# Patient Record
Sex: MALE | Race: AMERICAN INDIAN OR ALASKA NATIVE | NOT HISPANIC OR LATINO | Employment: OTHER | URBAN - METROPOLITAN AREA
[De-identification: names, ages, dates, MRNs, and addresses within clinical notes are randomized per-mention and may not be internally consistent; named-entity substitution may affect disease eponyms.]

---

## 2023-04-03 ENCOUNTER — TRANSFERRED RECORDS (OUTPATIENT)
Dept: HEALTH INFORMATION MANAGEMENT | Facility: CLINIC | Age: 68
End: 2023-04-03

## 2023-05-16 ENCOUNTER — TRANSFERRED RECORDS (OUTPATIENT)
Dept: HEALTH INFORMATION MANAGEMENT | Facility: CLINIC | Age: 68
End: 2023-05-16

## 2023-05-17 ENCOUNTER — TRANSFERRED RECORDS (OUTPATIENT)
Dept: HEALTH INFORMATION MANAGEMENT | Facility: CLINIC | Age: 68
End: 2023-05-17

## 2023-07-14 ENCOUNTER — TRANSFERRED RECORDS (OUTPATIENT)
Dept: HEALTH INFORMATION MANAGEMENT | Facility: CLINIC | Age: 68
End: 2023-07-14

## 2023-07-28 ENCOUNTER — HOSPITAL ENCOUNTER (OUTPATIENT)
Dept: CT IMAGING | Facility: HOSPITAL | Age: 68
Discharge: HOME | End: 2023-07-28
Attending: INTERNAL MEDICINE | Admitting: INTERNAL MEDICINE
Payer: COMMERCIAL

## 2023-07-28 DIAGNOSIS — R93.89 IMAGING ABNORMALITY: ICD-10-CM

## 2023-07-28 LAB
CREAT BLD-MCNC: 0.8 MG/DL (ref 0.7–1.3)
GFR SERPL CREATININE-BSD FRML MDRD: >60 ML/MIN/1.73M2

## 2023-07-28 PROCEDURE — 70491 CT SOFT TISSUE NECK W/DYE: CPT

## 2023-07-28 PROCEDURE — 82565 ASSAY OF CREATININE: CPT

## 2023-07-28 PROCEDURE — 250N000011 HC RX IP 250 OP 636: Mod: JZ | Performed by: INTERNAL MEDICINE

## 2023-07-28 RX ORDER — IOPAMIDOL 755 MG/ML
90 INJECTION, SOLUTION INTRAVASCULAR ONCE
Status: COMPLETED | OUTPATIENT
Start: 2023-07-28 | End: 2023-07-28

## 2023-07-28 RX ADMIN — IOPAMIDOL 90 ML: 755 INJECTION, SOLUTION INTRAVENOUS at 13:54

## 2023-08-01 ENCOUNTER — MEDICAL CORRESPONDENCE (OUTPATIENT)
Dept: HEALTH INFORMATION MANAGEMENT | Facility: CLINIC | Age: 68
End: 2023-08-01
Payer: COMMERCIAL

## 2023-08-02 ENCOUNTER — TRANSCRIBE ORDERS (OUTPATIENT)
Dept: VASCULAR SURGERY | Facility: CLINIC | Age: 68
End: 2023-08-02
Payer: COMMERCIAL

## 2023-08-02 ENCOUNTER — TRANSCRIBE ORDERS (OUTPATIENT)
Dept: OTHER | Age: 68
End: 2023-08-02

## 2023-08-02 DIAGNOSIS — I65.21 OCCLUSION OF RIGHT CAROTID ARTERY: Primary | ICD-10-CM

## 2023-08-02 DIAGNOSIS — I65.21 ARTERIOSCLEROSIS OF CAROTID ARTERY, RIGHT: ICD-10-CM

## 2023-08-02 DIAGNOSIS — J39.2 THROAT MASS: Primary | ICD-10-CM

## 2023-08-03 ENCOUNTER — TELEPHONE (OUTPATIENT)
Dept: OTOLARYNGOLOGY | Facility: CLINIC | Age: 68
End: 2023-08-03
Payer: COMMERCIAL

## 2023-08-07 DIAGNOSIS — I65.29 CAROTID STENOSIS: Primary | ICD-10-CM

## 2023-08-07 NOTE — TELEPHONE ENCOUNTER
FUTURE VISIT INFORMATION:      FUTURE VISIT INFORMATION:  Date: 8/9/23  Time: 9:20 AM  Location: [facility] [place]  REFERRAL INFORMATION:  Referring provider: Wojciech Vicente MD   Referring providers clinic: [facility]  Reason for visit/diagnosis:  Ref by Wojciech Vicente MD for Throat Mass, Arteriosclerosis of Carotid Artery     RECORDS REQUESTED FROM:       Clinic name Comments Records Status Imaging Status   Imaging CT neck 7/28/23 Epic Pacs   [facility] 7/11/2023 [place] Video Swallow Study   5/5/2023 [place] ESOPHAGRAM  CE pending  Req 8/7/23  Pacs   [facility] Speech Therapy   7/11/23 note- Rayna Aleman, SLP   CE    [facility] 4/3/23 note Don Kilpatrick DO Send to scan 9/8/23 August 7, 2023 at 3:02 PM - request for imaging pushed and [facility] for recs -[place]  August 8, 2023 at 9:06 AM - Images resolved in pacs from [facility] -[place]  August 8, 2023 at 10:22 AM - Received [facility] recs and send to scan -[facility]

## 2023-08-09 ENCOUNTER — THERAPY VISIT (OUTPATIENT)
Dept: SPEECH THERAPY | Facility: CLINIC | Age: 68
End: 2023-08-09
Payer: COMMERCIAL

## 2023-08-09 ENCOUNTER — PRE VISIT (OUTPATIENT)
Dept: OTOLARYNGOLOGY | Facility: CLINIC | Age: 68
End: 2023-08-09

## 2023-08-09 ENCOUNTER — OFFICE VISIT (OUTPATIENT)
Dept: OTOLARYNGOLOGY | Facility: CLINIC | Age: 68
End: 2023-08-09
Payer: COMMERCIAL

## 2023-08-09 DIAGNOSIS — R22.1 NECK MASS: ICD-10-CM

## 2023-08-09 DIAGNOSIS — R13.13 PHARYNGEAL DYSPHAGIA: Primary | ICD-10-CM

## 2023-08-09 DIAGNOSIS — R13.10 DYSPHAGIA, UNSPECIFIED TYPE: Primary | ICD-10-CM

## 2023-08-09 PROCEDURE — 31575 DIAGNOSTIC LARYNGOSCOPY: CPT | Performed by: OTOLARYNGOLOGY

## 2023-08-09 PROCEDURE — 99207 PR NO BILLABLE SERVICE THIS VISIT: CPT | Performed by: SPEECH-LANGUAGE PATHOLOGIST

## 2023-08-09 PROCEDURE — 99205 OFFICE O/P NEW HI 60 MIN: CPT | Mod: 25 | Performed by: OTOLARYNGOLOGY

## 2023-08-09 ASSESSMENT — PAIN SCALES - GENERAL: PAINLEVEL: MILD PAIN (2)

## 2023-08-09 NOTE — NURSING NOTE
Chief Complaint   Patient presents with    Consult     Throat mass        .  Rogelio Santiago LPN

## 2023-08-09 NOTE — LETTER
8/9/2023       RE: Ko Thakkar  [address]  [address]  [place] [address]     Dear Colleague,    Thank you for referring your patient, Ko Thakkar, to the [facility] [place] at [facility]. Please see a copy of my visit note below.    Dear Dr. Vicente:    I had the pleasure of meeting Ko Thakkar in consultation today at the [facility] at your request.     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    He was seen by [facility] on 4/3/2023 at the request of his PCP for dysphagia to solid foods. They discussed EGD vs swallow study.     He had an EGD on 5/16/2023 with GI and found to be H pylori positive, started on medication.     He saw SLP on 7/11/2023 for a video swallow study at [facility]. He was noted to have poor epiglottic inversion and was recommended for further evaluation. He did have penetration and aspiration on the swallow study.    He had a CT neck on 7/28/2023 which showed a supraglottic mass measuring 2.2 x 1.4 x 2.3 cm with concern for paraglottic involvement, no cartilage erosion, enlarged bilateral level IB, IIA, III, and right level IV nodes per radiology. He was also noted to have 70% ICA stenosis with concerns for high risk plaque. He has carotid duplex and vascular consult on 8/21/2023.    He says he tries to eat as much protein as possible, has to avoid things like steak. He makes shaw and softer things that involve less chewing. He does supplements in liquid form. He says sometimes soft things get stuck. He is able to clear things when they get stuck. He does not have pain with swallowing. He is able to feel residuals. He has coughing on liquids. He has not had an aspiration pneumonia. He has lost about  5-7 lbs in the last few months. He has no ear pain. He has tinnitus. He feels like it is not as easy to breathe as it used to be. He can walk up to about 3 flights of stairs. He has no hemoptysis.     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     He says his teeth need addressing. He last saw the dentist about a year ago.     His wife is dealing with some surgical needs as well.       Past medical history: reflux, removal of food impaction in 2008 and 2014 (potential web vs Schatzki ring in distal esophagus on EGD), hyperlipidemia, hypertension, diabetes, spinal meningitis as a child    Past surgical history: none    Social history: History of alcohol sober for 24 years. Former drug abuse. Previously smoked about 23 years ago, 4 ppd previously, 8 years old started. Intermittent chewing tobacco use in the past. Works as a . Lives with wife. Worked as .    Family history: Brother had scleroderma    MEDICATIONS:     Current Outpatient Medications   Medication Sig Dispense Refill    aspirin 325 MG tablet [ASPIRIN 325 MG TABLET] Take 325 mg by mouth daily.      glyBURIDE (DIABETA) 5 MG tablet [GLYBURIDE (DIABETA) 5 MG TABLET] Take 10 mg by mouth 2 (two) times a day with meals.      insulin glargine (LANTUS) 100 unit/mL injection [INSULIN GLARGINE (LANTUS) 100 UNIT/ML INJECTION] Inject 25 Units under the skin bedtime.      lisinopril (PRINIVIL,ZESTRIL) 5 MG tablet [LISINOPRIL (PRINIVIL,ZESTRIL) 5 MG TABLET] Take 5 mg by mouth daily.      metFORMIN (GLUCOPHAGE) 500 MG tablet [METFORMIN (GLUCOPHAGE) 500 MG TABLET] Take 500 mg by mouth 2 (two) times a day with meals.      pravastatin (PRAVACHOL) 20 MG tablet [PRAVASTATIN (PRAVACHOL) 20 MG TABLET] Take 20 mg by mouth bedtime.      therapeutic multivitamin (THERAGRAN) tablet [THERAPEUTIC MULTIVITAMIN (THERAGRAN) TABLET] Take 1 tablet by mouth daily.         ALLERGIES:  No Known Allergies    HABITS/SOCIAL HISTORY:   History of alcohol sober for 24 years. Former drug abuse.  Previously smoked about 23 years ago, 4 ppd previously, 8 years old started. Intermittent chewing tobacco use in the past.   Works as a .   Lives with wife.   Worked as .    Social History     Socioeconomic History    Marital status:      Spouse name: Not on file    Number of children: Not on file    Years of education: Not on file    Highest education level: Not on file   Occupational History    Not on file   Tobacco Use    Smoking status: Former     Types: Cigarettes     Quit date: 10/12/1999     Years since quittin.8    Smokeless tobacco: Not on file   Substance and Sexual Activity    Alcohol use: No    Drug use: No    Sexual activity: Not on file   Other Topics Concern    Not on file   Social History Narrative    Not on file     Social Determinants of Health     Financial Resource Strain: Not on file   Food Insecurity: Not on file   Transportation Needs: Not on file   Physical Activity: Not on file   Stress: Not on file   Social Connections: Not on file   Intimate Partner Violence: Not on file   Housing Stability: Not on file       PAST MEDICAL HISTORY: No past medical history on file.     PAST SURGICAL HISTORY:   Past Surgical History:   Procedure Laterality Date    ESOPHAGOSCOPY, GASTROSCOPY, DUODENOSCOPY (EGD), COMBINED N/A 10/12/2014    Procedure: ESOPHAGOGASTRODUODENOSCOPY;  Surgeon: Jh Ventura MD;  Location: [facility];  Service:     LAPAROTOMY EXPLORATORY Left     spleen       FAMILY HISTORY:  No family history on file.    REVIEW OF SYSTEMS:  12 point ROS was negative other than the symptoms noted above in the HPI.  Patient Supplied Answers to Review of Systems      2023     9:07 AM    ENT ROS   Constitutional Problems with sleep   Neurology Dizzy spells    Headache   Psychology Frequently feeling anxious   Ears, Nose, Throat Ringing/noise in ears    Sore throat    Trouble swallowing    Hoarseness   Cardiopulmonary Cough         PHYSICAL EXAMINATION:   There were  no vitals taken for this visit.   Appearance:   normal; NAD, age-appropriate appearance, well-developed, normal habitus   Communication:   normal; communicates verbally, normal voice quality   Head/Face:   inspection -  Normal; no scars or visible lesions   Salivary glands -  Normal size, no tenderness, swelling, or palpable masses   Facial strength -  Normal and symmetric    Skin:  normal, no rash   Ears:  auricle (AD) -  normal  EAC (AD) -  normal  TM (AD) -  Normal, no effusion  auricle (AS) -  normal  EAC (AS) -  normal  TM (AS) -  Normal, no effusion  Normal clinical speech reception   Nose:  Ext. inspection -  Normal  Internal Inspection -  Normal mucosa, septum, and turbinates   Nasopharynx:  normal mucosa, no masses   Oral Cavity:  lips -  Normal mucosa, oral competence, and stoma size   edentulous along maxilla, mandible with broken and missing teeth, healthy gingival mucosa   Hard palate, buccal, floor of mouth mucosa normal   Tongue - normal movement, no lesions, no palpable masses   Oropharynx:  mucosa -  Normal, no lesions  soft palate -  Normal, no lesions, no asymmetry, normal elevation  There is tumor of the right tonsil extending toward the base of tongue  There is some asymmetric fullness of the right lateral/posterior pharyngeal wall   Hypopharynx:  asymmetric fullness of the right lateral and pharyngeal wall concerning for submucosal tumor  No pooled secretions  Limited view of pyriform sinuses particularly on right   Larynx:  Epiglottis is thickened  Tumor of the right arytenoid  Right cord is paralyzed  Left arytenoid with some mild edema  Mobile left true cord   Neck: No visible mass or asymmetry   Palpable about 1.5-2 cm node in left level II  Normal range of motion   Lymphatic:  left level II node about 1.5-2 cm in size, firm   Cardiovascular:  warm, pink, well-perfused extremities without swelling, tenderness, or edema   Respiratory:  Normal respiratory effort, no stridor   Neuro/Psych.:   mood/affect -  normal  mental status -  normal          PROCEDURES:   Flexible fiberoptic laryngoscopy: Scope exam was indicated due to oropharyngeal vs supraglottic cancer. Verbal consent was obtained. The nasal cavity was prepped with an aerosolized solution of topical anesthetic and vasoconstrictive agent. The scope was passed through the anterior nasal cavity and advanced. Inspection of the nasopharynx revealed no gross abnormality. There is tumor of the right tonsil extending toward the glossotonsillar sulcus. There is asymetric fullness of the right lateral and posterior pharyngeal wall. There is edema of the epiglottis. There is tumor of the right arytenoid. The right cord is paralyzed. The left cord is mobile. The left arytenoid has some mild edema. There is a limited view of the pyriform sinuses. The airway is patent. Procedure tolerated well with no immediate complications noted.                    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 I am suspicious this is oropharyngeal with supraglottic extension.    We discussed that his clinical picture is certainly concerning for a laryngeal (vs oropharyngeal) cancer with sameer metastasis.    We will need to get a tissue diagnosis. We discussed that this could be done with a needle biopsy of the palpable neck node or an in clinic tonsil biopsy. He has considerable phobia of needles and would prefer anything be done under sedation. We will get him setup for a biopsy with IR of the left neck node.    We discussed that if his disease has no distant metastasis that he still has curable disease. If there is distant disease then this is treatable but not curable.    We broadly discussed that this cancer  can be treated with surgical and nonsurgical techniques. Nonsurgical treatment would be with chemoradiation with laryngeal preservation. Surgical treatment would be with a total laryngectomy/partial pharyngectomy with postoperative radiation (given the multiple bilateral nodes concerning for metastasis).    Certainly of concern is his baseline aspiration. We discussed that a laryngectomy would give him the opportunity to swallow again safely when he heals. We discussed that he would be NPO with a feeding tube for a period of time while healing but that ultimately he would be able to eat again safely without aspiration.     We discussed however that a laryngectomy would lose his ability to talk normally, that there are vocal rehab options. He states today very clearly that singing is very important to him, more so even than eating. Certainly with the partial pharyngectomy I would have some concerns about this but I encouraged him to talk more with the SLP team as well.     We discussed that if he has nonsurgical therapy with chemoradiation he would almost certainly need to have a PEG and this could be permanent vs temporary. We discussed that the radiation could also have impacts on his singing.    Complicating his situation, his wife also recently was diagnosed with some medical issues and will need to undergo surgery. We discussed that his is obviously a very challenging situation and will take balance to figure out timing for each of their treatments.    I had him see our SLP team today. He will need to have a full visit at his follow-up.     Once we have a tissue diagnosis, if malignancy is verified then we will need to proceed with a PET scan. I would like to plan on seeing him back about a week after the PET scan, giving us time to review it at tumor board.    Important in his treatment planning will also be his severe carotid stenosis. He is seeing vascular in 2 weeks with a carotid duplex. We discussed that  management of carotid stenosis varies and could impact his surgical clearance as well.     I discussed with him that he would not be able to be on anticoagulation for surgical resection of any malignancy in the form of a laryngectomy. With nonsurgical therapy he would almost certainly need a PEG tube and would also need to be done prior to any anticoagulation for treatment of his carotid stenosis.    We discussed that regardless of surgical or nonsurgical therapy he would need to have dental extractions given the need for radiation. If he has surgical treatment we can discuss the teeth at the time of laryngectomy. If he has nonsurgical therapy we would need to get him in with the dentist as soon as possible.    Thank you very much for the opportunity to participate in the care of your patient.      Denia Hardin MD  Otolaryngology- Head & Neck Surgery      This note was dictated with voice recognition software and then edited. Please excuse any unintentional errors.         CC:  Wojciech Vicente MD  [facility]   [address] [id number]  [facility] [address]      Alton Mota MD  [facility]   [address]  [facility] [address]

## 2023-08-09 NOTE — PROGRESS NOTES
Ko Thakkar was seen for allied health care provider visit for introduction of self and SLP services. Provided information on trajectory of care and benefits of SLP services after further work up is completed. Speaking is functional. Swallowing is impaired. Pt had VFSS on the outside which resulted in recommendation for further evaluation of pharyngeal anatomy. Currently here is a mass in his pharynx which may require total laryngectomy with partial pharyngectomy or chemoradiation. Offered formal pre-operative total laryngectomy education however pt and his spouse declined today feeling a bit overwhelmed with everything going on in their lives. Pt's wife has to have surgery for cervical cancer and the thought of cancer in the patient has the wife feeling overwhelmed. All questions answered within scope of practice for pt and his spouse. Encouraged pt to reach out with any questions or concerns. Time spent with patient 9 minutes. No billable services provided. Pt will benefit from formal evaluation upon return to ENT clinic.       Rola Marcano MS, CCC-SLP  Speech-Language Pathology  [facility]  Department of Otolaryngology/D&T - 4th floor  Phone: [phone]  Email: [email or web address]

## 2023-08-09 NOTE — CONSULTS
.    Outpatient Neuroradiology Biopsy Referral    Patient is a 68 y/o male with a PMH of HTN, HDL, DM, reflux, tobacco use, drug abuse, Dysphagia.  Neuroradiology has been asked to biopsy left neck mass noted on recent CT with concerns for malignancy.    CT 7/28/23 IMPRESSION:   1.  Right supraglottic visceral space mass causing partial effacement of the supraglottic airway medially and effacement of the adjacent sub-thyroid cartilage fat plane. No CT findings of thyroid arytenoid cartilage invasion or erosion. Pathologic bilateral lymphadenopathy involving levels I, II, III and IV. Primary diagnostic considerations include squamous cell carcinoma and lymphoma. Tissue sampling and direct visualization recommended.  2.  High-grade (approximately 70%) proximal right internal carotid artery stenosis secondary to high risk plaque. Recommend ultrasound and vascular surgery consultation.  3.  Multiple mandibular cavities.     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    Case and imaging CT 7/28/23 was reviewed with Dr. Daniel and Dr. Gibbs from Neuroradiology and FNA biopsy of the left neck mass is approved.     Procedure order, FNA entered order placed.    No hold ASA.    If requesting team would like samples sent for anything else please enter them or notify Neuroradiology prior to scheduled procedure.    Primary team Dr. Wilfred FREGOSO made aware of Neuroradiology recommendations via epic messaging.    PETE Cleaning CNP  Interventional Radiology   IR on-call pager: [phone]

## 2023-08-09 NOTE — PROGRESS NOTES
Dear Dr. Vicente:    I had the pleasure of meeting Ko Thakkar in consultation today at the [facility] at your request.     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    He was seen by [facility] on 4/3/2023 at the request of his PCP for dysphagia to solid foods. They discussed EGD vs swallow study.     He had an EGD on 5/16/2023 with GI and found to be H pylori positive, started on medication.     He saw SLP on 7/11/2023 for a video swallow study at [facility]. He was noted to have poor epiglottic inversion and was recommended for further evaluation. He did have penetration and aspiration on the swallow study.    He had a CT neck on 7/28/2023 which showed a supraglottic mass measuring 2.2 x 1.4 x 2.3 cm with concern for paraglottic involvement, no cartilage erosion, enlarged bilateral level IB, IIA, III, and right level IV nodes per radiology. He was also noted to have 70% ICA stenosis with concerns for high risk plaque. He has carotid duplex and vascular consult on 8/21/2023.    He says he tries to eat as much protein as possible, has to avoid things like steak. He makes shaw and softer things that involve less chewing. He does supplements in liquid form. He says sometimes soft things get stuck. He is able to clear things when they get stuck. He does not have pain with swallowing. He is able to feel residuals. He has coughing on liquids. He has not had an aspiration pneumonia. He has lost about 5-7 lbs in the last few months. He has no ear pain. He has tinnitus. He feels like it is not as easy to breathe as it used to be. He can walk up to about 3 flights of stairs. He has no hemoptysis.     He is a [place] in [place].     He says his teeth need addressing. He last saw the dentist about a year ago.     His wife is dealing with  some surgical needs as well.       Past medical history: reflux, removal of food impaction in 2008 and 2014 (potential web vs Schatzki ring in distal esophagus on EGD), hyperlipidemia, hypertension, diabetes, spinal meningitis as a child    Past surgical history: none    Social history: History of alcohol sober for 24 years. Former drug abuse. Previously smoked about 23 years ago, 4 ppd previously, 8 years old started. Intermittent chewing tobacco use in the past. Works as a . Lives with wife. Worked as .    Family history: Brother had scleroderma    MEDICATIONS:     Current Outpatient Medications   Medication Sig Dispense Refill     aspirin 325 MG tablet [ASPIRIN 325 MG TABLET] Take 325 mg by mouth daily.       glyBURIDE (DIABETA) 5 MG tablet [GLYBURIDE (DIABETA) 5 MG TABLET] Take 10 mg by mouth 2 (two) times a day with meals.       insulin glargine (LANTUS) 100 unit/mL injection [INSULIN GLARGINE (LANTUS) 100 UNIT/ML INJECTION] Inject 25 Units under the skin bedtime.       lisinopril (PRINIVIL,ZESTRIL) 5 MG tablet [LISINOPRIL (PRINIVIL,ZESTRIL) 5 MG TABLET] Take 5 mg by mouth daily.       metFORMIN (GLUCOPHAGE) 500 MG tablet [METFORMIN (GLUCOPHAGE) 500 MG TABLET] Take 500 mg by mouth 2 (two) times a day with meals.       pravastatin (PRAVACHOL) 20 MG tablet [PRAVASTATIN (PRAVACHOL) 20 MG TABLET] Take 20 mg by mouth bedtime.       therapeutic multivitamin (THERAGRAN) tablet [THERAPEUTIC MULTIVITAMIN (THERAGRAN) TABLET] Take 1 tablet by mouth daily.         ALLERGIES:  No Known Allergies    HABITS/SOCIAL HISTORY:   History of alcohol sober for 24 years. Former drug abuse. Previously smoked about 23 years ago, 4 ppd previously, 8 years old started. Intermittent chewing tobacco use in the past.   Works as a .   Lives with wife.   Worked as .    Social History     Socioeconomic History     Marital status:      Spouse name: Not on file     Number of children: Not on file     Years of  education: Not on file     Highest education level: Not on file   Occupational History     Not on file   Tobacco Use     Smoking status: Former     Types: Cigarettes     Quit date: 10/12/1999     Years since quittin.8     Smokeless tobacco: Not on file   Substance and Sexual Activity     Alcohol use: No     Drug use: No     Sexual activity: Not on file   Other Topics Concern     Not on file   Social History Narrative     Not on file     Social Determinants of Health     Financial Resource Strain: Not on file   Food Insecurity: Not on file   Transportation Needs: Not on file   Physical Activity: Not on file   Stress: Not on file   Social Connections: Not on file   Intimate Partner Violence: Not on file   Housing Stability: Not on file       PAST MEDICAL HISTORY: No past medical history on file.     PAST SURGICAL HISTORY:   Past Surgical History:   Procedure Laterality Date     ESOPHAGOSCOPY, GASTROSCOPY, DUODENOSCOPY (EGD), COMBINED N/A 10/12/2014    Procedure: ESOPHAGOGASTRODUODENOSCOPY;  Surgeon: Jh Ventura MD;  Location: [facility];  Service:      LAPAROTOMY EXPLORATORY Left     spleen       FAMILY HISTORY:  No family history on file.    REVIEW OF SYSTEMS:  12 point ROS was negative other than the symptoms noted above in the HPI.  Patient Supplied Answers to Review of Systems      2023     9:07 AM    ENT ROS   Constitutional Problems with sleep   Neurology Dizzy spells    Headache   Psychology Frequently feeling anxious   Ears, Nose, Throat Ringing/noise in ears    Sore throat    Trouble swallowing    Hoarseness   Cardiopulmonary Cough         PHYSICAL EXAMINATION:   There were no vitals taken for this visit.   Appearance:   normal; NAD, age-appropriate appearance, well-developed, normal habitus   Communication:   normal; communicates verbally, normal voice quality   Head/Face:   inspection -  Normal; no scars or visible lesions   Salivary glands -  Normal size, no tenderness, swelling, or  palpable masses   Facial strength -  Normal and symmetric    Skin:  normal, no rash   Ears:  auricle (AD) -  normal  EAC (AD) -  normal  TM (AD) -  Normal, no effusion  auricle (AS) -  normal  EAC (AS) -  normal  TM (AS) -  Normal, no effusion  Normal clinical speech reception   Nose:  Ext. inspection -  Normal  Internal Inspection -  Normal mucosa, septum, and turbinates   Nasopharynx:  normal mucosa, no masses   Oral Cavity:  lips -  Normal mucosa, oral competence, and stoma size   edentulous along maxilla, mandible with broken and missing teeth, healthy gingival mucosa   Hard palate, buccal, floor of mouth mucosa normal   Tongue - normal movement, no lesions, no palpable masses   Oropharynx:  mucosa -  Normal, no lesions  soft palate -  Normal, no lesions, no asymmetry, normal elevation  There is tumor of the right tonsil extending toward the base of tongue  There is some asymmetric fullness of the right lateral/posterior pharyngeal wall   Hypopharynx:  asymmetric fullness of the right lateral and pharyngeal wall concerning for submucosal tumor  No pooled secretions  Limited view of pyriform sinuses particularly on right   Larynx:  Epiglottis is thickened  Tumor of the right arytenoid  Right cord is paralyzed  Left arytenoid with some mild edema  Mobile left true cord   Neck: No visible mass or asymmetry   Palpable about 1.5-2 cm node in left level II  Normal range of motion   Lymphatic:  left level II node about 1.5-2 cm in size, firm   Cardiovascular:  warm, pink, well-perfused extremities without swelling, tenderness, or edema   Respiratory:  Normal respiratory effort, no stridor   Neuro/Psych.:  mood/affect -  normal  mental status -  normal          PROCEDURES:   Flexible fiberoptic laryngoscopy: Scope exam was indicated due to oropharyngeal vs supraglottic cancer. Verbal consent was obtained. The nasal cavity was prepped with an aerosolized solution of topical anesthetic and vasoconstrictive agent. The  scope was passed through the anterior nasal cavity and advanced. Inspection of the nasopharynx revealed no gross abnormality. There is tumor of the right tonsil extending toward the glossotonsillar sulcus. There is asymetric fullness of the right lateral and posterior pharyngeal wall. There is edema of the epiglottis. There is tumor of the right arytenoid. The right cord is paralyzed. The left cord is mobile. The left arytenoid has some mild edema. There is a limited view of the pyriform sinuses. The airway is patent. Procedure tolerated well with no immediate complications noted.                    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 I am suspicious this is oropharyngeal with supraglottic extension.    We discussed that his clinical picture is certainly concerning for a laryngeal (vs oropharyngeal) cancer with sameer metastasis.    We will need to get a tissue diagnosis. We discussed that this could be done with a needle biopsy of the palpable neck node or an in clinic tonsil biopsy. He has considerable phobia of needles and would prefer anything be done under sedation. We will get him setup for a biopsy with IR of the left neck node.    We discussed that if his disease has no distant metastasis that he still has curable disease. If there is distant disease then this is treatable but not curable.    We broadly discussed that this cancer can be treated with surgical and nonsurgical techniques. Nonsurgical treatment would be with chemoradiation with laryngeal preservation. Surgical treatment would be with a total laryngectomy/partial pharyngectomy with postoperative radiation (given the multiple bilateral nodes concerning for  metastasis).    Certainly of concern is his baseline aspiration. We discussed that a laryngectomy would give him the opportunity to swallow again safely when he heals. We discussed that he would be NPO with a feeding tube for a period of time while healing but that ultimately he would be able to eat again safely without aspiration.     We discussed however that a laryngectomy would lose his ability to talk normally, that there are vocal rehab options. He states today very clearly that singing is very important to him, more so even than eating. Certainly with the partial pharyngectomy I would have some concerns about this but I encouraged him to talk more with the SLP team as well.     We discussed that if he has nonsurgical therapy with chemoradiation he would almost certainly need to have a PEG and this could be permanent vs temporary. We discussed that the radiation could also have impacts on his singing.    Complicating his situation, his wife also recently was diagnosed with some medical issues and will need to undergo surgery. We discussed that his is obviously a very challenging situation and will take balance to figure out timing for each of their treatments.    I had him see our SLP team today. He will need to have a full visit at his follow-up.     Once we have a tissue diagnosis, if malignancy is verified then we will need to proceed with a PET scan. I would like to plan on seeing him back about a week after the PET scan, giving us time to review it at tumor board.    Important in his treatment planning will also be his severe carotid stenosis. He is seeing vascular in 2 weeks with a carotid duplex. We discussed that management of carotid stenosis varies and could impact his surgical clearance as well.     I discussed with him that he would not be able to be on anticoagulation for surgical resection of any malignancy in the form of a laryngectomy. With nonsurgical therapy he would almost certainly need a  PEG tube and would also need to be done prior to any anticoagulation for treatment of his carotid stenosis.    We discussed that regardless of surgical or nonsurgical therapy he would need to have dental extractions given the need for radiation. If he has surgical treatment we can discuss the teeth at the time of laryngectomy. If he has nonsurgical therapy we would need to get him in with the dentist as soon as possible.    Thank you very much for the opportunity to participate in the care of your patient.      Denia Hardin MD  Otolaryngology- Head & Neck Surgery      This note was dictated with voice recognition software and then edited. Please excuse any unintentional errors.         CC:  Wojciech Vicente MD  [facility]   [address] [address]  [facility] [id number]      Alton Mota MD  [facility]   [address] [facility] [address]

## 2023-08-21 ENCOUNTER — ANCILLARY PROCEDURE (OUTPATIENT)
Dept: VASCULAR ULTRASOUND | Facility: CLINIC | Age: 68
End: 2023-08-21
Attending: SURGERY
Payer: COMMERCIAL

## 2023-08-21 ENCOUNTER — OFFICE VISIT (OUTPATIENT)
Dept: VASCULAR SURGERY | Facility: CLINIC | Age: 68
End: 2023-08-21
Attending: INTERNAL MEDICINE
Payer: COMMERCIAL

## 2023-08-21 VITALS
SYSTOLIC BLOOD PRESSURE: 167 MMHG | TEMPERATURE: 98 F | DIASTOLIC BLOOD PRESSURE: 77 MMHG | HEART RATE: 60 BPM | RESPIRATION RATE: 16 BRPM

## 2023-08-21 DIAGNOSIS — I65.29 CAROTID STENOSIS: ICD-10-CM

## 2023-08-21 DIAGNOSIS — I65.21 OCCLUSION OF RIGHT CAROTID ARTERY: ICD-10-CM

## 2023-08-21 PROCEDURE — G0463 HOSPITAL OUTPT CLINIC VISIT: HCPCS | Mod: 25 | Performed by: SURGERY

## 2023-08-21 PROCEDURE — 93880 EXTRACRANIAL BILAT STUDY: CPT

## 2023-08-21 PROCEDURE — 99214 OFFICE O/P EST MOD 30 MIN: CPT | Mod: GC | Performed by: SURGERY

## 2023-08-21 PROCEDURE — 93880 EXTRACRANIAL BILAT STUDY: CPT | Mod: 26 | Performed by: SURGERY

## 2023-08-21 RX ORDER — LIRAGLUTIDE 6 MG/ML
INJECTION SUBCUTANEOUS DAILY
COMMUNITY

## 2023-08-21 RX ORDER — ATORVASTATIN CALCIUM 20 MG/1
20 TABLET, FILM COATED ORAL DAILY
COMMUNITY
End: 2023-09-14

## 2023-08-21 RX ORDER — ATORVASTATIN CALCIUM 80 MG/1
80 TABLET, FILM COATED ORAL DAILY
Qty: 90 TABLET | Refills: 3 | Status: SHIPPED | OUTPATIENT
Start: 2023-08-21

## 2023-08-21 RX ORDER — ASPIRIN 81 MG/1
81 TABLET, CHEWABLE ORAL DAILY
Qty: 90 TABLET | Refills: 3 | Status: SHIPPED | OUTPATIENT
Start: 2023-08-21

## 2023-08-21 ASSESSMENT — PAIN SCALES - GENERAL: PAINLEVEL: NO PAIN (0)

## 2023-08-21 NOTE — PATIENT INSTRUCTIONS
Linda Connell,    Thank you for entrusting your care with us today. After your visit today with MD Tj Leonard this is the plan that was discussed at your appointment.    Increase atorvastatin to 80mg daily and start aspirin 81mg daily    Return in 6 months to follow up with ultrasound. We will call you to schedule.         I am including additional information on these things and our contact information if you have any questions or concerns.   Please do not hesitate to reach out to us if you felt we did not answer your questions or you are unsure of the treatment plan after your visit today. Our number is [phone].Thank you for trusting us with your care.         Again thank you for your time.

## 2023-08-21 NOTE — PROGRESS NOTES
VASCULAR SURGERY CLINIC CONSULTATION   VASCULAR SURGEON: Dr. Leonard    LOCATION:  [facility]    Ko Tahkkar  Medical Record #:  [id number]  YOB: 1955  Age:  67 year old     Date of Service: 8/21/2023    PRIMARY CARE PROVIDER: Alton Mota      Reason for visit: Right carotid stenosis    IMPRESSION: 67-year-old male with right-sided carotid stenosis that is high-grade.  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  This has to be addressed first before we intervene on the carotid stenosis.  It appears to be about 80 to 85% stenosed on the CT scan.  The patient is asymptomatic from carotid standpoint.    RECOMMENDATION:    Agree with that the patient has to be treated for cancer first.  Resume aspirin and atorvastatin and high-dose (80 mg).  We will follow-up in 6 months with another ultrasound of the carotid.    Seen and discussed with staff, Dr. Aisha Tapia MD      HPI:  Ko Thakkar is a 67 year old male who was seen today in consultation for right-sided carotid stenosis.  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  He was to pursue nonoperative management of chemoradiation for his cancer.  Otherwise, he never had a stroke or strokelike symptoms in the past.    REVIEW OF SYSTEMS:    A 12 point ROS was reviewed and except for what is listed in the HPI above, all others are negative    PHH:  No past medical history on file.     Past Surgical History:   Procedure Laterality Date    ESOPHAGOSCOPY, GASTROSCOPY, DUODENOSCOPY (EGD), COMBINED N/A 10/12/2014    Procedure: ESOPHAGOGASTRODUODENOSCOPY;  Surgeon: Jh Ventura MD;  Location: [facility];  Service:     LAPAROTOMY EXPLORATORY Left     spleen       ALLERGIES:  No Known Allergies    MEDS:    Current Outpatient Medications:     lisinopril (PRINIVIL,ZESTRIL) 5  MG tablet, [LISINOPRIL (PRINIVIL,ZESTRIL) 5 MG TABLET] Take 5 mg by mouth daily., Disp: , Rfl:     metFORMIN (GLUCOPHAGE) 500 MG tablet, [METFORMIN (GLUCOPHAGE) 500 MG TABLET] Take 500 mg by mouth 2 (two) times a day with meals., Disp: , Rfl:     aspirin 325 MG tablet, [ASPIRIN 325 MG TABLET] Take 325 mg by mouth daily. (Patient not taking: Reported on 8/21/2023), Disp: , Rfl:     glyBURIDE (DIABETA) 5 MG tablet, [GLYBURIDE (DIABETA) 5 MG TABLET] Take 10 mg by mouth 2 (two) times a day with meals. (Patient not taking: Reported on 8/21/2023), Disp: , Rfl:     insulin glargine (LANTUS) 100 unit/mL injection, [INSULIN GLARGINE (LANTUS) 100 UNIT/ML INJECTION] Inject 25 Units under the skin bedtime., Disp: , Rfl:     pravastatin (PRAVACHOL) 20 MG tablet, [PRAVASTATIN (PRAVACHOL) 20 MG TABLET] Take 20 mg by mouth bedtime., Disp: , Rfl:     therapeutic multivitamin (THERAGRAN) tablet, [THERAPEUTIC MULTIVITAMIN (THERAGRAN) TABLET] Take 1 tablet by mouth daily., Disp: , Rfl:     SOCIAL HABITS:   Social History    Substance and Sexual Activity      Alcohol use: No      History   Drug Use No      History   Smoking Status    Former    Types: Cigarettes    Quit date: 10/12/1999   Smokeless Tobacco    Not on file        FAMILY HISTORY:  No family history on file.    PE:  There were no vitals taken for this visit.  Wt Readings from Last 1 Encounters:   10/12/14 81.6 kg (180 lb)     There is no height or weight on file to calculate BMI.    EXAM:  GENERAL: This is a well-developed 67 year old male who appears his stated age  EYES: Grossly normal.  CARDIAC:  Warm, well-perfused, RRR, palpable radial, palpable femoral, and palpable PT pulses bilaterally  ABDOMEN: Soft, non-tender, no pulsatile mass  MUSCULOSKELETAL: Grossly normal and both lower extremities are intact.  HEME/LYMPH: No lymphedema  NEUROLOGIC: Focally intact, Alert and oriented x 3.   strength 5 out of 5 bilaterally, moving lower extremities without issues  PSYCH:  appropriate affect  INTEGUMENT: No open lesions or ulcers        DIAGNOSTIC STUDIES:     Images:  CT Soft Tissue Neck w Contrast    Result Date: 8/1/2023  EXAM: CT SOFT TISSUE NECK W CONTRAST LOCATION: [facility] DATE: 7/28/2023 INDICATION: Imaging abnormality. COMPARISON: None. CONTRAST: IsoVue-370. 90 mL. TECHNIQUE: Routine CT Soft Tissue Neck with IV contrast. Multiplanar reformats. Dose reduction techniques were used. FINDINGS: MUCOSAL SPACES/SOFT TISSUES: Hyperenhancing right supraglottic polypoid-shaped mass measuring 22 x 14 x 23 mm. Effacement of the right fat plane between the pharyngeal mucosal space and deep margin of the right thyroid cartilage. Partial effacement of the supraglottic airway. No erosion of the arytenoid or cricoid cartilages. The inferior margin of the lesion appears to involve the superior aspect of the false vocal cords. Abnormally enlarged bilateral level IB, IIA, III and right level IV lymph nodes  measuring between 11 and 21 mm long axis. Normal infraglottic trachea. Normal oral cavity,  spaces. Mild right greater than left torus mandibularis. Otherwise normal floor of mouth structures. LYMPH NODES: As above. SALIVARY GLANDS: Normal parotid and submandibular glands. THYROID: Normal. VESSELS: Focal circumferential narrowing of the right proximal internal carotid artery by low-attenuation plaque measuring 1.2-1.5 mm. Normal right internal carotid artery measures 4-5 mm calculating to approximately 70% luminal stenosis. Mild atherosclerotic plaque at the carotid bifurcations. VISUALIZED INTRACRANIAL/ORBITS/SINUSES: No abnormality of the visualized intracranial compartment or orbits. Mild-to-moderate parapharyngeal mucosal thickening and debris. Clear mastoid air cells. Multiple cavities involving the remaining mandibular teeth. OTHER: No destructive osseous lesion. Centrilobular and paraseptal emphysema.     IMPRESSION: 1.  Right supraglottic visceral  space mass causing partial effacement of the supraglottic airway medially and effacement of the adjacent sub-thyroid cartilage fat plane. No CT findings of thyroid arytenoid cartilage invasion or erosion. Pathologic bilateral lymphadenopathy involving levels I, II, III and IV. Primary diagnostic considerations include squamous cell carcinoma and lymphoma. Tissue sampling and direct visualization recommended. 2.  High-grade (approximately 70%) proximal right internal carotid artery stenosis secondary to high risk plaque. Recommend ultrasound and vascular surgery consultation. 3.  Multiple mandibular cavities. 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 This report will be copied to the [facility] to ensure a provider acknowledges the finding. Access Center is available Monday through Friday 8am-3:30 pm.        LABS:      No results found for: NA, BUN, CREATININE, GLUCOSE, HGB, PLT, BNP, INR

## 2023-08-21 NOTE — PROGRESS NOTES
[facility]        Patient is here for a consult to discuss Carotid stenosis. Patient has no symptoms.  Diagnosed with throat cancer 2 weeks ago, needs throat surgery first.     Pt is currently taking Statin.      The provider has been notified that the patient has no concerns.     Questions patient would like addressed today are: N/A.    Refills are needed: No    Has homecare services and agency name:  No

## 2023-08-22 ENCOUNTER — TELEPHONE (OUTPATIENT)
Dept: INTERVENTIONAL RADIOLOGY/VASCULAR | Facility: CLINIC | Age: 68
End: 2023-08-22
Payer: COMMERCIAL

## 2023-08-24 ENCOUNTER — TELEPHONE (OUTPATIENT)
Dept: OTOLARYNGOLOGY | Facility: CLINIC | Age: 68
End: 2023-08-24
Payer: COMMERCIAL

## 2023-08-24 NOTE — TELEPHONE ENCOUNTER
" Health Call Center    Phone Message    May a detailed message be left on voicemail: yes     Reason for Call: Other: Pt called in this morning letting us know that he will be having another procedure for throat cancer and the way he understands it is that Dr. Hardin will not be able to do surgery on 8/29/23. When this writer went to clarify this information pt was very confused and we telling me that Dr. Hardin told him that he can not do this throat cancer \"thing\" at the same time as surgery. Please reach out to patient and help him understand what is going on and if his surgery with Dr. Hardin is still going to be scheduled for 8/29/23 or if it will have to be post poned. Thank you     Action Taken: Message routed to:  [facility] ([facility]): [facility]    Travel Screening: Not Applicable                                                                    "

## 2023-08-24 NOTE — TELEPHONE ENCOUNTER
This writer reached out to the patient to assure him that the appointment on August 29th is not a surgical appointment.  After getting clarification from the surgical team and from Dr. Wilfred Bueno's nurse, this writer explained to the patient that the August 29th appointment is going to be a biopsy, and the appointment on September 13th is going to be to follow up after a biopsy and PET scan have been completed.  This writer asked the patient if he had a PET scan scheduled yet, and the patient was not sure and mentioned that it was possible he was supposed to have that done through [facility].  This writer informed the patient that I would reach out to Dr. Hardin's team to clarify that plan so his imaging could be scheduled through the correct conduits.  The patient expressed understanding of the plan and had no further questions at the time of the call.  This writer will forward a message to Dr. Hardin's team to ensure the PET scan scheduling is in progress.    Jen Corey LPN

## 2023-08-25 DIAGNOSIS — C10.9 SQUAMOUS CELL CARCINOMA OF OROPHARYNX (H): Primary | ICD-10-CM

## 2023-08-25 DIAGNOSIS — C10.2 PRIMARY SQUAMOUS CELL CARCINOMA OF LATERAL WALL OF OROPHARYNX (H): ICD-10-CM

## 2023-08-29 ENCOUNTER — APPOINTMENT (OUTPATIENT)
Dept: MEDSURG UNIT | Facility: CLINIC | Age: 68
End: 2023-08-29
Attending: OTOLARYNGOLOGY
Payer: COMMERCIAL

## 2023-08-29 ENCOUNTER — HOSPITAL ENCOUNTER (OUTPATIENT)
Facility: CLINIC | Age: 68
Discharge: HOME | End: 2023-08-29
Attending: OTOLARYNGOLOGY | Admitting: RADIOLOGY
Payer: COMMERCIAL

## 2023-08-29 ENCOUNTER — APPOINTMENT (OUTPATIENT)
Dept: INTERVENTIONAL RADIOLOGY/VASCULAR | Facility: CLINIC | Age: 68
End: 2023-08-29
Attending: OTOLARYNGOLOGY
Payer: COMMERCIAL

## 2023-08-29 VITALS
OXYGEN SATURATION: 97 % | BODY MASS INDEX: 28.24 KG/M2 | SYSTOLIC BLOOD PRESSURE: 133 MMHG | RESPIRATION RATE: 18 BRPM | DIASTOLIC BLOOD PRESSURE: 84 MMHG | HEART RATE: 81 BPM | HEIGHT: 66 IN | TEMPERATURE: 97.4 F | WEIGHT: 175.71 LBS

## 2023-08-29 DIAGNOSIS — R22.1 NECK MASS: ICD-10-CM

## 2023-08-29 LAB
ERYTHROCYTE [DISTWIDTH] IN BLOOD BY AUTOMATED COUNT: 13.5 % (ref 10–15)
GLUCOSE BLDC GLUCOMTR-MCNC: 121 MG/DL (ref 70–99)
GLUCOSE BLDC GLUCOMTR-MCNC: 149 MG/DL (ref 70–99)
HCT VFR BLD AUTO: 42.7 % (ref 40–53)
HGB BLD-MCNC: 13.8 G/DL (ref 13.3–17.7)
INR PPP: 0.98 (ref 0.85–1.15)
MCH RBC QN AUTO: 27.5 PG (ref 26.5–33)
MCHC RBC AUTO-ENTMCNC: 32.3 G/DL (ref 31.5–36.5)
MCV RBC AUTO: 85 FL (ref 78–100)
PLATELET # BLD AUTO: 335 10E3/UL (ref 150–450)
RBC # BLD AUTO: 5.02 10E6/UL (ref 4.4–5.9)
WBC # BLD AUTO: 10.3 10E3/UL (ref 4–11)

## 2023-08-29 PROCEDURE — 10005 FNA BX W/US GDN 1ST LES: CPT | Mod: GC | Performed by: RADIOLOGY

## 2023-08-29 PROCEDURE — 88172 CYTP DX EVAL FNA 1ST EA SITE: CPT | Mod: 26 | Performed by: PATHOLOGY

## 2023-08-29 PROCEDURE — 36415 COLL VENOUS BLD VENIPUNCTURE: CPT | Performed by: NURSE PRACTITIONER

## 2023-08-29 PROCEDURE — 999N000142 HC STATISTIC PROCEDURE PREP ONLY

## 2023-08-29 PROCEDURE — 82962 GLUCOSE BLOOD TEST: CPT

## 2023-08-29 PROCEDURE — 99152 MOD SED SAME PHYS/QHP 5/>YRS: CPT | Mod: GC | Performed by: RADIOLOGY

## 2023-08-29 PROCEDURE — 250N000009 HC RX 250

## 2023-08-29 PROCEDURE — 258N000003 HC RX IP 258 OP 636: Performed by: NURSE PRACTITIONER

## 2023-08-29 PROCEDURE — 85027 COMPLETE CBC AUTOMATED: CPT | Performed by: NURSE PRACTITIONER

## 2023-08-29 PROCEDURE — 10005 FNA BX W/US GDN 1ST LES: CPT

## 2023-08-29 PROCEDURE — 999N000133 HC STATISTIC PP CARE STAGE 2

## 2023-08-29 PROCEDURE — 88305 TISSUE EXAM BY PATHOLOGIST: CPT | Mod: TC | Performed by: OTOLARYNGOLOGY

## 2023-08-29 PROCEDURE — 250N000011 HC RX IP 250 OP 636

## 2023-08-29 PROCEDURE — 99153 MOD SED SAME PHYS/QHP EA: CPT

## 2023-08-29 PROCEDURE — 85610 PROTHROMBIN TIME: CPT | Performed by: NURSE PRACTITIONER

## 2023-08-29 PROCEDURE — 99152 MOD SED SAME PHYS/QHP 5/>YRS: CPT

## 2023-08-29 PROCEDURE — 88173 CYTOPATH EVAL FNA REPORT: CPT | Mod: 26 | Performed by: PATHOLOGY

## 2023-08-29 PROCEDURE — 88342 IMHCHEM/IMCYTCHM 1ST ANTB: CPT | Mod: 26 | Performed by: PATHOLOGY

## 2023-08-29 PROCEDURE — 88305 TISSUE EXAM BY PATHOLOGIST: CPT | Mod: 26 | Performed by: PATHOLOGY

## 2023-08-29 RX ORDER — LIDOCAINE 40 MG/G
CREAM TOPICAL
Status: DISCONTINUED | OUTPATIENT
Start: 2023-08-29 | End: 2023-08-29 | Stop reason: HOSPADM

## 2023-08-29 RX ORDER — NICOTINE POLACRILEX 4 MG
15-30 LOZENGE BUCCAL
Status: DISCONTINUED | OUTPATIENT
Start: 2023-08-29 | End: 2023-08-29 | Stop reason: HOSPADM

## 2023-08-29 RX ORDER — NALOXONE HYDROCHLORIDE 0.4 MG/ML
0.2 INJECTION, SOLUTION INTRAMUSCULAR; INTRAVENOUS; SUBCUTANEOUS
Status: DISCONTINUED | OUTPATIENT
Start: 2023-08-29 | End: 2023-08-29 | Stop reason: HOSPADM

## 2023-08-29 RX ORDER — FENTANYL CITRATE 50 UG/ML
25-50 INJECTION, SOLUTION INTRAMUSCULAR; INTRAVENOUS EVERY 5 MIN PRN
Status: DISCONTINUED | OUTPATIENT
Start: 2023-08-29 | End: 2023-08-29 | Stop reason: HOSPADM

## 2023-08-29 RX ORDER — NALOXONE HYDROCHLORIDE 0.4 MG/ML
0.4 INJECTION, SOLUTION INTRAMUSCULAR; INTRAVENOUS; SUBCUTANEOUS
Status: DISCONTINUED | OUTPATIENT
Start: 2023-08-29 | End: 2023-08-29 | Stop reason: HOSPADM

## 2023-08-29 RX ORDER — SODIUM CHLORIDE 9 MG/ML
INJECTION, SOLUTION INTRAVENOUS CONTINUOUS
Status: DISCONTINUED | OUTPATIENT
Start: 2023-08-29 | End: 2023-08-29 | Stop reason: HOSPADM

## 2023-08-29 RX ORDER — DEXTROSE MONOHYDRATE 25 G/50ML
25-50 INJECTION, SOLUTION INTRAVENOUS
Status: DISCONTINUED | OUTPATIENT
Start: 2023-08-29 | End: 2023-08-29 | Stop reason: HOSPADM

## 2023-08-29 RX ORDER — AMLODIPINE BESYLATE 5 MG/1
5 TABLET ORAL DAILY
COMMUNITY

## 2023-08-29 RX ORDER — FLUMAZENIL 0.1 MG/ML
0.2 INJECTION, SOLUTION INTRAVENOUS
Status: DISCONTINUED | OUTPATIENT
Start: 2023-08-29 | End: 2023-08-29 | Stop reason: HOSPADM

## 2023-08-29 RX ORDER — HYDROCHLOROTHIAZIDE 25 MG/1
25 TABLET ORAL DAILY
COMMUNITY

## 2023-08-29 RX ADMIN — MIDAZOLAM 0.5 MG: 1 INJECTION INTRAMUSCULAR; INTRAVENOUS at 09:54

## 2023-08-29 RX ADMIN — LIDOCAINE HYDROCHLORIDE 4 ML: 10 INJECTION, SOLUTION EPIDURAL; INFILTRATION; INTRACAUDAL; PERINEURAL at 10:19

## 2023-08-29 RX ADMIN — FENTANYL CITRATE 25 MCG: 50 INJECTION, SOLUTION INTRAMUSCULAR; INTRAVENOUS at 09:55

## 2023-08-29 RX ADMIN — MIDAZOLAM 0.5 MG: 1 INJECTION INTRAMUSCULAR; INTRAVENOUS at 10:08

## 2023-08-29 RX ADMIN — FENTANYL CITRATE 25 MCG: 50 INJECTION, SOLUTION INTRAMUSCULAR; INTRAVENOUS at 10:18

## 2023-08-29 RX ADMIN — MIDAZOLAM 0.5 MG: 1 INJECTION INTRAMUSCULAR; INTRAVENOUS at 10:18

## 2023-08-29 RX ADMIN — FENTANYL CITRATE 25 MCG: 50 INJECTION, SOLUTION INTRAMUSCULAR; INTRAVENOUS at 10:08

## 2023-08-29 RX ADMIN — SODIUM CHLORIDE: 9 INJECTION, SOLUTION INTRAVENOUS at 08:30

## 2023-08-29 ASSESSMENT — ACTIVITIES OF DAILY LIVING (ADL)
ADLS_ACUITY_SCORE: 35
ADLS_ACUITY_SCORE: 35

## 2023-08-29 NOTE — PROVIDER NOTIFICATION
Time: 1105  Notified: Ashlyn Cade,   Reason: L neck bx site appeared to be swelling more. Pt denied discomfort.   Provider came and  assess pt and bx site. Per MD, swelling was there and it can be due to the Lidocaine injection. Ordered to apply ice pack

## 2023-08-29 NOTE — PROGRESS NOTES
Pt is tolerating liquids and foods, ambulating, and urinating. L neck site stable (no further bleeding or hematoma).  Pt has a  - wife Aretha.  A/O x4 and making needs known.  VSS.  IV access removed.  Discharge education completed and outlined in AVS or handout by previous RN.  All questions answered and addressed.  Belongings returned to patient at discharge.  Discharged to self care.  [facility].

## 2023-08-29 NOTE — IR NOTE
Patient Name: Ko Thakkar  Medical Record Number: [id number]  Today's Date: 8/29/2023    Procedure: Left level 2 lymph node fine needle aspiration  Proceduralist: Dr. White, Dr. Alyssia Goldberg  Pathology present: Yes    Procedure Start: 1015  Procedure end: 1032  Sedation medications administered: 75 mcg fentanyl and 1.5 mg versed (sedation time 37 mins)    Report given to: Maria Elena MAGALI 2A  : NA    Other Notes: Pt arrived to IR room 6 from . Consent reviewed. Pt denies any questions or concerns regarding procedure. Pt positioned supine and monitored per protocol. Pt tolerated procedure without any noted complications. Pt transferred back to .

## 2023-08-29 NOTE — PROGRESS NOTES
Interventional Radiology Pre-Procedure Sedation Assessment   Time of Assessment: 9:37 AM    Expected Level: Moderate Sedation    Indication: Sedation is required for the following type of Procedure: Biopsy    Sedation and procedural consent: Risks, benefits and alternatives were discussed with Patient    PO Intake: Appropriately NPO for procedure    ASA Class: Class 2 - MILD SYSTEMIC DISEASE, NO ACUTE PROBLEMS, NO FUNCTIONAL LIMITATIONS.    Mallampati: Grade 1:  Soft palate, uvula, tonsillar pillars, and posterior pharyngeal wall visible    Lungs: Lungs Clear with good breath sounds bilaterally    Heart: Normal heart sounds and rate    History and physical reviewed and no updates needed. I have reviewed the lab findings, diagnostic data, medications, and the plan for sedation. I have determined this patient to be an appropriate candidate for the planned sedation and procedure and have reassessed the patient IMMEDIATELY PRIOR to sedation and procedure.    Arthur Goldberg MD

## 2023-08-29 NOTE — PROGRESS NOTES
Pt arrived 2A with spouse. Pt is here for L neck needle aspiration.  Pt is stable, aVSS and pain free.  PIV in and IVF. INR=0.98, Hkjq=478 and hgb=13.8.and CBC pending.  Consent is singed. Spouse, Aretha is here and will drive pt to home. Aretha's cell # is  [phone]

## 2023-08-29 NOTE — DISCHARGE INSTRUCTIONS
[facility]    Interventional Radiology  Patient Instructions Following Left Neck Lymph node Biopsy    AFTER YOU GO HOME  Due to the sedation you were given, for the first 24 hours:   We recommend that an adult stay with you, DO NOT drive or operate machinery at home or at work, DO NOT smoke, DO NOT make any important or legal decisions.  DO NOT drink alcoholic beverages the day of your procedure  Drink plenty of fluids   Resume your regular diet, unless otherwise instructed by your Primary Physician  Relax and take it easy for 48 hours  DO NOT do any strenuous exercise or lifting (> 10 lbs) for at least 7 days following your procedure  Keep the dressing dry and in place for 24 hours. Replace with Band aid for 2 days.  Never leave a wet dressing in place.  Do not take a shower for at least 12 hours following your procedure. No tub bath, hot tub, or swimming for 5 days.  There should be minimum drainage from the biopsy site    CALL THE PHYSICIAN IF:  You start bleeding from the procedure site.  If you do start to bleed from that site, lie down flat and hold pressure on the site for a minimum of 10 minutes.  Your physician will tell you if you need to return to the hospital  You develop nausea or vomiting  You have excessive swelling, redness, or tenderness at the site  You have drainage that looks like it is infected.  You experience severe pain  You develop hives or a rash or unexplained itching  You develop shortness of breath  You develop a temperature of 101 degrees F or greater  You develop chest pain or cough up blood, lightheadedness or fainting    ADDITIONAL INSTRUCTIONS  Resume all your medications    [facility] INTERVENTIONAL RADIOLOGY DEPARTMENT  Procedure Physician: Arthur Goldberg MD                                      Date of procedure: August 29, 2023  Telephone Numbers: [phone]      Monday-Friday 7:30 am to 4:00 pm  [phone] After 4:00 pm Monday-Friday, Weekends & Holidays.    Ask for the Interventional Radiologist on call.  Someone is on call 24 hrs/day  [facility] toll free number: [phone] Monday-Friday 8:00 am to 4:30 pm  [facility] Emergency Dept: [phone]

## 2023-08-29 NOTE — PROGRESS NOTES
Pt returned from IR ~1040 s/p L neck lymph biopsy.  Up on arrival pt is ALox4 and drowsy, pain free and AVSS/BS stable. L neck site covered with drsg and it is cdi/no bleeding or hematoma.

## 2023-08-31 LAB
PATH REPORT.COMMENTS IMP SPEC: ABNORMAL
PATH REPORT.COMMENTS IMP SPEC: YES
PATH REPORT.FINAL DX SPEC: ABNORMAL
PATH REPORT.GROSS SPEC: ABNORMAL
PATH REPORT.MICROSCOPIC SPEC OTHER STN: ABNORMAL
PATH REPORT.RELEVANT HX SPEC: ABNORMAL

## 2023-09-01 ENCOUNTER — PATIENT OUTREACH (OUTPATIENT)
Dept: OTOLARYNGOLOGY | Facility: CLINIC | Age: 68
End: 2023-09-01
Payer: COMMERCIAL

## 2023-09-01 NOTE — TELEPHONE ENCOUNTER
Called patient to review results from biopsy.    Final Diagnosis   Specimen A                 Interpretation:                  Positive for malignancy  Squamous cell carcinoma (see comment)                 Other Findings:                  Acute inflammation present.                 Adequacy:                 Satisfactory for evaluation        Patient scheduled for PET scan and will be reviewed at tumor conference. Patient verbalized understanding of plan of care. Will follow up as scheduled.

## 2023-09-06 ENCOUNTER — HOSPITAL ENCOUNTER (OUTPATIENT)
Dept: PET IMAGING | Facility: CLINIC | Age: 68
Discharge: HOME | End: 2023-09-06
Attending: OTOLARYNGOLOGY | Admitting: OTOLARYNGOLOGY
Payer: COMMERCIAL

## 2023-09-06 PROCEDURE — A9552 F18 FDG: HCPCS | Performed by: OTOLARYNGOLOGY

## 2023-09-06 PROCEDURE — 78815 PET IMAGE W/CT SKULL-THIGH: CPT | Mod: 26 | Performed by: RADIOLOGY

## 2023-09-06 PROCEDURE — 78815 PET IMAGE W/CT SKULL-THIGH: CPT | Mod: PI

## 2023-09-06 PROCEDURE — 343N000001 HC RX 343: Performed by: OTOLARYNGOLOGY

## 2023-09-06 RX ORDER — IOPAMIDOL 755 MG/ML
45-150 INJECTION, SOLUTION INTRAVASCULAR ONCE
Status: DISCONTINUED | OUTPATIENT
Start: 2023-09-06 | End: 2023-09-06

## 2023-09-06 RX ADMIN — FLUDEOXYGLUCOSE F-18 10.46 MILLICURIE: 500 INJECTION, SOLUTION INTRAVENOUS at 14:29

## 2023-09-06 NOTE — TUMOR CONFERENCE
Head & Neck Tumor Conference Note   9/8/2023    Status: New  Staff: Dr. 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 He was complaining of dysphagia and had an EGD on 5/16/2023 with GI and found to be H pylori positive, started on medication.     He saw SLP on 7/11/2023 for a video swallow study at [facility]. He was noted to have poor epiglottic inversion and was recommended for further evaluation. He did have penetration and aspiration on the swallow study.     He had a CT neck on 7/28/2023 which showed a supraglottic mass measuring 2.2 x 1.4 x 2.3 cm with concern for paraglottic involvement, no cartilage erosion, enlarged bilateral level IB, IIA, III, and right level IV nodes per radiology. He was also noted to have 70% ICA stenosis with concerns for high risk plaque. He has carotid duplex and vascular consult on 8/21/2023.     He says he tries to eat as much protein as possible, has to avoid things like steak. He makes shaw and softer things that involve less chewing. He does supplements in liquid form. He says sometimes soft things get stuck. He is able to clear things when they get stuck. He does not have pain with swallowing. He is able to feel residuals. He has coughing on liquids. He has not had an aspiration pneumonia. He has lost about 5-7 lbs in the last few months. He has no ear pain. He has tinnitus. He feels like it is not as easy to breathe as it used to be. He can walk up to about 3 flights of stairs. He has no hemoptysis.     On clinical exam, he was noted to have tumor of the right tonsil extending toward the glossotonsillar sulcus, asymetric fullness of the right lateral and posterior pharyngeal wall, edema of the epiglottis. 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     Pertinent PMH: reflux, removal of food impaction in 2008 and 2014 (potential web vs Schatzki ring in distal esophagus on EGD), hyperlipidemia, hypertension, diabetes, spinal meningitis as a child. Patient highly values his singing voice.    Surgical Hx: No prior surgeries.     Social history: History of alcohol sober for 24 years. Former drug abuse. Previously smoked about 23 years ago, 4 ppd previously, 8 years old started. Intermittent chewing tobacco use in the past. Works as a . Lives with wife. Worked as .     Family history: Brother had scleroderma    Imaging:   PET  IMPRESSION:      F-18 FDG whole body PET/CT including the neck area demonstrates:     Hypermetabolic mass centered around the right aryepiglottic fold,  piriform sinus and anteriorly extending to involve the right side of  the suprahyoid epiglottis with slight extension to the infrahyoid  segment. Posteriorly there is extension to posterior pharyngeal wall.  A separate single focus of hypermetabolism in the left posterior  pharyngeal wall. These areas are suspicious for squamosal cell cancer.     Additional noncontinuous areas of suspicious hypermetabolic activity  and thickening involving the right palatine tonsil extending to  involve the glossotonsillar sulcus, right tongue base and right  lateral oropharyngeal wall. These are also suspicious for malignancy.  Less intense but still worrisome areas of hypermetabolism in the left  palatine tonsil and left glossotonsillar sulcus.  Multiple bilateral cervical metastatic FDG avid lymphadenopathy  including right lateral retropharyngeal node, right intraparotid node,  right level 2, 3, 4 and left level 2 and 3 stations.  No evidence of distant metastasis.      Other findings:   -Mild esophagitis.   -Left mandibular canine periapical abscess.   -Inflammatory sinus mucosal disease.   -Single 5 mm left lower lobe subpleural  nodule.  -Sequela of presumably infarction in the right temporal lobe.     Pathology:   Left neck FNA: Positive for malignancy, SCC    Comment: p16 is performed with appropriate controls and displays block positivity in the malignant cells.  p16 is a surrogate for HPV. There were only scant lymphoid cells in the background and therefore, lymph node as the site of sampling cannot be definitively mentioned    Tumor Board Recommendation:   Discussion: Imaging and path were discussed. On PET there is extensive tumor involving the A-E fold, right lateral pharyngeal wall, tongue base, tonsil, epiglottis. Additionally there is a focus of uptake on the left posterior wall. There are multiple areas of lymphadenopathy, including right lateral retropharyngeal node, right intraparotid node, right level 2, 3, 4 and left level 2 and 3 stations. Primary surgery vs induction therapy followed by surgery were discussed, however the patient is very interested in keeping his voice. If pursuing surgery, patient would likely need postop radiation anyway given the extent of the disease.    Regarding his carotid stenosis, he has been seen by vascular surgery who recommended first managing his cancer, as well as resumption of his aspirin and atorvastatin.    Plan:   - PEG with upfront chemorads recommended given patient feels his voice is very important  - could consider induction followed by surgery vs radiation however given the parotid and retropharyngeal involvement, this is unlikely to decrease the size of the resection or treatment field    Mayte Aggarwal MD   Otolaryngology-Head & Neck Surgery PGY3  Please contact ENT on call with questions    Documentation / Disclaimer Cancer Tumor Board Note  Cancer tumor board recommendations do not override what is determined to be reasonable care and treatment, which is dependent on the circumstances of a patient's case; the patient's medical, social, and personal concerns; and the  clinical judgment of the oncologist [physician].

## 2023-09-08 ENCOUNTER — TUMOR CONFERENCE (OUTPATIENT)
Dept: ONCOLOGY | Facility: CLINIC | Age: 68
End: 2023-09-08
Payer: COMMERCIAL

## 2023-09-08 NOTE — TUMOR CONFERENCE
PET scan results and tumor conference recommendations will be reviewed at appointment with Dr. Hardin on 8/13.

## 2023-09-11 NOTE — PROGRESS NOTES
Dear Dr. Vicente:    I had the pleasure of seeing Ko Thakkar in follow-up today at the [facility].     History of Present Illness:   Ko Thakkar is a 67 year old man with a T4N2c SCC of the oropharynx.    He was seen by [facility] on 4/3/2023 at the request of his PCP for dysphagia to solid foods. They discussed EGD vs swallow study.     He had an EGD on 5/16/2023 with GI and found to be H pylori positive, started on medication.     He saw SLP on 7/11/2023 for a video swallow study at [facility]. He was noted to have poor epiglottic inversion and was recommended for further evaluation. He did have penetration and aspiration on the swallow study.    He had a CT neck on 7/28/2023 which showed a supraglottic mass measuring 2.2 x 1.4 x 2.3 cm with concern for paraglottic involvement, no cartilage erosion, enlarged bilateral level IB, IIA, III, and right level IV nodes per radiology. He was also noted to have 70% ICA stenosis with concerns for high risk plaque.     Interval history:  He comes in today for follow-up. He was seen as a new patient in August 2023. On exam he was found to have extensive tumor involving the oropharynx, supraglottis, hypopharynx, with vocal cord paralysis. He elected for an FNA of the neck with IR which was performed on 8/29/2023. Final pathology was consistent with SCC (not enough cells for p16 status). He had a PET scan on 9/6/2023 which showed FDG avid mass of the right tonsil/lateral pharyngeal wall/glossotonsillar sulcus/tongue base, right AE fold, right epiglottis, right pyriform sinus, right posterolateral pharyngeal wall, posterior pharyngeal wall, no thyroid cartilage erosion, right retropharyngeal node, deep lobe parotid FDG avid mass, bilateral cervical nodes (right level II-IV, left level II-III), no distant disease. His case was reviewed at tumor board with recommendation for chemoradiation with PEG  placement.    He was found on CT neck to have right carotid stenosis. He did have visit with vascular on 8/21/2023 with recommendation for management after cancer treatment, resume aspirin.    He is tired. He is having a bit of phlegm that he has to spit up.     His wife has her hysterectomy next Wednesday.    He has no dentist.    He is accompanied by his wife who supplements history.      MEDICATIONS:     Current Outpatient Medications   Medication Sig Dispense Refill    amLODIPine (NORVASC) 5 MG tablet Take 5 mg by mouth daily      aspirin (ASA) 81 MG chewable tablet Take 1 tablet (81 mg) by mouth daily 90 tablet 3    atorvastatin (LIPITOR) 20 MG tablet Take 20 mg by mouth daily      atorvastatin (LIPITOR) 80 MG tablet Take 1 tablet (80 mg) by mouth daily 90 tablet 3    empagliflozin (JARDIANCE) 25 MG TABS tablet Take 25 mg by mouth daily      hydrochlorothiazide (HYDRODIURIL) 25 MG tablet Take 25 mg by mouth daily      insulin glargine (LANTUS) 100 unit/mL injection Inject 25 Units Subcutaneous At Bedtime      insulin NPH-Regular 70/30 (HUMULIN 70/30;NOVOLIN 70/30) (70-30) 100 UNIT/ML vial       liraglutide (VICTOZA) 18 MG/3ML solution Inject Subcutaneous daily      lisinopril (PRINIVIL,ZESTRIL) 5 MG tablet [LISINOPRIL (PRINIVIL,ZESTRIL) 5 MG TABLET] Take 5 mg by mouth daily.      metFORMIN (GLUCOPHAGE) 500 MG tablet [METFORMIN (GLUCOPHAGE) 500 MG TABLET] Take 500 mg by mouth 2 (two) times a day with meals.      therapeutic multivitamin (THERAGRAN) tablet [THERAPEUTIC MULTIVITAMIN (THERAGRAN) TABLET] Take 1 tablet by mouth daily.      glyBURIDE (DIABETA) 5 MG tablet [GLYBURIDE (DIABETA) 5 MG TABLET] Take 10 mg by mouth 2 (two) times a day with meals. (Patient not taking: Reported on 8/21/2023)         ALLERGIES:  No Known Allergies    HABITS/SOCIAL HISTORY:   History of alcohol sober for 24 years. Former drug abuse. Previously smoked about 23 years ago, 4 ppd previously, 8 years old started. Intermittent chewing  tobacco use in the past.   Works as a .   Lives with wife.   Worked as .    Social History     Socioeconomic History    Marital status:      Spouse name: Not on file    Number of children: Not on file    Years of education: Not on file    Highest education level: Not on file   Occupational History    Not on file   Tobacco Use    Smoking status: Former     Types: Cigarettes     Quit date: 10/12/1999     Years since quittin.9    Smokeless tobacco: Not on file   Substance and Sexual Activity    Alcohol use: No    Drug use: No    Sexual activity: Not on file   Other Topics Concern    Not on file   Social History Narrative    Not on file     Social Determinants of Health     Financial Resource Strain: Not on file   Food Insecurity: Not on file   Transportation Needs: Not on file   Physical Activity: Not on file   Stress: Not on file   Social Connections: Not on file   Intimate Partner Violence: Not on file   Housing Stability: Not on file       PAST MEDICAL HISTORY: No past medical history on file.     PAST SURGICAL HISTORY:   Past Surgical History:   Procedure Laterality Date    ESOPHAGOSCOPY, GASTROSCOPY, DUODENOSCOPY (EGD), COMBINED N/A 10/12/2014    Procedure: ESOPHAGOGASTRODUODENOSCOPY;  Surgeon: Jh Ventura MD;  [facility]: [facility];  Service:     IR FINE NEEDLE ASPIRATION W ULTRASOUND  2023    LAPAROTOMY EXPLORATORY Left     spleen       FAMILY HISTORY:  No family history on file.    REVIEW OF SYSTEMS:  12 point ROS was negative other than the symptoms noted above in the HPI.  Patient Supplied Answers to Review of Systems      2023     9:07 AM    ENT ROS   Constitutional Problems with sleep   Neurology Dizzy spells    Headache   Psychology Frequently feeling anxious   Ears, Nose, Throat Ringing/noise in ears    Sore throat    Trouble swallowing    Hoarseness   Cardiopulmonary Cough         PHYSICAL EXAMINATION:   /76 (BP Location: Right arm, Patient Position:  "Sitting, Cuff Size: Adult Regular)   Pulse 86   Ht 1.676 m (5' 6\")   Wt 80.3 kg (177 lb)   SpO2 97%   BMI 28.57 kg/m     Patient in NAD  Breathing comfortably on room air  No hoarseness    PROCEDURES:       RESULTS REVIEWED:   I reviewed path report, note from vascular    I independently reviewed PET imaging    Care discussed with SLP      IMPRESSION AND PLAN:   Ko Thakkar is a 67 year old man with a T4N2c SCC of the oropharynx with sameer metastasis.     I discussed with him that his tumor could certainly be treated with either surgical or nonsurgical techniques. Given the extent of his disease surgery would be a total laryngectomy with partial (vs total) pharyngectomy, bilateral neck dissections, free flap reconstruction. We may not reach that retropharyngeal node at the time of surgery. He would need postoperative radiation, and potentially chemotherapy.    He indicated at his last visit that singing is very important, more so than even eating. The laryngectomy would leave him without ability to talk normally. The pharyngectomy can have impacts on his voice rehab after as well as his swallowing.    Nonsurgical therapy would be with chemoradiation. It would allow him to keep his larynx but given his baseline swallow function likely leave him PEG dependent or require a salvage laryngectomy down the road for a nonfunctional larynx.      Certainly of concern is his baseline aspiration. He will need to have a PEG and this could be permanent vs temporary. We discussed that normally for someone who has baseline aspiration I would recommend consideration of upfront surgical therapy, but there are certainly other complicating considerations. He would need to understand that his swallow may be worse after radiation.     Complicating his situation, his wife also recently was diagnosed with some medical issues and will need to undergo surgery next week, and other treatment plans pending. We discussed that his is " obviously a very challenging situation and will take balance to figure out timing for each of their treatments.    I had him see our SLP team today. A new baseline video swallow study was ordered for prior to radiation.    We discussed that pending preference of oncology he may be recommended for a port. Will await their recommendations so that we can coordinate PEG and port placement if needed.    He does have severe carotid stenosis, certainly would place him at risk for a stroke in the perioperative period if he elected for surgery.    We discussed that regardless of surgical or nonsurgical therapy he would need to have dental extractions given the need for radiation. He would need to get him in with the dentist as soon as possible and will likely need extractions. He does not have a dentist, will reach out to the [place] dental team to try to expedite care.    He has a history of addiction. He has some concerns about the pain medications with treatment, but he will likely need narcotics given the extent of his treatment. A palliative care consult was placed to help with management.    He has a parotid node seen on PET. This could certainly be a warthin tumor. This would significantly change the radiation or surgical treatment plan. We will have IR do a biopsy of the parotid node prior to treatment.    A social work referral was placed to help with financial concerns related to treatment.    After our discussion he wishes to move forward with radiation and medical oncology consults for nonsurgical therapy.    I will see him back 6 weeks following completion of treatment.    Thank you very much for the opportunity to participate in the care of your patient.      Denia Hardin MD  Otolaryngology- Head & Neck Surgery      This note was dictated with voice recognition software and then edited. Please excuse any unintentional errors.       A total of 50 minutes was spent on patient visit (40 minutes) and charting  activities on date of service.         CC:  Wojciech Vicente MD  [facility]   [address] [address]  [facility] [address]      Alton Mota MD  [facility]   [address]  [facility] [address]      Xochilt Louise MD  Department of Medical Oncology  [facility]      Juwan Cordova MD  Department of Radiation Oncology  [facility]

## 2023-09-13 ENCOUNTER — THERAPY VISIT (OUTPATIENT)
Dept: SPEECH THERAPY | Facility: CLINIC | Age: 68
End: 2023-09-13
Payer: COMMERCIAL

## 2023-09-13 ENCOUNTER — OFFICE VISIT (OUTPATIENT)
Dept: OTOLARYNGOLOGY | Facility: CLINIC | Age: 68
End: 2023-09-13
Payer: COMMERCIAL

## 2023-09-13 ENCOUNTER — PATIENT OUTREACH (OUTPATIENT)
Dept: ONCOLOGY | Facility: CLINIC | Age: 68
End: 2023-09-13

## 2023-09-13 VITALS
WEIGHT: 177 LBS | OXYGEN SATURATION: 97 % | SYSTOLIC BLOOD PRESSURE: 125 MMHG | HEART RATE: 86 BPM | BODY MASS INDEX: 28.45 KG/M2 | HEIGHT: 66 IN | DIASTOLIC BLOOD PRESSURE: 76 MMHG

## 2023-09-13 DIAGNOSIS — R13.13 PHARYNGEAL DYSPHAGIA: Primary | ICD-10-CM

## 2023-09-13 DIAGNOSIS — C10.9 SQUAMOUS CELL CARCINOMA OF OROPHARYNX (H): Primary | ICD-10-CM

## 2023-09-13 DIAGNOSIS — C77.0 SECONDARY MALIGNANCY OF LYMPH NODES OF HEAD, FACE AND NECK (H): ICD-10-CM

## 2023-09-13 DIAGNOSIS — C10.9 SQUAMOUS CELL CARCINOMA OF OROPHARYNX (H): ICD-10-CM

## 2023-09-13 PROBLEM — C32.1: Status: ACTIVE | Noted: 2023-09-13

## 2023-09-13 PROCEDURE — 99215 OFFICE O/P EST HI 40 MIN: CPT | Performed by: OTOLARYNGOLOGY

## 2023-09-13 PROCEDURE — 99207 PR NO CHARGE LOS: CPT | Performed by: SPEECH-LANGUAGE PATHOLOGIST

## 2023-09-13 ASSESSMENT — PAIN SCALES - GENERAL: PAINLEVEL: NO PAIN (0)

## 2023-09-13 NOTE — PATIENT INSTRUCTIONS
"You were seen in the clinic today by Dr. Hardin. If you have any questions or concerns after your appointment, please call the clinic at [phone]. Press \"1\" for scheduling, press \"3\" for nurse advice.    2.   The following has been recommended for you based upon your appointment today:   -Schedule video swallow study with SLP within next 3 weeks   -Referrals to Medical and Radiation Oncology were placed    -IR referral for parotid biopsy    -Dentist: [phone]      Hallie HIDALGO, RN  [facility]  Department of Otolaryngology  [phone]     "

## 2023-09-13 NOTE — TELEPHONE ENCOUNTER
RECORDS STATUS - ALL OTHER DIAGNOSIS      Action September 19, 2023 9:11 AM    Action Taken Received IB from provider, PT was seen at [facility]. Records is requested.      RECORDS RECEIVED FROM: Epic, [facility]   DATE RECEIVED: 9/14   NOTES STATUS DETAILS   OFFICE NOTE from referring provider Lidia Hardin: 9/13/23   DISCHARGE SUMMARY from hospital [facility] 8/29/23   MEDICATION LIST [facility]    LABS     PATHOLOGY REPORTS Epic 9/13/23, 8/29/23: FNA   ANYTHING RELATED TO DIAGNOSIS Epic 8/29/23   IMAGING (NEED IMAGES & REPORT)     ULTRASOUND PACS 5/25/23: [facility]

## 2023-09-13 NOTE — PROGRESS NOTES
New Patient Oncology Nurse Navigator Note     Referring provider:   Denia Hardin MD [facility] [phone]     Referred to (specialty): Medical Oncology    Requested provider (if applicable): Aspen Nair or Pamela     Date Referral Received: 2023     Evaluation for : oropharyngeal cancer     Clinical History (per Nurse review of records provided):    **BOOK MARKED**   NOTES:  2023:  ENT Consult with Dr. Hardin and SLP evaluation    IMAGIN2023:  PET CT   2023:  CT Soft Tissue neck    PATHOLOGY:  2023:  Final Diagnosis  Specimen A                 Interpretation:                  Positive for malignancy  Squamous cell carcinoma (see comment)        Electronically signed by Xavier Castro III, MD on 2023 at 11:57 AM    Clinical Assessment / Barriers to Care (Per Nurse): none noted       Records Location (Care Everywhere, Media, etc.): EPIC     Records Needed: none     Additional testing needed prior to consult: none

## 2023-09-13 NOTE — PROGRESS NOTES
I called and spoke to both Ko and his wife, Aretha.  I explained my role and purpose of my call.  We went over the need for both medical and radiation oncology. I explained he will not start treatment on these first visits, but rather he will be having consultation/discussion.    Dental:  I asked if he has been seen or needs to see a dentist yet.  They said someone (Possibly Dr Hardin and/or her RNCC) is working to get him in with the dental clinic as he has a bone protruding in his lower jaw that needs to be shaved down as well as several remaining teeth need to be pulled. I will message them and ask.

## 2023-09-13 NOTE — PROGRESS NOTES
Ko Thakkar was seen for allied health care provider visit for introduction of self and SLP services. Pt with newly dx T3N2c SCC supraglottis. He has decided to proceed with definitive chemoXRT. Provided information on trajectory of care and benefits of SLP services before and during treatment.. Speaking is WFL. Swallowing is impaired. Formal baseline video swallow study evaluation indicated before patient starts treatment. He will get a prophylactic PEG. All questions answered within scope of practice for pt and his wife. Encouraged pt to reach out with any questions or concerns. Time spent with patient 5 minutes.       FARNAZ Farah MA (music), CCC-SLP   Speech Language Pathologist  [place] Trained Vocologist   [facility]  Dept. of Otolaryngology  Department of Rehabilitation Services  [address] [place] [id number]  Email: [email or web address]   Phone: [phone]  Pronouns: she/her/hers

## 2023-09-13 NOTE — LETTER
9/13/2023       RE: Ko Thakkar  [address]  [address]  [place] [address]     Dear Colleague,    Thank you for referring your patient, Ko Thakkar, to the [facility] [place] at [facility]. Please see a copy of my visit note below.    Dear Dr. Vicente:    I had the pleasure of seeing Ko Thakkar in follow-up today at the [facility].     History of Present Illness:   Ko Thakkar is a 67 year old man with a T4N2c SCC of the oropharynx.    He was seen by [facility] on 4/3/2023 at the request of his PCP for dysphagia to solid foods. They discussed EGD vs swallow study.     He had an EGD on 5/16/2023 with GI and found to be H pylori positive, started on medication.     He saw SLP on 7/11/2023 for a video swallow study at [facility]. He was noted to have poor epiglottic inversion and was recommended for further evaluation. He did have penetration and aspiration on the swallow study.    He had a CT neck on 7/28/2023 which showed a supraglottic mass measuring 2.2 x 1.4 x 2.3 cm with concern for paraglottic involvement, no cartilage erosion, enlarged bilateral level IB, IIA, III, and right level IV nodes per radiology. He was also noted to have 70% ICA stenosis with concerns for high risk plaque.     Interval history:  He comes in today for follow-up. He was seen as a new patient in August 2023. On exam he was found to have extensive tumor involving the oropharynx, supraglottis, hypopharynx, with vocal cord paralysis. He elected for an FNA of the neck with IR which was performed on 8/29/2023. Final pathology was consistent with SCC (not enough cells for p16 status). He had a PET scan on 9/6/2023 which showed FDG avid mass of the right tonsil/lateral pharyngeal wall/glossotonsillar sulcus/tongue base, right AE fold, right epiglottis, right pyriform sinus, right  posterolateral pharyngeal wall, posterior pharyngeal wall, no thyroid cartilage erosion, right retropharyngeal node, deep lobe parotid FDG avid mass, bilateral cervical nodes (right level II-IV, left level II-III), no distant disease. His case was reviewed at tumor board with recommendation for chemoradiation with PEG placement.    He was found on CT neck to have right carotid stenosis. He did have visit with vascular on 8/21/2023 with recommendation for management after cancer treatment, resume aspirin.    He is tired. He is having a bit of phlegm that he has to spit up.     His wife has her hysterectomy next Wednesday.    He has no dentist.    He is accompanied by his wife who supplements history.      MEDICATIONS:     Current Outpatient Medications   Medication Sig Dispense Refill    amLODIPine (NORVASC) 5 MG tablet Take 5 mg by mouth daily      aspirin (ASA) 81 MG chewable tablet Take 1 tablet (81 mg) by mouth daily 90 tablet 3    atorvastatin (LIPITOR) 20 MG tablet Take 20 mg by mouth daily      atorvastatin (LIPITOR) 80 MG tablet Take 1 tablet (80 mg) by mouth daily 90 tablet 3    empagliflozin (JARDIANCE) 25 MG TABS tablet Take 25 mg by mouth daily      hydrochlorothiazide (HYDRODIURIL) 25 MG tablet Take 25 mg by mouth daily      insulin glargine (LANTUS) 100 unit/mL injection Inject 25 Units Subcutaneous At Bedtime      insulin NPH-Regular 70/30 (HUMULIN 70/30;NOVOLIN 70/30) (70-30) 100 UNIT/ML vial       liraglutide (VICTOZA) 18 MG/3ML solution Inject Subcutaneous daily      lisinopril (PRINIVIL,ZESTRIL) 5 MG tablet [LISINOPRIL (PRINIVIL,ZESTRIL) 5 MG TABLET] Take 5 mg by mouth daily.      metFORMIN (GLUCOPHAGE) 500 MG tablet [METFORMIN (GLUCOPHAGE) 500 MG TABLET] Take 500 mg by mouth 2 (two) times a day with meals.      therapeutic multivitamin (THERAGRAN) tablet [THERAPEUTIC MULTIVITAMIN (THERAGRAN) TABLET] Take 1 tablet by mouth daily.      glyBURIDE (DIABETA) 5 MG tablet [GLYBURIDE (DIABETA) 5 MG  TABLET] Take 10 mg by mouth 2 (two) times a day with meals. (Patient not taking: Reported on 2023)         ALLERGIES:  No Known Allergies    HABITS/SOCIAL HISTORY:   History of alcohol sober for 24 years. Former drug abuse. Previously smoked about 23 years ago, 4 ppd previously, 8 years old started. Intermittent chewing tobacco use in the past.   Works as a .   Lives with wife.   Worked as .    Social History     Socioeconomic History    Marital status:      Spouse name: Not on file    Number of children: Not on file    Years of education: Not on file    Highest education level: Not on file   Occupational History    Not on file   Tobacco Use    Smoking status: Former     Types: Cigarettes     Quit date: 10/12/1999     Years since quittin.9    Smokeless tobacco: Not on file   Substance and Sexual Activity    Alcohol use: No    Drug use: No    Sexual activity: Not on file   Other Topics Concern    Not on file   Social History Narrative    Not on file     Social Determinants of Health     Financial Resource Strain: Not on file   Food Insecurity: Not on file   Transportation Needs: Not on file   Physical Activity: Not on file   Stress: Not on file   Social Connections: Not on file   Intimate Partner Violence: Not on file   Housing Stability: Not on file       PAST MEDICAL HISTORY: No past medical history on file.     PAST SURGICAL HISTORY:   Past Surgical History:   Procedure Laterality Date    ESOPHAGOSCOPY, GASTROSCOPY, DUODENOSCOPY (EGD), COMBINED N/A 10/12/2014    Procedure: ESOPHAGOGASTRODUODENOSCOPY;  Surgeon: Jh Ventura MD;  Location: [facility];  Service:     IR FINE NEEDLE ASPIRATION W ULTRASOUND  2023    LAPAROTOMY EXPLORATORY Left     spleen       FAMILY HISTORY:  No family history on file.    REVIEW OF SYSTEMS:  12 point ROS was negative other than the symptoms noted above in the HPI.  Patient Supplied Answers to Review of Systems      2023     9:07 AM   [facility]  "ENT ROS   Constitutional Problems with sleep   Neurology Dizzy spells    Headache   Psychology Frequently feeling anxious   Ears, Nose, Throat Ringing/noise in ears    Sore throat    Trouble swallowing    Hoarseness   Cardiopulmonary Cough         PHYSICAL EXAMINATION:   /76 (BP Location: Right arm, Patient Position: Sitting, Cuff Size: Adult Regular)   Pulse 86   Ht 1.676 m (5' 6\")   Wt 80.3 kg (177 lb)   SpO2 97%   BMI 28.57 kg/m     Patient in NAD  Breathing comfortably on room air  No hoarseness    PROCEDURES:       RESULTS REVIEWED:   I reviewed path report, note from vascular    I independently reviewed PET imaging    Care discussed with SLP      IMPRESSION AND PLAN:   Ko Thakkar is a 67 year old man with a T4N2c SCC of the oropharynx with sameer metastasis.     I discussed with him that his tumor could certainly be treated with either surgical or nonsurgical techniques. Given the extent of his disease surgery would be a total laryngectomy with partial (vs total) pharyngectomy, bilateral neck dissections, free flap reconstruction. We may not reach that retropharyngeal node at the time of surgery. He would need postoperative radiation, and potentially chemotherapy.    He indicated at his last visit that singing is very important, more so than even eating. The laryngectomy would leave him without ability to talk normally. The pharyngectomy can have impacts on his voice rehab after as well as his swallowing.    Nonsurgical therapy would be with chemoradiation. It would allow him to keep his larynx but given his baseline swallow function likely leave him PEG dependent or require a salvage laryngectomy down the road for a nonfunctional larynx.      Certainly of concern is his baseline aspiration. He will need to have a PEG and this could be permanent vs temporary. We discussed that normally for someone who has baseline aspiration I would recommend consideration of upfront surgical therapy, but " there are certainly other complicating considerations. He would need to understand that his swallow may be worse after radiation.     Complicating his situation, his wife also recently was diagnosed with some medical issues and will need to undergo surgery next week, and other treatment plans pending. We discussed that his is obviously a very challenging situation and will take balance to figure out timing for each of their treatments.    I had him see our SLP team today. A new baseline video swallow study was ordered for prior to radiation.    We discussed that pending preference of oncology he may be recommended for a port. Will await their recommendations so that we can coordinate PEG and port placement if needed.    He does have severe carotid stenosis, certainly would place him at risk for a stroke in the perioperative period if he elected for surgery.    We discussed that regardless of surgical or nonsurgical therapy he would need to have dental extractions given the need for radiation. He would need to get him in with the dentist as soon as possible and will likely need extractions. He does not have a dentist, will reach out to the [facility] dental team to try to expedite care.    He has a history of addiction. He has some concerns about the pain medications with treatment, but he will likely need narcotics given the extent of his treatment. A palliative care consult was placed to help with management.    He has a parotid node seen on PET. This could certainly be a warthin tumor. This would significantly change the radiation or surgical treatment plan. We will have IR do a biopsy of the parotid node prior to treatment.    A social work referral was placed to help with financial concerns related to treatment.    After our discussion he wishes to move forward with radiation and medical oncology consults for nonsurgical therapy.    I will see him back 6 weeks following completion of treatment.    Thank you very  much for the opportunity to participate in the care of your patient.      Denia Hardin MD  Otolaryngology- Head & Neck Surgery      This note was dictated with voice recognition software and then edited. Please excuse any unintentional errors.       A total of 50 minutes was spent on patient visit (40 minutes) and charting activities on date of service.       CC:  Wojciech Vicente MD  [facility]   [address] [id number]  [facility] [address]      Alton Mota MD  [facility]   [address] [facility] [address]      Xochilt Louise MD  Department of Medical Oncology  [facility]      Juwan Cordova MD  Department of Radiation Oncology  [facility]

## 2023-09-13 NOTE — CONSULTS
Outpatient Neuroradiology Biopsy Referral    Patient is a 68 y/o male with a PMH of dysphagia, SCC oropharynx. Neuroradiology has been asked to biopsy soft tissue parotid lesion for treatment planning. Discussed at Tumor Board 9/8/23.    PET CT 9/6/23 IMPRESSION:      F-18 FDG whole body PET/CT including the neck area demonstrates:     Hypermetabolic mass centered around the right aryepiglottic fold,  piriform sinus and anteriorly extending to involve the right side of  the suprahyoid epiglottis with slight extension to the infrahyoid  segment. Posteriorly there is extension to posterior pharyngeal wall.  A separate single focus of hypermetabolism in the left posterior  pharyngeal wall. These areas are suspicious for squamosal cell cancer.     Additional noncontinuous areas of suspicious hypermetabolic activity  and thickening involving the right palatine tonsil extending to  involve the glossotonsillar sulcus, right tongue base and right  lateral oropharyngeal wall. These are also suspicious for malignancy.  Less intense but still worrisome areas of hypermetabolism in the left  palatine tonsil and left glossotonsillar sulcus.  Multiple bilateral cervical metastatic FDG avid lymphadenopathy  including right lateral retropharyngeal node, right intraparotid node,  right level 2, 3, 4 and left level 2 and 3 stations.  No evidence of distant metastasis.      Other findings:   -Mild esophagitis.   -Left mandibular canine periapical abscess.   -Inflammatory sinus mucosal disease.   -Single 5 mm left lower lobe subpleural nodule.  -Sequela of presumably infarction in the right temporal lobe.     Case and imaging PET CT 9/6/23 was reviewed with Dr. Carmona and Dr. Gibbs from Neuroradiology and US guided FNA biopsy of the Right parotid  is approved.     Procedure order, FNA placed.    No hold ASA.     If requesting team would like samples sent for anything else please enter them or notify Neuroradiology prior to  scheduled procedure.    Primary team Dr.Khaja FREGOSO made aware of Neuroradiology recommendations via epic messaging.    PETE Cleaning CNP  Interventional Radiology   IR on-call pager: [phone]

## 2023-09-14 ENCOUNTER — PATIENT OUTREACH (OUTPATIENT)
Dept: ONCOLOGY | Facility: CLINIC | Age: 68
End: 2023-09-14

## 2023-09-14 ENCOUNTER — ONCOLOGY VISIT (OUTPATIENT)
Dept: ONCOLOGY | Facility: CLINIC | Age: 68
End: 2023-09-14
Attending: OTOLARYNGOLOGY
Payer: COMMERCIAL

## 2023-09-14 ENCOUNTER — PRE VISIT (OUTPATIENT)
Dept: ONCOLOGY | Facility: CLINIC | Age: 68
End: 2023-09-14
Payer: COMMERCIAL

## 2023-09-14 ENCOUNTER — LAB (OUTPATIENT)
Dept: LAB | Facility: CLINIC | Age: 68
End: 2023-09-14
Attending: INTERNAL MEDICINE
Payer: COMMERCIAL

## 2023-09-14 VITALS
WEIGHT: 180 LBS | HEART RATE: 86 BPM | OXYGEN SATURATION: 97 % | BODY MASS INDEX: 29.05 KG/M2 | TEMPERATURE: 98.2 F | SYSTOLIC BLOOD PRESSURE: 144 MMHG | DIASTOLIC BLOOD PRESSURE: 80 MMHG

## 2023-09-14 DIAGNOSIS — H93.13 TINNITUS, BILATERAL: ICD-10-CM

## 2023-09-14 DIAGNOSIS — C32.1 SCC (SQUAMOUS CELL CARCINOMA) OF SUPRAGLOTTIS (H): ICD-10-CM

## 2023-09-14 DIAGNOSIS — E83.42 HYPOMAGNESEMIA: ICD-10-CM

## 2023-09-14 DIAGNOSIS — C32.1 SCC (SQUAMOUS CELL CARCINOMA) OF SUPRAGLOTTIS (H): Primary | ICD-10-CM

## 2023-09-14 DIAGNOSIS — C10.9 SQUAMOUS CELL CARCINOMA OF OROPHARYNX (H): ICD-10-CM

## 2023-09-14 LAB
ALBUMIN SERPL BCG-MCNC: 4.5 G/DL (ref 3.5–5.2)
ALP SERPL-CCNC: 107 U/L (ref 40–129)
ALT SERPL W P-5'-P-CCNC: 28 U/L (ref 0–70)
ANION GAP SERPL CALCULATED.3IONS-SCNC: 10 MMOL/L (ref 7–15)
AST SERPL W P-5'-P-CCNC: 22 U/L (ref 0–45)
BASOPHILS # BLD AUTO: 0.1 10E3/UL (ref 0–0.2)
BASOPHILS NFR BLD AUTO: 1 %
BILIRUB SERPL-MCNC: 0.4 MG/DL
BUN SERPL-MCNC: 23.1 MG/DL (ref 8–23)
CALCIUM SERPL-MCNC: 9.2 MG/DL (ref 8.8–10.2)
CHLORIDE SERPL-SCNC: 102 MMOL/L (ref 98–107)
CREAT SERPL-MCNC: 0.86 MG/DL (ref 0.67–1.17)
DEPRECATED HCO3 PLAS-SCNC: 26 MMOL/L (ref 22–29)
EGFRCR SERPLBLD CKD-EPI 2021: >90 ML/MIN/1.73M2
EOSINOPHIL # BLD AUTO: 0.6 10E3/UL (ref 0–0.7)
EOSINOPHIL NFR BLD AUTO: 5 %
ERYTHROCYTE [DISTWIDTH] IN BLOOD BY AUTOMATED COUNT: 13.6 % (ref 10–15)
GLUCOSE SERPL-MCNC: 167 MG/DL (ref 70–99)
HCT VFR BLD AUTO: 39.5 % (ref 40–53)
HGB BLD-MCNC: 13.2 G/DL (ref 13.3–17.7)
IMM GRANULOCYTES # BLD: 0 10E3/UL
IMM GRANULOCYTES NFR BLD: 0 %
LYMPHOCYTES # BLD AUTO: 2.1 10E3/UL (ref 0.8–5.3)
LYMPHOCYTES NFR BLD AUTO: 20 %
MAGNESIUM SERPL-MCNC: 2.3 MG/DL (ref 1.7–2.3)
MCH RBC QN AUTO: 28.2 PG (ref 26.5–33)
MCHC RBC AUTO-ENTMCNC: 33.4 G/DL (ref 31.5–36.5)
MCV RBC AUTO: 84 FL (ref 78–100)
MONOCYTES # BLD AUTO: 0.9 10E3/UL (ref 0–1.3)
MONOCYTES NFR BLD AUTO: 9 %
NEUTROPHILS # BLD AUTO: 6.9 10E3/UL (ref 1.6–8.3)
NEUTROPHILS NFR BLD AUTO: 65 %
NRBC # BLD AUTO: 0 10E3/UL
NRBC BLD AUTO-RTO: 0 /100
PLATELET # BLD AUTO: 351 10E3/UL (ref 150–450)
POTASSIUM SERPL-SCNC: 4 MMOL/L (ref 3.4–5.3)
PROT SERPL-MCNC: 7.3 G/DL (ref 6.4–8.3)
RBC # BLD AUTO: 4.68 10E6/UL (ref 4.4–5.9)
SODIUM SERPL-SCNC: 138 MMOL/L (ref 136–145)
WBC # BLD AUTO: 10.6 10E3/UL (ref 4–11)

## 2023-09-14 PROCEDURE — 83735 ASSAY OF MAGNESIUM: CPT

## 2023-09-14 PROCEDURE — 85025 COMPLETE CBC W/AUTO DIFF WBC: CPT

## 2023-09-14 PROCEDURE — 99205 OFFICE O/P NEW HI 60 MIN: CPT | Performed by: INTERNAL MEDICINE

## 2023-09-14 PROCEDURE — 36415 COLL VENOUS BLD VENIPUNCTURE: CPT

## 2023-09-14 PROCEDURE — 80053 COMPREHEN METABOLIC PANEL: CPT

## 2023-09-14 PROCEDURE — 99417 PROLNG OP E/M EACH 15 MIN: CPT | Performed by: INTERNAL MEDICINE

## 2023-09-14 PROCEDURE — G0463 HOSPITAL OUTPT CLINIC VISIT: HCPCS | Performed by: INTERNAL MEDICINE

## 2023-09-14 ASSESSMENT — PAIN SCALES - GENERAL: PAINLEVEL: NO PAIN (0)

## 2023-09-14 NOTE — NURSING NOTE
Chief Complaint   Patient presents with    Blood Draw     Labs drawn via  by RN.    Labs Only     Labs collected from venipuncture by RN.    Destiny Trevino RN

## 2023-09-14 NOTE — PROGRESS NOTES
[facility]    PATIENT NAME: Ko Thakkar  MRN # [id number]   DATE OF VISIT: September 14, 2023  YOB: 1955     Otolaryngology: Dr. Denia Hardin  Radiation Oncology: Dr. Juwan Cordova   PCP: Dr. Alton Mota; [facility] in [place]     CANCER TYPE: SCC supraglottis  STAGE: [id number] (IRON)  ECOG PS:     PD-L1:  NGS:     SUMMARY  5/16/23 EGD for dysphagia. Gastritis and gastric diverticulum. Bx showed H. Pylori, treated. No atrophic gastritis or dysplasia  7/11/23 Swallow study at [facility]. Poor epiglottic inversion, penetration, aspiration  7/28/23 CT neck. 2.2 x 1.4 x 2.3 cm supraglottic mass, concern for paraglottic involvement, bilateral IB, IIA, III, IV adenopathy, 70% ICA stenosis   8/21/23 US carotid. 70-99% stenosis R ICA. <50% stenosis L ICA  8/29/23 FNA L level 2 node (IR). Path: SCC  9/6/23 PET/CT. FDG avid mass (SUV 13.5) right tonsil/lateral pharyngeal wall/glossotonsillar sulcus with slight extension to the R tongue base, right AE fold, right epiglottis, right piriform sinus, right posterolateral pharyngeal wall, posterior pharyngeal wall, no thyroid cartilage erosion, right retropharyngeal node, deep lobe parotid FDG avid mass, bilateral cervical nodes (right level II-IV, left level II-III), no distant disease, L mandibular canine with periapical abscess. R temporal lobe change suggestive of prior infarct. 5 mm LLL nodule.    ASSESSMENT AND PLAN  SCC supraglottis, [id number] (IRON): Wants to preserve his voice if possible, so has declined surgery and is here to discuss chemoradiation. Not likely to be a candidate for cisplatin due to tinnitus. Will get audiogram. Assuming hearing loss is prohibitive, discussed carboplatin paclitaxel weekly as a radiosensitizer for 6-7 doses during radiation. Discussed logistics, potential side effects and additional benefit over radiation alone. Will see Dr. Cordova next week. We're worried about laryngeal incompetence despite cancer  treatment so will have to monitor for pneumonia closely. Plan to FNA R parotid node 10/13    Definitive vs adjuvant: definitive   Chemo: carboplatin paclitaxel weekly pending audiogram  Goal: curative  Dental: waiting to hear back from dental school  Speech Path: Jessica Ramos visit 9/13/23, video 10/10, known aspiration at baseline  Nutrition: Will get Meka involved after Gtube placed  Audiology: Scheduled 9/25  Labs: CBC ok, need CMP  Gtube: Prophylactic - Dr. Hardin's team working on this  Port: If pt prefers  Start date: TBD    R KAILASH: Met with Dr. Tapia, vascular surgery fellow 8/21. Recommended treating the cancer first, asa 81 and high dose atorva, follow up 6 months with repeat US. PET/CT showed possible old R temporal infarct.    H/o food impaction: lower esophagus monitor    Liver issue: Says he was to have some sort of imaging at [facility] to look at his liver that had to be canceled due to the appts he needed for the cancer. Will try to figure out what that was about    120 minutes spent by me on the date of the encounter doing chart review, history and exam, documentation and further activities per the note     Xochilt Louise MD  Associate Professor of Medicine  Hematology, Oncology and Transplantation      SUBJECTIVE  Mr Thakkar is a 66 yo male who presents today to establish care in medical oncology for newly diagnosed supraglottic cancer. TL was discussed, declined. 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 Gtube dependence and inability to swallow or necessitating laryngectomy down the road if going down the chemoradiation route was also discussed.   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   At tumor board, we discussed the option of induction. Wouldn't change surgery, might make radiation field a little smaller  Has parotid bx planned.  Needs dentist  Has baseline tinnitus   Coughing up phlegm. Just started sitting up to sleep last night  His wife Aretha  "is scheduled for a hysterectomy next wed 12-13# since spring   Friends know him by \"Ace\"     Aretha can't drive for 2 weeks after surgery     PAST MEDICAL HISTORY  SCC as above  GERD  H/o food impaction in 2008 and 2014 (steak) related to Schatzki ring on EGD 10/13/14  DM2. Last A1c about 7.6 sometime in summer 2023  Dyslipidemia  Possible prior L temporal CVA  HTN  H.o spinal meningitis as a baby -   R arm surgery  Bone graft to forehead  Ex lap   Tinnitus secondary to treatment for meningitis or from menigitis - bilateral   Hearing loss   Numbness in the toes - mostly big toes   Crushing pills   No muscle aches or pains     CURRENT OUTPATIENT MEDICATIONS  Reviewed    Omega 3 fatty acid in addition    ALLERGIES  No Known Allergies     SOCIAL HISTORY: . H/o ETOH, but sober for many years - 20 +. H/o substance dependence. Former smoker, about 4 PPD started age 8, quit in 1999. H/o intermittent chew tobacco. Working as a , has done so since age 19. 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 Three children - 45-47. Two sons and a daughter. Three step-sons. 19 grandchildren.     FAMILY HISTORY:   Father passed away in his 40s of a heart attack  Mom had cirrhosis, did not consume ETOH  Children are generally healthy - his son has back problems - has DJD  Sister had an aneurysm and passed away from that     REVIEW OF SYSTEMS  As above in the HPI, o/w complete 12-point ROS was negative.    PHYSICAL EXAM  BP (!) 144/80   Pulse 86   Temp 98.2  F (36.8  C)   Wt 81.6 kg (180 lb)   SpO2 97%   BMI 29.05 kg/m    GEN: NAD  HEENT: EOMI, no icterus, injection or pallor  EXT: no edema  NEURO: alert  SKIN: no rashes    LABORATORY AND IMAGING STUDIES    Labs 8/29/23 were independently reviewed and interpreted by me  CBC ok. MCV 85, hgb 13.8  No chemistries or creatinine, LFTs    PET Oncology (Eyes to Thighs)  Narrative: Combined Report of: PET and CT on " 9/6/2023 4:18 PM:    1. PET of the neck, chest, abdomen, and pelvis.  2. PET CT Fusion for Attenuation Correction and Anatomical  Localization.  3. 3D MIP and PET-CT fused images were processed on an independent  workstation and archived to PACS and reviewed by a radiologist.    Technique:    1. PET: The patient received 10.46 mCi of F-18-FDG. The serum glucose  was 144 mg/dL prior to administration. Body weight was 79.7 kg. Images  were evaluated in the axial, sagittal, and coronal planes as well as  the rotational whole body MIP. Images were acquired from the cranial  vertex to the feet.    UPTAKE WAS MEASURED AT 83 MINUTES.     BACKGROUND: Liver SUV max = 3.3, Aorta Blood SUV Max = 2.5.     2. CT: For attenuation correction only.    Contrast and Medications: None    3. 3D MIP and PET-CT fused images were processed on an independent  workstation and archived to PACS and reviewed by a radiologist.    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    ADDITIONAL INFORMATION OBTAINED FROM EMR: none.    COMPARISON: 7/28/23 CT neck    FINDINGS:     HEAD/NECK:    Pharyngeal mucosal spaces:     There is abnormal increased FDG uptake (max SUV 13.5, contralateral  6.3) by the right lateral oropharyngeal wall extending to involve the  right tonsil and glossotonsillar sulcus with slight extension into  right tongue base. There is asymmetric fullness of the right lateral  oropharyngeal wall. There is less intense increased metabolism by the  left tongue base and left glossotonsillar sulcus (max SUV 7.1).    There is abnormal FDG avid masslike thickening of the right AE fold  extending to involve the suprahyoid epiglottis on the right. There is  slight involvement and thickening of the infrahyoid segment of the  epiglottis on the right.   There is asymmetric complete effacement with FDG avid masslike  appearance of the right piriform sinus suggestive of  tumor extension.  FDG avid disease extends also to the right posterolateral pharyngeal  wall. The right piriform sinus tumor component abuts the inner margin  of the right thyroid cartilage with no evidence of erosion. Posterior  to the left piriform sinus there is focal FDG uptake along the  posterior pharyngeal wall or post cricoid mucosa which might suggest  extension of tumor.    True and false cords appear uninvolved. Hyoid bone is intact.  Nasopharyngeal mucosal space is normal.    Cervical lymph nodes: There is an FDG avid right lateral  retropharyngeal node suspicious for involvement. There is a punctate  FDG avid node in the deep lobe of the right parotid gland suspicious  for an intraparotid node. There are multiple FDG avid and enlarged  right level 2, right level 3 and right L4 lymphadenopathy. On the left  there are enlargement and FDG avid left level 2 and 3 adenopathy.  Examples for lymph nodes in the right level2,  there is a 1.5 cm  lymphadenopathy with maximum SUV of 11.7. On the left an example lymph  node which is largest one measures up to 2.0 cm with maximum SUV of  13.1.     FDG avid periapical lucency of the left mandibular canine which is  suggestive of periapical abscess.    Within the brain parenchyma there is photopenic hypodensity in the  lateral aspect of the right temporal lobe suggestive of sequela of  prior infarction.     Scattered mildly FDG avid mucosal thickening of bilateral ethmoid air  cells, right side of the frontal sinus, left maxillary sinus and right  sphenoid locule are suggestive of sinus inflammatory mucosal disease.     CHEST:    Mild diffuse FDG uptake right esophagus without masslike thickening  suggestive of mild esophagitis.  There is no suspicious FDG uptake in the chest.  Scarring in the left apex and smaller scar within the right apex.  Centrilobular emphysematous changes in the lung apices. A calcified  granuloma in the right middle lobe adjacent to the  fissure. There is a  non-FDG avid 5 mm left lower lobe subpleural nodule.    ABDOMEN AND PELVIS:  There is no suspicious FDG uptake in the abdomen or pelvis.    LOWER EXTREMITIES:   There is no suspicious FDG uptake in the visualized lower extremities.    BONES:   There is no suspicious FDG uptake in the skeleton.   Impression: IMPRESSION:     F-18 FDG whole body PET/CT including the neck area demonstrates:    Hypermetabolic mass centered around the right aryepiglottic fold,  piriform sinus and anteriorly extending to involve the right side of  the suprahyoid epiglottis with slight extension to the infrahyoid  segment. Posteriorly there is extension to posterior pharyngeal wall.  A separate single focus of hypermetabolism in the left posterior  pharyngeal wall. These areas are suspicious for squamosal cell cancer.    Additional noncontinuous areas of suspicious hypermetabolic activity  and thickening involving the right palatine tonsil extending to  involve the glossotonsillar sulcus, right tongue base and right  lateral oropharyngeal wall. These are also suspicious for malignancy.  Less intense but still worrisome areas of hypermetabolism in the left  palatine tonsil and left glossotonsillar sulcus.  Multiple bilateral cervical metastatic FDG avid lymphadenopathy  including right lateral retropharyngeal node, right intraparotid node,  right level 2, 3, 4 and left level 2 and 3 stations.  No evidence of distant metastasis.     Other findings:   -Mild esophagitis.   -Left mandibular canine periapical abscess.   -Inflammatory sinus mucosal disease.   -Single 5 mm left lower lobe subpleural nodule.  -Sequela of presumably infarction in the right temporal lobe.     MARI WARREN MD         SYSTEM ID:  [id number]     Imaging was personally reviewed and interpreted by me     Path report reviewed    Notes from [facility], Dr. Hardin reviewed

## 2023-09-14 NOTE — LETTER
9/14/2023         RE: Ko Thakkar  [address]  [address]  [place] [address]        Dear Colleague,    Thank you for referring your patient, Ko Thakkar, to the [facility]. Please see a copy of my visit note below.       [facility]    PATIENT NAME: Ko Thakkar  MRN # [id number]   DATE OF VISIT: September 14, 2023  YOB: 1955     Otolaryngology: Dr. Denia Hardin  Radiation Oncology: Dr. Juwan Cordova   PCP: Dr. Alton Mota; [facility] in [place]     CANCER TYPE: SCC supraglottis  STAGE: [id number] (RION)  ECOG PS:     PD-L1:  NGS:     SUMMARY  5/16/23 EGD for dysphagia. Gastritis and gastric diverticulum. Bx showed H. Pylori, treated. No atrophic gastritis or dysplasia  7/11/23 Swallow study at [facility]. Poor epiglottic inversion, penetration, aspiration  7/28/23 CT neck. 2.2 x 1.4 x 2.3 cm supraglottic mass, concern for paraglottic involvement, bilateral IB, IIA, III, IV adenopathy, 70% ICA stenosis   8/21/23 US carotid. 70-99% stenosis R ICA. <50% stenosis L ICA  8/29/23 FNA L level 2 node (IR). Path: SCC  9/6/23 PET/CT. FDG avid mass (SUV 13.5) right tonsil/lateral pharyngeal wall/glossotonsillar sulcus with slight extension to the R tongue base, right AE fold, right epiglottis, right piriform sinus, right posterolateral pharyngeal wall, posterior pharyngeal wall, no thyroid cartilage erosion, right retropharyngeal node, deep lobe parotid FDG avid mass, bilateral cervical nodes (right level II-IV, left level II-III), no distant disease, L mandibular canine with periapical abscess. R temporal lobe change suggestive of prior infarct. 5 mm LLL nodule.    ASSESSMENT AND PLAN  SCC supraglottis, [id number] (IRON): Wants to preserve his voice if possible, so has declined surgery and is here to discuss chemoradiation. Not likely to be a candidate for cisplatin due to tinnitus. Will get audiogram. Assuming hearing loss is prohibitive,  discussed carboplatin paclitaxel weekly as a radiosensitizer for 6-7 doses during radiation. Discussed logistics, potential side effects and additional benefit over radiation alone. Will see Dr. Cordova next week. We're worried about laryngeal incompetence despite cancer treatment so will have to monitor for pneumonia closely. Plan to FNA R parotid node 10/13    Definitive vs adjuvant: definitive   Chemo: carboplatin paclitaxel weekly pending audiogram  Goal: curative  Dental: waiting to hear back from dental school  Speech Path: Jessica Cuellargueroadenike visit 9/13/23, video 10/10, known aspiration at baseline  Nutrition: Will get Meka involved after Gtube placed  Audiology: Scheduled 9/25  Labs: CBC ok, need CMP  Gtube: Prophylactic - Dr. Hardin's team working on this  Port: If pt prefers  Start date: TBD    R KAILASH: Met with Dr. Tapia, vascular surgery fellow 8/21. Recommended treating the cancer first, asa 81 and high dose atorva, follow up 6 months with repeat US. PET/CT showed possible old R temporal infarct.    H/o food impaction: lower esophagus monitor    Liver issue: Says he was to have some sort of imaging at [facility] to look at his liver that had to be canceled due to the appts he needed for the cancer. Will try to figure out what that was about    120 minutes spent by me on the date of the encounter doing chart review, history and exam, documentation and further activities per the note     Xochilt Louise MD  Associate Professor of Medicine  Hematology, Oncology and Transplantation      SUBJECTIVE  Mr Thakkar is a 66 yo male who presents today to establish care in medical oncology for newly diagnosed supraglottic cancer. TL was discussed, declined. 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 Gtube dependence and inability to swallow or necessitating laryngectomy down the road if going down the chemoradiation route was also discussed.   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  "  At tumor board, we discussed the option of induction. Wouldn't change surgery, might make radiation field a little smaller  Has parotid bx planned.  Needs dentist  Has baseline tinnitus   Coughing up phlegm. Just started sitting up to sleep last night  His wife Aretha is scheduled for a hysterectomy next wed 12-13# since spring   Friends know him by \"Ace\"     Aretha can't drive for 2 weeks after surgery     PAST MEDICAL HISTORY  SCC as above  GERD  H/o food impaction in 2008 and 2014 (steak) related to Schatzki ring on EGD 10/13/14  DM2. Last A1c about 7.6 sometime in summer 2023  Dyslipidemia  Possible prior L temporal CVA  HTN  H.o spinal meningitis as a baby -   R arm surgery  Bone graft to forehead  Ex lap   Tinnitus secondary to treatment for meningitis or from menigitis - bilateral   Hearing loss   Numbness in the toes - mostly big toes   Crushing pills   No muscle aches or pains     CURRENT OUTPATIENT MEDICATIONS  Reviewed    Omega 3 fatty acid in addition    ALLERGIES  No Known Allergies     SOCIAL HISTORY: . H/o ETOH, but sober for many years - 20 +. H/o substance dependence. Former smoker, about 4 PPD started age 8, quit in 1999. H/o intermittent chew tobacco. Working as a , has done so since age 19. 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 Three children - 45-47. Two sons and a daughter. Three step-sons. 19 grandchildren.     FAMILY HISTORY:   Father passed away in his 40s of a heart attack  Mom had cirrhosis, did not consume ETOH  Children are generally healthy - his son has back problems - has DJD  Sister had an aneurysm and passed away from that     REVIEW OF SYSTEMS  As above in the HPI, o/w complete 12-point ROS was negative.    PHYSICAL EXAM  BP (!) 144/80   Pulse 86   Temp 98.2  F (36.8  C)   Wt 81.6 kg (180 lb)   SpO2 97%   BMI 29.05 kg/m    GEN: NAD  HEENT: EOMI, no icterus, injection or pallor  EXT: no " edema  NEURO: alert  SKIN: no rashes    LABORATORY AND IMAGING STUDIES    Labs 8/29/23 were independently reviewed and interpreted by me  CBC ok. MCV 85, hgb 13.8  No chemistries or creatinine, LFTs    PET Oncology (Eyes to Thighs)  Narrative: Combined Report of: PET and CT on 9/6/2023 4:18 PM:    1. PET of the neck, chest, abdomen, and pelvis.  2. PET CT Fusion for Attenuation Correction and Anatomical  Localization.  3. 3D MIP and PET-CT fused images were processed on an independent  workstation and archived to PACS and reviewed by a radiologist.    Technique:    1. PET: The patient received 10.46 mCi of F-18-FDG. The serum glucose  was 144 mg/dL prior to administration. Body weight was 79.7 kg. Images  were evaluated in the axial, sagittal, and coronal planes as well as  the rotational whole body MIP. Images were acquired from the cranial  vertex to the feet.    UPTAKE WAS MEASURED AT 83 MINUTES.     BACKGROUND: Liver SUV max = 3.3, Aorta Blood SUV Max = 2.5.     2. CT: For attenuation correction only.    Contrast and Medications: None    3. 3D MIP and PET-CT fused images were processed on an independent  workstation and archived to PACS and reviewed by a radiologist.    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    ADDITIONAL INFORMATION OBTAINED FROM EMR: none.    COMPARISON: 7/28/23 CT neck    FINDINGS:     HEAD/NECK:    Pharyngeal mucosal spaces:     There is abnormal increased FDG uptake (max SUV 13.5, contralateral  6.3) by the right lateral oropharyngeal wall extending to involve the  right tonsil and glossotonsillar sulcus with slight extension into  right tongue base. There is asymmetric fullness of the right lateral  oropharyngeal wall. There is less intense increased metabolism by the  left tongue base and left glossotonsillar sulcus (max SUV 7.1).    There is abnormal FDG avid masslike thickening of the right AE  fold  extending to involve the suprahyoid epiglottis on the right. There is  slight involvement and thickening of the infrahyoid segment of the  epiglottis on the right.   There is asymmetric complete effacement with FDG avid masslike  appearance of the right piriform sinus suggestive of tumor extension.  FDG avid disease extends also to the right posterolateral pharyngeal  wall. The right piriform sinus tumor component abuts the inner margin  of the right thyroid cartilage with no evidence of erosion. Posterior  to the left piriform sinus there is focal FDG uptake along the  posterior pharyngeal wall or post cricoid mucosa which might suggest  extension of tumor.    True and false cords appear uninvolved. Hyoid bone is intact.  Nasopharyngeal mucosal space is normal.    Cervical lymph nodes: There is an FDG avid right lateral  retropharyngeal node suspicious for involvement. There is a punctate  FDG avid node in the deep lobe of the right parotid gland suspicious  for an intraparotid node. There are multiple FDG avid and enlarged  right level 2, right level 3 and right L4 lymphadenopathy. On the left  there are enlargement and FDG avid left level 2 and 3 adenopathy.  Examples for lymph nodes in the right level2,  there is a 1.5 cm  lymphadenopathy with maximum SUV of 11.7. On the left an example lymph  node which is largest one measures up to 2.0 cm with maximum SUV of  13.1.     FDG avid periapical lucency of the left mandibular canine which is  suggestive of periapical abscess.    Within the brain parenchyma there is photopenic hypodensity in the  lateral aspect of the right temporal lobe suggestive of sequela of  prior infarction.     Scattered mildly FDG avid mucosal thickening of bilateral ethmoid air  cells, right side of the frontal sinus, left maxillary sinus and right  sphenoid locule are suggestive of sinus inflammatory mucosal disease.     CHEST:    Mild diffuse FDG uptake right esophagus without  masslike thickening  suggestive of mild esophagitis.  There is no suspicious FDG uptake in the chest.  Scarring in the left apex and smaller scar within the right apex.  Centrilobular emphysematous changes in the lung apices. A calcified  granuloma in the right middle lobe adjacent to the fissure. There is a  non-FDG avid 5 mm left lower lobe subpleural nodule.    ABDOMEN AND PELVIS:  There is no suspicious FDG uptake in the abdomen or pelvis.    LOWER EXTREMITIES:   There is no suspicious FDG uptake in the visualized lower extremities.    BONES:   There is no suspicious FDG uptake in the skeleton.   Impression: IMPRESSION:     F-18 FDG whole body PET/CT including the neck area demonstrates:    Hypermetabolic mass centered around the right aryepiglottic fold,  piriform sinus and anteriorly extending to involve the right side of  the suprahyoid epiglottis with slight extension to the infrahyoid  segment. Posteriorly there is extension to posterior pharyngeal wall.  A separate single focus of hypermetabolism in the left posterior  pharyngeal wall. These areas are suspicious for squamosal cell cancer.    Additional noncontinuous areas of suspicious hypermetabolic activity  and thickening involving the right palatine tonsil extending to  involve the glossotonsillar sulcus, right tongue base and right  lateral oropharyngeal wall. These are also suspicious for malignancy.  Less intense but still worrisome areas of hypermetabolism in the left  palatine tonsil and left glossotonsillar sulcus.  Multiple bilateral cervical metastatic FDG avid lymphadenopathy  including right lateral retropharyngeal node, right intraparotid node,  right level 2, 3, 4 and left level 2 and 3 stations.  No evidence of distant metastasis.     Other findings:   -Mild esophagitis.   -Left mandibular canine periapical abscess.   -Inflammatory sinus mucosal disease.   -Single 5 mm left lower lobe subpleural nodule.  -Sequela of presumably infarction  in the right temporal lobe.     MARI WARREN MD         SYSTEM ID:  [id number]     Imaging was personally reviewed and interpreted by me     Path report reviewed    Notes from Dr. Wilfred CARUSO reviewed        Xochilt Louise MD

## 2023-09-15 ENCOUNTER — PATIENT OUTREACH (OUTPATIENT)
Dept: ONCOLOGY | Facility: CLINIC | Age: 68
End: 2023-09-15
Payer: COMMERCIAL

## 2023-09-15 NOTE — PROGRESS NOTES
"[facility]: Cancer Care Initial Note                                    Discussion with Patient:                                                      Met with Ko and Ondina (spouse) to discuss chemotherapy and resources available at the [facility]. Provided patient with \"My Cancer Guidebook\", and [facility] printouts on carbo/taxol. Reviewed administration, side effects and ongoing symptom management by APPs in clinic. Provided phone numbers for triage and after hours care. Highlighted steps to expect when getting chemotherapy (check in, labs, pre- meds, infusion), Discussed that chemo treatment may be delayed a day or two due to blood counts, infusion schedule or patient's need to modify. Included a one page resource of symptoms and when to contact the Cancer Clinic with questions or concerns.  Reviewed chemotherapy safety guidelines.    Ko signed Authorization to Discuss paperwork. Discussed utilizing [employer] to assist in managing appointments and asking future questions of health care team. Requested Ko contact our Nurse Triage team for symptom and side effect management.        Social work referral placed for financial concerns per patient request. Provided my contact info if they have any questions or concerns.        Goals          General     Medical (pt-stated)      Notes - Note created  9/15/2023  2:01 PM by Olga Miranda, RN     Goal Statement: I will use my clinic and care team resources as directed.  Date Goal set: 9/15/2023  Barriers: disease burden  Strengths: support  Date to Achieve By: ongoing  Patient expressed understanding of goal: Yes  Action steps to achieve this goal:  I will contact triage with new, worsening or uncontrolled symptoms.   I will contact triage with temperature over 100.4  I will call with difficulties of scheduling and/or transportation.   I will not send urgent or symptomatic messages through [employer].   I will contact scheduling to " Was the patient seen in the last year in this department? Yes    Does patient have an active prescription for medications requested? No     Received Request Via: Pharmacy      Pt met protocol?: Yes    LAST OV 11/26/2019      arrange or make changes in my appointments.                Assessment:                                                      Initial  Current living arrangement:: I live in a private home with family  Informal Support system:: Family;Sherry based  Bed or wheelchair confined:: No  Mobility Status: Independent  Transportation means:: Regular car  Referrals Placed: social work  Advanced Care Plans/Directives on file:: No    Plan of Care Education Review:   Assessment completed with:: Patient    Current living arrangement  I live in a private home with family    Plan of Care Education   Yearly learning assessment completed?: Yes (see Education tab)  Diagnosis:: squamous cell carcinoma of supraglottis  Does patient understand diagnosis?: Yes  Tx plan/regimen:: carbo/taxol + RT, needs peg placed  Does patient understand treatment plan/regimen?: Yes  Preparing for treatment:: Reviewed treatment preparation information with patient (vascular access, day of chemo, visitor policy, what to bring, etc.)  Vascular access:: Peripheral IV  Side effect education:: Diarrhea/Constipation;Fatigue;Nausea/Vomiting;Infection;Neuropathy;Lab value monitoring (anemia, neutropenia, thrombocytopenia);Mouth sores;Sexual health  Supportive services:: Nutrition;Social work  Transportation means:: Regular car  Safety/self care at home reviewed with patient:: Yes  Informal Support system:: Family;Sherry based  Coping - concerns/fears reviewed with patient:: Yes  Plan of Care:: PALOMO follow-up appointment;Lab appointment;Imaging;MD follow-up appointment;Treatment schedule  When to call provider:: Bleeding;Uncontrolled diarrhea/constipation;Increased shortness of breath;New/worsening pain;Uncontrolled nausea/vomiting;Shaking chills;Temperature >100.4F  Reasons for deferring treatment reviewed with patient:: Yes    Evaluation of Learning  Patient Education Provided: Yes  Readiness:: Acceptance  Method:: Booklet/Handout;Literature;Explanation  Response::  Verbalizes understanding           Intervention/Education provided during outreach:                                                       Plan:  Start carbo/taxol pending RT start (consult date 9/19)  PEG placement  RNCC will call patient prior to chemo start for check in   referral    Olga Miranda, RN, BSN  RN Care Coordinator  [facility]

## 2023-09-15 NOTE — PROGRESS NOTES
[facility]: Cancer Care                                                                                          Received VM from Ko's wife, Ondina, requesting a letter for Ko's employer.    Returned their call. Discussed that we will work on this letter and mail it to their house once it is completed.    Additionally discussed with Ko if he would prefer getting a port placed vs peripheral lab draws & infusions. Discussed how both ports & PIVs work, risks and benefits, long term maintenance, etc. Ko states that he would prefer to not get a port placed, and is okay with getting chemotherapy and labs peripherally. MD made aware.    Encouraged them to call back with any further questions or concerns. RNCC will work on obtaining letter for Ko's employer and will mail it to them once it is completed.    Olga Miranda, RN, BSN  RN Care Coordinator  [facility]

## 2023-09-18 ENCOUNTER — PATIENT OUTREACH (OUTPATIENT)
Dept: ONCOLOGY | Facility: CLINIC | Age: 68
End: 2023-09-18
Payer: COMMERCIAL

## 2023-09-18 DIAGNOSIS — C10.9 SQUAMOUS CELL CARCINOMA OF OROPHARYNX (H): Primary | ICD-10-CM

## 2023-09-18 DIAGNOSIS — C10.9 SQUAMOUS CELL CARCINOMA OF OROPHARYNX (H): ICD-10-CM

## 2023-09-18 DIAGNOSIS — E11.9 TYPE 2 DIABETES MELLITUS WITHOUT COMPLICATIONS (H): ICD-10-CM

## 2023-09-18 NOTE — CONSULTS
Outpatient Percutaneous Gastrotomy/Gastrojejunostomy Tube IR Referral  09/18/23    Referring Provider: Dr. Wilfred FREGOSO  IR Referral Request: To place a g tube for nutrition, SCC oral pharynx for chemo radiation therapy.   Procedure Approved for: G tube placement    Brief History:    Ko Thakkar is a 67 year old male with history of SCC oral, dysphagia.  Patient no known abdominal surgeries.     Pertinent Imaging Reviewed:  CT 9/6/23         Case and imaging was reviewed with Dr. Taylor from IR and G tube placement is recommended. Series 4 Image 172    IR recommends patient have a dietary consultation, learning center referral, and home care consult prior to G tube placement.     Med holds ASA for 5 days prior to scheduled procedure.    Procedure order placed.    Requesting team, DR. Wilfred FREGOSO, made aware of IR recommendations via Epic messaging.      PETE Cleaning CNP  Interventional Radiology   IR on-call pager: [phone]

## 2023-09-19 ENCOUNTER — OFFICE VISIT (OUTPATIENT)
Dept: RADIATION ONCOLOGY | Facility: CLINIC | Age: 68
End: 2023-09-19
Attending: OTOLARYNGOLOGY
Payer: COMMERCIAL

## 2023-09-19 VITALS
WEIGHT: 182 LBS | SYSTOLIC BLOOD PRESSURE: 162 MMHG | DIASTOLIC BLOOD PRESSURE: 77 MMHG | BODY MASS INDEX: 29.38 KG/M2 | HEART RATE: 78 BPM

## 2023-09-19 DIAGNOSIS — C10.9 SQUAMOUS CELL CARCINOMA OF OROPHARYNX (H): Primary | ICD-10-CM

## 2023-09-19 DIAGNOSIS — C10.9 SQUAMOUS CELL CARCINOMA OF OROPHARYNX (H): ICD-10-CM

## 2023-09-19 PROCEDURE — G0463 HOSPITAL OUTPT CLINIC VISIT: HCPCS | Performed by: SURGERY

## 2023-09-19 PROCEDURE — 99205 OFFICE O/P NEW HI 60 MIN: CPT | Performed by: SURGERY

## 2023-09-19 ASSESSMENT — ENCOUNTER SYMPTOMS
EYES NEGATIVE: 1
PSYCHIATRIC NEGATIVE: 1
HEADACHES: 1
CARDIOVASCULAR NEGATIVE: 1
SPUTUM PRODUCTION: 1
GASTROINTESTINAL NEGATIVE: 1
DIZZINESS: 1
COUGH: 1
BACK PAIN: 1
TINGLING: 1
NECK PAIN: 1

## 2023-09-19 NOTE — PROGRESS NOTES
Department of Radiation Oncology  [facility]: Cancer Center  [facility]  [address] [place] [id number]  [phone]       Consultation Note    Name: Ko Thakkar MRN: [id number]   : 1955   Date of Service: Sep 19, 2023 Referring: Dr. Hardin/ Dr. Louise     Reason for consultation: [id number] oropharyngeal SCC, p16 status unknown     History of Present Illness     Mr. Thakkar is a 67 year old male [id number] oropharyngeal SCC, p16 status unknown. Tumor involves the right tonsil, posterior pharyngeal wall, and right AE fold, hypopharynx, with known right cord paralysis, with bilateral adenopathy including right RP node, right level IV, and questionable right parotid node.    Briefly, his oncologic history is as follows:    Patient initially presented in 2023 with worsening dysphagia particularly to solid foods.  This prompted further evaluation and he met with a gastroenterologist and eventually had a EGD performed on 2023.    He eventually saw a speech pathologist on 2023, and was noted to have poor epiglottic inversion.  He also was noted to have aspiration on the swallow study.    A CT scan was obtained on 2023, which demonstrated a 2.3 cm enhancing mass in the supraglottis, with no erosion of the arytenoid or cricoid cartilage, along with bilateral adenopathy.  Of note there was also right carotid stenosis of 70%.  Follow-up ultrasound was performed on 2023, with 70 to 99% stenosis of the right ICA, and less than 50% stenosis of the left ICA.    He saw Dr. Hardin in 2023, with scope exam demonstrating a tumor in the right tonsil extending towards the glossotonsillar sulcus, with fullness of the posterior pharyngeal wall on the right, with edema of the epiglottis primarily on the right-hand side, with tumor involving the right arytenoid, with vocal cord paralysis on the right, along with obstruction of the  right piriform sinus.    He eventually went FNA of left neck lymph node which returned positive for squamous cell carcinoma,with p16 undetermined due to scant cells.    PET scan was obtained on 9/6/2023 which demonstrated increased FDG uptake in the right tonsil and glossotonsillar sulcus with slight extension into the right tongue base and to the lateral pharyngeal wall.  There is also evidence and uptake in the right AE fold and epiglottis with a effacement of the right piriform sinus and possible extension into the postcricoid mucosa.There is no evidence of any cartilage invasion.  There is also evidence of bilateral adenopathy, including a right lateral retropharyngeal lymph node, a questionable right deep lobe of the parotid, and right level 2, right level 3, right level 4, left level 2, 3.  There is no evidence of any distant metastatic disease.    He discussed options of surgery with , but this would involved a total laryngectomy, partial versus total pharyngectomy and bilateral neck dissections and reconstruction.  At this point, patient does not wish to pursue any surgical options particularly as he enjoys singing at the [employer] and does not want to lose his voice, despite already having evidence of swallow dysfunction and voice dysfunction, evidence by aspiration as well as a right cord paralysis and dysphonia.    His case was discussed at head neck tumor board, with recommendation for proceeding with definitive chemoradiation versus induction chemotherapy.     He has met with Dr. Louise in medical oncology on 9/14/23, who discussed both options of concurrent chemoradiation versus induction chemotherapy.  He is deemed likely to be a candidate of cisplatin and possibly would be a candidate for carbo/Taxol.    Today, he is accompanied by his wife.  He states that he has had significant dysphagia to solid food since April 2023 with weight loss.  He does endorse aspirations with thin liquids.  He  endorses dysphonia, but still is able to use his voice and saying.  He denies any odynophagia, otalgia, stridor, focal neurologic changes.     He does have evidence of poor dentition, and meeting with dental team at [facility] has not beed scheduled yet.    He also states that currently works as a .  He does have a former 4 pack a day smoking history since the age of 9, quit over 20 years ago, prior alcohol and drug abuser, quit over 20 years ago.  He currently lives with his wife, who is also undergoing a hysterectomy tomorrow.    Past Medical History:   No past medical history on file.    Past Surgical History:   Past Surgical History:   Procedure Laterality Date    ESOPHAGOSCOPY, GASTROSCOPY, DUODENOSCOPY (EGD), COMBINED N/A 10/12/2014    Procedure: ESOPHAGOGASTRODUODENOSCOPY;  Surgeon: Jh Ventura MD;  Location: [facility];  Service:     IR FINE NEEDLE ASPIRATION W ULTRASOUND  8/29/2023    LAPAROTOMY EXPLORATORY Left     spleen       Chemotherapy History:  No prior chemotherapy    Radiation History:  No prior radiation    Pregnant: No  Implanted Cardiac Devices: No    Medications:  Current Outpatient Medications   Medication    amLODIPine (NORVASC) 5 MG tablet    aspirin (ASA) 81 MG chewable tablet    atorvastatin (LIPITOR) 80 MG tablet    empagliflozin (JARDIANCE) 25 MG TABS tablet    glyBURIDE (DIABETA) 5 MG tablet    hydrochlorothiazide (HYDRODIURIL) 25 MG tablet    insulin glargine (LANTUS) 100 unit/mL injection    insulin NPH-Regular 70/30 (HUMULIN 70/30;NOVOLIN 70/30) (70-30) 100 UNIT/ML vial    liraglutide (VICTOZA) 18 MG/3ML solution    lisinopril (PRINIVIL,ZESTRIL) 5 MG tablet    metFORMIN (GLUCOPHAGE) 500 MG tablet    therapeutic multivitamin (THERAGRAN) tablet     No current facility-administered medications for this visit.         Allergies:   No Known Allergies    Social History:  Tobacco: Former smoker, 4 pack a day for 30 years quit over 20 years ago  Alcohol: Former alcohol abuser, quit  over 20 years ago, former drug user quit over 20 years ago  Employment: Works as a     Family History:  No family history on file.    Review of Systems   A 10-point review of systems was performed. Pertinent findings are noted in the HPI.    Physical Exam   ECOG Status: 2    Vitals:  BP (!) 162/77   Pulse 78   Wt 82.6 kg (182 lb)   BMI 29.38 kg/m      Gen: Alert, in NAD  Head: NC/AT  Eyes: PERRL, EOMI, sclera anicteric  Ears: No external auricular lesions  Nose/sinus: No rhinorrhea or epistaxis  Oral cavity/oropharynx: MMM, no visible oral cavity lesions, right tonsil mass with possible extension to posterior pharyngeal wall, poor dentition no teeth in the uppers, poor dentition no lower   Neck: Full ROM, supple, small lymphadenopathy bilaterally,  Pulm: No wheezing, stridor or respiratory distress  CV: Extremities are warm and well-perfused, no cyanosis, no pedal edema  Abdominal: Normal bowel sounds, soft, nontender, no masses  Musculoskeletal: Normal bulk and tone  Skin: Normal color and turgor  Neuro: A/Ox3, CN II-XII intact, normal gait         Imaging/Path/Labs   Imaging: per HPI, personally reviewed and in agreement    Path: per HPI, personally reviewed and in agreement    Labs: per HPI, personally reviewed and in agreement    Assessment      Mr. Thakkar is a 67 year old male [id number] oropharyngeal SCC, p16 status unknown. Tumor involves the right tonsil, posterior pharyngeal wall, and right AE fold, hypopharynx, with known right cord paralysis, with bilateral adenopathy including right RP node, right level IV, and questionable right parotid node. There is no evidence of any distant disease. He has evidence of swallow dysfunction with dysphagia and aspiration, and voice dysfunction with dysphonia and right cord paralysis.     In general, we discussed NCCN guidelines for locally advanced oropharyngeal cancer.  Options generally include either resection, with adjuvant treatment depending on final  pathology, concurrent chemoradiation, or induction chemotherapy followed by chemoradiation.    He is already discussed options of surgery with , which would likely include a pharyngectomy, total laryngectomy, bilateral neck dissections.  Although he already has evidence of aspiration and voice dysfunction with a right cord paralysis, he greatly wishes to per serve his voice as he is king in [employer] and thus does not want to pursue surgical options.    His case was discussed at head neck tumor board with recommendation for either concurrent chemoradiation versus initiating with induction chemotherapy.  He is already met with Dr. Louise who has discussed both options.  After evaluating this case, I feel that induction chemotherapy is preferred in this setting, given that he has fairly extensive disease in the oropharynx into the supraglottis and into the hypopharynx with significant bilateral neck disease including a right retropharyngeal lymph node, and questionable right parotid lymph node.      Further, he is still pending radiation clearance and will likely require significant dental work.  In addition he is also undergoing a biopsy of the right parotid lymph node on 10/13/2023.  All of these will potentially delay his care, which is another reason I favor induction chemotherapy in this situation.    I have directly discussed this case with both Jarrett and Wilfred, who are also in agreement with this plan of care for this patient.    Plan     After extensive discussion, patient wishes to induction therapy, followed by assessment for chemoRT.  Medical Oncology- He has been evaluated by Dr. Louise, with plan for induction chemotherapy, followed by re-imaging with PET/CT with CT neck with contrast, and plan chemoRT to follow. Given baseline tinnitus, anticipate carbo/taxol per Dr. Louise.  Dental- Pending Radiation Clearance, he has poor dentition and will likely require dental work  Feeding tube- He  already has evidence of dysphagia and aspiration, and is pending treatment for this  He is pending right parotid biopsy on 10/13/23  With regard to right carotid artery stenosis, he has met with David/Willie, vascular surgery, with recommendation to treat cancer first, started on ASA and statin, will have follow up in 6 months.     All benefits and risks discussed, and patient is in agreement with the oncologic plan discussed above.     I have directly discussed this case with both Jarrett and Wilfred, who are also in agreement with this plan of care for this patient.    Thank you for allowing me to participate in your patient's care.  If you should require any additional information, please do not hesitate in contacting me.     Juwan Cordova MD  Department of Radiation Oncology  [facility]

## 2023-09-19 NOTE — PROGRESS NOTES
High priority dental referral placed for pre-radiation clearance. Message sent to dental team for scheduling.    Denia Hardin MD    Department of Otolaryngology

## 2023-09-19 NOTE — LETTER
2023         RE: Ko Thakkar  [address]  [address]  [place] [address]        Dear Colleague,    Thank you for referring your patient, Ko Thakkar, to the [facility] RADIATION ONCOLOGY. Please see a copy of my visit note below.      HPI    INITIAL PATIENT ASSESSMENT    Diagnosis: Cancer    Prior radiation therapy: None    Prior chemotherapy: None    Prior hormonal therapy:No    Pain Eval:  Denies    Psychosocial  Living arrangements: Lives with wife  Fall Risk: independent   referral needs: Yes: Will contact     Advanced Directive: No  Implantable Cardiac Device? No    OReview of Systems   Constitutional:  Positive for malaise/fatigue.   HENT:  Positive for tinnitus.    Eyes: Negative.    Respiratory:  Positive for cough and sputum production.    Cardiovascular: Negative.    Gastrointestinal: Negative.    Genitourinary: Negative.    Musculoskeletal:  Positive for back pain and neck pain.   Skin: Negative.    Neurological:  Positive for dizziness, tingling and headaches.        Neuropathy in feet   Endo/Heme/Allergies: Negative.    Psychiatric/Behavioral: Negative.                      Department of Radiation Oncology  [facility]    Health: Cancer Center  [facility]  [address] [place] [address]  [phone]       Consultation Note    Name: Ko Thakkar MRN: [id number]   : 1955   Date of Service: Sep 19, 2023 Referring: Dr. Hardin/ Dr. Louise     Reason for consultation: [id number] oropharyngeal SCC, p16 status unknown     History of Present Illness     Mr. Thakkar is a 67 year old male [id number] oropharyngeal SCC, p16 status unknown. Tumor involves the right tonsil, posterior pharyngeal wall, and right AE fold, hypopharynx, with known right cord paralysis, with bilateral adenopathy including right RP node, right level IV, and questionable right parotid node.    Briefly, his oncologic history is  as follows:    Patient initially presented in April 2023 with worsening dysphagia particularly to solid foods.  This prompted further evaluation and he met with a gastroenterologist and eventually had a EGD performed on 5/16/2023.    He eventually saw a speech pathologist on 7/11/2023, and was noted to have poor epiglottic inversion.  He also was noted to have aspiration on the swallow study.    A CT scan was obtained on 7/28/2023, which demonstrated a 2.3 cm enhancing mass in the supraglottis, with no erosion of the arytenoid or cricoid cartilage, along with bilateral adenopathy.  Of note there was also right carotid stenosis of 70%.  Follow-up ultrasound was performed on 8/21/2023, with 70 to 99% stenosis of the right ICA, and less than 50% stenosis of the left ICA.    He saw Dr. Hardin in August 2023, with scope exam demonstrating a tumor in the right tonsil extending towards the glossotonsillar sulcus, with fullness of the posterior pharyngeal wall on the right, with edema of the epiglottis primarily on the right-hand side, with tumor involving the right arytenoid, with vocal cord paralysis on the right, along with obstruction of the right piriform sinus.    He eventually went FNA of left neck lymph node which returned positive for squamous cell carcinoma,with p16 undetermined due to scant cells.    PET scan was obtained on 9/6/2023 which demonstrated increased FDG uptake in the right tonsil and glossotonsillar sulcus with slight extension into the right tongue base and to the lateral pharyngeal wall.  There is also evidence and uptake in the right AE fold and epiglottis with a effacement of the right piriform sinus and possible extension into the postcricoid mucosa.There is no evidence of any cartilage invasion.  There is also evidence of bilateral adenopathy, including a right lateral retropharyngeal lymph node, a questionable right deep lobe of the parotid, and right level 2, right level 3, right level 4, left  level 2, 3.  There is no evidence of any distant metastatic disease.    He discussed options of surgery with , but this would involved a total laryngectomy, partial versus total pharyngectomy and bilateral neck dissections and reconstruction.  At this point, patient does not wish to pursue any surgical options particularly as he enjoys singing at the [employer] and does not want to lose his voice, despite already having evidence of swallow dysfunction and voice dysfunction, evidence by aspiration as well as a right cord paralysis and dysphonia.    His case was discussed at head neck tumor board, with recommendation for proceeding with definitive chemoradiation versus induction chemotherapy.     He has met with Dr. Louise in medical oncology on 9/14/23, who discussed both options of concurrent chemoradiation versus induction chemotherapy.  He is deemed likely to be a candidate of cisplatin and possibly would be a candidate for carbo/Taxol.    Today, he is accompanied by his wife.  He states that he has had significant dysphagia to solid food since April 2023 with weight loss.  He does endorse aspirations with thin liquids.  He endorses dysphonia, but still is able to use his voice and saying.  He denies any odynophagia, otalgia, stridor, focal neurologic changes.     He does have evidence of poor dentition, and meeting with dental team at [facility] has not beed scheduled yet.    He also states that currently works as a .  He does have a former 4 pack a day smoking history since the age of 9, quit over 20 years ago, prior alcohol and drug abuser, quit over 20 years ago.  He currently lives with his wife, who is also undergoing a hysterectomy tomorrow.    Past Medical History:   No past medical history on file.    Past Surgical History:   Past Surgical History:   Procedure Laterality Date    ESOPHAGOSCOPY, GASTROSCOPY, DUODENOSCOPY (EGD), COMBINED N/A 10/12/2014    Procedure: ESOPHAGOGASTRODUODENOSCOPY;  Surgeon:  Jh Ventura MD;  Location: [facility];  Service:     IR FINE NEEDLE ASPIRATION W ULTRASOUND  8/29/2023    LAPAROTOMY EXPLORATORY Left     spleen       Chemotherapy History:  No prior chemotherapy    Radiation History:  No prior radiation    Pregnant: No  Implanted Cardiac Devices: No    Medications:  Current Outpatient Medications   Medication    amLODIPine (NORVASC) 5 MG tablet    aspirin (ASA) 81 MG chewable tablet    atorvastatin (LIPITOR) 80 MG tablet    empagliflozin (JARDIANCE) 25 MG TABS tablet    glyBURIDE (DIABETA) 5 MG tablet    hydrochlorothiazide (HYDRODIURIL) 25 MG tablet    insulin glargine (LANTUS) 100 unit/mL injection    insulin NPH-Regular 70/30 (HUMULIN 70/30;NOVOLIN 70/30) (70-30) 100 UNIT/ML vial    liraglutide (VICTOZA) 18 MG/3ML solution    lisinopril (PRINIVIL,ZESTRIL) 5 MG tablet    metFORMIN (GLUCOPHAGE) 500 MG tablet    therapeutic multivitamin (THERAGRAN) tablet     No current facility-administered medications for this visit.         Allergies:   No Known Allergies    Social History:  Tobacco: Former smoker, 4 pack a day for 30 years quit over 20 years ago  Alcohol: Former alcohol abuser, quit over 20 years ago, former drug user quit over 20 years ago  Employment: Works as a     Family History:  No family history on file.    Review of Systems   A 10-point review of systems was performed. Pertinent findings are noted in the HPI.    Physical Exam   ECOG Status: 2    Vitals:  BP (!) 162/77   Pulse 78   Wt 82.6 kg (182 lb)   BMI 29.38 kg/m      Gen: Alert, in NAD  Head: NC/AT  Eyes: PERRL, EOMI, sclera anicteric  Ears: No external auricular lesions  Nose/sinus: No rhinorrhea or epistaxis  Oral cavity/oropharynx: MMM, no visible oral cavity lesions, right tonsil mass with possible extension to posterior pharyngeal wall, poor dentition no teeth in the uppers, poor dentition no lower   Neck: Full ROM, supple, small lymphadenopathy bilaterally,  Pulm: No wheezing, stridor or  respiratory distress  CV: Extremities are warm and well-perfused, no cyanosis, no pedal edema  Abdominal: Normal bowel sounds, soft, nontender, no masses  Musculoskeletal: Normal bulk and tone  Skin: Normal color and turgor  Neuro: A/Ox3, CN II-XII intact, normal gait         Imaging/Path/Labs   Imaging: per HPI, personally reviewed and in agreement    Path: per HPI, personally reviewed and in agreement    Labs: per HPI, personally reviewed and in agreement    Assessment      Mr. Thakkar is a 67 year old male [id number] oropharyngeal SCC, p16 status unknown. Tumor involves the right tonsil, posterior pharyngeal wall, and right AE fold, hypopharynx, with known right cord paralysis, with bilateral adenopathy including right RP node, right level IV, and questionable right parotid node. There is no evidence of any distant disease. He has evidence of swallow dysfunction with dysphagia and aspiration, and voice dysfunction with dysphonia and right cord paralysis.     In general, we discussed NCCN guidelines for locally advanced oropharyngeal cancer.  Options generally include either resection, with adjuvant treatment depending on final pathology, concurrent chemoradiation, or induction chemotherapy followed by chemoradiation.    He is already discussed options of surgery with , which would likely include a pharyngectomy, total laryngectomy, bilateral neck dissections.  Although he already has evidence of aspiration and voice dysfunction with a right cord paralysis, he greatly wishes to per serve his voice as he is king in [employer] and thus does not want to pursue surgical options.    His case was discussed at head neck tumor board with recommendation for either concurrent chemoradiation versus initiating with induction chemotherapy.  He is already met with Dr. Louise who has discussed both options.  After evaluating this case, I feel that induction chemotherapy is preferred in this setting, given that he has fairly  extensive disease in the oropharynx into the supraglottis and into the hypopharynx with significant bilateral neck disease including a right retropharyngeal lymph node, and questionable right parotid lymph node.      Further, he is still pending radiation clearance and will likely require significant dental work.  In addition he is also undergoing a biopsy of the right parotid lymph node on 10/13/2023.  All of these will potentially delay his care, which is another reason I favor induction chemotherapy in this situation.    I have directly discussed this case with both Jarrett and Wilfred, who are also in agreement with this plan of care for this patient.    Plan     After extensive discussion, patient wishes to induction therapy, followed by assessment for chemoRT.  Medical Oncology- He has been evaluated by Dr. Louise, with plan for induction chemotherapy, followed by re-imaging with PET/CT with CT neck with contrast, and plan chemoRT to follow. Given baseline tinnitus, anticipate carbo/taxol per Dr. Louise.  Dental- Pending Radiation Clearance, he has poor dentition and will likely require dental work  Feeding tube- He already has evidence of dysphagia and aspiration, and is pending treatment for this  He is pending right parotid biopsy on 10/13/23  With regard to right carotid artery stenosis, he has met with David/Willie, vascular surgery, with recommendation to treat cancer first, started on ASA and statin, will have follow up in 6 months.     All benefits and risks discussed, and patient is in agreement with the oncologic plan discussed above.     I have directly discussed this case with both Jarrett and Wilfred, who are also in agreement with this plan of care for this patient.    Thank you for allowing me to participate in your patient's care.  If you should require any additional information, please do not hesitate in contacting me.     Juwan Cordova MD  Department of Radiation  Oncology  [facility]

## 2023-09-19 NOTE — PROGRESS NOTES
HPI    INITIAL PATIENT ASSESSMENT    Diagnosis: Cancer    Prior radiation therapy: None    Prior chemotherapy: None    Prior hormonal therapy:No    Pain Eval:  Denies    Psychosocial  Living arrangements: Lives with wife  Fall Risk: independent   referral needs: Yes: Will contact     Advanced Directive: No  Implantable Cardiac Device? No    OReview of Systems   Constitutional:  Positive for malaise/fatigue.   HENT:  Positive for tinnitus.    Eyes: Negative.    Respiratory:  Positive for cough and sputum production.    Cardiovascular: Negative.    Gastrointestinal: Negative.    Genitourinary: Negative.    Musculoskeletal:  Positive for back pain and neck pain.   Skin: Negative.    Neurological:  Positive for dizziness, tingling and headaches.        Neuropathy in feet   Endo/Heme/Allergies: Negative.    Psychiatric/Behavioral: Negative.                  005

## 2023-09-21 ENCOUNTER — TELEPHONE (OUTPATIENT)
Dept: INTERVENTIONAL RADIOLOGY/VASCULAR | Facility: CLINIC | Age: 68
End: 2023-09-21
Payer: COMMERCIAL

## 2023-09-21 NOTE — TELEPHONE ENCOUNTER
Patient scheduled for biopsy and g tube placement on 10/13. Warm transferred from scheduling team to review meds, however due to several med holds, patient's wife (Ondina) requested instructions to be emailed and to have nurses follow-up closer to appointment date to review instructions.     Instructions emailed to [email or web address] per request as detailed below.        INTERVENTIONAL RADIOLOGY INSTRUCTIONS    You are scheduled for an upcoming procedure in the Interventional Radiology Department at [facility].    Date: 10/13/23    Procedure: FNA biopsy of parotid and G tube placement    Address: [facility]    [address] [place] [address]    Parking: Patient and Visitor Ramp    [address] [place] [address]   parking available by the main entrance of hospital    Check in at the [facility] Waiting Room at 8:00am.    Do not eat anything after midnight.    You may have sips of clear liquids until 6:00am. Examples of clear liquids: water, apple juice, gatorade, jello, black coffee or tea (no creamer or milk).    Arrange for a responsible adult to drive you home. Use of public transportation (cabs, buses, etc) home alone after your procedure is not allowed.     We recommend you have a responsible adult stay with you 1-2 hours after you get home.    Two visitors may accompany you the day of your procedure.    Please make the following medication adjustments:  Aspirin: hold 5 days before procedure.  Metformin: hold the morning of procedure.  Glyburide: hold the morning of procedure.  Victoza: hold the day before and day of procedure.  Jardiance: hold for 3 days before procedure.  Insulin NPH-Regular 70/30: hold the morning of procedure.  Lantus: give 80% of your usual evening dose the night before your procedure or on the morning dose the day of your procedure.    You may take all your other medications as prescribed with  sips of clear liquids the morning of your procedure. Bring a list of your current medications.      [facility] ([facility])    Who:  PLC offers classes for patients and family to learn self-care skills for going home. Classes are available for inpatients and outpatients.    Where:  [facility] has offices at [facility], [facility], [facility], and [facility] [place].    How to set up a class:  Call [phone] to set up an appointment. You can schedule a same-day appointment after your g tube placement. It is recommended to have a family member or caregiver available to participate in learning how to care for your new g tube at home.    If you have any questions, please call the IR nurses at [phone], Monday through Friday 7:30am - 4:00pm.  If you need to cancel or reschedule, please call the IR schedulers at [phone].    Thank you!

## 2023-09-25 ENCOUNTER — TELEPHONE (OUTPATIENT)
Dept: AUDIOLOGY | Facility: CLINIC | Age: 68
End: 2023-09-25

## 2023-09-25 ENCOUNTER — PATIENT OUTREACH (OUTPATIENT)
Dept: CARE COORDINATION | Facility: CLINIC | Age: 68
End: 2023-09-25

## 2023-09-25 NOTE — PROGRESS NOTES
Social Work - Intervention  [facility]  Data/Intervention:    Patient Name: Ko Thakkar Goes By: Ko    /Age: 1955 (67 year old)     Visit Type: telephone  Referral Source: [facility]  Reason for Referral: Financial Assistance    Collaborated With:    -Ondina, Wife (consent on file)     Psychosocial Information/Concerns:  SW received referral to connect with patient and wife regarding financial resources.     Intervention/Education/Resources Provided:  SW called Ondina (wife), introduced self and explained the reason for calling. Per Ondina, since patients diagnosis they have lost time from work and has been behind on rent and utilities. When patient is able to work they make anywhere from $400-$600 per week. Ondina was diagnosed with Cerebral Palsy during childhood and receives SSDI. Ondina was also diagnosed with Uterine Cancer and had a Hysterectomy last month.     SW spoke with Ondina about financial grants available to Oncology patients and expenses they can assist with. SW provided information on [employer] and the Cancer Care Financial Assistance Program. Per Ondina, they already completed an application for [employer] for both patient and self. They are waiting to hear back from the program. SW also provided information on [place] [employer] and explained that it is not Oncology specific but for those who reside in [place] and need financial assistance.     Assessment/Plan:  SW will follow up with Ondina via e-mail with resources that were discussed today. Ondina was encouraged patient to reach back out as needed. SW will continue to remain available.  Provided patient/family with contact information and availability.    CORDELL Kim,[facility]  Hematology/Oncology Social Worker  Phone:[phone] Pager: [phone]

## 2023-09-25 NOTE — TELEPHONE ENCOUNTER
LVM with rescheduled time for hearing eval       If they show up for their original time at 10, please ask Neli or Rhonda if they would be willing to see in admin time. If not schedule with me 9/26 at 12:00.

## 2023-10-02 ENCOUNTER — OFFICE VISIT (OUTPATIENT)
Dept: AUDIOLOGY | Facility: CLINIC | Age: 68
End: 2023-10-02
Attending: INTERNAL MEDICINE
Payer: COMMERCIAL

## 2023-10-02 DIAGNOSIS — C32.1 SCC (SQUAMOUS CELL CARCINOMA) OF SUPRAGLOTTIS (H): ICD-10-CM

## 2023-10-02 DIAGNOSIS — H90.3 SENSORY HEARING LOSS, BILATERAL: Primary | ICD-10-CM

## 2023-10-02 DIAGNOSIS — H93.13 TINNITUS, BILATERAL: ICD-10-CM

## 2023-10-02 PROCEDURE — 92588 EVOKED AUDITORY TST COMPLETE: CPT | Performed by: AUDIOLOGIST

## 2023-10-02 PROCEDURE — 92550 TYMPANOMETRY & REFLEX THRESH: CPT | Performed by: AUDIOLOGIST

## 2023-10-02 PROCEDURE — 92557 COMPREHENSIVE HEARING TEST: CPT | Performed by: AUDIOLOGIST

## 2023-10-02 NOTE — Clinical Note
Baseline audiogram completed today. He has bilateral sensorineural hearing loss- he reports this is longstanding since he was a baby, but likely worsened over time. Please send him back for testing as needed throughout/after treatment. See audiogram for details.   Thanks, Neli

## 2023-10-02 NOTE — PROGRESS NOTES
AUDIOLOGY REPORT    SUBJECTIVE:  Ko Thakkar is a 67 year old male who was seen in the Audiology Clinic at the [facility] for audiologic evaluation, referred by Xochilt Louise M.D. The patient is here for a baseline hearing evaluation prior to undergoing ototoxic chemotherapy treatment. They have a diagnosis of squamous cell carcinoma of the supraglottis. The patient reports a longstanding hearing loss and tinnitus bilaterally. 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 He also reports extensive history of noise exposure throughout his life without consistent use of hearing protection. The patient denies aural pain, pressure, drainage, and history of ear surgeries. The patient notes difficulty with communication in a variety of listening situations.      OBJECTIVE:  Abuse Screening:  Do you feel unsafe at home or work/school? No  Do you feel threatened by someone? No  Does anyone try to keep you from having contact with others, or doing things outside of your home? No  Physical signs of abuse present? No    Otoscopic exam indicated ears are clear of cerumen bilaterally     Pure Tone Thresholds assessed using conventional audiometry with good, reliability from 250-8000 Hz bilaterally using insert earphones and circumaural headphones     RIGHT:  mild sloping to profound sensorineural hearing loss    LEFT:   normal sloping to profound, rising to severe sensorineural hearing loss    High frequency audiometry from 9,000-16,000 Hz was performed and fell outside aged norms at majority of frequencies bilaterally.    Distortion product otoacoustic emissions were performed from 1.5-10 kHz and were absent bilaterally with the exception of 1.5 kHz in the right ear.      Tympanogram:    RIGHT: normal eardrum mobility    LEFT:  normal eardrum mobility    Reflexes (reported by stimulus ear):  RIGHT: Ipsilateral: absent at frequencies  tested  RIGHT: Contralateral: absent at frequencies tested  LEFT:   Ipsilateral: present at normal levels  LEFT:   Contralateral: present at elevated levels      Speech Reception Threshold:    RIGHT: 40 dB HL    LEFT:   35 dB HL  Word Recognition Score:     RIGHT: 92% at 85 dB HL using NU-6 recorded word list.    LEFT:   96% at 85 dB HL using NU-6 recorded word list.      ASSESSMENT:   Mild sloping to profound sensorineural hearing loss in the right ear, and normal sloping to profound rising to severe sensorineural hearing loss in the left ear was found today. No grade assigned today as this is a baseline evaluation. Today s results were discussed with the patient in detail.     PLAN:  Patient was counseled regarding hearing loss and impact on communication.  Patient is a good candidate for amplification- hearing aid consultation recommended once chemotherapy treatment is complete. Handout on good communication strategies was given to patient. It is recommended that the patient return for testing based on physician protocol and recommendation.  Please call this clinic with questions regarding these results or recommendations.        Jefferson Felipe.  Licensed Audiologist  [place] # [id number]

## 2023-10-03 ENCOUNTER — TELEPHONE (OUTPATIENT)
Dept: INTERVENTIONAL RADIOLOGY/VASCULAR | Facility: CLINIC | Age: 68
End: 2023-10-03
Payer: COMMERCIAL

## 2023-10-03 ENCOUNTER — PATIENT OUTREACH (OUTPATIENT)
Dept: ONCOLOGY | Facility: CLINIC | Age: 68
End: 2023-10-03
Payer: COMMERCIAL

## 2023-10-03 DIAGNOSIS — C32.1 SCC (SQUAMOUS CELL CARCINOMA) OF SUPRAGLOTTIS (H): Primary | ICD-10-CM

## 2023-10-03 DIAGNOSIS — Z13.29 SCREENING FOR HYPOTHYROIDISM: ICD-10-CM

## 2023-10-03 NOTE — TELEPHONE ENCOUNTER
Contacted patient and left VMM informing him that he does not need an additional appointment for a pre-operative physical prior to his procedure with IR at [facility] on 10/13/2023.    Mariah SANTILLAN  Interventional Radiology RN   [phone]

## 2023-10-04 ENCOUNTER — VIRTUAL VISIT (OUTPATIENT)
Dept: ONCOLOGY | Facility: CLINIC | Age: 68
End: 2023-10-04
Attending: INTERNAL MEDICINE
Payer: COMMERCIAL

## 2023-10-04 VITALS — BODY MASS INDEX: 29.38 KG/M2 | HEIGHT: 66 IN

## 2023-10-04 DIAGNOSIS — Z91.89 AT RISK FOR PROLONGED QT INTERVAL SYNDROME: ICD-10-CM

## 2023-10-04 DIAGNOSIS — C32.1 SCC (SQUAMOUS CELL CARCINOMA) OF SUPRAGLOTTIS (H): Primary | ICD-10-CM

## 2023-10-04 PROCEDURE — 99214 OFFICE O/P EST MOD 30 MIN: CPT | Mod: VID | Performed by: INTERNAL MEDICINE

## 2023-10-04 RX ORDER — DIPHENHYDRAMINE HCL 25 MG
50 CAPSULE ORAL ONCE
Status: CANCELLED
Start: 2023-10-16

## 2023-10-04 RX ORDER — EPINEPHRINE 1 MG/ML
0.3 INJECTION, SOLUTION INTRAMUSCULAR; SUBCUTANEOUS EVERY 5 MIN PRN
Status: CANCELLED | OUTPATIENT
Start: 2023-10-16

## 2023-10-04 RX ORDER — METHYLPREDNISOLONE SODIUM SUCCINATE 125 MG/2ML
125 INJECTION, POWDER, LYOPHILIZED, FOR SOLUTION INTRAMUSCULAR; INTRAVENOUS
Status: CANCELLED
Start: 2023-10-16

## 2023-10-04 RX ORDER — HEPARIN SODIUM,PORCINE 10 UNIT/ML
5-20 VIAL (ML) INTRAVENOUS DAILY PRN
Status: CANCELLED | OUTPATIENT
Start: 2023-10-16

## 2023-10-04 RX ORDER — LORAZEPAM 2 MG/ML
0.5 INJECTION INTRAMUSCULAR EVERY 4 HOURS PRN
Status: CANCELLED | OUTPATIENT
Start: 2023-10-16

## 2023-10-04 RX ORDER — PALONOSETRON 0.05 MG/ML
0.25 INJECTION, SOLUTION INTRAVENOUS ONCE
Status: CANCELLED | OUTPATIENT
Start: 2023-10-16

## 2023-10-04 RX ORDER — ALBUTEROL SULFATE 90 UG/1
1-2 AEROSOL, METERED RESPIRATORY (INHALATION)
Status: CANCELLED
Start: 2023-10-16

## 2023-10-04 RX ORDER — MEPERIDINE HYDROCHLORIDE 25 MG/ML
25 INJECTION INTRAMUSCULAR; INTRAVENOUS; SUBCUTANEOUS EVERY 30 MIN PRN
Status: CANCELLED | OUTPATIENT
Start: 2023-10-16

## 2023-10-04 RX ORDER — DIPHENHYDRAMINE HYDROCHLORIDE 50 MG/ML
50 INJECTION INTRAMUSCULAR; INTRAVENOUS
Status: CANCELLED
Start: 2023-10-16

## 2023-10-04 RX ORDER — HEPARIN SODIUM (PORCINE) LOCK FLUSH IV SOLN 100 UNIT/ML 100 UNIT/ML
5 SOLUTION INTRAVENOUS
Status: CANCELLED | OUTPATIENT
Start: 2023-10-16

## 2023-10-04 RX ORDER — ALBUTEROL SULFATE 0.83 MG/ML
2.5 SOLUTION RESPIRATORY (INHALATION)
Status: CANCELLED | OUTPATIENT
Start: 2023-10-16

## 2023-10-04 ASSESSMENT — PAIN SCALES - GENERAL: PAINLEVEL: MODERATE PAIN (4)

## 2023-10-04 NOTE — NURSING NOTE
Is the patient currently in the state of [place]? YES    Visit mode:VIDEO    If the visit is dropped, the patient can be reconnected by: VIDEO VISIT: Text to cell phone:   Telephone Information:   Mobile [phone]       Will anyone else be joining the visit? NO  (If patient encounters technical issues they should call [phone] :[phone])    How would you like to obtain your AVS? MyChart    Are changes needed to the allergy or medication list? No  Please review distress screening answers. Patient would like to connect Oncology Social Worker.     Pt states he has been very tired and has no energy.   Pt states pain level currently is a 0. This morning pain level was a 4.     Reason for visit: ADELE LUOF

## 2023-10-04 NOTE — PROGRESS NOTES
"     [facility] CANCER [facility]    PATIENT NAME: Ko Thakkar  MRN # [id number]   DATE OF VISIT: October 4, 2023  YOB: 1955     Otolaryngology: Dr. Denia Hardin  Radiation Oncology: Dr. Juwan Cordova   PCP: Dr. Alton Mota; [facility] in [place]     CANCER TYPE: SCC supraglottis  STAGE: [id number] (IRON)  ECOG PS: 1    PD-L1:  NGS:     SUMMARY  5/16/23 EGD for dysphagia. Gastritis and gastric diverticulum. Bx showed H. Pylori, treated. No atrophic gastritis or dysplasia  7/11/23 Swallow study at [facility]. Poor epiglottic inversion, penetration, aspiration  7/28/23 CT neck. 2.2 x 1.4 x 2.3 cm supraglottic mass, concern for paraglottic involvement, bilateral IB, IIA, III, IV adenopathy, 70% ICA stenosis   8/21/23 US carotid. 70-99% stenosis R ICA. <50% stenosis L ICA  8/29/23 FNA L level 2 node (IR). Path: SCC  9/6/23 PET/CT. FDG avid mass (SUV 13.5) right tonsil/lateral pharyngeal wall/glossotonsillar sulcus with slight extension to the R tongue base, right AE fold, right epiglottis, right piriform sinus, right posterolateral pharyngeal wall, posterior pharyngeal wall, no thyroid cartilage erosion, right retropharyngeal node, deep lobe parotid FDG avid mass, bilateral cervical nodes (right level II-IV, left level II-III), no distant disease, L mandibular canine with periapical abscess. R temporal lobe change suggestive of prior infarct. 5 mm LLL nodule.    ASSESSMENT AND PLAN  SCC supraglottis, [id number] (IRON): Declined surgery. Planning chemoradiation but with all of the logistical issues and work needed prior to starting, also due to the large size of the primary tumor, will start chemo first. Discussed carboplatin paclitaxel. Not a candidate for cisplatin-based therapy due to hearing loss, comorbidities, etc., and not a candidate for TPF for the same. Discussed not a \"standard\" approach but have to take into consideration details in his specific case. Plan 2 cycles and CT neck + CAP, appts " with me an Dr. Cordova. Would move to chemoradiation afterward, with plan for C3 of carboplatin paclitaxel if needed.    Dental: Met dentist yesterday, planning extractions of remaining 8 teeth starting this Fri. Ko didn't know if it would be a one-time deal of would require multiple sessions    Dysphagia, aspiration: Jessica Ramos visit 9/13/23, video 10/10, known aspiration at baseline. Gtube 10/13    Secretions: Discussed it's from the cancer.     R KAILASH: Met with Dr. Tapia, vascular surgery fellow 8/21. Recommended treating the cancer first, asa 81 and high dose atorva, follow up 6 months with repeat US. PET/CT showed possible old R temporal infarct. Not discussed today.     H/o food impaction: lower esophagus monitor    Liver issue: Says he was to have some sort of imaging at [facility] to look at his liver that had to be canceled due to the appts he needed for the cancer. Will try to figure out what that was about - not actively discussed again today    30 minutes spent by me on the date of the encounter doing chart review, history and exam, documentation and further activities per the note     Xochilt Louise MD  Associate Professor of Medicine  Hematology, Oncology and Transplantation      SUBJECTIVE  Mr. Thakkar (Ace) returns for follow up to discuss chemo.   Tired - lots of interruptions in sleep due to secretions and having to clear them. O/w no change from last visit   Aretha is doing ok, recovering well.     PAST MEDICAL HISTORY  SCC as above  GERD  H/o food impaction in 2008 and 2014 (steak) related to Schatzki ring on EGD 10/13/14  DM2. Last A1c about 7.6 sometime in summer 2023  Dyslipidemia  Possible prior L temporal CVA  HTN  H.o spinal meningitis as a baby -   R arm surgery  Bone graft to forehead  Ex lap   Tinnitus secondary to treatment for meningitis or from menigitis - bilateral   Hearing loss -- audiogram 10/2/2023  Numbness in the toes - mostly big toes   Crushing pills   No muscle aches or  pains     CURRENT OUTPATIENT MEDICATIONS  Reviewed    ALLERGIES  No Known Allergies     PHYSICAL EXAM  There were no vitals taken for this visit.  GEN: NAD  HEENT: EOMI, no icterus, injection or pallor  NEURO: alert    Remainder of physical exam deferred due to public health emergency and limitations of video visit.    LABORATORY AND IMAGING STUDIES    Labs 9/14/23 were independently reviewed and interpreted by me    Audiogram report reviewed     Virtual Visit Details  Type of service:  Video Visit   Video Start Time: 11:10 AM  Video End Time:11:25 AM  Originating Location (pt. Location): Home  Distant Location (provider location):  On-site  Platform used for Video Visit: Schedulicity

## 2023-10-04 NOTE — LETTER
10/4/2023         RE: Ko Thakkar  [address]  [address]  [place] [address]        Dear Colleague,    Thank you for referring your patient, Ko Thakkar, to the [facility] CANCER CLINIC. Please see a copy of my visit note below.         [facility] CANCER [facility]    PATIENT NAME: Ko Thakkar  MRN # [id number]   DATE OF VISIT: October 4, 2023  YOB: 1955     Otolaryngology: Dr. Denia Hardin  Radiation Oncology: Dr. Juwan Cordova   PCP: Dr. Alton Mota; [facility] in [place]     CANCER TYPE: SCC supraglottis  STAGE: [id number] (IRON)  ECOG PS: 1    PD-L1:  NGS:     SUMMARY  5/16/23 EGD for dysphagia. Gastritis and gastric diverticulum. Bx showed H. Pylori, treated. No atrophic gastritis or dysplasia  7/11/23 Swallow study at . Poor epiglottic inversion, penetration, aspiration  7/28/23 CT neck. 2.2 x 1.4 x 2.3 cm supraglottic mass, concern for paraglottic involvement, bilateral IB, IIA, III, IV adenopathy, 70% ICA stenosis   8/21/23 US carotid. 70-99% stenosis R ICA. <50% stenosis L ICA  8/29/23 FNA L level 2 node (IR). Path: SCC  9/6/23 PET/CT. FDG avid mass (SUV 13.5) right tonsil/lateral pharyngeal wall/glossotonsillar sulcus with slight extension to the R tongue base, right AE fold, right epiglottis, right piriform sinus, right posterolateral pharyngeal wall, posterior pharyngeal wall, no thyroid cartilage erosion, right retropharyngeal node, deep lobe parotid FDG avid mass, bilateral cervical nodes (right level II-IV, left level II-III), no distant disease, L mandibular canine with periapical abscess. R temporal lobe change suggestive of prior infarct. 5 mm LLL nodule.    ASSESSMENT AND PLAN  SCC supraglottis, [id number] (IRON): Declined surgery. Planning chemoradiation but with all of the logistical issues and work needed prior to starting, also due to the large size of the primary tumor, will start chemo first. Discussed carboplatin paclitaxel. Not  "a candidate for cisplatin-based therapy due to hearing loss, comorbidities, etc., and not a candidate for TPF for the same. Discussed not a \"standard\" approach but have to take into consideration details in his specific case. Plan 2 cycles and CT neck + CAP, appts with me an Dr. Cordova. Would move to chemoradiation afterward, with plan for C3 of carboplatin paclitaxel if needed.    Dental: Met dentist yesterday, planning extractions of remaining 8 teeth starting this Fri. Ko didn't know if it would be a one-time deal of would require multiple sessions    Dysphagia, aspiration: Jessica Gregorioadenike visit 9/13/23, video 10/10, known aspiration at baseline. Gtube 10/13    Secretions: Discussed it's from the cancer.     R KAILASH: Met with Dr. Tapia, vascular surgery fellow 8/21. Recommended treating the cancer first, asa 81 and high dose atorva, follow up 6 months with repeat US. PET/CT showed possible old R temporal infarct. Not discussed today.     H/o food impaction: lower esophagus monitor    Liver issue: Says he was to have some sort of imaging at [facility] to look at his liver that had to be canceled due to the appts he needed for the cancer. Will try to figure out what that was about - not actively discussed again today    30 minutes spent by me on the date of the encounter doing chart review, history and exam, documentation and further activities per the note     Xochilt Louise MD  Associate Professor of Medicine  Hematology, Oncology and Transplantation      SUBJECTIVE  Mr. Thakkar (Ace) returns for follow up to discuss chemo.   Tired - lots of interruptions in sleep due to secretions and having to clear them. O/w no change from last visit   Aretha is doing ok, recovering well.     PAST MEDICAL HISTORY  SCC as above  GERD  H/o food impaction in 2008 and 2014 (steak) related to Schatzki ring on EGD 10/13/14  DM2. Last A1c about 7.6 sometime in summer 2023  Dyslipidemia  Possible prior L temporal CVA  HTN  H.o spinal " meningitis as a baby -   R arm surgery  Bone graft to forehead  Ex lap   Tinnitus secondary to treatment for meningitis or from menigitis - bilateral   Hearing loss -- audiogram 10/2/2023  Numbness in the toes - mostly big toes   Crushing pills   No muscle aches or pains     CURRENT OUTPATIENT MEDICATIONS  Reviewed    ALLERGIES  No Known Allergies     PHYSICAL EXAM  There were no vitals taken for this visit.  GEN: NAD  HEENT: EOMI, no icterus, injection or pallor  NEURO: alert    Remainder of physical exam deferred due to public health emergency and limitations of video visit.    LABORATORY AND IMAGING STUDIES    Labs 9/14/23 were independently reviewed and interpreted by me    Audiogram report reviewed     Virtual Visit Details  Type of service:  Video Visit   Video Start Time: 11:10 AM  Video End Time:11:25 AM  Originating Location (pt. Location): Home  Distant Location (provider location):  On-site  Platform used for Video Visit: Merritt Louise MD

## 2023-10-05 ENCOUNTER — PATIENT OUTREACH (OUTPATIENT)
Dept: ONCOLOGY | Facility: CLINIC | Age: 68
End: 2023-10-05
Payer: COMMERCIAL

## 2023-10-05 DIAGNOSIS — C10.9 SQUAMOUS CELL CARCINOMA OF OROPHARYNX (H): Primary | ICD-10-CM

## 2023-10-05 NOTE — PROGRESS NOTES
[facility] CANCER CLINIC    PATIENT NAME: Ko Thakkar  MRN # [id number]   DATE OF VISIT: October 6, 2023  YOB: 1955     Otolaryngology: Dr. Denia Hardin  Radiation Oncology: Dr. Juwan Cordova   PCP: Dr. Alton Mota; [facility] in [place]     CANCER TYPE: SCC supraglottis  STAGE: [id number] (IRON)  ECOG PS: 1    PD-L1:  NGS:     SUMMARY  5/16/23 EGD for dysphagia. Gastritis and gastric diverticulum. Bx showed H. Pylori, treated. No atrophic gastritis or dysplasia  7/11/23 Swallow study at [facility]. Poor epiglottic inversion, penetration, aspiration  7/28/23 CT neck. 2.2 x 1.4 x 2.3 cm supraglottic mass, concern for paraglottic involvement, bilateral IB, IIA, III, IV adenopathy, 70% ICA stenosis   8/21/23 US carotid. 70-99% stenosis R ICA. <50% stenosis L ICA  8/29/23 FNA L level 2 node (IR). Path: SCC  9/6/23 PET/CT. FDG avid mass (SUV 13.5) right tonsil/lateral pharyngeal wall/glossotonsillar sulcus with slight extension to the R tongue base, right AE fold, right epiglottis, right piriform sinus, right posterolateral pharyngeal wall, posterior pharyngeal wall, no thyroid cartilage erosion, right retropharyngeal node, deep lobe parotid FDG avid mass, bilateral cervical nodes (right level II-IV, left level II-III), no distant disease, L mandibular canine with periapical abscess. R temporal lobe change suggestive of prior infarct. 5 mm LLL nodule.    ASSESSMENT AND PLAN  R KAILASH: Met with Dr. Tapia, vascular surgery fellow 8/21. Recommended treating the cancer first, asa 81 and high dose atorva. He confirms he has been compliant with these meds. Follow up 6 months with repeat US. PET/CT showed possible old R temporal infarct which we reviewed today. His recent confusion is concerning for TIA symptoms. Reviewed his multiple risk factors for stroke including the KAILASH. We had a detailed discussion regarding situations where he would need to present emergently to ED including recurrent confusion,  vision or speech changes, ataxia, paresthesias, etc. He and Miya verbalize understanding of plan. I suggested we obtain a brain MRI to essentially serve as a baseline scan and further evaluate infarct history which he is agreeable to. Will try to obtain this prior to starting chemo.  -Brain MRI w and w/o contrast within 1 week    SCC supraglottis, [id number] (IRON): Declined surgery. Planning chemoradiation but with all of the logistical issues and work needed prior to starting, also due to the large size of the primary tumor, will start chemo first. Tentatively planned 10/16.  Plan is for carboplatin/paclitaxel. Although not standard approach, he is not a candidate for cisplatin-based therapy due to hearing loss, comorbidities, etc., and not a candidate for TPF for the same. Plan 2 cycles and CT neck + CAP, appts with Dr. Louise and Dr. Cordova. Would move to chemoradiation afterward, with plan for C3 of carboplatin paclitaxel if needed.    Dental: Dental extractions of remaining 8 teeth today. Isn't sure if this will be done in one visit. Asked that they update us after appointment.     Dysphagia, aspiration: Jessica Ramos visit 9/13/23, next video 10/10, known aspiration at baseline. Gtube scheduled 10/13    Not discussed today:  Secretions: Discussed it's from the cancer.     H/o food impaction: lower esophagus monitor    Liver issue: Says he was to have some sort of imaging at [facility] to look at his liver that had to be canceled due to the appts he needed for the cancer. Will try to figure out what that was about - not actively discussed again today    60 minutes spent on the date of the encounter doing chart review, review of test results, interpretation of tests, patient visit, documentation, and discussion with family     Juany Gallagher CNP    SUBJECTIVE  Ace is seen today for an interim visit due to a recent episode of confusion  -Beginning Wednesday night his wife noticed that he became acutely confused. Preceding  the event, they had been at the grocery store and were having issues figuring out a balance on a gift card. They had argued a bit about this so Ondina went to her room briefly. When she came out Ace could not recall the issue with the gift card or the fact they were just at the store. He also couldn't recall that he had cancer and seemed shocked by this. He wasn't aware of any of this upcoming procedures. He had also become confused about whether he had parked the car in the garage. Went to the garage to check on this and fumbled with his keys for quite some time.  -Episode started around 9:00 pm and lasted for about 4 hours   -During this time Ondina denies that Ace was acting unusual in any regard. He was steady on his feet, no slurred speech  -Ace briefly recalls the event now but doesn't not recall details at the time  -Woke up the following morning and all symptoms/confusion have resolved  -He notes he did have symptoms similar to this when he was 18 years old after falling out of a 3rd story window and lying in subzero temperature for hours before being taken to the hospital    PAST MEDICAL HISTORY  SCC as above  GERD  H/o food impaction in 2008 and 2014 (steak) related to Schatzki ring on EGD 10/13/14  DM2. Last A1c about 7.6 sometime in summer 2023  Dyslipidemia  Possible prior L temporal CVA  HTN  H.o spinal meningitis as a baby -   R arm surgery  Bone graft to forehead  Ex lap   Tinnitus secondary to treatment for meningitis or from menigitis - bilateral   Hearing loss -- audiogram 10/2/2023  Numbness in the toes - mostly big toes   Crushing pills   No muscle aches or pains     CURRENT OUTPATIENT MEDICATIONS  Reviewed    ALLERGIES  No Known Allergies     PHYSICAL EXAM  BP (!) 147/68   Pulse 81   Temp 98.3  F (36.8  C)   Resp 18   Wt 82.8 kg (182 lb 8 oz)   SpO2 98%   BMI 29.46 kg/m      General: Well-appearing male, NAD  Eyes: EOMI, PERRL. No scleral icterus.  Cardiovascular: RRR, no m/g/r. No  peripheral edema.  Respiratory: CTA bilaterally. No wheezes or crackles.  Neurologic: A/O x 4. Normal speech. Strength 5/5 bilaterally to upper and lower extremities. Negative Romberg. Normal gait.   Skin: No rashes, petechiae, or bruising noted on exposed skin.    LABORATORY AND IMAGING STUDIES  Most Recent 3 CBC's:  Recent Labs   Lab Test 10/06/23  0714 09/14/23  1515 08/29/23  0828   WBC 8.7 10.6 10.3   HGB 12.6* 13.2* 13.8   MCV 84 84 85    351 335   ANEUTAUTO 4.4 6.9  --     Most Recent 3 BMP's:  Recent Labs   Lab Test 10/06/23  0714 09/14/23  1515 08/29/23  1043 08/29/23  0836 07/28/23  1345    138  --   --   --    POTASSIUM 3.6 4.0  --   --   --    CHLORIDE 105 102  --   --   --    CO2 23 26  --   --   --    BUN 22.0 23.1*  --   --   --    CR 0.85 0.86  --   --  0.8   ANIONGAP 11 10  --   --   --    OLE 9.2 9.2  --   --   --    * 167* 121*   < >  --    PROTTOTAL 6.9 7.3  --   --   --    ALBUMIN 4.2 4.5  --   --   --     < > = values in this interval not displayed.    Most Recent 2 LFT's:  Recent Labs   Lab Test 10/06/23  0714 09/14/23  1515   AST 20 22   ALT 21 28   ALKPHOS 106 107   BILITOTAL 0.3 0.4    Most Recent TSH and T4:  Recent Labs   Lab Test 10/06/23  0714   TSH 3.07     Phos/Mag:  Lab Results   Component Value Date    MAG 2.3 09/14/2023    No results found for: B12      I reviewed the above labs today.

## 2023-10-05 NOTE — TELEPHONE ENCOUNTER
[facility]: Cancer Care                                                                                          Received voice mail from Ko's wife, Ondina, requesting I call her back.    Attempted to call back but they did not answer. Left VM requesting a call back if they would still like to talk.    Addendum 10/05/23 3:16 PM  Received call from Ondina that Ko developed 3 hour long episode of confusion yesterday evening. Per Ondina, he couldn't remember that he had cancer, where they parked the car, etc. Nothing seemed to precede this episode - denies trauma to head, changes to medications, alcohol consumption. No weakness, slurred speech, changes in vision, balance changes.    Per Ondina, they woke up in the morning and Ko was completely lucid. Ko has no residual symptoms today. He somewhat remembers the episode yesterday.     I strongly encouraged them to contact our triage team if this happens again. Messaged Dr. Louise to see if further workup is needed.     Addendum 10/05/23 4:13 PM  Per Dr. Louise, patient needs neuro exam by PALOMO + labs today or tomorrow. Attempted to contact Ondina + Ko x2 but they did not answer. Left detailed message requesting a call back. Notified [facility] know that we would like to get him in tomorrow. [facility] will follow up tomorrow.     Addendum 10/05/23 4:30 PM  Ondina returned my call. They are agreeable to coming in tomorrow for labs and a visit with [facility]. Connected the call with [facility] to get him scheduled.    Olga Miranda, RN, BSN  RN Care Coordinator  [facility]

## 2023-10-06 ENCOUNTER — APPOINTMENT (OUTPATIENT)
Dept: LAB | Facility: CLINIC | Age: 68
End: 2023-10-06
Attending: REGISTERED NURSE
Payer: COMMERCIAL

## 2023-10-06 ENCOUNTER — PATIENT OUTREACH (OUTPATIENT)
Dept: ONCOLOGY | Facility: CLINIC | Age: 68
End: 2023-10-06

## 2023-10-06 ENCOUNTER — ONCOLOGY VISIT (OUTPATIENT)
Dept: ONCOLOGY | Facility: CLINIC | Age: 68
End: 2023-10-06
Attending: REGISTERED NURSE
Payer: COMMERCIAL

## 2023-10-06 VITALS
WEIGHT: 182.5 LBS | DIASTOLIC BLOOD PRESSURE: 68 MMHG | RESPIRATION RATE: 18 BRPM | SYSTOLIC BLOOD PRESSURE: 147 MMHG | TEMPERATURE: 98.3 F | HEART RATE: 81 BPM | OXYGEN SATURATION: 98 % | BODY MASS INDEX: 29.46 KG/M2

## 2023-10-06 DIAGNOSIS — R41.0 ACUTE CONFUSION: ICD-10-CM

## 2023-10-06 DIAGNOSIS — C10.9 SQUAMOUS CELL CARCINOMA OF OROPHARYNX (H): Primary | ICD-10-CM

## 2023-10-06 DIAGNOSIS — C32.1 SCC (SQUAMOUS CELL CARCINOMA) OF SUPRAGLOTTIS (H): ICD-10-CM

## 2023-10-06 DIAGNOSIS — I65.21 CAROTID ARTERY STENOSIS, ASYMPTOMATIC, RIGHT: ICD-10-CM

## 2023-10-06 DIAGNOSIS — Z13.29 SCREENING FOR HYPOTHYROIDISM: ICD-10-CM

## 2023-10-06 LAB
ALBUMIN SERPL BCG-MCNC: 4.2 G/DL (ref 3.5–5.2)
ALP SERPL-CCNC: 106 U/L (ref 40–129)
ALT SERPL W P-5'-P-CCNC: 21 U/L (ref 0–70)
ANION GAP SERPL CALCULATED.3IONS-SCNC: 11 MMOL/L (ref 7–15)
AST SERPL W P-5'-P-CCNC: 20 U/L (ref 0–45)
BASO+EOS+MONOS # BLD AUTO: ABNORMAL 10*3/UL
BASO+EOS+MONOS NFR BLD AUTO: ABNORMAL %
BASOPHILS # BLD AUTO: 0.1 10E3/UL (ref 0–0.2)
BASOPHILS NFR BLD AUTO: 1 %
BILIRUB SERPL-MCNC: 0.3 MG/DL
BUN SERPL-MCNC: 22 MG/DL (ref 8–23)
CALCIUM SERPL-MCNC: 9.2 MG/DL (ref 8.8–10.2)
CHLORIDE SERPL-SCNC: 105 MMOL/L (ref 98–107)
CREAT SERPL-MCNC: 0.85 MG/DL (ref 0.67–1.17)
DEPRECATED HCO3 PLAS-SCNC: 23 MMOL/L (ref 22–29)
EGFRCR SERPLBLD CKD-EPI 2021: >90 ML/MIN/1.73M2
EOSINOPHIL # BLD AUTO: 0.6 10E3/UL (ref 0–0.7)
EOSINOPHIL NFR BLD AUTO: 7 %
ERYTHROCYTE [DISTWIDTH] IN BLOOD BY AUTOMATED COUNT: 13.5 % (ref 10–15)
GLUCOSE SERPL-MCNC: 141 MG/DL (ref 70–99)
HCT VFR BLD AUTO: 38.3 % (ref 40–53)
HGB BLD-MCNC: 12.6 G/DL (ref 13.3–17.7)
IMM GRANULOCYTES # BLD: 0 10E3/UL
IMM GRANULOCYTES NFR BLD: 0 %
LYMPHOCYTES # BLD AUTO: 2.5 10E3/UL (ref 0.8–5.3)
LYMPHOCYTES NFR BLD AUTO: 29 %
MCH RBC QN AUTO: 27.8 PG (ref 26.5–33)
MCHC RBC AUTO-ENTMCNC: 32.9 G/DL (ref 31.5–36.5)
MCV RBC AUTO: 84 FL (ref 78–100)
MONOCYTES # BLD AUTO: 1 10E3/UL (ref 0–1.3)
MONOCYTES NFR BLD AUTO: 12 %
NEUTROPHILS # BLD AUTO: 4.4 10E3/UL (ref 1.6–8.3)
NEUTROPHILS NFR BLD AUTO: 51 %
NRBC # BLD AUTO: 0 10E3/UL
NRBC BLD AUTO-RTO: 0 /100
PLATELET # BLD AUTO: 318 10E3/UL (ref 150–450)
POTASSIUM SERPL-SCNC: 3.6 MMOL/L (ref 3.4–5.3)
PROT SERPL-MCNC: 6.9 G/DL (ref 6.4–8.3)
RBC # BLD AUTO: 4.54 10E6/UL (ref 4.4–5.9)
SODIUM SERPL-SCNC: 139 MMOL/L (ref 135–145)
TSH SERPL DL<=0.005 MIU/L-ACNC: 3.07 UIU/ML (ref 0.3–4.2)
VIT B12 SERPL-MCNC: 488 PG/ML (ref 232–1245)
WBC # BLD AUTO: 8.7 10E3/UL (ref 4–11)

## 2023-10-06 PROCEDURE — 82607 VITAMIN B-12: CPT | Performed by: REGISTERED NURSE

## 2023-10-06 PROCEDURE — 85025 COMPLETE CBC W/AUTO DIFF WBC: CPT | Performed by: REGISTERED NURSE

## 2023-10-06 PROCEDURE — 84443 ASSAY THYROID STIM HORMONE: CPT | Performed by: REGISTERED NURSE

## 2023-10-06 PROCEDURE — 99215 OFFICE O/P EST HI 40 MIN: CPT | Performed by: REGISTERED NURSE

## 2023-10-06 PROCEDURE — G0463 HOSPITAL OUTPT CLINIC VISIT: HCPCS | Performed by: REGISTERED NURSE

## 2023-10-06 PROCEDURE — 80053 COMPREHEN METABOLIC PANEL: CPT | Performed by: REGISTERED NURSE

## 2023-10-06 PROCEDURE — 36415 COLL VENOUS BLD VENIPUNCTURE: CPT | Performed by: REGISTERED NURSE

## 2023-10-06 RX ORDER — LISINOPRIL 40 MG/1
40 TABLET ORAL DAILY
COMMUNITY
Start: 2023-09-22

## 2023-10-06 ASSESSMENT — PAIN SCALES - GENERAL: PAINLEVEL: NO PAIN (1)

## 2023-10-06 NOTE — Clinical Note
10/6/2023         RE: Ko Thakkar  [address]  [address]  [place] [address]        Dear Colleague,    Thank you for referring your patient, Ko Thakkar, to the [facility]. Please see a copy of my visit note below.         [facility] CANCER [facility]    PATIENT NAME: Ko Thakkar  MRN # [id number]   DATE OF VISIT: October 6, 2023  YOB: 1955     Otolaryngology: Dr. Denia Hardin  Radiation Oncology: Dr. Juwan Cordova   PCP: Dr. Alton Mota; [facility] in [place]     CANCER TYPE: SCC supraglottis  STAGE: [id number] (IRON)  ECOG PS: 1    PD-L1:  NGS:     SUMMARY  5/16/23 EGD for dysphagia. Gastritis and gastric diverticulum. Bx showed H. Pylori, treated. No atrophic gastritis or dysplasia  7/11/23 Swallow study at . Poor epiglottic inversion, penetration, aspiration  7/28/23 CT neck. 2.2 x 1.4 x 2.3 cm supraglottic mass, concern for paraglottic involvement, bilateral IB, IIA, III, IV adenopathy, 70% ICA stenosis   8/21/23 US carotid. 70-99% stenosis R ICA. <50% stenosis L ICA  8/29/23 FNA L level 2 node (IR). Path: SCC  9/6/23 PET/CT. FDG avid mass (SUV 13.5) right tonsil/lateral pharyngeal wall/glossotonsillar sulcus with slight extension to the R tongue base, right AE fold, right epiglottis, right piriform sinus, right posterolateral pharyngeal wall, posterior pharyngeal wall, no thyroid cartilage erosion, right retropharyngeal node, deep lobe parotid FDG avid mass, bilateral cervical nodes (right level II-IV, left level II-III), no distant disease, L mandibular canine with periapical abscess. R temporal lobe change suggestive of prior infarct. 5 mm LLL nodule.    ASSESSMENT AND PLAN  SCC supraglottis, [id number] (IRON): Declined surgery. Planning chemoradiation but with all of the logistical issues and work needed prior to starting, also due to the large size of the primary tumor, will start chemo first. Discussed carboplatin paclitaxel. Not  "a candidate for cisplatin-based therapy due to hearing loss, comorbidities, etc., and not a candidate for TPF for the same. Discussed not a \"standard\" approach but have to take into consideration details in his specific case. Plan 2 cycles and CT neck + CAP, appts with me an Dr. Cordova. Would move to chemoradiation afterward, with plan for C3 of carboplatin paclitaxel if needed.    R KAILASH: Met with Dr. Tapia, vascular surgery fellow 8/21. Recommended treating the cancer first, asa 81 and high dose atorva, follow up 6 months with repeat US. PET/CT showed possible old R temporal infarct. Not discussed today.     Dental: Met dentist yesterday, planning extractions of remaining 8 teeth starting this Fri. Ko didn't know if it would be a one-time deal of would require multiple sessions    Dysphagia, aspiration: Jessica Ramos visit 9/13/23, video 10/10, known aspiration at baseline. Gtube 10/13    Secretions: Discussed it's from the cancer.     H/o food impaction: lower esophagus monitor    Liver issue: Says he was to have some sort of imaging at [facility] to look at his liver that had to be canceled due to the appts he needed for the cancer. Will try to figure out what that was about - not actively discussed again today    *** minutes spent on the date of the encounter doing {2021 E&M time in:695479}     Juany Gallagher, CNP    SUBJECTIVE  Mr. Thakkar (Ace) ***     Wed evening  Drove to store  Arguing  Short term memory deficit, couldn't recall gift card issue  8:00 pm  Couldn't recall upcoming procedures or cancer  Couldn't recall parking car garage. Went to garage to check.  Ace recalls being in the garage. Went to look in the truck.   No focal deficits  Up until 2:00 am  Slept, symptoms resolved when woke up  Injury at 18 year old      PAST MEDICAL HISTORY  SCC as above  GERD  H/o food impaction in 2008 and 2014 (steak) related to Schatzki ring on EGD 10/13/14  DM2. Last A1c about 7.6 sometime in summer " 2023  Dyslipidemia  Possible prior L temporal CVA  HTN  H.o spinal meningitis as a baby -   R arm surgery  Bone graft to forehead  Ex lap   Tinnitus secondary to treatment for meningitis or from menigitis - bilateral   Hearing loss -- audiogram 10/2/2023  Numbness in the toes - mostly big toes   Crushing pills   No muscle aches or pains     CURRENT OUTPATIENT MEDICATIONS  Reviewed    ALLERGIES  No Known Allergies     PHYSICAL EXAM  There were no vitals taken for this visit.    General: Well-appearing male in no acute distress.  Eyes: EOMI, PERRL. No scleral icterus.  ENT: Oral mucosa is moist without lesions or thrush.   Lymphatic: Neck is supple without cervical or supraclavicular lymphadenopathy.   Cardiovascular: RRR No murmurs, gallops, or rubs. No peripheral edema.  Respiratory: CTA bilaterally. No wheezes or crackles.  Gastrointestinal: BS +. Abdomen soft, non-tender. No palpable hepatosplenomegaly or masses.   Neurologic: A/O x 4. Normal speech. Strength 5/5 bilaterally to upper and lower extremities. Reflexes 2+ bilaterally. Negative Romberg. Normal gait.   Skin: No rashes, petechiae, or bruising noted on exposed skin.      LABORATORY AND IMAGING STUDIES  Most Recent 3 CBC's:  Recent Labs   Lab Test 09/14/23  1515 08/29/23  0828   WBC 10.6 10.3   HGB 13.2* 13.8   MCV 84 85    335   ANEUTAUTO 6.9  --     Most Recent 3 BMP's:  Recent Labs   Lab Test 09/14/23  1515 08/29/23  1043 08/29/23  0836 07/28/23  1345     --   --   --    POTASSIUM 4.0  --   --   --    CHLORIDE 102  --   --   --    CO2 26  --   --   --    BUN 23.1*  --   --   --    CR 0.86  --   --  0.8   ANIONGAP 10  --   --   --    OLE 9.2  --   --   --    * 121* 149*  --    PROTTOTAL 7.3  --   --   --    ALBUMIN 4.5  --   --   --     Most Recent 2 LFT's:  Recent Labs   Lab Test 09/14/23  1515   AST 22   ALT 28   ALKPHOS 107   BILITOTAL 0.4    Most Recent TSH and T4:No lab results found.  Phos/Mag:  Lab Results   Component Value  Date    MAG 2.3 09/14/2023    No results found for: B12      I reviewed the above labs today.               [facility] CANCER [facility]    PATIENT NAME: Ko Thakkar  MRN # [id number]   DATE OF VISIT: October 6, 2023  YOB: 1955     Otolaryngology: Dr. Denia Hardin  Radiation Oncology: Dr. Juwan Cordova   PCP: Dr. Alton Mota; [facility] [place]     CANCER TYPE: SCC supraglottis  STAGE: [id number] (IRON)  ECOG PS: 1    PD-L1:  NGS:     SUMMARY  5/16/23 EGD for dysphagia. Gastritis and gastric diverticulum. Bx showed H. Pylori, treated. No atrophic gastritis or dysplasia  7/11/23 Swallow study at [facility]. Poor epiglottic inversion, penetration, aspiration  7/28/23 CT neck. 2.2 x 1.4 x 2.3 cm supraglottic mass, concern for paraglottic involvement, bilateral IB, IIA, III, IV adenopathy, 70% ICA stenosis   8/21/23 US carotid. 70-99% stenosis R ICA. <50% stenosis L ICA  8/29/23 FNA L level 2 node (IR). Path: SCC  9/6/23 PET/CT. FDG avid mass (SUV 13.5) right tonsil/lateral pharyngeal wall/glossotonsillar sulcus with slight extension to the R tongue base, right AE fold, right epiglottis, right piriform sinus, right posterolateral pharyngeal wall, posterior pharyngeal wall, no thyroid cartilage erosion, right retropharyngeal node, deep lobe parotid FDG avid mass, bilateral cervical nodes (right level II-IV, left level II-III), no distant disease, L mandibular canine with periapical abscess. R temporal lobe change suggestive of prior infarct. 5 mm LLL nodule.    ASSESSMENT AND PLAN  R KAILASH: Met with Dr. Tapia, vascular surgery fellow 8/21. Recommended treating the cancer first, asa 81 and high dose atorva. He confirms he has been compliant with these meds. Follow up 6 months with repeat US. PET/CT showed possible old R temporal infarct which we reviewed today. His recent confusion is concerning for TIA symptoms. Reviewed his multiple risk factors for stroke including the KAILASH. We had a detailed  discussion regarding situations where he would need to present emergently to ED including recurrent confusion, vision or speech changes, ataxia, paresthesias, etc. He and Miya verbalize understanding of plan. I suggested we obtain a brain MRI to essentially serve as a baseline scan and further evaluate infarct history which he is agreeable to. Will try to obtain this prior to starting chemo.  -Brain MRI w and w/o contrast within 1 week    SCC supraglottis, [id number] (IRON): Declined surgery. Planning chemoradiation but with all of the logistical issues and work needed prior to starting, also due to the large size of the primary tumor, will start chemo first. Tentatively planned 10/16.  Plan is for carboplatin/paclitaxel. Although not standard approach, he is not a candidate for cisplatin-based therapy due to hearing loss, comorbidities, etc., and not a candidate for TPF for the same. Plan 2 cycles and CT neck + CAP, appts with Dr. Louise and Dr. Cordova. Would move to chemoradiation afterward, with plan for C3 of carboplatin paclitaxel if needed.    Dental: Dental extractions of remaining 8 teeth today. Isn't sure if this will be done in one visit. Asked that they update us after appointment.     Dysphagia, aspiration: Jessica Ramos visit 9/13/23, next video 10/10, known aspiration at baseline. Gtube scheduled 10/13    Not discussed today:  Secretions: Discussed it's from the cancer.     H/o food impaction: lower esophagus monitor    Liver issue: Says he was to have some sort of imaging at [facility] to look at his liver that had to be canceled due to the appts he needed for the cancer. Will try to figure out what that was about - not actively discussed again today    60 minutes spent on the date of the encounter doing chart review, review of test results, interpretation of tests, patient visit, documentation, and discussion with family     Juany Gallagher CNP    SUBJECTIVE  Ace is seen today for an interim visit due to a  recent episode of confusion  -Beginning Wednesday night his wife noticed that he became acutely confused. Preceding the event, they had been at the grocery store and were having issues figuring out a balance on a gift card. They had argued a bit about this so Ondina went to her room briefly. When she came out Ace could not recall the issue with the gift card or the fact they were just at the store. He also couldn't recall that he had cancer and seemed shocked by this. He wasn't aware of any of this upcoming procedures. He had also become confused about whether he had parked the car in the garage. Went to the garage to check on this and fumbled with his keys for quite some time.  -Episode started around 9:00 pm and lasted for about 4 hours   -During this time Ondina denies that Ace was acting unusual in any regard. He was steady on his feet, no slurred speech  -Ace briefly recalls the event now but doesn't not recall details at the time  -Woke up the following morning and all symptoms/confusion have resolved  -He notes he did have symptoms similar to this when he was 18 years old after falling out of a 3rd story window and lying in subzero temperature for hours before being taken to the hospital    PAST MEDICAL HISTORY  SCC as above  GERD  H/o food impaction in 2008 and 2014 (steak) related to Schatzki ring on EGD 10/13/14  DM2. Last A1c about 7.6 sometime in summer 2023  Dyslipidemia  Possible prior L temporal CVA  HTN  H.o spinal meningitis as a baby -   R arm surgery  Bone graft to forehead  Ex lap   Tinnitus secondary to treatment for meningitis or from menigitis - bilateral   Hearing loss -- audiogram 10/2/2023  Numbness in the toes - mostly big toes   Crushing pills   No muscle aches or pains     CURRENT OUTPATIENT MEDICATIONS  Reviewed    ALLERGIES  No Known Allergies     PHYSICAL EXAM  BP (!) 147/68   Pulse 81   Temp 98.3  F (36.8  C)   Resp 18   Wt 82.8 kg (182 lb 8 oz)   SpO2 98%   BMI 29.46 kg/m       General: Well-appearing male, NAD  Eyes: EOMI, PERRL. No scleral icterus.  Cardiovascular: RRR, no m/g/r. No peripheral edema.  Respiratory: CTA bilaterally. No wheezes or crackles.  Neurologic: A/O x 4. Normal speech. Strength 5/5 bilaterally to upper and lower extremities. Negative Romberg. Normal gait.   Skin: No rashes, petechiae, or bruising noted on exposed skin.    LABORATORY AND IMAGING STUDIES  Most Recent 3 CBC's:  Recent Labs   Lab Test 10/06/23  0714 09/14/23  1515 08/29/23  0828   WBC 8.7 10.6 10.3   HGB 12.6* 13.2* 13.8   MCV 84 84 85    351 335   ANEUTAUTO 4.4 6.9  --     Most Recent 3 BMP's:  Recent Labs   Lab Test 10/06/23  0714 09/14/23  1515 08/29/23  1043 08/29/23  0836 07/28/23  1345    138  --   --   --    POTASSIUM 3.6 4.0  --   --   --    CHLORIDE 105 102  --   --   --    CO2 23 26  --   --   --    BUN 22.0 23.1*  --   --   --    CR 0.85 0.86  --   --  0.8   ANIONGAP 11 10  --   --   --    OLE 9.2 9.2  --   --   --    * 167* 121*   < >  --    PROTTOTAL 6.9 7.3  --   --   --    ALBUMIN 4.2 4.5  --   --   --     < > = values in this interval not displayed.    Most Recent 2 LFT's:  Recent Labs   Lab Test 10/06/23  0714 09/14/23  1515   AST 20 22   ALT 21 28   ALKPHOS 106 107   BILITOTAL 0.3 0.4    Most Recent TSH and T4:  Recent Labs   Lab Test 10/06/23  0714   TSH 3.07     Phos/Mag:  Lab Results   Component Value Date    MAG 2.3 09/14/2023    No results found for: B12      I reviewed the above labs today.            Again, thank you for allowing me to participate in the care of your patient.        Sincerely,        Juany Gallagher, CNP

## 2023-10-06 NOTE — NURSING NOTE
Chief Complaint   Patient presents with    Labs Only     Venipuncture, vitals checked       Chanel Navarrete RN on 10/6/2023 at 7:16 AM

## 2023-10-06 NOTE — PROGRESS NOTES
"[facility]: Cancer Care                                                                                          Received VM from Ace's wife, Ondina, requesting a callback.    Spoke with Ondina, who was wondering if it is okay for Ace to get his covid vaccine. I relayed that it is okay, and encouraged him to get it prior to starting treatment. Ace starts chemotherapy 10/16.     Ondina reports that Ace had all of his teeth removed on Friday. They are planning to get his jaw bone \"shaved down\" on 10/20, and are wondering if the timing of that is okay given that he starts chemotherapy 4 days prior.     Message sent to provider inquiring about this.     Ace's dentist: Dr. Bird [phone]      Olga Miranda, RN, BSN  RN Care Coordinator  [facility]    "

## 2023-10-06 NOTE — NURSING NOTE
"Oncology Rooming Note    October 6, 2023 7:21 AM   Ko Thakkar is a 67 year old male who presents for:    Chief Complaint   Patient presents with    Labs Only     Venipuncture, vitals checked    Oncology Clinic Visit     SCC, Confusion spell 3 hours     Initial Vitals: BP (!) 147/68   Pulse 81   Temp 98.3  F (36.8  C)   Resp 18   Wt 82.8 kg (182 lb 8 oz)   SpO2 98%   BMI 29.46 kg/m   Estimated body mass index is 29.46 kg/m  as calculated from the following:    Height as of 10/4/23: 1.676 m (5' 6\").    Weight as of this encounter: 82.8 kg (182 lb 8 oz). Body surface area is 1.96 meters squared.  No Pain (1) Comment: Data Unavailable   No LMP for male patient.  Allergies reviewed: Yes  Medications reviewed: Yes    Medications: Medication refills not needed today.  Pharmacy name entered into EPIC: [facility] - [place], [place] - [address]    Clinical concerns: Pt had confusion spell of 3 hours on 10/4/23  Juany Gallagher was notified.      Rayna Sandhu              "

## 2023-10-10 ENCOUNTER — ANCILLARY PROCEDURE (OUTPATIENT)
Dept: GENERAL RADIOLOGY | Facility: CLINIC | Age: 68
End: 2023-10-10
Attending: OTOLARYNGOLOGY
Payer: COMMERCIAL

## 2023-10-10 ENCOUNTER — THERAPY VISIT (OUTPATIENT)
Dept: SPEECH THERAPY | Facility: CLINIC | Age: 68
End: 2023-10-10
Payer: COMMERCIAL

## 2023-10-10 ENCOUNTER — PATIENT OUTREACH (OUTPATIENT)
Dept: ONCOLOGY | Facility: CLINIC | Age: 68
End: 2023-10-10

## 2023-10-10 DIAGNOSIS — R13.13 PHARYNGEAL DYSPHAGIA: Primary | ICD-10-CM

## 2023-10-10 DIAGNOSIS — C10.9 SQUAMOUS CELL CARCINOMA OF OROPHARYNX (H): ICD-10-CM

## 2023-10-10 PROCEDURE — 92611 MOTION FLUOROSCOPY/SWALLOW: CPT | Mod: GN | Performed by: SPEECH-LANGUAGE PATHOLOGIST

## 2023-10-10 PROCEDURE — 74230 X-RAY XM SWLNG FUNCJ C+: CPT | Mod: GC | Performed by: RADIOLOGY

## 2023-10-10 PROCEDURE — 92610 EVALUATE SWALLOWING FUNCTION: CPT | Mod: GN | Performed by: SPEECH-LANGUAGE PATHOLOGIST

## 2023-10-10 RX ORDER — BARIUM SULFATE 400 MG/ML
SUSPENSION ORAL ONCE
Status: COMPLETED | OUTPATIENT
Start: 2023-10-10 | End: 2023-10-10

## 2023-10-10 RX ADMIN — BARIUM SULFATE 50 ML: 400 SUSPENSION ORAL at 13:29

## 2023-10-10 NOTE — PROGRESS NOTES
SPEECH LANGUAGE PATHOLOGY EVALUATION    See electronic medical record for Abuse and Falls Screening details.    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    Date of onset:  10/10/23    Relevant medical history: Arthritis; Bladder or bowel problems; Cancer; Diabetes; Dizziness; High blood pressure; Incontinence; Migraines or headaches; Numbness or tingling in perianal area; Pain at night or rest   Dates & types of surgery:       Prior diagnostic imaging/testing results: CT scan; X-ray; Video fluoroscopic swallow study       VFSS 7/11/23: noted aspiration on thin liquid trial during chin tuck maneuver. Decreased pharyngeal constriction, vallecular residue noted, decreased epiglottic inversion noted.   Recommended IDDSI level 6/0 recommended  Prior therapy history for the same diagnosis, illness or injury: No       Living Environment  Social support: With a significant other or spouse   Help at home: None  Equipment owned:        Employment: Yes   Hobbies/Interests: Caodaism     Patient goals for therapy: Eat sleep breath easy    Pain assessment: Pain denied     Objective     SWALLOW EVALUTION  Dysphagia history: Pt reports having trouble chewing food for some time since he had minimal dentition. He eats slowly   Current Diet/Method of Nutritional Intake: thin liquids (level 0), minced & moist (level 5)        CLINICAL SWALLOW EVALUATION  Oral Motor Function: Dentition: edentulous, does not have dentures, remaining teeth were extracted yesterday.  Secretion management: WFL  Mucosal quality: adequate  Mandibular function: intact  Oral labial function: WFL  Lingual function: WFL  Velar function: elevation decreased on right   Buccal function: intact  Laryngeal function: cough, throat clear, volitional swallow     Level of assist required for feeding: no assistance needed    Textures Trialed:   Clinical Swallow Eval: Thin Liquids  Mode of presentation: cup, self-fed   Volume presented: 4 oz   Preparatory Phase: WFL  Oral Phase: WFL  Pharyngeal phase of swallow: intact   Strategies trialed during procedure: none   Diagnostic statement: No overt clinical s/sx of aspiration/penetration noted on thin liquid trials.      ADDITIONAL EVAL COMPLETED TODAY : yes; rationale: objective assessment of pharyngeal phase since pt has known tumor    VIDEOFLUOROSCOPIC SWALLOW STUDY  Radiologist: resident  Views Taken: left lateral, A/P   Physical location of procedure: [facility]  Patient sitting upright on chair/stool for lateral views and standing for A/P views    VFSS textures trialed:   VFSS Eval: Thin Liquids  Mode of Presentation: cup, self-fed   Order of Presentation: Series 1, 2, 3, 11, 15 (A/P), 16 (A/P)  Preparatory Phase: WFL  Oral Phase: WFL  Bolus Location When Swallow Initiated: posterior angle of ramus  Pharyngeal Phase: pharyngeal wall coating, residue in valleculae, residue in pyriform sinus, decreased pharyngeal constriction  Rosenbeck's Penetration Aspiration Scale: 5 - contrast contacts vocal cords, visible residue remains (penetration)  Response to Aspiration: n/a  Strategies and Compensations: repeated swallows  Diagnostic Statement: Pt demonstrates residue in the valleculae and pyriform sinus after the completed swallow which is penetrated over time into the laryngeal vestibule. Pt is aware of residue and eventually clears.     VFSS Eval: Mildly Thick Liquids  Mode of Presentation: cup, self-fed   Order of Presentation: Series 4, 5  Preparatory Phase: WFL  Oral Phase: premature pharyngeal entry  Bolus Location When Swallow Initiated: pyriforms  Pharyngeal Phase: impaired epiglottic movement, impaired pharyngoesophageal segment opening, impaired tongue base retraction, residue in valleculae, residue in pyriform sinus  Rosenbeck's Penetration Aspiration Scale: 3 -  contrast remains above the vocal cords, visible residue remains (penetration)  Response to Aspiration: n/a  Strategies and Compensations: hard swallow, multiple swallows  Diagnostic Statement: Pt demonstrates decreased pharyngeal constriction, penetration into the laryngeal vestibule and pyriform sinus after the completed swallow. The penetration remains in laryngeal vestibule after the swallow. No aspiration noted. Pt cleared laryngeal residue with cued cough. Difficult to determine if residue increases with sips of mildly thick liquid since there was residue prior to the swallow.    VFSS Eval: Purees  Mode of Presentation: spoon, self-fed   Order of Presentation: Series 6, 7, 14 (A/P)  Preparatory Phase: WFL  Oral Phase: WFL, pt demonstrates bolus fractionation on 2/3 trials  Bolus Location When Swallow Initiated: valleculae  Pharyngeal Phase: impaired epiglottic movement, impaired tongue base retraction, residue in valleculae  Rosenbeck s Penetration Aspiration Scale: 1 - no aspiration, contrast does not enter airway  Response to Aspiration: none  Strategies and Compensations: double swallow  Diagnostic Statement: No aspiration/penetration noted on puree consistency trials. Residue noted in the valleculae however, cleared with a second swallow.     VFSS Eval: Minced & Moist  Mode of Presentation: spoon, self-fed   Order of Presentation: Series 8, 9, 10 (after cough), 12  Preparatory Phase: WFL  Oral Phase: WFL  Bolus Location When Swallow Initiated: valleculae  Pharyngeal Phase: impaired hyolaryngel excursion, impaired epiglottic movement, impaired pharyngoesophageal segment opening, residue in valleculae, residue in pyriform sinus  Rosenbeck s Penetration Aspiration Scale: 3 - contrast remains above the vocal cords, visible residue remains (penetration)  Response to Aspiration:  no aspiration however pt did demonstrate productive cough in response to penetrated material  Strategies and Compensations: liquid  wash  Diagnostic Statement: Penetration noted on material which remained in the pharynx after the swallow. He was able to clear with spontaneous cough. Decreased UES opening noted. Impaired pharyngeal constriction demonstrated.      VFSS Eval: Barium Tablet  Mode of presentation: Self fed with water  Order or Presentation Series 13   Diagnostic statement: Barium tablet moved through oral phase easily. It stuck in the valleculae for a short time but was cleared with several large sips of liquid. It passed into the esophagus without noted area of narrowing at the UES.     ESOPHAGEAL PHASE OF SWALLOW  no observed or reported concerns related to esophageal function     SWALLOW ASSESSMENT CLINICAL IMPRESSIONS AND RATIONALE  Diet Consistency Recommendations: thin liquids (level 0), minced & moist (level 5)    Recommended Feeding/Eating Techniques: alternate food and liquid intake, effortful (hard) swallow, maintain upright sitting position for eating   Medication Administration Recommendations: Whole with liquid  Instrumental Assessment Recommendations: repeat imaging if there are changes in swallowing function following chemoradiation.     Assessment & Plan   CLINICAL IMPRESSIONS   Medical Diagnosis: Squamous cell carcinoma of the supraglottis    Treatment Diagnosis: moderate oropharyngeal dysphagia   Impression/Assessment: Pt is a 67 year old male with dysphagia complaints. The following significant findings have been identified: impaired swallowing, characterized by penetration on thin liquid and minced and moist solid consistencies. Pt sensitive to the penetrated material and is able to clear with a cough. He demonstrates decreased hyolaryngeal excursion and epiglottic inversion. Decreased oral manipulation of minced/moist solid due to lack of dentition. He demonstrates decreased base of tongue retraction. His swallow function is more impaired when compared to his swallow study in July 2023. Identified deficits  interfere with their ability to consume an oral diet and maintain nutrition as compared to previous level of function.    PLAN OF CARE  Treatment Interventions: Swallowing dysfunction and/or oral function for feeding    Prognosis to achieve stated therapy goals is good   Rehab potential is impacted by: comorbidities    Long Term Goals   SLP Goal 1  Goal Identifier: PO  Goal Description: Pt will tolerate least restricitive diet while adhering to safe swallow precautions and without pulmonary compromise across 6 months time.  Rationale: To maximize safety, ease and/or independence of oral intake  Target Date: 01/07/24  SLP Goal 2  Goal Identifier: Exercise  Goal Description: Pt will improve and/or maintain ROM and strength of BOT, jaw and pharynx by completing 10 repetitions of 5/5 exercises 3 times daily with minimal written or verbal cues.  Rationale: To maximize safety, ease and/or independence of oral intake  Target Date: 01/07/24      Frequency of Treatment: 2-4x/month  Duration of Treatment: 3 months     Recommended Referrals to Other Professionals: none  Education Assessment:   Learner/Method: Patient;Significant Other;No Barriers to Learning    Risks and benefits of evaluation/treatment have been explained.   Patient/Family/caregiver agrees with Plan of Care.     Evaluation Time:    SLP Eval: oral/pharyngeal swallow function, clinical minutes (82507): 10  SLP Eval: VideoFluoroscopic Swallow function Minutes (20797): 35      Signing Clinician: LLOYD Cruz    [facility]                                                                                   OUTPATIENT SPEECH LANGUAGE PATHOLOGY      PLAN OF TREATMENT FOR OUTPATIENT REHABILITATION   Patient's Last Name, First Name, Ko Almeida YOB: 1955   Provider's Name   [facility]   Medical Record No.  [id number]     Onset Date: 10/10/23 Start of Care Date:  10/10/23     Medical Diagnosis:  Squamous cell carcinoma of the supraglottis      SLP Treatment Diagnosis: moderate oropharyngeal dysphagia  Plan of Treatment  Frequency/Duration: 2-4x/month  / 3 months     Certification date from 10/10/23   To 01/07/24          See note for plan of treatment details and functional goals     Rola Marcano, SLP                         I CERTIFY THE NEED FOR THESE SERVICES FURNISHED UNDER        THIS PLAN OF TREATMENT AND WHILE UNDER MY CARE     (Physician attestation of this document indicates review and certification of the therapy plan).                Referring Provider:  Dr. Denia Hardin      Initial Assessment  See Epic Evaluation- 10/10/23

## 2023-10-11 NOTE — PROGRESS NOTES
"[facility]: Cancer Care Follow-Up Note                                    Discussion with Patient:                                                      Reached out to Ace & Ondina to relay chemotherapy timing surrounding dental procedures.    We will have Ace start chemo as planned on 10/16. He is supposed to have dental procedure (\"removal of right lower torus mandibularis\" to prep for dentures eventually) on 10/20, which we will delay to be shortly before cycle 2 is due (11/2 or 11/3), then push out cycle 2 one week from dental procedure. Ace and Ondina are agreeable to this plan, and will contact the dental clinic to reschedule the appointment and let me know when this is done.    Ace did mention they found an abscess when he got his teeth removed last week. I did confirm with the dentist that they were able to clean this out thoroughly and they had no further concerned for infection. Relayed to Dr. Louise, who is okay with starting treatment on Monday still.     Goals          General     Medical (pt-stated)      Notes - Note created  9/15/2023  2:01 PM by Olga Miranda, RN     Goal Statement: I will use my clinic and care team resources as directed.  Date Goal set: 9/15/2023  Barriers: disease burden  Strengths: support  Date to Achieve By: ongoing  Patient expressed understanding of goal: Yes  Action steps to achieve this goal:  I will contact triage with new, worsening or uncontrolled symptoms.   I will contact triage with temperature over 100.4  I will call with difficulties of scheduling and/or transportation.   I will not send urgent or symptomatic messages through [employer].   I will contact scheduling to arrange or make changes in my appointments.                Dates of Treatment:                                                      Infusion given in last 28 days       None          Treatment Plan Medications       OP ONC Head Neck Cancer - PACLitaxel / CARBOplatin       Take Home Medications       " Medication Sig Start/End Day/Cycle Status    prochlorperazine (COMPAZINE) 10 MG tablet Take 0.5 tablets (5 mg) by mouth every 6 hours as needed for nausea or vomiting S to -- Take-home, Prescription medications - 9/26/2023 Signed    ondansetron (ZOFRAN) 8 MG tablet Take 1 tablet (8 mg) by mouth every 8 hours as needed for nausea (vomiting) S to -- Take-home, Prescription medications - 9/26/2023 Signed                            Assessment:                                                      Plan of Care Education Review:        Plan of Care Education   Yearly learning assessment completed?: Yes (see Education tab)  Diagnosis:: squamous cell carcinoma of supraglottis  Does patient understand diagnosis?: Yes  Tx plan/regimen:: carboplatin + paclitaxol  Does patient understand treatment plan/regimen?: Yes  Additional education provided for: : Dental clearance/precautions;Other (comment) (retime chemotherapy and dental procedure)    Evaluation of Learning  Patient Education Provided: Yes  Readiness:: Acceptance  Method:: Explanation  Response:: Verbalizes understanding           Intervention/Education provided during outreach:                                                       Follow up call in 1-2 weeks  Patient to follow up as scheduled at next appt  Patient to call/Sentrihart message with updates  Confirmed patient has clinic and triage numbers    Olga Miranda, RN, BSN  RN Care Coordinator  [facility]

## 2023-10-12 DIAGNOSIS — C32.1 SCC (SQUAMOUS CELL CARCINOMA) OF SUPRAGLOTTIS (H): Primary | ICD-10-CM

## 2023-10-13 ENCOUNTER — APPOINTMENT (OUTPATIENT)
Dept: INTERVENTIONAL RADIOLOGY/VASCULAR | Facility: CLINIC | Age: 68
End: 2023-10-13
Attending: OTOLARYNGOLOGY
Payer: COMMERCIAL

## 2023-10-13 ENCOUNTER — APPOINTMENT (OUTPATIENT)
Dept: MEDSURG UNIT | Facility: CLINIC | Age: 68
End: 2023-10-13
Attending: OTOLARYNGOLOGY
Payer: COMMERCIAL

## 2023-10-13 ENCOUNTER — HOSPITAL ENCOUNTER (OUTPATIENT)
Dept: EDUCATION SERVICES | Facility: CLINIC | Age: 68
Discharge: HOME | End: 2023-10-13
Attending: OTOLARYNGOLOGY | Admitting: OTOLARYNGOLOGY
Payer: COMMERCIAL

## 2023-10-13 ENCOUNTER — PATIENT OUTREACH (OUTPATIENT)
Dept: ONCOLOGY | Facility: CLINIC | Age: 68
End: 2023-10-13

## 2023-10-13 ENCOUNTER — HOSPITAL ENCOUNTER (OUTPATIENT)
Facility: CLINIC | Age: 68
Discharge: HOME | End: 2023-10-13
Attending: OTOLARYNGOLOGY | Admitting: RADIOLOGY
Payer: COMMERCIAL

## 2023-10-13 VITALS
TEMPERATURE: 98 F | HEART RATE: 86 BPM | BODY MASS INDEX: 29.15 KG/M2 | SYSTOLIC BLOOD PRESSURE: 139 MMHG | OXYGEN SATURATION: 96 % | HEIGHT: 66 IN | RESPIRATION RATE: 18 BRPM | WEIGHT: 181.4 LBS | DIASTOLIC BLOOD PRESSURE: 71 MMHG

## 2023-10-13 DIAGNOSIS — C77.0 SECONDARY MALIGNANCY OF LYMPH NODES OF HEAD, FACE AND NECK (H): ICD-10-CM

## 2023-10-13 DIAGNOSIS — C10.9 SQUAMOUS CELL CARCINOMA OF OROPHARYNX (H): ICD-10-CM

## 2023-10-13 LAB — INR PPP: 1.03 (ref 0.85–1.15)

## 2023-10-13 PROCEDURE — 250N000011 HC RX IP 250 OP 636: Performed by: RADIOLOGY

## 2023-10-13 PROCEDURE — 10005 FNA BX W/US GDN 1ST LES: CPT

## 2023-10-13 PROCEDURE — 99152 MOD SED SAME PHYS/QHP 5/>YRS: CPT | Mod: GC | Performed by: RADIOLOGY

## 2023-10-13 PROCEDURE — 85610 PROTHROMBIN TIME: CPT | Performed by: NURSE PRACTITIONER

## 2023-10-13 PROCEDURE — 250N000011 HC RX IP 250 OP 636

## 2023-10-13 PROCEDURE — 250N000009 HC RX 250

## 2023-10-13 PROCEDURE — 999N000134 HC STATISTIC PP CARE STAGE 3

## 2023-10-13 PROCEDURE — 250N000009 HC RX 250: Performed by: RADIOLOGY

## 2023-10-13 PROCEDURE — 10005 FNA BX W/US GDN 1ST LES: CPT | Mod: GC | Performed by: RADIOLOGY

## 2023-10-13 PROCEDURE — 88173 CYTOPATH EVAL FNA REPORT: CPT | Mod: 26 | Performed by: PATHOLOGY

## 2023-10-13 PROCEDURE — 272N000151 HC KIT CR11

## 2023-10-13 PROCEDURE — 49440 PLACE GASTROSTOMY TUBE PERC: CPT

## 2023-10-13 PROCEDURE — 999N000142 HC STATISTIC PROCEDURE PREP ONLY

## 2023-10-13 PROCEDURE — 49440 PLACE GASTROSTOMY TUBE PERC: CPT | Mod: GC | Performed by: RADIOLOGY

## 2023-10-13 PROCEDURE — 258N000003 HC RX IP 258 OP 636: Performed by: NURSE PRACTITIONER

## 2023-10-13 PROCEDURE — 250N000011 HC RX IP 250 OP 636: Mod: JZ | Performed by: NURSE PRACTITIONER

## 2023-10-13 PROCEDURE — 255N000002 HC RX 255 OP 636: Mod: JZ | Performed by: OTOLARYNGOLOGY

## 2023-10-13 PROCEDURE — 36415 COLL VENOUS BLD VENIPUNCTURE: CPT | Performed by: NURSE PRACTITIONER

## 2023-10-13 PROCEDURE — 88305 TISSUE EXAM BY PATHOLOGIST: CPT | Mod: 26 | Performed by: PATHOLOGY

## 2023-10-13 PROCEDURE — 250N000009 HC RX 250: Performed by: STUDENT IN AN ORGANIZED HEALTH CARE EDUCATION/TRAINING PROGRAM

## 2023-10-13 PROCEDURE — 88305 TISSUE EXAM BY PATHOLOGIST: CPT | Mod: TC | Performed by: OTOLARYNGOLOGY

## 2023-10-13 RX ORDER — DEXTROSE MONOHYDRATE 25 G/50ML
25-50 INJECTION, SOLUTION INTRAVENOUS
Status: DISCONTINUED | OUTPATIENT
Start: 2023-10-13 | End: 2023-10-13 | Stop reason: HOSPADM

## 2023-10-13 RX ORDER — SODIUM CHLORIDE 9 MG/ML
INJECTION, SOLUTION INTRAVENOUS CONTINUOUS
Status: DISCONTINUED | OUTPATIENT
Start: 2023-10-13 | End: 2023-10-13 | Stop reason: HOSPADM

## 2023-10-13 RX ORDER — LIDOCAINE HYDROCHLORIDE 10 MG/ML
1-30 INJECTION, SOLUTION EPIDURAL; INFILTRATION; INTRACAUDAL; PERINEURAL
Status: COMPLETED | OUTPATIENT
Start: 2023-10-13 | End: 2023-10-13

## 2023-10-13 RX ORDER — NALOXONE HYDROCHLORIDE 0.4 MG/ML
0.4 INJECTION, SOLUTION INTRAMUSCULAR; INTRAVENOUS; SUBCUTANEOUS
Status: DISCONTINUED | OUTPATIENT
Start: 2023-10-13 | End: 2023-10-13 | Stop reason: HOSPADM

## 2023-10-13 RX ORDER — LIDOCAINE HYDROCHLORIDE 20 MG/ML
JELLY TOPICAL 2 TIMES DAILY PRN
Status: DISCONTINUED | OUTPATIENT
Start: 2023-10-13 | End: 2023-10-13 | Stop reason: HOSPADM

## 2023-10-13 RX ORDER — ONDANSETRON 8 MG/1
8 TABLET, FILM COATED ORAL EVERY 8 HOURS PRN
Qty: 30 TABLET | Refills: 2 | Status: SHIPPED | OUTPATIENT
Start: 2023-10-13 | End: 2023-12-19

## 2023-10-13 RX ORDER — PROCHLORPERAZINE MALEATE 10 MG
5 TABLET ORAL EVERY 6 HOURS PRN
Qty: 30 TABLET | Refills: 2 | Status: SHIPPED | OUTPATIENT
Start: 2023-10-13 | End: 2023-12-19

## 2023-10-13 RX ORDER — NICOTINE POLACRILEX 4 MG
15-30 LOZENGE BUCCAL
Status: DISCONTINUED | OUTPATIENT
Start: 2023-10-13 | End: 2023-10-13 | Stop reason: HOSPADM

## 2023-10-13 RX ORDER — FLUMAZENIL 0.1 MG/ML
0.2 INJECTION, SOLUTION INTRAVENOUS
Status: DISCONTINUED | OUTPATIENT
Start: 2023-10-13 | End: 2023-10-13 | Stop reason: HOSPADM

## 2023-10-13 RX ORDER — LIDOCAINE 40 MG/G
CREAM TOPICAL
Status: DISCONTINUED | OUTPATIENT
Start: 2023-10-13 | End: 2023-10-13 | Stop reason: HOSPADM

## 2023-10-13 RX ORDER — IOPAMIDOL 510 MG/ML
100 INJECTION, SOLUTION INTRAVASCULAR ONCE
Status: COMPLETED | OUTPATIENT
Start: 2023-10-13 | End: 2023-10-13

## 2023-10-13 RX ORDER — NALOXONE HYDROCHLORIDE 0.4 MG/ML
0.2 INJECTION, SOLUTION INTRAMUSCULAR; INTRAVENOUS; SUBCUTANEOUS
Status: DISCONTINUED | OUTPATIENT
Start: 2023-10-13 | End: 2023-10-13 | Stop reason: HOSPADM

## 2023-10-13 RX ORDER — CEFAZOLIN SODIUM 2 G/100ML
2 INJECTION, SOLUTION INTRAVENOUS
Status: COMPLETED | OUTPATIENT
Start: 2023-10-13 | End: 2023-10-13

## 2023-10-13 RX ORDER — FENTANYL CITRATE 50 UG/ML
25-50 INJECTION, SOLUTION INTRAMUSCULAR; INTRAVENOUS EVERY 5 MIN PRN
Status: DISCONTINUED | OUTPATIENT
Start: 2023-10-13 | End: 2023-10-13 | Stop reason: HOSPADM

## 2023-10-13 RX ADMIN — CEFAZOLIN SODIUM 2 G: 2 INJECTION, SOLUTION INTRAVENOUS at 08:39

## 2023-10-13 RX ADMIN — FENTANYL CITRATE 50 MCG: 50 INJECTION, SOLUTION INTRAMUSCULAR; INTRAVENOUS at 10:09

## 2023-10-13 RX ADMIN — GLUCAGON 1 MG: 1 INJECTION, POWDER, LYOPHILIZED, FOR SOLUTION INTRAMUSCULAR; INTRAVENOUS at 11:09

## 2023-10-13 RX ADMIN — MIDAZOLAM 1 MG: 1 INJECTION INTRAMUSCULAR; INTRAVENOUS at 10:09

## 2023-10-13 RX ADMIN — FENTANYL CITRATE 50 MCG: 50 INJECTION, SOLUTION INTRAMUSCULAR; INTRAVENOUS at 10:58

## 2023-10-13 RX ADMIN — FENTANYL CITRATE 50 MCG: 50 INJECTION, SOLUTION INTRAMUSCULAR; INTRAVENOUS at 11:12

## 2023-10-13 RX ADMIN — MIDAZOLAM 1 MG: 1 INJECTION INTRAMUSCULAR; INTRAVENOUS at 11:13

## 2023-10-13 RX ADMIN — LIDOCAINE HYDROCHLORIDE: 20 JELLY TOPICAL at 11:04

## 2023-10-13 RX ADMIN — LIDOCAINE HYDROCHLORIDE: 20 JELLY TOPICAL at 11:00

## 2023-10-13 RX ADMIN — LIDOCAINE HYDROCHLORIDE 3 ML: 10 INJECTION, SOLUTION EPIDURAL; INFILTRATION; INTRACAUDAL; PERINEURAL at 10:27

## 2023-10-13 RX ADMIN — SODIUM CHLORIDE: 9 INJECTION, SOLUTION INTRAVENOUS at 08:38

## 2023-10-13 RX ADMIN — IOPAMIDOL 15 ML: 510 INJECTION, SOLUTION INTRAVASCULAR at 11:30

## 2023-10-13 RX ADMIN — LIDOCAINE HYDROCHLORIDE 10 ML: 10 INJECTION, SOLUTION EPIDURAL; INFILTRATION; INTRACAUDAL; PERINEURAL at 11:11

## 2023-10-13 RX ADMIN — MIDAZOLAM 1 MG: 1 INJECTION INTRAMUSCULAR; INTRAVENOUS at 10:58

## 2023-10-13 ASSESSMENT — ACTIVITIES OF DAILY LIVING (ADL)
ADLS_ACUITY_SCORE: 35

## 2023-10-13 NOTE — DISCHARGE INSTRUCTIONS
[facility]    Interventional Radiology  Patient Instructions Following Biopsy    AFTER YOU GO HOME  If you were given sedation, for the first 24 hours: we recommend that an adult stay with you, DO NOT drive or operate machinery at home or at work, DO NOT smoke, DO NOT make any important or legal decisions.  DO NOT drink alcoholic beverages the day of your procedure  Drink plenty of fluids   Resume your regular diet, unless otherwise instructed by your Primary Physician  Relax and take it easy for 48 hours  DO NOT do any strenuous exercise or lifting (> 10 lbs) for at least 7 days following your procedure  Keep the dressing dry and in place for 24 hours. Replace with Band aid for 2 days.  Never leave a wet dressing in place.  Do not take a shower for at least 12 hours following your procedure. No tub bath, hot tub, or swimming for 5 days.  There should be minimum drainage from the biopsy site    CALL THE PHYSICIAN IF:  You start bleeding from the procedure site.  If you do start to bleed from that site, lie down flat and hold pressure on the site for a minimum of 10 minutes.  Your physician will tell you if you need to return to the hospital  You develop nausea or vomiting  You have excessive swelling, redness, or tenderness at the site  You have drainage that looks like it is infected.  You experience severe pain  You develop hives or a rash or unexplained itching  You develop shortness of breath  You develop a temperature of 101 degrees F or greater    [facility] INTERVENTIONAL RADIOLOGY DEPARTMENT  Procedure Physician: Dr. Dr. Wilkinson, Dr. Daniel                                     Date of procedure: October 13, 2023  Telephone Numbers: [phone]      Monday-Friday 7:30 am to 4:00 pm  [phone] After 4:00 pm Monday-Friday, Weekends & Holidays.   Ask for the Interventional Radiologist on call.  Someone is on call 24 hrs/day  [facility] toll free number: [phone] Monday-Friday 8:00 am to 4:30  pm  [facility] Emergency Dept: [phone]    Interventional Radiology  Patient Discharge Instructions  Post Tube Placement:  Gastrostomy/Gastro-Jejunostomy    For some patients, the tube puts food and medicine into the body. For others, it drains fluid from the stomach. Your doctor will decide how long you will have the tube.    A disc or balloon inside the body will hold the tube in place. The balloon is filled with water.  You may notice one or two  anchors  (buttons, stitches, T-tacks, or T-fasteners) around your tube/site. These hold the stomach to the inside wall of the belly.    Self-care the first few days  Do not eat or drink for the first four hours. (You may take medicine with small sips of water.)  Stick to liquids for the first day and then advance your diet as tolerated, if you are able to take in oral foods.  If you are starting tube feedings, this should have been prearranged with your clinic.    Check your tube site often. Call your doctor if your side effects of pain, redness or bleeding get worse. It is normal to have some drainage (fluid leaking) around the tube.    Limit heavy activity (lifting, straining or exercise).     If you received IV medicine to make you sleepy: You may feel drowsy, forgetful and unsteady.     No driving until the day after surgery. No alcohol for 24 hours.    Activity   You may resume your normal activities as tolerated, however, most patients have site pain for about a week following placement.  Minimal use of your abdominal muscles will decrease the pain.  Keep the tube secure at all times (you may tape it to your skin or use the Flexi-Trak device) and avoid tugging on it.  Bathing  You may shower 24 hours after the tube is placed.  Remove the dressing before your shower, reapply afterwards.  DO NOT submerge in water of any kind for 3 weeks after placement.  Once the site is healed, which is around 3 weeks, for most patients, you may go in bathtubs, spas and swimming pools  where the water is clean or chlorinated.  Be sure the caps are tight.  Your doctor may ask you to cover the tube site with a plastic bandage when swimming.    Do not swim in lakes, oceans or non-chlorinated public schmitt for the duration of your tube being in place.  When swimming or any activity where the tube could be inadvertently pulled, wearing a t-shirt or one piece bathing suit (for women) decreases the risk.  Basic tube care  Always wash your hands before touching the tube.    Do not use ointment or hydrogen peroxide near the tube. You may use a thin layer of skin balm, like Desitin around the site after it is clean and dry.     After 3 weeks:  If you have a G-tube:  - Once each day, pull gently on the tube.  It should move in and out slightly (about   to    inch). You may see a slight rounding of the skin as you pull up. If the tube is too tight to move in and out, call your nurse or doctor.  - We may ask you to gently rotate the tube. If so, roll it between your thumb and forefinger.  This breaks up any flaps of tissue that have formed around the tube inside the stomach.  Never rotate a J-tube or a GJ-tube.    Changing the bandage  Wear a bandage until the site has healed and there is no leaking fluid. Change the bandage at least once a day and each time it gets wet or soiled.  1. Gather these supplies:  - Plastic bag  - Gauze or split gauze (4×4 or 2×2)  - Paper tape (1 or 2 inches wide)  2. Clean your work area with household  and water (or rubbing alcohol). Wash your hands.  3. Remove the old bandage. Place it in the bag.  4. Wash your hands again. Clean the tube site.  5. Replace the bandage.   If you have a disk: Slide split gauze under and around the tube (or use two pieces of gauze).  Tape the gauze in place.   If you don t have a disk: Fold one piece of gauze in half, placing it below the tube site.  Fold another piece of gauze in half, placing it over the tube site. Tape the gauze in  place.  6. Secure the tube to the skin. If you don t have a disk, be sure the tube is at a 90-degree angle.   First, place a piece of a tape across the skin. Then, use a second piece of tape to secure the tube over the first piece.  7 Do not attach the tube to clothing, except during feedings.    Flushing Your Tube  Flush the tube with water (clean, drinkable tap water is fine) before and after each feeding, as well as between medications if you choose to use your tube to instill medications.    If you are not using your tube, it needs to be flushed with 20-30 cc of clean tap water twice daily.    Follow-up  Contact the Interventional Radiology Clinic to schedule your suture removal two weeks after tube placement at [phone]  Routine tube changes are recommended every 6-9 months.  After 6 weeks most tubes can be changed to a skin level,  button  type device.  Tube removals are performed in our clinic    The following questions should be first directed to the doctor that referred you for the feeding tube  Which feeding formula to use?  How long will the tube be needed?  Calorie requirement for your specific problem?  How much water you need to give through your tube daily?  Nausea, vomiting, diarrhea, or weight loss problems.    When to call for help  Call your doctor if you have   A fever that is:  - over 101 F (38.3 C) if taken under the tongue  - over 100 F (37.8 C) if taken under your arm.   Vomiting (throwing up)   Diarrhea (loose, watery stools)   Constipation (hard, dry stools) for more than 24 hours  Call your care coordinator or Interventional Radiology if you have:   Bleeding, drainage or swelling at the tube site.   A painful, bright red rash   Severe pain or pain that does not improve with medicine.   A plugged tube and you cannot flush it.   A tube that has come out.   Fluid leaking around the tube.   Any questions or problems with your tube.  If you need help after business hours or on a holiday, go  to Emergency unless you can reach home care or your care coordinator.  Phone numbers  [] [facility]:   [place]: [phone]      Monday-Friday 7:30 am to 4:00 pm  * After 4pm and on weekends, call the hospital  at [phone] and ask to have the on call Interventional Radiologist paged.

## 2023-10-13 NOTE — PROGRESS NOTES
Pt arrived to  for g tube placement and parotid gland biopsy in [facility]. VSS. 3/10 pain in mouth and tension headache.  PIV infusing. Lab resulted. One consent signed, awaiting Neuro-radiology consent.   Aretha, wife, at bedside.

## 2023-10-13 NOTE — PROGRESS NOTES
Pt s/p parotid biopsy and G tube placement.  VSS.  Denies pain.  R upper neck area stable, no bleeding or hematoma.  G tube site also stable; bag open to gravity for 4 hrs per provider orders until 1430.

## 2023-10-13 NOTE — PROCEDURES
[facility]    Procedure: Image guided gastrostomy tube placement    Date/Time: 10/13/2023 11:35 AM    Performed by: Behzadi, Heshmatzadeh, MD  Authorized by: Jonathon Cruz MD  IR Fellow Physician: Ashkan Behzadi      UNIVERSAL PROTOCOL   Site Marked: NA  Prior Images Obtained and Reviewed:  Yes  Required items: Required blood products, implants, devices and special equipment available    Patient identity confirmed:  Verbally with patient, arm band, provided demographic data and hospital-assigned identification number  Patient was reevaluated immediately before administering moderate or deep sedation or anesthesia  Confirmation Checklist:  Patient's identity using two indicators, relevant allergies, procedure was appropriate and matched the consent or emergent situation and correct equipment/implants were available  Time out: Immediately prior to the procedure a time out was called    Universal Protocol: the Joint Commission Universal Protocol was followed    Preparation: Patient was prepped and draped in usual sterile fashion       ANESTHESIA    Anesthesia:  Local infiltration  Local Anesthetic:  Lidocaine 1% without epinephrine      SEDATION  Patient Sedated: Yes    Sedation:  Fentanyl and midazolam  Vital signs: Vital signs monitored during sedation    See dictated procedure note for full details.  Findings: -    Specimens: none    Complications: None    Condition: Stable    Plan: Gastrostomy tube should be attached to gravity drainage for 4 hours, then can be used.       PROCEDURE  Describe Procedure: 18 [place] citizen [place] TEO gastrostomy tube placed under fluoroscopic guidance. Catheter to gravity drainage.    Patient Tolerance:  Patient tolerated the procedure well with no immediate complications  Length of time physician/provider present for 1:1 monitoring during sedation: 20

## 2023-10-13 NOTE — CONSULTS
Met with patient and his wife for enteral tube feeding education. Patient was still sleepy from his procedure and was mostly engaged in class, but did start to fall asleep toward the end. His wife was engaged in learning.  Demonstrated on a model site cares, anchoring the tube, flushing, medication administration, and administration of bolus feeding using either the syringe and plunger or gravity bag. We also discussed tube safety and when to call with concerns. Patient's wife plans to assist with tube cares at home. She was able to return demonstrate skills on the model without difficulty and verbalized understanding of the information given.     Literature given: Learning about Handwashing, Caring for Your Tube at Home, Tube Feeding at Home-[place] Method Using Syringe and Plunger, Tube Feedings at Home-[place] Method.

## 2023-10-13 NOTE — PROGRESS NOTES
Pt's g tube was flushed and clamped. Pt's PIV removed. Pt has met criteria to discharge. Pt brought by wheelchair to  with wife.

## 2023-10-13 NOTE — PROGRESS NOTES
Discharge instructions given to pt and pt's wife; patient learning nurse at bedside completed all g tube education. Pt and pt's wife verbalize understanding of all.  Pt has ambulated to bathroom and voided. Continue to be NPO until 15:30, g tube to gravity bag. R parotid gland biopsy site with primapore CDI, no hematoma, soft.

## 2023-10-13 NOTE — PRE-PROCEDURE
GENERAL PRE-PROCEDURE:   Procedure:  G tube placement  Date/Time:  10/13/2023 8:53 AM    Verbal consent obtained?: Yes    Written consent obtained?: Yes    Risks and benefits: Risks, benefits and alternatives were discussed    DC Plan: Appropriate discharge home plan in place for patients who are going home after procedure   Consent given by:  Patient  Patient states understanding of procedure being performed: Yes    Patient's understanding of procedure matches consent: Yes    Procedure consent matches procedure scheduled: Yes    Expected level of sedation:  Moderate  Appropriately NPO:  Yes  ASA Class:  2  Mallampati  :  Grade 1- soft palate, uvula, tonsillar pillars, and posterior pharyngeal wall visible  Lungs:  Lungs clear with good breath sounds bilaterally  Heart:  Normal heart sounds and rate  History & Physical reviewed:  History and physical reviewed and no updates needed  Statement of review:  I have reviewed the lab findings, diagnostic data, medications, and the plan for sedation

## 2023-10-13 NOTE — IR NOTE
Patient Name: Ko Thakkar  Medical Record Number: [id number]  Today's Date: 10/13/2023    Procedure: Right parotid fine needle aspiration  Proceduralist: Dr. Fredy García  Pathology present: Yes  Procedure Start: 1019  Procedure end: 1034  Sedation medications administered: 50 mcg fentanyl and 1 mg versed (sedation time 30 minutes)        Procedure: Gastrostomy tube placement  Proceduralist: Dr. Shrestha, Dr. Heshmatzaday Behzadi   Pathology present: NA  Procedure Start: 1107  Procedure end: 1125  Sedation medications administered: 100 mcg fentanyl and 2 mg versed (sedation time 25 mins)        Report given to: Yasmin DE LOS SANTOS 2A  : POPEYE    Other Notes: Pt arrived to IR room 4 from . Consent reviewed. Pt denies any questions or concerns regarding procedure. Pt positioned supine and monitored per protocol. Pt tolerated procedure without any noted complications. Pt transferred back to .

## 2023-10-13 NOTE — PROGRESS NOTES
[facility]: Cancer Care                                                                                          Received VM from Ace's wife, Ondina, requesting a callback.    Returned call but they did not answer. Left VM requesting a callback.    Olga Miranda, RN, BSN  RN Care Coordinator  [facility]

## 2023-10-14 ENCOUNTER — HEALTH MAINTENANCE LETTER (OUTPATIENT)
Age: 68
End: 2023-10-14

## 2023-10-14 ENCOUNTER — NURSE TRIAGE (OUTPATIENT)
Dept: NURSING | Facility: CLINIC | Age: 68
End: 2023-10-14
Payer: COMMERCIAL

## 2023-10-14 NOTE — TELEPHONE ENCOUNTER
"Wife calling. Is in pain where GT was placed, Pain level is \"8\" . Tube was placed yesterday. Patient did sleep through the night. Is due to have dressing change. This writer asked wife to do the dressing change first to assess for any reddness, signs of infection and call back for triage.     BENNY KRAUSE RN  [facility] nurse advisors  10/14/2023  12:41 PM  Reason for Disposition   Health Information question, no triage required and triager able to answer question    Additional Information   Negative: [1] Caller is not with the adult (patient) AND [2] reporting urgent symptoms   Negative: Lab result questions   Negative: Medication questions   Negative: Caller can't be reached by phone   Negative: Caller has already spoken to PCP or another triager   Negative: RN needs further essential information from caller in order to complete triage   Negative: Requesting regular office appointment   Negative: [1] Caller requesting NON-URGENT health information AND [2] PCP's office is the best resource    Protocols used: Information Only Call - No Triage-A-    "

## 2023-10-14 NOTE — TELEPHONE ENCOUNTER
"Wife is the caller.  Pt called earlier as pt was having pain issues where g-tube was placed 10/13/23.  Pt pain was an \"8\" on 0/10 pain scale and continues to be \"4\".  Wife did dressing change, no drainage, no sign of infection.  Pt is taking Ibuprofen and Tylenol.    Mom will follow Care Advice and follow up with clinic if anything needed further.  Maria De Jesus Lee RN  [facility] Nurse Advisor    Additional Information   Negative: [1] Major abdominal surgical incision AND [2] wound gaping open AND [3] visible internal organs   Negative: Sounds like a life-threatening emergency to the triager   Negative: Patient has a Negative Pressure Wound Therapy device   Negative: Patient is followed by a wound clinic or wound specialist for this wound   Negative: [1] Bleeding from incision AND [2] won't stop after 10 minutes of direct pressure   Negative: [1] Bleeding (more than a few drops) from incision AND [2] tracheostomy or blood vessel surgery (e.g., carotid endarterectomy, femoral bypass graft, kidney dialysis fistula)   Negative: [1] Widespread rash AND [2] bright red, sunburn-like   Negative: Severe pain in the incision   Negative: [1] Incision gaping open AND [2] < 48 hours since wound re-opened   Negative: [1] Incision gaping open AND [2] length of opening > 2 inches (5 cm)   Negative: Patient sounds very sick or weak to the triager   Negative: Sounds like a serious complication to the triager   Negative: Fever > 100.4 F (38.0 C)   Negative: [1] Incision looks infected (spreading redness, pain) AND [2] fever > 99.5 F (37.5 C)   Negative: [1] Incision looks infected (spreading redness, pain) AND [2] large red area (> 2 in. or 5 cm)   Negative: [1] Incision looks infected (spreading redness, pain) AND [2] face wound   Negative: [1] Red streak runs from the incision AND [2] longer than 1 inch (2.5 cm)   Negative: [1] Pus or bad-smelling fluid draining from incision AND [2] no fever   Negative: [1] Post-op pain AND [2] not " controlled with pain medications   Negative: Dressing soaked with blood or body fluid (e.g., drainage)   Negative: [1] Scant bleeding (e.g., few drops) from incision AND AND [2] tracheostomy or blood vessel surgery (e.g., carotid endarterectomy, femoral bypass graft, kidney dialysis fistula)   Negative: [1] Raised bruise and [2] size > 2 inches (5 cm) and expanding   Negative: [1] Caller has URGENT question AND [2] triager unable to answer question   Negative: [1] INCREASING pain in incision AND [2] > 2 days (48 hours) since surgery   Negative: [1] Small red area or streak AND [2] no fever   Negative: [1] Clear or blood-tinged fluid draining from wound AND [2] no fever   Negative: [1] Incision gaping open AND [2] > 48 hours since wound re-opened AND [3] length of opening > 1/2 inch (12 mm)   Negative: [1] Incision on face gaping open AND [2] > 48 hours since wound re-opened AND [3] length of opening > 1/4 inch (6 mm)   Negative: Suture or staple removal is overdue   Negative: [1] Suture or staple came out early AND [2] caller wants wound checked   Negative: [1] Caller has NON-URGENT question AND [2] triager unable to answer question   Negative: Pimple where a stitch comes through the skin   Negative: Suture (or staple) came out early   Negative: Mild bruising near incision site   Negative: Suture removal date, questions about   Negative: Skin tape (e.g., Steri-Strips) removal, questions about   Negative: Sutured or stapled surgical incision, questions about   Negative: Surgical incision after sutures or staples removed, questions about   Negative: Numbness at incision site, questions about   Negative: Saline dressing changes, questions about    Protocols used: Post-Op Incision Symptoms and [id number]

## 2023-10-16 ENCOUNTER — THERAPY VISIT (OUTPATIENT)
Dept: SPEECH THERAPY | Facility: CLINIC | Age: 68
End: 2023-10-16
Payer: COMMERCIAL

## 2023-10-16 ENCOUNTER — APPOINTMENT (OUTPATIENT)
Dept: LAB | Facility: CLINIC | Age: 68
End: 2023-10-16
Attending: INTERNAL MEDICINE
Payer: COMMERCIAL

## 2023-10-16 ENCOUNTER — TELEPHONE (OUTPATIENT)
Dept: INTERVENTIONAL RADIOLOGY/VASCULAR | Facility: CLINIC | Age: 68
End: 2023-10-16

## 2023-10-16 ENCOUNTER — PATIENT OUTREACH (OUTPATIENT)
Dept: CARE COORDINATION | Facility: CLINIC | Age: 68
End: 2023-10-16

## 2023-10-16 ENCOUNTER — INFUSION THERAPY VISIT (OUTPATIENT)
Dept: ONCOLOGY | Facility: CLINIC | Age: 68
End: 2023-10-16
Attending: INTERNAL MEDICINE
Payer: COMMERCIAL

## 2023-10-16 VITALS
OXYGEN SATURATION: 98 % | TEMPERATURE: 98.9 F | HEART RATE: 100 BPM | RESPIRATION RATE: 16 BRPM | WEIGHT: 178.3 LBS | HEIGHT: 66 IN | SYSTOLIC BLOOD PRESSURE: 110 MMHG | DIASTOLIC BLOOD PRESSURE: 71 MMHG | BODY MASS INDEX: 28.66 KG/M2

## 2023-10-16 DIAGNOSIS — C10.9 SQUAMOUS CELL CARCINOMA OF OROPHARYNX (H): Primary | ICD-10-CM

## 2023-10-16 DIAGNOSIS — C32.1 SCC (SQUAMOUS CELL CARCINOMA) OF SUPRAGLOTTIS (H): Primary | ICD-10-CM

## 2023-10-16 DIAGNOSIS — R13.13 PHARYNGEAL DYSPHAGIA: ICD-10-CM

## 2023-10-16 LAB
ALBUMIN SERPL BCG-MCNC: 4.3 G/DL (ref 3.5–5.2)
ALP SERPL-CCNC: 140 U/L (ref 40–129)
ALT SERPL W P-5'-P-CCNC: 23 U/L (ref 0–70)
ANION GAP SERPL CALCULATED.3IONS-SCNC: 13 MMOL/L (ref 7–15)
AST SERPL W P-5'-P-CCNC: 17 U/L (ref 0–45)
BASO+EOS+MONOS # BLD AUTO: ABNORMAL 10*3/UL
BASO+EOS+MONOS NFR BLD AUTO: ABNORMAL %
BASOPHILS # BLD AUTO: 0.1 10E3/UL (ref 0–0.2)
BASOPHILS NFR BLD AUTO: 1 %
BILIRUB SERPL-MCNC: 0.4 MG/DL
BUN SERPL-MCNC: 14.8 MG/DL (ref 8–23)
CALCIUM SERPL-MCNC: 9.7 MG/DL (ref 8.8–10.2)
CHLORIDE SERPL-SCNC: 99 MMOL/L (ref 98–107)
CREAT SERPL-MCNC: 0.79 MG/DL (ref 0.67–1.17)
DEPRECATED HCO3 PLAS-SCNC: 25 MMOL/L (ref 22–29)
EGFRCR SERPLBLD CKD-EPI 2021: >90 ML/MIN/1.73M2
EOSINOPHIL # BLD AUTO: 0.7 10E3/UL (ref 0–0.7)
EOSINOPHIL NFR BLD AUTO: 6 %
ERYTHROCYTE [DISTWIDTH] IN BLOOD BY AUTOMATED COUNT: 13.2 % (ref 10–15)
GLUCOSE SERPL-MCNC: 258 MG/DL (ref 70–99)
HCT VFR BLD AUTO: 40.8 % (ref 40–53)
HGB BLD-MCNC: 13.4 G/DL (ref 13.3–17.7)
IMM GRANULOCYTES # BLD: 0 10E3/UL
IMM GRANULOCYTES NFR BLD: 0 %
LYMPHOCYTES # BLD AUTO: 1.9 10E3/UL (ref 0.8–5.3)
LYMPHOCYTES NFR BLD AUTO: 17 %
MCH RBC QN AUTO: 27.9 PG (ref 26.5–33)
MCHC RBC AUTO-ENTMCNC: 32.8 G/DL (ref 31.5–36.5)
MCV RBC AUTO: 85 FL (ref 78–100)
MONOCYTES # BLD AUTO: 1.1 10E3/UL (ref 0–1.3)
MONOCYTES NFR BLD AUTO: 10 %
NEUTROPHILS # BLD AUTO: 7.5 10E3/UL (ref 1.6–8.3)
NEUTROPHILS NFR BLD AUTO: 66 %
NRBC # BLD AUTO: 0 10E3/UL
NRBC BLD AUTO-RTO: 0 /100
PATH REPORT.COMMENTS IMP SPEC: ABNORMAL
PATH REPORT.COMMENTS IMP SPEC: ABNORMAL
PATH REPORT.COMMENTS IMP SPEC: YES
PATH REPORT.FINAL DX SPEC: ABNORMAL
PATH REPORT.GROSS SPEC: ABNORMAL
PATH REPORT.MICROSCOPIC SPEC OTHER STN: ABNORMAL
PATH REPORT.RELEVANT HX SPEC: ABNORMAL
PLATELET # BLD AUTO: 392 10E3/UL (ref 150–450)
POTASSIUM SERPL-SCNC: 3.5 MMOL/L (ref 3.4–5.3)
PROT SERPL-MCNC: 7.4 G/DL (ref 6.4–8.3)
RBC # BLD AUTO: 4.81 10E6/UL (ref 4.4–5.9)
SODIUM SERPL-SCNC: 137 MMOL/L (ref 135–145)
WBC # BLD AUTO: 11.2 10E3/UL (ref 4–11)

## 2023-10-16 PROCEDURE — 258N000003 HC RX IP 258 OP 636: Performed by: INTERNAL MEDICINE

## 2023-10-16 PROCEDURE — 36415 COLL VENOUS BLD VENIPUNCTURE: CPT | Performed by: INTERNAL MEDICINE

## 2023-10-16 PROCEDURE — 250N000013 HC RX MED GY IP 250 OP 250 PS 637: Performed by: INTERNAL MEDICINE

## 2023-10-16 PROCEDURE — 250N000011 HC RX IP 250 OP 636: Mod: JZ | Performed by: INTERNAL MEDICINE

## 2023-10-16 PROCEDURE — 96367 TX/PROPH/DG ADDL SEQ IV INF: CPT

## 2023-10-16 PROCEDURE — 96417 CHEMO IV INFUS EACH ADDL SEQ: CPT

## 2023-10-16 PROCEDURE — 96375 TX/PRO/DX INJ NEW DRUG ADDON: CPT

## 2023-10-16 PROCEDURE — 85025 COMPLETE CBC W/AUTO DIFF WBC: CPT | Performed by: INTERNAL MEDICINE

## 2023-10-16 PROCEDURE — 92526 ORAL FUNCTION THERAPY: CPT | Mod: GN | Performed by: SPEECH-LANGUAGE PATHOLOGIST

## 2023-10-16 PROCEDURE — 84155 ASSAY OF PROTEIN SERUM: CPT | Performed by: INTERNAL MEDICINE

## 2023-10-16 PROCEDURE — 96415 CHEMO IV INFUSION ADDL HR: CPT

## 2023-10-16 PROCEDURE — 96413 CHEMO IV INFUSION 1 HR: CPT

## 2023-10-16 RX ORDER — DIPHENHYDRAMINE HCL 25 MG
50 CAPSULE ORAL ONCE
Status: COMPLETED | OUTPATIENT
Start: 2023-10-16 | End: 2023-10-16

## 2023-10-16 RX ORDER — PALONOSETRON 0.05 MG/ML
0.25 INJECTION, SOLUTION INTRAVENOUS ONCE
Status: COMPLETED | OUTPATIENT
Start: 2023-10-16 | End: 2023-10-16

## 2023-10-16 RX ADMIN — PACLITAXEL 343 MG: 6 INJECTION, SOLUTION INTRAVENOUS at 09:38

## 2023-10-16 RX ADMIN — FAMOTIDINE 20 MG: 10 INJECTION, SOLUTION INTRAVENOUS at 08:57

## 2023-10-16 RX ADMIN — DIPHENHYDRAMINE HYDROCHLORIDE 50 MG: 25 CAPSULE ORAL at 08:55

## 2023-10-16 RX ADMIN — SODIUM CHLORIDE 250 ML: 9 INJECTION, SOLUTION INTRAVENOUS at 08:55

## 2023-10-16 RX ADMIN — CARBOPLATIN 520 MG: 10 INJECTION, SOLUTION INTRAVENOUS at 12:46

## 2023-10-16 RX ADMIN — DEXAMETHASONE SODIUM PHOSPHATE: 10 INJECTION, SOLUTION INTRAMUSCULAR; INTRAVENOUS at 09:08

## 2023-10-16 RX ADMIN — PALONOSETRON HYDROCHLORIDE 0.25 MG: 0.25 INJECTION INTRAVENOUS at 08:57

## 2023-10-16 ASSESSMENT — PAIN SCALES - GENERAL: PAINLEVEL: MODERATE PAIN (4)

## 2023-10-16 NOTE — TELEPHONE ENCOUNTER
Met Ace & Ondina in the infusion center. They report that the pain is improving but still present. Ace mostly notices it with movement, and has to brace the tube when he bends over. They deny swelling, redness, drainage. Provided the IR nurse line phone number and instructed them to reach out to the IR team, as they placed the tube on Friday.

## 2023-10-16 NOTE — PATIENT INSTRUCTIONS
[facility] Triage and after hours / weekends / holidays:  [phone]    Please call the triage or after hours line if you experience a temperature greater than or equal to 100.4, shaking chills, have uncontrolled nausea, vomiting and/or diarrhea, dizziness, shortness of breath, chest pain, bleeding, unexplained bruising, or if you have any other new/concerning symptoms, questions or concerns.      If you are having any concerning symptoms or wish to speak to a provider before your next infusion visit, please call triage to notify them so we can adequately serve you.     If you need a refill on a narcotic prescription or other medication, please call before your infusion appointment.                October 2023 Sunday Monday Tuesday Wednesday Thursday Friday Saturday   1     2    OTOTOXIC AUDIOGRAM  12:00 PM   (60 min.)   Neli Farrar AuD   [facility] Audiology [facility] 3     4    RETURN CCSL  10:45 AM   (30 min.)   Xochilt Louise MD   [facility] 5     6    LAB PERIPHERAL   7:00 AM   (15 min.)    [facility] DRAW   [facility]    RETURN CCSL   7:15 AM   (45 min.)   Juany Gallagher CNP   [facility] 7       8     9     10    XR VIDEO SWALLOW W SLP OR OT  12:45 PM   (60 min.)   [email or web address]   [facility] Xray [place]    VIDEO SWALLOW STUDY  12:45 PM   (60 min.)   Rola Marcano SLP   [facility] Rehabilitation Services [facility] 11     12     13    PROCEDURE - 7 HR   8:00 AM   (480 min.)   U2A ROOM 11   [facility] [address] [place]    IR FNA W ULTRASOUND   8:00 AM   (90 min.)   UUIR4   [facility] Interventional Radiology    Admission   8:00 AM   Denia Hardin MD   [facility] [address] [place] [address]   (Discharge: 10/13/2023)    IR G TUBE PERCUTANEOUS PLCMNT   8:30 AM   (120 min.)   [address]  Interventional Radiology    UU ENTERAL TUBE FEEDING  12:00 PM   (90 min.)   Tigist Hopkins, MAGALI   [facility]    PROCEDURE - 4 HR  12:30 PM   (240 min.)   U2A ROOM 10   [facility] [address] [place] 14       15     16    LAB PERIPHERAL   7:45 AM   (15 min.)   [facility]   [facility]    ONC INFUSION 5 HR (300 MIN)   8:30 AM   (300 min.)    ONC INFUSION NURSE   [facility]    SWALLOW TREATMENT   9:30 AM   (45 min.)   Rola Marcano, SLP   [facility] Rehabilitation Services [facility] 17     18    NEW NUTRITION   7:45 AM   (60 min.)   Meka Lowery RD   [facility] 19     20     21       22     23    LAB PERIPHERAL  12:15 PM   (15 min.)   [facility]   [facility]    RETURN CCSL  12:30 PM   (45 min.)   Juany Gallagher CNP   [facility]    MR BRAIN WWO   6:30 PM   (45 min.)   [email or web address]   [facility] 24     25     26     27     28       29     30     31                                     November 2023 Sunday Monday Tuesday Wednesday Thursday Friday Saturday                  1     2     3     4       5     6    LAB PERIPHERAL   9:45 AM   (15 min.)   [facility]   [facility]    RETURN CCSL  10:00 AM   (45 min.)   Juany Gallagher CNP   [facility]    ONC INFUSION 4.5 HR (270 MIN)  11:00 AM   (270 min.)    ONC INFUSION NURSE   [facility] 7     8     9     10     11       12     13     14    CT SOFT TISSUE NECK W  11:45 AM   (20 min.)   [email or web address]   [facility]    CT CHEST/ABDOMEN/PELVIS W  11:50 AM   (20 min.)   [facility]   [facility] 15     16    LAB PERIPHERAL  10:45 AM    (15 min.)   [facility] LAB DRAW   [facility]    RETURN CCSL  11:00 AM   (30 min.)   Xochilt Louise MD   [facility] 17     18       19     20     21     22     23     24     25       26     27    LAB PERIPHERAL  10:00 AM   (15 min.)   [facility] LAB DRAW   [facility]    ONC INFUSION 4.5 HR (270 MIN)  10:30 AM   (270 min.)    ONC INFUSION NURSE   [facility] 28     29     30                               Lab Results:  Recent Results (from the past 12 hour(s))   Comprehensive metabolic panel    Collection Time: 10/16/23  8:03 AM   Result Value Ref Range    Sodium 137 135 - 145 mmol/L    Potassium 3.5 3.4 - 5.3 mmol/L    Carbon Dioxide (CO2) 25 22 - 29 mmol/L    Anion Gap 13 7 - 15 mmol/L    Urea Nitrogen 14.8 8.0 - 23.0 mg/dL    Creatinine 0.79 0.67 - 1.17 mg/dL    GFR Estimate >90 >60 mL/min/1.73m2    Calcium 9.7 8.8 - 10.2 mg/dL    Chloride 99 98 - 107 mmol/L    Glucose 258 (H) 70 - 99 mg/dL    Alkaline Phosphatase 140 (H) 40 - 129 U/L    AST 17 0 - 45 U/L    ALT 23 0 - 70 U/L    Protein Total 7.4 6.4 - 8.3 g/dL    Albumin 4.3 3.5 - 5.2 g/dL    Bilirubin Total 0.4 <=1.2 mg/dL   CBC with platelets and differential    Collection Time: 10/16/23  8:03 AM   Result Value Ref Range    WBC Count 11.2 (H) 4.0 - 11.0 10e3/uL    RBC Count 4.81 4.40 - 5.90 10e6/uL    Hemoglobin 13.4 13.3 - 17.7 g/dL    Hematocrit 40.8 40.0 - 53.0 %    MCV 85 78 - 100 fL    MCH 27.9 26.5 - 33.0 pg    MCHC 32.8 31.5 - 36.5 g/dL    RDW 13.2 10.0 - 15.0 %    Platelet Count 392 150 - 450 10e3/uL    % Neutrophils 66 %    % Lymphocytes 17 %    % Monocytes 10 %    Mids % (Monos, Eos, Basos)      % Eosinophils 6 %    % Basophils 1 %    % Immature Granulocytes 0 %    NRBCs per 100 WBC 0 <1 /100    Absolute Neutrophils 7.5 1.6 - 8.3 10e3/uL    Absolute Lymphocytes 1.9 0.8 - 5.3 10e3/uL    Absolute Monocytes 1.1 0.0 - 1.3 10e3/uL    Mids Abs  (Monos, Eos, Basos)      Absolute Eosinophils 0.7 0.0 - 0.7 10e3/uL    Absolute Basophils 0.1 0.0 - 0.2 10e3/uL    Absolute Immature Granulocytes 0.0 <=0.4 10e3/uL    Absolute NRBCs 0.0 10e3/uL

## 2023-10-16 NOTE — NURSING NOTE
Chief Complaint   Patient presents with    Labs Only     Venipuncture, vitals checked     PIV placed  Chanel Navarrete RN on 10/16/2023 at 8:06 AM

## 2023-10-16 NOTE — TELEPHONE ENCOUNTER
Call attempt: 1  Message left: Yes  Left message to see how Ace's pain is after his G-tube placement.    Post call completed.   October 16, 2023 3:17 PM  Swapnil Saunders RN

## 2023-10-16 NOTE — PROGRESS NOTES
Infusion Nursing Note:  Ko Thakkar presents today for cycle 1 day 1 paclitaxel and carboplatin.    Patient seen by provider today: No   present during visit today: Not Applicable.    Note: Pt comes to infusion today with no questions or concerns.  Pt has 2/10 abdominal pain today which he describes as internal, not at the g tube site. Pt states the pain is improving each day and subsides when he takes ibuprofen. There is no redness, drainage, swelling at the G tube site. Pt has flushed G tube without difficulty. Olga Miranda, RNCC saw pt at bedside and provided IR nurse triage number if symptoms worsen.    Pt has been afebrile and denies signs and symptoms of infection including: cough, SOB, sore throat, diarrhea, vomiting, rash, or pain with urination. Pt wishes to proceed with today's planned treatment.    Pt slept most of the appointment after receiving Benadryl. He was fairly drowsy at the completion of the appointment but denied any other s/s other than feeling tired. Pt was oriented x4 and vitals remained stable. Discussed if drowsiness doesn't today with rest pt should call triage.     Intravenous Access:  Peripheral IV placed.    Treatment Conditions:  Lab Results   Component Value Date    HGB 13.4 10/16/2023    WBC 11.2 (H) 10/16/2023    ANEUTAUTO 7.5 10/16/2023     10/16/2023     Lab Results   Component Value Date     10/16/2023    POTASSIUM 3.5 10/16/2023    MAG 2.3 09/14/2023    CR 0.79 10/16/2023    OLE 9.7 10/16/2023    BILITOTAL 0.4 10/16/2023    ALBUMIN 4.3 10/16/2023    ALT 23 10/16/2023    AST 17 10/16/2023   Results reviewed, labs MET treatment parameters, ok to proceed with treatment.    Post Infusion Assessment:  Patient tolerated infusion without incident.  Blood return noted pre and post infusion.  Site patent and intact, free from redness, edema or discomfort.  No evidence of extravasations.  Access discontinued per protocol.     Discharge Plan:   Prescription  refills given for compazine and zofran.  Discharge instructions reviewed with: Family.  Patient and/or family verbalized understanding of discharge instructions and all questions answered.  Copy of AVS reviewed with patient and/or family.  Patient will return 11/06/23 for next appointment.  Patient discharged in stable condition accompanied by: wife.  Departure Mode: Ambulatory.      Emily Meese, RN

## 2023-10-17 ENCOUNTER — PATIENT OUTREACH (OUTPATIENT)
Dept: OTOLARYNGOLOGY | Facility: CLINIC | Age: 68
End: 2023-10-17
Payer: COMMERCIAL

## 2023-10-17 DIAGNOSIS — I65.21 CAROTID ARTERY STENOSIS, ASYMPTOMATIC, RIGHT: Primary | ICD-10-CM

## 2023-10-17 NOTE — TELEPHONE ENCOUNTER
Called patient's wife to review results from biopsy.    Final Diagnosis   Specimen A                 Interpretation:                  Positive for malignancy  Metastatic squamous cell carcinoma (see comment)                 Adequacy:                 Satisfactory for evaluation     Plan for patient to continue with chemo/radiation therapy as scheduled. Wife verbalized understanding of plan of care. Will call clinic with further questions or concerns.     Hallie CORONAN, RN

## 2023-10-18 ENCOUNTER — VIRTUAL VISIT (OUTPATIENT)
Dept: ONCOLOGY | Facility: CLINIC | Age: 68
End: 2023-10-18
Attending: DIETITIAN, REGISTERED
Payer: COMMERCIAL

## 2023-10-18 DIAGNOSIS — E11.9 TYPE 2 DIABETES MELLITUS WITHOUT COMPLICATIONS (H): ICD-10-CM

## 2023-10-18 DIAGNOSIS — C10.9 SQUAMOUS CELL CARCINOMA OF OROPHARYNX (H): ICD-10-CM

## 2023-10-18 PROCEDURE — 97802 MEDICAL NUTRITION INDIV IN: CPT | Mod: GT,95 | Performed by: DIETITIAN, REGISTERED

## 2023-10-18 NOTE — PROGRESS NOTES
Video-Visit Details     Type of service:  Video Visit     Video Start Time (time video started): 7:56am     Video End Time (time video stopped): 8:36am    Originating Location (pt. 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   Ace has been focused on eating mostly soft, pureed foods as he recently had his teeth removed and he has been having some difficulty with swallowing.   He has also noticed decreased taste with foods since he started chemo this week.   He has been eating 2 meals/day, often skips lunch as he's traveling, on the road.   He has been drinking 2-3 Muscle Milk shakes per day.    He is drinking a smoothie made by his wife every morning for breakfast.    He has been drinking >80 oz water between vitamin water and regular water.  He also drinks Corky D and diet coke.  He tries to limit sugary beverages due to his diabetes.     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    ANTHROPOMETRICS  Height: 66\"  Weight: 178 lbs/80kg  BMI: 28  Weight Status:  Overweight BMI 25-29.9  IBW: 142 lbs (125%)  Weight History: stable   Wt Readings from Last 10 Encounters:   10/16/23 80.9 kg (178 lb 4.8 oz)   10/13/23 82.3 kg (181 lb 6.4 oz)   10/06/23 82.8 kg (182 lb 8 oz)   09/19/23 82.6 kg (182 lb)   09/14/23 81.6 kg (180 lb)   09/13/23 80.3 kg (177 lb)   08/29/23 79.7 kg (175 lb 11.3 oz)   10/12/14 81.6 kg (180 lb)   10/12/14 81.6 kg (180 lb)   10/12/14 81.6 kg (180 lb)     Dosing Weight: 68kg    Medications/vitamins/minerals/herbals:   Reviewed    Labs:   Labs reviewed    NUTRITION FOCUSED PHYSICAL ASSESSMENT FOR DIAGNOSING MALNUTRITION:  Consult for education only      ASSESSED NUTRITION NEEDS:  Estimated Energy Needs: 9380-9611 kcals (30-35 Kcal/Kg)  Justification: increased needs with chemo/radiation   Estimated Protein Needs:  grams protein (1.2-1.5 g pro/Kg)  Justification: increased needs with chemo/radiation   Estimated Fluid Needs: 2400  mL   Justification: increase needs with chemo    NUTRITION DIAGNOSIS:  Predicted suboptimal nutrient intake " related cancer treatment to head/neck region, difficulty swallowing, PEG tube in place    INTERVENTIONS  Provided written & verbal education:     - Reviewed nutrition and hydration needs.   Advised pt to aim for at least 2000-2400kcal and 80-100g protein daily.    Advised pt to aim for 10 cups non-caffeine containing beverages (water/electrolytes) daily.    - Discussed strategies to help fortify meals and snacks via pureed/moist foods. Encouraged to focus on small, frequent meals and bringing foods/snacks on the road if working.   - Reviewed sources of protein. Encouraged to have a protein source with each meal and snack.    - Reviewed common barriers to eating with cancer treatment.  Discussed ways to cope with taste aversions.   - Reviewed high calorie/high protein oral nutrition supplement options (Ensure Complete, Ensure Plus/Boost Plus, Weight tamela powders etc).   - Encouraged utilizing these ONS in home made shakes/smoothies to prevent flavor fatigue.   - Reviewed PO to EN transition if PO intake declines.  Reviewed formula types, method of administration, role of home infusion company and RD.      Provided pt with corresponding education materials/handouts on:  --High calorie, high protein recipes  --Coping with taste aversions  --High calorie, high protein beverages  --Additional High calorie, high protein recipes  --Soft Moist high protein foods  --Nutrition considerations for head/neck cancer  --Electrolyte hydration options   --Tips to increase calories in your diet    Pt verbalize understanding of materials provided during consult.   Patient Understanding: good  Expected patient engagement: good     Goals  1.  Aim for 5-6 small frequent meals  2.  Aim for 2000-2400kcal and 80-100g protein, >8 cups non-caffeine fluids per day  3. Weight maintenance/no more than 1-2 lb wt loss each week during treatment    Follow-Up Plans: Pt has RD contact information for questions.      MONITORING AND  EVALUATION:  -Food/beverage intake  -Weight trends    Meka Lee RD, , LD

## 2023-10-18 NOTE — PATIENT INSTRUCTIONS
Linda Santamaria,     I have attached the resources that I referred to during our conversation (see your email):  --High calorie, high protein recipes  --Coping with taste aversions  --High calorie, high protein beverages  --Additional High calorie, high protein recipes  --Soft Moist high protein foods  --Nutrition considerations for head/neck cancer  --Electrolyte hydration options   --Tips to increase calories in your diet    Here are your nutrition goals:  --Calories: 3395-0089/day  --Protein:  grams/day  --Fluids: 8-10 cups (64-80oz)/day    I will follow-up with you in a few weeks once you start radiation.  Please reach out with further questions.     Be well,     Salvador Lee RD, , LD  Board Certified Specialist in Oncology Nutrition  [facility]  Oncology Services  [email or web address]   Office: [phone]  Fax: [phone]

## 2023-10-18 NOTE — PROGRESS NOTES
Social Work - Follow-Up  [employer]    Data/Intervention:    Patient Name: Ko Thakkar Goes By: Ace    /Age: 1955 (67 year old)    Reason for Follow-Up: Financial Assistance, Resources    Collaborated With:    -Patient's wife, Ondina  -[facility] Assessment    Intervention/Education/Resources Provided:  SW called Ondina to follow up on additional resources. Ondina reported that they are wondering if patient would qualify for PCA services. SW explained that PCA is a covered service through Medical Assistance, which patient is not on. SW assessed monthly income between patient and spouse and they appear to be over income for MA. SW did talk to spouse about the Alternative Care Waiver which is for individuals 65 years and older who are over income for MA and need additional services in the home. SW explained that patient would need to have an assessment completed first to determine if they are eligible for the waiver service. SW can call the [facility] to request an assessment. Once an  has been assigned they will reach out to patient and spouse. SW did explain that this process is not something that can happen within the next few days and that it can take several weeks due to staffing within the [facility]. Spouse verbalized understanding and knows to wait for a call from the [facility].     Assessment/Plan:  SW will call [facility] and request a [facility] Assessment for the Alternative Care Waiver. SW will continue to remain available as needed. Previously provided patient/family with writer's contact information and availability.      CORDELL Kim,[facility]  Hematology/Oncology Social Worker  Phone:[phone] Pager: [phone]

## 2023-10-19 ENCOUNTER — PATIENT OUTREACH (OUTPATIENT)
Dept: ONCOLOGY | Facility: CLINIC | Age: 68
End: 2023-10-19
Payer: COMMERCIAL

## 2023-10-19 NOTE — PROGRESS NOTES
[facility]: Cancer Care Follow-Up Note                                    Discussion with Patient:                                                      Spoke with Ko Thakkar following his first carboplatin/paclitaxel infusion on 10/16.     See below for assessment.    Ace is feeling well and is going to work today. He is now denying any discomfort at G-tube site (previous complaint from kiara this week). Denies nausea, vomiting, any other symptoms. Reports LBM 3 days ago, so I encouraged he start taking miralax daily with an increase in water intake, and feedback to us if this does not resolve constipation.     Reminded them to contact triage team for any developing symptoms/side effects.     Goals          General     Medical (pt-stated)      Notes - Note created  9/15/2023  2:01 PM by Olga Miranda, RN     Goal Statement: I will use my clinic and care team resources as directed.  Date Goal set: 9/15/2023  Barriers: disease burden  Strengths: support  Date to Achieve By: ongoing  Patient expressed understanding of goal: Yes  Action steps to achieve this goal:  I will contact triage with new, worsening or uncontrolled symptoms.   I will contact triage with temperature over 100.4  I will call with difficulties of scheduling and/or transportation.   I will not send urgent or symptomatic messages through [employer].   I will contact scheduling to arrange or make changes in my appointments.                Dates of Treatment:                                                      Infusion given in last 28 days       Administered MAR Action Medication Dose Rate Visit    10/16/2023 09:38 New Bag PACLitaxel (TAXOL) 343 mg in sodium chloride 0.9% in non-PVC container 607.17 mL infusion 343 mg 202.4 mL/hr Infusion Therapy Visit on 10/16/2023 in [facility]    10/16/2023 12:46 New Bag CARBOplatin 520 mg in sodium chloride 0.9 % 327 mL infusion 520 mg 654 mL/hr Infusion Therapy Visit on  10/16/2023 in [facility]            Assessment:                                                      The following possible symptoms were reviewed with Ko Thakkar:    Pain: PEG tube pain has resolved    Fever: denies    Chills: denies    Constipation: mild, last BM 3 days ago. Encouraged use of stool softeners and laxatives and increasing water intake    Diarrhea: denies    Nausea: denies    Vomiting: denies    Fatigue: denies    How are you coping: well    Plan of Care Education Review:   Assessment completed with:: Patient    Plan of Care Education   Yearly learning assessment completed?: Yes (see Education tab)  Diagnosis:: squamous cell carcinoma of supraglottis  Does patient understand diagnosis?: Yes  Tx plan/regimen:: carboplatin + paclitaxol  Additional education provided for: : Other (comment) (S&S to contact triage, how to manage constipation, reminder of antiemetic instructions)    Evaluation of Learning  Patient Education Provided: Yes  Readiness:: Acceptance  Method:: Explanation  Response:: Verbalizes understanding           Intervention/Education provided during outreach:                                                       Patient to follow up as scheduled at next appt  Patient to call/[employer] message with updates  Confirmed patient has clinic and triage numbers    Olga Miranda, RN, BSN  RN Care Coordinator  [facility]

## 2023-10-20 ENCOUNTER — NURSE TRIAGE (OUTPATIENT)
Dept: ONCOLOGY | Facility: CLINIC | Age: 68
End: 2023-10-20
Payer: COMMERCIAL

## 2023-10-20 DIAGNOSIS — G62.9 NEUROPATHY: Primary | ICD-10-CM

## 2023-10-20 RX ORDER — GABAPENTIN 250 MG/5ML
100 SOLUTION ORAL 2 TIMES DAILY
Qty: 120 ML | Refills: 0 | Status: SHIPPED | OUTPATIENT
Start: 2023-10-20 | End: 2023-10-23

## 2023-10-20 RX ORDER — GABAPENTIN 250 MG/5ML
100 SOLUTION ORAL 2 TIMES DAILY
Qty: 120 ML | Refills: 0 | Status: SHIPPED | OUTPATIENT
Start: 2023-10-20 | End: 2023-10-20

## 2023-10-23 ENCOUNTER — HOSPITAL ENCOUNTER (OUTPATIENT)
Dept: CT IMAGING | Facility: HOSPITAL | Age: 68
Discharge: HOME | End: 2023-10-23
Attending: REGISTERED NURSE
Payer: COMMERCIAL

## 2023-10-23 ENCOUNTER — HOSPITAL ENCOUNTER (OUTPATIENT)
Dept: MRI IMAGING | Facility: HOSPITAL | Age: 68
Discharge: HOME | End: 2023-10-23
Attending: REGISTERED NURSE
Payer: COMMERCIAL

## 2023-10-23 ENCOUNTER — ONCOLOGY VISIT (OUTPATIENT)
Dept: ONCOLOGY | Facility: CLINIC | Age: 68
End: 2023-10-23
Attending: REGISTERED NURSE
Payer: COMMERCIAL

## 2023-10-23 ENCOUNTER — LAB (OUTPATIENT)
Dept: LAB | Facility: CLINIC | Age: 68
End: 2023-10-23
Attending: REGISTERED NURSE
Payer: COMMERCIAL

## 2023-10-23 VITALS
RESPIRATION RATE: 18 BRPM | TEMPERATURE: 97.8 F | SYSTOLIC BLOOD PRESSURE: 168 MMHG | HEART RATE: 92 BPM | DIASTOLIC BLOOD PRESSURE: 68 MMHG | OXYGEN SATURATION: 97 %

## 2023-10-23 VITALS
SYSTOLIC BLOOD PRESSURE: 151 MMHG | OXYGEN SATURATION: 98 % | RESPIRATION RATE: 18 BRPM | DIASTOLIC BLOOD PRESSURE: 64 MMHG | WEIGHT: 176 LBS | HEART RATE: 93 BPM | BODY MASS INDEX: 28.41 KG/M2 | TEMPERATURE: 98 F

## 2023-10-23 DIAGNOSIS — T45.1X5A CHEMOTHERAPY-INDUCED NAUSEA: ICD-10-CM

## 2023-10-23 DIAGNOSIS — I65.21 CAROTID ARTERY STENOSIS, ASYMPTOMATIC, RIGHT: ICD-10-CM

## 2023-10-23 DIAGNOSIS — C32.1 SCC (SQUAMOUS CELL CARCINOMA) OF SUPRAGLOTTIS (H): ICD-10-CM

## 2023-10-23 DIAGNOSIS — R41.0 ACUTE CONFUSION: ICD-10-CM

## 2023-10-23 DIAGNOSIS — R11.0 CHEMOTHERAPY-INDUCED NAUSEA: ICD-10-CM

## 2023-10-23 DIAGNOSIS — C32.1 SCC (SQUAMOUS CELL CARCINOMA) OF SUPRAGLOTTIS (H): Primary | ICD-10-CM

## 2023-10-23 DIAGNOSIS — G62.9 NEUROPATHY: ICD-10-CM

## 2023-10-23 DIAGNOSIS — R13.10 DYSPHAGIA, UNSPECIFIED TYPE: ICD-10-CM

## 2023-10-23 DIAGNOSIS — E87.6 HYPOKALEMIA: ICD-10-CM

## 2023-10-23 LAB
ALBUMIN SERPL BCG-MCNC: 3.8 G/DL (ref 3.5–5.2)
ALP SERPL-CCNC: 110 U/L (ref 40–129)
ALT SERPL W P-5'-P-CCNC: 16 U/L (ref 0–70)
ANION GAP SERPL CALCULATED.3IONS-SCNC: 12 MMOL/L (ref 7–15)
AST SERPL W P-5'-P-CCNC: 18 U/L (ref 0–45)
BASOPHILS # BLD AUTO: 0 10E3/UL (ref 0–0.2)
BASOPHILS NFR BLD AUTO: 1 %
BILIRUB SERPL-MCNC: 0.2 MG/DL
BUN SERPL-MCNC: 24.1 MG/DL (ref 8–23)
CALCIUM SERPL-MCNC: 8.6 MG/DL (ref 8.8–10.2)
CHLORIDE SERPL-SCNC: 99 MMOL/L (ref 98–107)
CREAT SERPL-MCNC: 0.84 MG/DL (ref 0.67–1.17)
DEPRECATED HCO3 PLAS-SCNC: 24 MMOL/L (ref 22–29)
EGFRCR SERPLBLD CKD-EPI 2021: >90 ML/MIN/1.73M2
EOSINOPHIL # BLD AUTO: 0.2 10E3/UL (ref 0–0.7)
EOSINOPHIL NFR BLD AUTO: 4 %
ERYTHROCYTE [DISTWIDTH] IN BLOOD BY AUTOMATED COUNT: 12.7 % (ref 10–15)
GLUCOSE SERPL-MCNC: 267 MG/DL (ref 70–99)
HCT VFR BLD AUTO: 35.5 % (ref 40–53)
HGB BLD-MCNC: 12.1 G/DL (ref 13.3–17.7)
IMM GRANULOCYTES # BLD: 0 10E3/UL
IMM GRANULOCYTES NFR BLD: 0 %
LYMPHOCYTES # BLD AUTO: 1.4 10E3/UL (ref 0.8–5.3)
LYMPHOCYTES NFR BLD AUTO: 28 %
MCH RBC QN AUTO: 28.4 PG (ref 26.5–33)
MCHC RBC AUTO-ENTMCNC: 34.1 G/DL (ref 31.5–36.5)
MCV RBC AUTO: 83 FL (ref 78–100)
MONOCYTES # BLD AUTO: 0.3 10E3/UL (ref 0–1.3)
MONOCYTES NFR BLD AUTO: 5 %
NEUTROPHILS # BLD AUTO: 3.1 10E3/UL (ref 1.6–8.3)
NEUTROPHILS NFR BLD AUTO: 62 %
NRBC # BLD AUTO: 0 10E3/UL
NRBC BLD AUTO-RTO: 0 /100
PLATELET # BLD AUTO: 270 10E3/UL (ref 150–450)
POTASSIUM SERPL-SCNC: 3.3 MMOL/L (ref 3.4–5.3)
PROT SERPL-MCNC: 6.4 G/DL (ref 6.4–8.3)
RBC # BLD AUTO: 4.26 10E6/UL (ref 4.4–5.9)
SODIUM SERPL-SCNC: 135 MMOL/L (ref 135–145)
WBC # BLD AUTO: 5 10E3/UL (ref 4–11)

## 2023-10-23 PROCEDURE — 250N000011 HC RX IP 250 OP 636: Mod: JZ | Performed by: REGISTERED NURSE

## 2023-10-23 PROCEDURE — 70498 CT ANGIOGRAPHY NECK: CPT

## 2023-10-23 PROCEDURE — 80053 COMPREHEN METABOLIC PANEL: CPT

## 2023-10-23 PROCEDURE — G0463 HOSPITAL OUTPT CLINIC VISIT: HCPCS | Performed by: REGISTERED NURSE

## 2023-10-23 PROCEDURE — A9585 GADOBUTROL INJECTION: HCPCS | Mod: JZ | Performed by: REGISTERED NURSE

## 2023-10-23 PROCEDURE — 255N000002 HC RX 255 OP 636: Mod: JZ | Performed by: REGISTERED NURSE

## 2023-10-23 PROCEDURE — 36415 COLL VENOUS BLD VENIPUNCTURE: CPT

## 2023-10-23 PROCEDURE — 85004 AUTOMATED DIFF WBC COUNT: CPT

## 2023-10-23 PROCEDURE — 99215 OFFICE O/P EST HI 40 MIN: CPT | Performed by: REGISTERED NURSE

## 2023-10-23 PROCEDURE — 70553 MRI BRAIN STEM W/O & W/DYE: CPT

## 2023-10-23 RX ORDER — GADOBUTROL 604.72 MG/ML
0.1 INJECTION INTRAVENOUS ONCE
Status: COMPLETED | OUTPATIENT
Start: 2023-10-23 | End: 2023-10-23

## 2023-10-23 RX ORDER — IOPAMIDOL 755 MG/ML
67 INJECTION, SOLUTION INTRAVASCULAR ONCE
Status: COMPLETED | OUTPATIENT
Start: 2023-10-23 | End: 2023-10-23

## 2023-10-23 RX ADMIN — IOPAMIDOL 67 ML: 755 INJECTION, SOLUTION INTRAVENOUS at 18:59

## 2023-10-23 RX ADMIN — GADOBUTROL 8 ML: 604.72 INJECTION INTRAVENOUS at 19:36

## 2023-10-23 ASSESSMENT — PAIN SCALES - GENERAL: PAINLEVEL: MILD PAIN (2)

## 2023-10-23 NOTE — NURSING NOTE
Chief Complaint   Patient presents with    Blood Draw     Labs drawn via  by RN. VS taken.     Labs collected from venipuncture by RN. Vitals taken. Checked in for appointment(s).     Helder Adams RN

## 2023-10-23 NOTE — Clinical Note
10/23/2023         RE: Ko Thakkar  [address]  [address]  [place] [address]        Dear Colleague,    Thank you for referring your patient, Ko Thakkar, to the [facility] CANCER CLINIC. Please see a copy of my visit note below.         [facility] CANCER [facility]    PATIENT NAME: Ko Thakkar  MRN # [id number]   DATE OF VISIT: October 23, 2023  YOB: 1955     Otolaryngology: Dr. Denia Hardin  Radiation Oncology: Dr. Juwan Cordova   PCP: Dr. Alton Mota; [facility] in [place]     CANCER TYPE: SCC supraglottis  STAGE: [id number] (IRON)  ECOG PS: 1    PD-L1:  NGS:     SUMMARY  5/16/23 EGD for dysphagia. Gastritis and gastric diverticulum. Bx showed H. Pylori, treated. No atrophic gastritis or dysplasia  7/11/23 Swallow study at . Poor epiglottic inversion, penetration, aspiration  7/28/23 CT neck. 2.2 x 1.4 x 2.3 cm supraglottic mass, concern for paraglottic involvement, bilateral IB, IIA, III, IV adenopathy, 70% ICA stenosis   8/21/23 US carotid. 70-99% stenosis R ICA. <50% stenosis L ICA  8/29/23 FNA L level 2 node (IR). Path: SCC  9/6/23 PET/CT. FDG avid mass (SUV 13.5) right tonsil/lateral pharyngeal wall/glossotonsillar sulcus with slight extension to the R tongue base, right AE fold, right epiglottis, right piriform sinus, right posterolateral pharyngeal wall, posterior pharyngeal wall, no thyroid cartilage erosion, right retropharyngeal node, deep lobe parotid FDG avid mass, bilateral cervical nodes (right level II-IV, left level II-III), no distant disease, L mandibular canine with periapical abscess. R temporal lobe change suggestive of prior infarct. 5 mm LLL nodule.    ASSESSMENT AND PLAN  SCC supraglottis, [id number] (IRON): Declined surgery. Planning chemoradiation but with all of the logistical issues and work needed prior to starting, also due to the large size of the primary tumor, will start chemo first. Tentatively planned 10/16.  Plan is  for carboplatin/paclitaxel. Although not standard approach, he is not a candidate for cisplatin-based therapy due to hearing loss, comorbidities, etc., and not a candidate for TPF for the same. Plan 2 cycles and CT neck + CAP, appts with Dr. Louise and Dr. Cordova. Would move to chemoradiation afterward, with plan for C3 of carboplatin paclitaxel if needed.    R KAILASH: Met with Dr. Tapia, vascular surgery fellow 8/21. Recommended treating the cancer first, asa 81 and high dose atorva. He confirms he has been compliant with these meds. Follow up 6 months with repeat US. PET/CT showed possible old R temporal infarct which we reviewed today. His recent confusion is concerning for TIA symptoms. Reviewed his multiple risk factors for stroke including the KAILASH. We had a detailed discussion regarding situations where he would need to present emergently to ED including recurrent confusion, vision or speech changes, ataxia, paresthesias, etc. He and Miya verbalize understanding of plan. I suggested we obtain a brain MRI to essentially serve as a baseline scan and further evaluate infarct history which he is agreeable to. Will try to obtain this prior to starting chemo.  -Brain MRI w and w/o contrast within 1 week    Dental: Dental extractions of remaining 8 teeth today. Isn't sure if this will be done in one visit. Asked that they update us after appointment.     Dysphagia, aspiration: Jessica Ramos visit 9/13/23, next video 10/10, known aspiration at baseline. Gtube scheduled 10/13    Not discussed today:  Secretions: Discussed it's from the cancer.     H/o food impaction: lower esophagus monitor    Liver issue: Says he was to have some sort of imaging at [facility] to look at his liver that had to be canceled due to the appts he needed for the cancer. Will try to figure out what that was about - not actively discussed again today    *** minutes spent on the date of the encounter doing {2021 E&M time in:354509}     Juany Gallagher  CNP    SUBJECTIVE  ***    PAST MEDICAL HISTORY  SCC as above  GERD  H/o food impaction in 2008 and 2014 (steak) related to Schatzki ring on EGD 10/13/14  DM2. 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 No scleral icterus.  Cardiovascular: RRR, no m/g/r. No peripheral edema.  Respiratory: CTA bilaterally. No wheezes or crackles.  Neurologic: A/O x 4. Normal speech. Strength 5/5 bilaterally to upper and lower extremities. Negative Romberg. Normal gait.   Skin: No rashes, petechiae, or bruising noted on exposed skin.    LABORATORY AND IMAGING STUDIES  Most Recent 3 CBC's:  Recent Labs   Lab Test 10/23/23  1302 10/16/23  0803 10/06/23  0714   WBC 5.0 11.2* 8.7   HGB 12.1* 13.4 12.6*   MCV 83 85 84    392 318   ANEUTAUTO 3.1 7.5 4.4    Most Recent 3 BMP's:  Recent Labs   Lab Test 10/16/23  0803 10/06/23  0714 09/14/23  1515    139 138   POTASSIUM 3.5 3.6 4.0   CHLORIDE 99 105 102   CO2 25 23 26   BUN 14.8 22.0 23.1*   CR 0.79 0.85 0.86   ANIONGAP 13 11 10   OLE 9.7 9.2 9.2   * 141* 167*   PROTTOTAL 7.4 6.9 7.3   ALBUMIN 4.3 4.2 4.5    Most Recent 2 LFT's:  Recent Labs   Lab Test 10/16/23  0803 10/06/23  0714   AST 17 20   ALT 23 21   ALKPHOS 140* 106   BILITOTAL 0.4 0.3    Most Recent TSH and T4:  Recent Labs   Lab Test 10/06/23  0714   TSH 3.07     Phos/Mag:  Lab Results   Component Value Date    MAG 2.3 09/14/2023      Lab Results   Component Value Date    B12 488 10/06/2023          I reviewed the above labs today.               [facility] CANCER [facility]    PATIENT NAME: Ko Thakkar  MRN # [id number]   DATE OF VISIT: October 23, 2023  YOB: 1955     Otolaryngology: Dr. Denia Hardin  Radiation Oncology: Dr. Juwan Cordova   PCP: Dr. Alton Mota; [facility] in [place]     CANCER TYPE: SCC supraglottis  STAGE: [id number] (IRON)  ECOG PS: 1    PD-L1:  NGS:     SUMMARY  5/16/23 EGD for dysphagia. Gastritis and gastric diverticulum. Bx showed H. Pylori, treated. No atrophic gastritis or dysplasia  7/11/23 Swallow study at [facility]. Poor epiglottic inversion, penetration, aspiration  7/28/23 CT neck. 2.2 x 1.4 x 2.3 cm supraglottic mass, concern for paraglottic involvement, bilateral IB, IIA, III, IV adenopathy, 70% ICA stenosis   8/21/23 US carotid. 70-99% stenosis R ICA. <50% stenosis L ICA  8/29/23 FNA L level 2 node (IR). Path: SCC  9/6/23 PET/CT. FDG avid mass (SUV 13.5) right tonsil/lateral pharyngeal wall/glossotonsillar sulcus with slight extension to the R tongue base, right AE fold, right epiglottis, right piriform sinus, right posterolateral pharyngeal wall, posterior pharyngeal wall, no thyroid cartilage erosion, right retropharyngeal node, deep lobe parotid FDG avid mass, bilateral cervical nodes (right level II-IV, left level II-III), no distant disease, L mandibular canine with periapical abscess. R temporal lobe change suggestive of prior infarct. 5 mm LLL nodule.    ASSESSMENT AND PLAN  SCC supraglottis, [id number] (IRON): Declined surgery. Planning chemoradiation but with all of the logistical issues and work needed prior to starting, also due to the large size of the primary tumor, will start chemo first. Tentatively planned 10/16.  Plan is for carboplatin/paclitaxel. Although not standard approach, he is not a candidate for cisplatin-based therapy due to hearing loss, comorbidities, etc., and not a candidate for TPF for the same. Plan 2 cycles and CT  neck + CAP, appts with Dr. Louise and Dr. Cordova. Would move to chemoradiation afterward, with plan for C3 of carboplatin paclitaxel if needed.    R KAILASH: Met with Dr. Tapia, vascular surgery fellow 8/21. Recommended treating the cancer first, asa 81 and high dose atorva. He confirms he has been compliant with these meds. Follow up 6 months with repeat US. PET/CT showed possible old R temporal infarct which we reviewed today. His recent confusion is concerning for TIA symptoms. Reviewed his multiple risk factors for stroke including the KAILASH. We had a detailed discussion regarding situations where he would need to present emergently to ED including recurrent confusion, vision or speech changes, ataxia, paresthesias, etc. He and Miya verbalize understanding of plan. I suggested we obtain a brain MRI to essentially serve as a baseline scan and further evaluate infarct history which he is agreeable to. Will try to obtain this prior to starting chemo.  -Brain MRI w and w/o contrast within 1 week    Dental: Dental extractions of remaining 8 teeth today. Isn't sure if this will be done in one visit. Asked that they update us after appointment.     Dysphagia, aspiration: Jessica Ramos visit 9/13/23, next video 10/10, known aspiration at baseline. Gtube scheduled 10/13    Not discussed today:  Secretions: Discussed it's from the cancer.     H/o food impaction: lower esophagus monitor    Liver issue: Says he was to have some sort of imaging at [facility] to look at his liver that had to be canceled due to the appts he needed for the cancer. Will try to figure out what that was about - not actively discussed again today    *** minutes spent on the date of the encounter doing {2021 E&M time in:775108}     Juany Gallagher, CNP    SUBJECTIVE  Neuropathy  Arthritis  Gabapentin-fatigue  Nausea yesterday  Chili  Beef stew   Endocrinology 10/30, [facility] [place]    PAST MEDICAL HISTORY  SCC as above  GERD  H/o food impaction in 2008 and  2014 (steak) related to Schatzki ring on EGD 10/13/14  DM2. 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 No scleral icterus.  Cardiovascular: RRR, no m/g/r. No peripheral edema.  Respiratory: CTA bilaterally. No wheezes or crackles.  Neurologic: A/O x 4. Normal speech. Strength 5/5 bilaterally to upper and lower extremities. Negative Romberg. Normal gait.   Skin: No rashes, petechiae, or bruising noted on exposed skin.    LABORATORY AND IMAGING STUDIES  Most Recent 3 CBC's:  Recent Labs   Lab Test 10/23/23  1302 10/16/23  0803 10/06/23  0714   WBC 5.0 11.2* 8.7   HGB 12.1* 13.4 12.6*   MCV 83 85 84    392 318   ANEUTAUTO 3.1 7.5 4.4    Most Recent 3 BMP's:  Recent Labs   Lab Test 10/16/23  0803 10/06/23  0714 09/14/23  1515    139 138   POTASSIUM 3.5 3.6 4.0   CHLORIDE 99 105 102   CO2 25 23 26   BUN 14.8 22.0 23.1*   CR 0.79 0.85 0.86   ANIONGAP 13 11 10   OLE 9.7 9.2 9.2   * 141* 167*   PROTTOTAL 7.4 6.9 7.3   ALBUMIN 4.3 4.2 4.5    Most Recent 2 LFT's:  Recent Labs   Lab Test 10/16/23  0803 10/06/23  0714   AST 17 20   ALT 23 21   ALKPHOS 140* 106   BILITOTAL 0.4 0.3    Most Recent TSH and T4:  Recent Labs   Lab Test 10/06/23  0714   TSH 3.07     Phos/Mag:  Lab Results   Component Value Date    MAG 2.3 09/14/2023      Lab Results   Component Value Date    B12 488 10/06/2023         I reviewed the above labs today.            Again, thank you for allowing me to participate in  the care of your patient.        Sincerely,        Juany Gallagher CNP

## 2023-10-23 NOTE — NURSING NOTE
"Oncology Rooming Note    October 23, 2023 1:32 PM   Ko Thakkar is a 67 year old male who presents for:    Chief Complaint   Patient presents with    Oncology Clinic Visit     SCC (squamous cell carcinoma) of supraglottis     Initial Vitals: BP (!) 151/64   Pulse 93   Temp 98  F (36.7  C) (Oral)   Resp 18   Wt 79.8 kg (176 lb)   SpO2 98%   BMI 28.41 kg/m   Estimated body mass index is 28.41 kg/m  as calculated from the following:    Height as of 10/16/23: 1.676 m (5' 6\").    Weight as of this encounter: 79.8 kg (176 lb). Body surface area is 1.93 meters squared.  Mild Pain (2) Comment: Data Unavailable   No LMP for male patient.  Allergies reviewed: Yes  Medications reviewed: Yes    Medications: Medication refills not needed today.  Pharmacy name entered into EPIC: [facility] - [place], [place] - [address]    Clinical concerns: Pt wife concerned that new medication (gabapentin) are too strong.        Mandie Chamorro CMA              "

## 2023-10-23 NOTE — PROGRESS NOTES
[facility] CANCER CLINIC    PATIENT NAME: Ko Thakkar  MRN # [id number]   DATE OF VISIT: October 23, 2023  YOB: 1955     Otolaryngology: Dr. Denia Hardin  Radiation Oncology: Dr. Juwan Cordova   PCP: Dr. Alton Mota; [facility] in [place]     CANCER TYPE: SCC supraglottis  STAGE: cN4cT4wH1 (IRON)  ECOG PS: 1    PD-L1:  NGS:     SUMMARY  5/16/23 EGD for dysphagia. Gastritis and gastric diverticulum. Bx showed H. Pylori, treated. No atrophic gastritis or dysplasia  7/11/23 Swallow study at . Poor epiglottic inversion, penetration, aspiration  7/28/23 CT neck. 2.2 x 1.4 x 2.3 cm supraglottic mass, concern for paraglottic involvement, bilateral IB, IIA, III, IV adenopathy, 70% ICA stenosis   8/21/23 US carotid. 70-99% stenosis R ICA. <50% stenosis L ICA  8/29/23 FNA L level 2 node (IR). Path: SCC  9/6/23 PET/CT. FDG avid mass (SUV 13.5) right tonsil/lateral pharyngeal wall/glossotonsillar sulcus with slight extension to the R tongue base, right AE fold, right epiglottis, right piriform sinus, right posterolateral pharyngeal wall, posterior pharyngeal wall, no thyroid cartilage erosion, right retropharyngeal node, deep lobe parotid FDG avid mass, bilateral cervical nodes (right level II-IV, left level II-III), no distant disease, L mandibular canine with periapical abscess. R temporal lobe change suggestive of prior infarct. 5 mm LLL nodule.    ASSESSMENT AND PLAN  SCC supraglottis, [id number] (IRON): Declined surgery. Planning chemoradiation but d/t logistics and large size of the primary tumor, started chemo first with carbo/paclitaxel on 10/16/23. Although not standard approach, he is not a candidate for cisplatin-based therapy due to hearing loss, comorbidities, etc., and not a candidate for TPF for the same. Plan 2 cycles and CT neck + CAP, appts with Dr. Louise and Dr. Cordova.  Would move to chemoradiation afterward, with plan for C3 of carboplatin paclitaxel if needed. Now with  G2 peripheral neuropathy (improving) and mild nausea following C1. Will likely need to decrease paclitaxel dose with C2 although will continue to monitor recovery of paresthesias over the next few weeks before finalizing that decision.    R KAILASH: Met with Dr. Tapia, vascular surgery fellow 8/21/23. Recommended treating the cancer first, asa 81 and high dose atorva. Follow up 6 months with repeat US recommended . PET/CT 9/6/23 showed possible old R temporal infarct. Recent episode of confusion in early October (see 10/6 note for further detail) concerning for TIA. Brain MRI and CTA scheduled this evening. I've been in contact with Dr. Leonard with vascular and will update as needed based on imaging results or concerning recurrent symptoms.     Peripheral neuropathy: At least G2 but already improving. Okay to stop gabapentin. As above, may need to adjust Taxol with C2. Likely worse due to underlying diabetes. Cautioned against using high doses of ibuprofen. Specifically reviewed risk for renal toxicity and gastric ulcers.    Hypokalemia: Potassium 3.3 today. Recommended short-term supplement but declined. Prefers to manage with high-potassium foods such as bananas and spinach added to smoothies daily.    JULIANA: Mild, possibly more dyspepsia than true nausea. Continue Zofran and/or Compazine if needed.    Dental: s/p dental extractions (8 teeth) on 10/6. Has upcoming procedure to shave down jaw for dentures scheduled 11/6.  Intentional, one week chemo delay planned to allow for recovery.    Dysphagia, aspiration: Jessica Ramos visit 9/13/23, next video 10/10, known aspiration at baseline. Gtube scheduled 10/13    Secretions: Improving    H/o food impaction: lower esophagus, monitor. Globus sensation has improved    Liver issue: Says he was to have some sort of imaging at [facility] to look at his liver that had to be canceled due to the appts he needed for the cancer. Will try to figure out what that was about - not actively  discussed again today    50 minutes spent on the date of the encounter doing chart review, review of test results, interpretation of tests, patient visit, documentation, and discussion with family     Juany Gallagher CNP    SUBJECTIVE  Ace is seen today for a toxicity check following cycle 1 paclitaxel/carboplatin given 10/16/23.  -About 3 days after his infusion he developed acute numbness and tingling in his feet and hands. In his lower extremities, this pain radiated proximally through his calves and his upper thighs. In the upper extremities, the pain radiated up both arms and across his neck.   -He was prescribed gabapentin 100 mg BID for the neuropathic pain which made him extremely tired. He drives for a living and didn't feel he could function like this and has stopped the medication. He was also under the impression this included codeine and is very mindful to avoid addicting medications as he is a recovering alcoholic and chemical dependent.  -Neuropathic pain has started to improved. He admits that he had symptoms preceding chemo but not nearly as severe. He had difficulty walking when the pain was at it's worst. Typically for general pain control he takes 10 tablets of ibuprofen (2000mg) at one time which he has been doing for years.  -Took Compazine and Zofran once yesterday for mild nausea, otherwise denies any symptoms  -G-tube was placed 10/13. This was sore for the first few days but has now improved. Miya flushes this daily. Denies any resistance  -Appetite and taste are improving. He is also coughing and choking far less with eating over the past week. Yesterday he pureed beef stew and was able to eat watered-down chili. Also eats smoothies daily.  -Denies any recurrent episodes of confusion or short-term memory changes   -Scheduled to see his PCP, Alton Mane MD at [facility] on 10/30 for diabetes follow-up. He and Miya plan to get their flu and COVID vaccines at that  time.    PAST MEDICAL HISTORY  SCC as above  GERD  H/o food impaction in 2008 and 2014 (steak) related to Schatzki ring on EGD 10/13/14  DM2. Last A1c about 7.6 sometime in summer 2023  Dyslipidemia  Possible prior L temporal CVA  HTN  H.o spinal meningitis as a baby -   R arm surgery  Bone graft to forehead  Ex lap   Tinnitus secondary to treatment for meningitis or from menigitis - bilateral   Hearing loss -- audiogram 10/2/2023  Numbness in the toes - mostly big toes   Crushing pills   No muscle aches or pains     CURRENT OUTPATIENT MEDICATIONS  Current Outpatient Medications   Medication    amLODIPine (NORVASC) 5 MG tablet    aspirin (ASA) 81 MG chewable tablet    atorvastatin (LIPITOR) 80 MG tablet    empagliflozin (JARDIANCE) 25 MG TABS tablet    hydrochlorothiazide (HYDRODIURIL) 25 MG tablet    insulin NPH-Regular 70/30 (HUMULIN 70/30;NOVOLIN 70/30) (70-30) 100 UNIT/ML vial    liraglutide (VICTOZA) 18 MG/3ML solution    lisinopril (ZESTRIL) 40 MG tablet    metFORMIN (GLUCOPHAGE) 500 MG tablet    ondansetron (ZOFRAN) 8 MG tablet    prochlorperazine (COMPAZINE) 10 MG tablet    therapeutic multivitamin (THERAGRAN) tablet    glyBURIDE (DIABETA) 5 MG tablet    insulin glargine (LANTUS) 100 unit/mL injection    lisinopril (PRINIVIL,ZESTRIL) 5 MG tablet     No current facility-administered medications for this visit.       ALLERGIES  No Known Allergies     PHYSICAL EXAM  BP (!) 151/64   Pulse 93   Temp 98  F (36.7  C) (Oral)   Resp 18   Wt 79.8 kg (176 lb)   SpO2 98%   BMI 28.41 kg/m      General: Well-appearing male, NAD  Eyes: EOMI, PERRL. No scleral icterus.  Cardiovascular: RRR, no m/g/r. No peripheral edema.  Respiratory: CTA bilaterally. No wheezes or crackles.  Neurologic: A/O x 4. Normal speech. Strength 5/5 bilaterally to upper and lower extremities. Negative Romberg. Normal gait.   Skin: No rashes, petechiae, or bruising noted on exposed skin.    LABORATORY AND IMAGING STUDIES  Most Recent 3  CBC's:  Recent Labs   Lab Test 10/23/23  1302 10/16/23  0803 10/06/23  0714   WBC 5.0 11.2* 8.7   HGB 12.1* 13.4 12.6*   MCV 83 85 84    392 318   ANEUTAUTO 3.1 7.5 4.4    Most Recent 3 BMP's:  Recent Labs   Lab Test 10/23/23  1302 10/16/23  0803 10/06/23  0714    137 139   POTASSIUM 3.3* 3.5 3.6   CHLORIDE 99 99 105   CO2 24 25 23   BUN 24.1* 14.8 22.0   CR 0.84 0.79 0.85   ANIONGAP 12 13 11   OLE 8.6* 9.7 9.2   * 258* 141*   PROTTOTAL 6.4 7.4 6.9   ALBUMIN 3.8 4.3 4.2    Most Recent 2 LFT's:  Recent Labs   Lab Test 10/23/23  1302 10/16/23  0803   AST 18 17   ALT 16 23   ALKPHOS 110 140*   BILITOTAL 0.2 0.4    Most Recent TSH and T4:  Recent Labs   Lab Test 10/06/23  0714   TSH 3.07     Phos/Mag:  Lab Results   Component Value Date    MAG 2.3 09/14/2023      Lab Results   Component Value Date    B12 488 10/06/2023     Final Diagnosis   Specimen A                 Interpretation:                  Positive for malignancy  Metastatic squamous cell carcinoma (see comment)                 Adequacy:                 Satisfactory for evaluation         Electronically signed by Xavier Castro III, MD on 10/16/2023 at 12:43 PM   Comment    Cytological preparations and cell block show 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    Clinical Information    H/o SCC supraglottis with enlarged intraparotid lymph node   Gross Description    A(A). Parotid Gland, Right, :A. Parotid Gland, Right, , Fine Needle Aspirate:  Received are 2 fixed slides, processed for Pap stain, 2 air dried slides, processed for Diff Quik stain, and material in formalin, processed for one hematoxylin stained cell block.       I reviewed the above labs and parotid biopsy path results today.

## 2023-10-24 ENCOUNTER — PATIENT OUTREACH (OUTPATIENT)
Dept: ONCOLOGY | Facility: CLINIC | Age: 68
End: 2023-10-24
Payer: COMMERCIAL

## 2023-10-24 NOTE — PROGRESS NOTES
[facility]: Cancer Care                                                                                          Received phone call from [employer] and Ace requesting a work note for Ace's employer.   Note written and emailed to Ace's employer. Instructed Ace to reach out with any further needs.     Additionally relayed that per Dr. Louise, his brain MRI looks okay.    Olga Miranda, RN, BSN  RN Care Coordinator  [facility]

## 2023-10-24 NOTE — LETTER
10/24/2023         RE: Ko Thakkar  [address]  [address]  [place] [address]        To Whom It May Concern,    Ko is currently receiving treatment with us for a medical condition.    Please provide him frequent breaks at work to rest with reasonable working hours. We request he have a lifting limit of 20 lb.     Please reach out to us if you have any questions.     Thank you,  Olga Miranda RN BSN  Dr. Xochilt Louise's team

## 2023-11-07 ENCOUNTER — NURSE TRIAGE (OUTPATIENT)
Dept: ONCOLOGY | Facility: CLINIC | Age: 68
End: 2023-11-07
Payer: COMMERCIAL

## 2023-11-07 ENCOUNTER — TELEPHONE (OUTPATIENT)
Dept: INTERVENTIONAL RADIOLOGY/VASCULAR | Facility: CLINIC | Age: 68
End: 2023-11-07
Payer: COMMERCIAL

## 2023-11-07 DIAGNOSIS — R13.10 DYSPHAGIA, UNSPECIFIED TYPE: Primary | ICD-10-CM

## 2023-11-07 NOTE — TELEPHONE ENCOUNTER
"Oncology Nurse Triage - Reporting Symptoms  Situation:   Ko reporting the following symptoms: GT pain    Background:   Treating Provider: Dr. Louise     Date of last office visit: 10/23/23 with Juany Gallagher CNP      Recent treatments: Yes:   GT placement by IR in [facility] on 10/13/23  Per Juany's note: SCC supraglottis, [id number] (IRON): Declined surgery. Planning chemoradiation but d/t logistics and large size of the primary tumor, started chemo first with carbo/paclitaxel on 10/16/23.     Assessment  Onset of symptoms: hurting off and on for quite some time. (Pt could not identify a specific timeframe. States that it hurt after being placed and then was fine for a while; now having more pain.)  Pain level: makes him cringe at times. At it's worst: 7/10, overall 3-5/10.  Feels like it hurts \"inside.\"   Sharp pain starts where button that holds the tube in is, and shoots to left side of body about 4 inches from button.  Pain comes and goes; Takes 6-7 ibp tablets that he crunches up in OJ. Tries not to take this amount more than 2x/24 hours but may take it more frequently than Q 12H. Backs it up with tylenol which he says doesn't really help but makes it manageable. This writer explained that this was too much ibuprofen to take. Pt states that PCP told him as long has he has no bleeding, he can take it like that.  GT is flushed every day. Does not hurt to flush. Flushes easily without resistance.  There was some redness around the tube, but wife got some Desitin and applied as instructions said. Next day redness was gone. No warmth, redness, swelling. Some drainage on gauze, jennifer yellow brown drain.  No fever or chills.  Hurts when moves around, and hurts when sleeping. Sleeps on back. Moves a lot in his sleep.    No problem with oral intake.  No problem with urniation , no dysuria.  Bowel movements every day.     Recommendations:   Reached out to Juany and [facility] who advised pt call IR nurse line " [phone].  Pt called and provided with this telephone number.  Ondina, pt's wife voiced understanding.   Ondina transferred to IR nurse line.

## 2023-11-07 NOTE — TELEPHONE ENCOUNTER
I received a call from Ace and Ondina regarding his gastrostomy tube that was inserted on 10/13/2023.    Ace states that he has persistent, sharp pain in abdomen, that intensifies with strong cough, certain positioning.  He states that if he puts pressure on his abdomen with palm of hand he can illicit this sharp pain that radiates into left side of abdomen.    Ace has not started using this tube for feedings or  medication administration but he does flush this tube daily.  He reports no issue with flushing.      Ace does state that 2 T-tacks are still visible since initial placement.    I have notified IR team of patient status.  Plan for patient to be seen in IR for T-tack removal and evaluation of tube.    1020-  I have spoken with patient and his wife and they acknowledge understanding of recommendations from IR team.  I have provided them with IR scheduling contact number and also transferred them to contact for scheduling.  All questions answered.    Mariah SANTILLAN  Interventional Radiology RN   [phone]

## 2023-11-08 ENCOUNTER — ANCILLARY PROCEDURE (OUTPATIENT)
Dept: INTERVENTIONAL RADIOLOGY/VASCULAR | Facility: CLINIC | Age: 68
End: 2023-11-08
Attending: OTOLARYNGOLOGY
Payer: COMMERCIAL

## 2023-11-08 DIAGNOSIS — R13.10 DYSPHAGIA, UNSPECIFIED TYPE: ICD-10-CM

## 2023-11-08 PROCEDURE — 99212 OFFICE O/P EST SF 10 MIN: CPT

## 2023-11-08 NOTE — PROGRESS NOTES
Interventional Radiology Brief Post Procedure Note    Procedure: T-tac removal    Proceduralist: Alba Kamara PA-C    Time Out: Prior to the start of the procedure and with procedural staff participation, I verbally confirmed the patient s identity using two indicators, relevant allergies, that the procedure was appropriate and matched the consent or emergent situation, and that the correct equipment/implants were available. Immediately prior to starting the procedure I conducted the Time Out with the procedural staff and re-confirmed the patient s name, procedure, and site/side. (The Joint Commission universal protocol was followed.)  Yes    Medications   Medication Event Details Admin User Admin Time       Sedation: None    Findings: Successful removal of two t-tacs.    Estimated Blood Loss: None    Fluoroscopy Time:  minute(s)    SPECIMENS: None    Complications: 1. None     Condition: Stable    Plan: Follow up per primary team.    Comments: See dictated procedure note for full details.    Rhea Kamara PA-C

## 2023-11-13 ENCOUNTER — NURSE TRIAGE (OUTPATIENT)
Dept: ONCOLOGY | Facility: CLINIC | Age: 68
End: 2023-11-13

## 2023-11-13 ENCOUNTER — THERAPY VISIT (OUTPATIENT)
Dept: SPEECH THERAPY | Facility: CLINIC | Age: 68
End: 2023-11-13
Payer: COMMERCIAL

## 2023-11-13 ENCOUNTER — ONCOLOGY VISIT (OUTPATIENT)
Dept: ONCOLOGY | Facility: CLINIC | Age: 68
End: 2023-11-13
Attending: NURSE PRACTITIONER
Payer: COMMERCIAL

## 2023-11-13 ENCOUNTER — APPOINTMENT (OUTPATIENT)
Dept: LAB | Facility: CLINIC | Age: 68
End: 2023-11-13
Attending: NURSE PRACTITIONER
Payer: COMMERCIAL

## 2023-11-13 VITALS
RESPIRATION RATE: 16 BRPM | SYSTOLIC BLOOD PRESSURE: 143 MMHG | HEIGHT: 66 IN | WEIGHT: 172.5 LBS | HEART RATE: 94 BPM | OXYGEN SATURATION: 99 % | DIASTOLIC BLOOD PRESSURE: 77 MMHG | BODY MASS INDEX: 27.72 KG/M2 | TEMPERATURE: 98.9 F

## 2023-11-13 DIAGNOSIS — C10.9 SQUAMOUS CELL CARCINOMA OF OROPHARYNX (H): Primary | ICD-10-CM

## 2023-11-13 DIAGNOSIS — E87.6 HYPOKALEMIA: ICD-10-CM

## 2023-11-13 DIAGNOSIS — R13.13 PHARYNGEAL DYSPHAGIA: ICD-10-CM

## 2023-11-13 DIAGNOSIS — G62.9 PERIPHERAL POLYNEUROPATHY: ICD-10-CM

## 2023-11-13 DIAGNOSIS — C32.1 SCC (SQUAMOUS CELL CARCINOMA) OF SUPRAGLOTTIS (H): Primary | ICD-10-CM

## 2023-11-13 DIAGNOSIS — R13.10 DYSPHAGIA, UNSPECIFIED TYPE: ICD-10-CM

## 2023-11-13 LAB
ALBUMIN SERPL BCG-MCNC: 3.3 G/DL (ref 3.5–5.2)
ALP SERPL-CCNC: 109 U/L (ref 40–129)
ALT SERPL W P-5'-P-CCNC: 13 U/L (ref 0–70)
ANION GAP SERPL CALCULATED.3IONS-SCNC: 10 MMOL/L (ref 7–15)
AST SERPL W P-5'-P-CCNC: 16 U/L (ref 0–45)
BASOPHILS # BLD AUTO: 0.1 10E3/UL (ref 0–0.2)
BASOPHILS NFR BLD AUTO: 1 %
BILIRUB SERPL-MCNC: 0.2 MG/DL
BUN SERPL-MCNC: 12.6 MG/DL (ref 8–23)
CALCIUM SERPL-MCNC: 7.2 MG/DL (ref 8.8–10.2)
CHLORIDE SERPL-SCNC: 112 MMOL/L (ref 98–107)
CREAT SERPL-MCNC: 0.56 MG/DL (ref 0.67–1.17)
DEPRECATED HCO3 PLAS-SCNC: 19 MMOL/L (ref 22–29)
EGFRCR SERPLBLD CKD-EPI 2021: >90 ML/MIN/1.73M2
EOSINOPHIL # BLD AUTO: 0.6 10E3/UL (ref 0–0.7)
EOSINOPHIL NFR BLD AUTO: 7 %
ERYTHROCYTE [DISTWIDTH] IN BLOOD BY AUTOMATED COUNT: 13.5 % (ref 10–15)
GLUCOSE SERPL-MCNC: 148 MG/DL (ref 70–99)
HCT VFR BLD AUTO: 39.4 % (ref 40–53)
HGB BLD-MCNC: 13.1 G/DL (ref 13.3–17.7)
IMM GRANULOCYTES # BLD: 0 10E3/UL
IMM GRANULOCYTES NFR BLD: 0 %
LYMPHOCYTES # BLD AUTO: 1.8 10E3/UL (ref 0.8–5.3)
LYMPHOCYTES NFR BLD AUTO: 22 %
MCH RBC QN AUTO: 28.4 PG (ref 26.5–33)
MCHC RBC AUTO-ENTMCNC: 33.2 G/DL (ref 31.5–36.5)
MCV RBC AUTO: 86 FL (ref 78–100)
MONOCYTES # BLD AUTO: 0.7 10E3/UL (ref 0–1.3)
MONOCYTES NFR BLD AUTO: 9 %
NEUTROPHILS # BLD AUTO: 5 10E3/UL (ref 1.6–8.3)
NEUTROPHILS NFR BLD AUTO: 61 %
NRBC # BLD AUTO: 0 10E3/UL
NRBC BLD AUTO-RTO: 0 /100
PLATELET # BLD AUTO: 251 10E3/UL (ref 150–450)
POTASSIUM SERPL-SCNC: 2.9 MMOL/L (ref 3.4–5.3)
PROT SERPL-MCNC: 5.8 G/DL (ref 6.4–8.3)
RBC # BLD AUTO: 4.61 10E6/UL (ref 4.4–5.9)
SODIUM SERPL-SCNC: 141 MMOL/L (ref 135–145)
WBC # BLD AUTO: 8.2 10E3/UL (ref 4–11)

## 2023-11-13 PROCEDURE — 250N000013 HC RX MED GY IP 250 OP 250 PS 637: Performed by: NURSE PRACTITIONER

## 2023-11-13 PROCEDURE — 250N000011 HC RX IP 250 OP 636: Mod: JZ | Performed by: NURSE PRACTITIONER

## 2023-11-13 PROCEDURE — 96413 CHEMO IV INFUSION 1 HR: CPT

## 2023-11-13 PROCEDURE — 258N000003 HC RX IP 258 OP 636: Performed by: NURSE PRACTITIONER

## 2023-11-13 PROCEDURE — 36415 COLL VENOUS BLD VENIPUNCTURE: CPT | Performed by: NURSE PRACTITIONER

## 2023-11-13 PROCEDURE — 85004 AUTOMATED DIFF WBC COUNT: CPT | Performed by: NURSE PRACTITIONER

## 2023-11-13 PROCEDURE — 96368 THER/DIAG CONCURRENT INF: CPT

## 2023-11-13 PROCEDURE — 96415 CHEMO IV INFUSION ADDL HR: CPT

## 2023-11-13 PROCEDURE — G0463 HOSPITAL OUTPT CLINIC VISIT: HCPCS | Performed by: NURSE PRACTITIONER

## 2023-11-13 PROCEDURE — 96375 TX/PRO/DX INJ NEW DRUG ADDON: CPT

## 2023-11-13 PROCEDURE — 92526 ORAL FUNCTION THERAPY: CPT | Mod: GN | Performed by: SPEECH-LANGUAGE PATHOLOGIST

## 2023-11-13 PROCEDURE — 96367 TX/PROPH/DG ADDL SEQ IV INF: CPT

## 2023-11-13 PROCEDURE — 80053 COMPREHEN METABOLIC PANEL: CPT | Performed by: NURSE PRACTITIONER

## 2023-11-13 PROCEDURE — 96417 CHEMO IV INFUS EACH ADDL SEQ: CPT

## 2023-11-13 PROCEDURE — 99215 OFFICE O/P EST HI 40 MIN: CPT | Performed by: NURSE PRACTITIONER

## 2023-11-13 RX ORDER — HEPARIN SODIUM,PORCINE 10 UNIT/ML
5-20 VIAL (ML) INTRAVENOUS DAILY PRN
Status: CANCELLED | OUTPATIENT
Start: 2023-11-13

## 2023-11-13 RX ORDER — ALBUTEROL SULFATE 0.83 MG/ML
2.5 SOLUTION RESPIRATORY (INHALATION)
Status: CANCELLED | OUTPATIENT
Start: 2023-11-13

## 2023-11-13 RX ORDER — PALONOSETRON 0.05 MG/ML
0.25 INJECTION, SOLUTION INTRAVENOUS ONCE
Status: COMPLETED | OUTPATIENT
Start: 2023-11-13 | End: 2023-11-13

## 2023-11-13 RX ORDER — PALONOSETRON 0.05 MG/ML
0.25 INJECTION, SOLUTION INTRAVENOUS ONCE
Status: CANCELLED | OUTPATIENT
Start: 2023-11-13

## 2023-11-13 RX ORDER — ALBUTEROL SULFATE 90 UG/1
1-2 AEROSOL, METERED RESPIRATORY (INHALATION)
Status: CANCELLED
Start: 2023-11-13

## 2023-11-13 RX ORDER — MEPERIDINE HYDROCHLORIDE 25 MG/ML
25 INJECTION INTRAMUSCULAR; INTRAVENOUS; SUBCUTANEOUS EVERY 30 MIN PRN
Status: CANCELLED | OUTPATIENT
Start: 2023-11-13

## 2023-11-13 RX ORDER — DIPHENHYDRAMINE HCL 25 MG
50 CAPSULE ORAL ONCE
Status: CANCELLED
Start: 2023-11-13

## 2023-11-13 RX ORDER — LORAZEPAM 2 MG/ML
0.5 INJECTION INTRAMUSCULAR EVERY 4 HOURS PRN
Status: CANCELLED | OUTPATIENT
Start: 2023-11-13

## 2023-11-13 RX ORDER — DIPHENHYDRAMINE HCL 12.5 MG/5ML
50 SOLUTION ORAL ONCE
Qty: 20 ML | Refills: 0 | Status: COMPLETED | OUTPATIENT
Start: 2023-11-13 | End: 2023-11-13

## 2023-11-13 RX ORDER — HEPARIN SODIUM (PORCINE) LOCK FLUSH IV SOLN 100 UNIT/ML 100 UNIT/ML
5 SOLUTION INTRAVENOUS
Status: CANCELLED | OUTPATIENT
Start: 2023-11-13

## 2023-11-13 RX ORDER — METHYLPREDNISOLONE SODIUM SUCCINATE 125 MG/2ML
125 INJECTION, POWDER, LYOPHILIZED, FOR SOLUTION INTRAMUSCULAR; INTRAVENOUS
Status: CANCELLED
Start: 2023-11-13

## 2023-11-13 RX ORDER — DIPHENHYDRAMINE HYDROCHLORIDE 50 MG/ML
50 INJECTION INTRAMUSCULAR; INTRAVENOUS
Status: CANCELLED
Start: 2023-11-13

## 2023-11-13 RX ORDER — POTASSIUM CHLORIDE 1.5 G/1.58G
20 POWDER, FOR SOLUTION ORAL 2 TIMES DAILY
Qty: 60 EACH | Refills: 3 | Status: SHIPPED | OUTPATIENT
Start: 2023-11-13

## 2023-11-13 RX ORDER — EPINEPHRINE 1 MG/ML
0.3 INJECTION, SOLUTION INTRAMUSCULAR; SUBCUTANEOUS EVERY 5 MIN PRN
Status: CANCELLED | OUTPATIENT
Start: 2023-11-13

## 2023-11-13 RX ORDER — POTASSIUM CHLORIDE 1.5 G/1.58G
40 POWDER, FOR SOLUTION ORAL ONCE
Status: COMPLETED | OUTPATIENT
Start: 2023-11-13 | End: 2023-11-13

## 2023-11-13 RX ADMIN — POTASSIUM CHLORIDE 40 MEQ: 1.5 POWDER, FOR SOLUTION ORAL at 11:14

## 2023-11-13 RX ADMIN — PALONOSETRON HYDROCHLORIDE 0.25 MG: 0.25 INJECTION INTRAVENOUS at 10:25

## 2023-11-13 RX ADMIN — CARBOPLATIN 675 MG: 10 INJECTION, SOLUTION INTRAVENOUS at 14:03

## 2023-11-13 RX ADMIN — DEXAMETHASONE SODIUM PHOSPHATE: 10 INJECTION, SOLUTION INTRAMUSCULAR; INTRAVENOUS at 10:00

## 2023-11-13 RX ADMIN — DIPHENHYDRAMINE HYDROCHLORIDE 50 MG: 12.5 SOLUTION ORAL at 10:07

## 2023-11-13 RX ADMIN — FAMOTIDINE 20 MG: 10 INJECTION, SOLUTION INTRAVENOUS at 10:27

## 2023-11-13 RX ADMIN — PACLITAXEL 274 MG: 6 INJECTION, SOLUTION INTRAVENOUS at 10:56

## 2023-11-13 RX ADMIN — SODIUM CHLORIDE 250 ML: 9 INJECTION, SOLUTION INTRAVENOUS at 09:57

## 2023-11-13 RX ADMIN — POTASSIUM CHLORIDE 30 MEQ: 149 INJECTION, SOLUTION, CONCENTRATE INTRAVENOUS at 11:17

## 2023-11-13 ASSESSMENT — PAIN SCALES - GENERAL: PAINLEVEL: MILD PAIN (3)

## 2023-11-13 NOTE — PROGRESS NOTES
Infusion Nursing Note:  Ko Thakkar presents today for C2D1: Taxol/Carboplatin, IV Potassium replacement.    Patient seen by provider today: Yes: Olga Cole NP   present during visit today: Not Applicable.    Note: Per patient and his wife, patient tolerated his first infusion well.  Had marked neuropathy in bilateral finger after treatmetn.  Says the neuropahty has improved.  Also has had improvement with eating and swallowing.  Reports to experiencing some taste changes but has improved this past week. Patient didn't like how tired he got from the IV Benadryl with his first infusion.  Liquid p.o. Benadryl given to patient today and therapy plan updated to reflect liquid option with subsequent cycles.     Intravenous Access:  Peripheral IV placed.    Treatment Conditions:  Lab Results   Component Value Date    HGB 13.1 (L) 11/13/2023    WBC 8.2 11/13/2023    ANEUTAUTO 5.0 11/13/2023     11/13/2023        Lab Results   Component Value Date     11/13/2023    POTASSIUM 2.9 (L) 11/13/2023    MAG 2.3 09/14/2023    CR 0.56 (L) 11/13/2023    OLE 7.2 (L) 11/13/2023    BILITOTAL 0.2 11/13/2023    ALBUMIN 3.3 (L) 11/13/2023    ALT 13 11/13/2023    AST 16 11/13/2023       Results reviewed, labs MET treatment parameters, ok to proceed with treatment.      Post Infusion Assessment:  Patient tolerated infusion without incident.  Blood return noted pre and post infusion.  Site patent and intact, free from redness, edema or discomfort.  No evidence of extravasations.  Access discontinued per protocol.       Discharge Plan:   Prescription refills for potassium packets sent to his preferred home pharmacy.  Patient and/or family verbalized understanding of discharge instructions and all questions answered.  Copy of AVS reviewed with patient and/or family.  Patient will return 11/28/23 for a PET scan and 11/29/23 for labs, and Dr. Louise for next appointment.  Patient discharged in stable condition  accompanied by: wife.  Departure Mode: Ambulatory.      Rina Restrepo RN

## 2023-11-13 NOTE — PROGRESS NOTES
[facility]    PATIENT NAME: Ko Thakkar  MRN # [id number]   DATE OF VISIT: November 13, 2023  YOB: 1955     Otolaryngology: Dr. Denia Hardin  Radiation Oncology: Dr. Juwan Cordova   PCP: Dr. Alton Mota; [facility] in [place]     CANCER TYPE: SCC supraglottis  STAGE: [id number] (IRON)  ECOG PS: 1    SUMMARY  5/16/23 EGD for dysphagia. Gastritis and gastric diverticulum. Bx showed H. Pylori, treated. No atrophic gastritis or dysplasia  7/11/23 Swallow study at . Poor epiglottic inversion, penetration, aspiration  7/28/23 CT neck. 2.2 x 1.4 x 2.3 cm supraglottic mass, concern for paraglottic involvement, bilateral IB, IIA, III, IV adenopathy, 70% ICA stenosis   8/21/23 US carotid. 70-99% stenosis R ICA. <50% stenosis L ICA  8/29/23 FNA L level 2 node (IR). Path: SCC  9/6/23 PET/CT. FDG avid mass (SUV 13.5) right tonsil/lateral pharyngeal wall/glossotonsillar sulcus with slight extension to the R tongue base, right AE fold, right epiglottis, right piriform sinus, right posterolateral pharyngeal wall, posterior pharyngeal wall, no thyroid cartilage erosion, right retropharyngeal node, deep lobe parotid FDG avid mass, bilateral cervical nodes (right level II-IV, left level II-III), no distant disease, L mandibular canine with periapical abscess. R temporal lobe change suggestive of prior infarct. 5 mm LLL nodule.      SUBJECTIVE  Ace is seen today for cycle 2 paclitaxel/carboplatin, first cycle given 10/16/23.  -the acute numbness/tingling of his hands and feet have improved. Almost to baseline. He reports chronic pain up both his calves and arms that pre-dates chemo. This is unchanged  -has noticed ease of swallowing and oral intake. Not using feeding tube for nutrition. Appetite and taste improved  -denies recurrent episodes of confusion or short term memory   -one episode of nausea after last chemo, but overall mild   -7.7 A1C, recently saw Alton Mota at [place]  [facility]  -g tube sore, recently had T tacs removed    PAST MEDICAL HISTORY  SCC as above  GERD  H/o food impaction in 2008 and 2014 (steak) related to Schatzki ring on EGD 10/13/14  DM2. Last A1c about 7.6 sometime in summer 2023  Dyslipidemia  Possible prior L temporal CVA  HTN  H.o spinal meningitis as a baby -   R arm surgery  Bone graft to forehead  Ex lap   Tinnitus secondary to treatment for meningitis or from menigitis - bilateral   Hearing loss -- audiogram 10/2/2023  Numbness in the toes - mostly big toes   Crushing pills   No muscle aches or pains     CURRENT OUTPATIENT MEDICATIONS  Current Outpatient Medications   Medication    amLODIPine (NORVASC) 5 MG tablet    aspirin (ASA) 81 MG chewable tablet    atorvastatin (LIPITOR) 80 MG tablet    empagliflozin (JARDIANCE) 25 MG TABS tablet    glyBURIDE (DIABETA) 5 MG tablet    hydrochlorothiazide (HYDRODIURIL) 25 MG tablet    insulin glargine (LANTUS) 100 unit/mL injection    insulin NPH-Regular 70/30 (HUMULIN 70/30;NOVOLIN 70/30) (70-30) 100 UNIT/ML vial    liraglutide (VICTOZA) 18 MG/3ML solution    lisinopril (PRINIVIL,ZESTRIL) 5 MG tablet    lisinopril (ZESTRIL) 40 MG tablet    metFORMIN (GLUCOPHAGE) 500 MG tablet    ondansetron (ZOFRAN) 8 MG tablet    prochlorperazine (COMPAZINE) 10 MG tablet    therapeutic multivitamin (THERAGRAN) tablet     No current facility-administered medications for this visit.       ALLERGIES  No Known Allergies     PHYSICAL EXAM  BP (!) 143/77 (BP Location: Right arm, Patient Position: Sitting, Cuff Size: Adult Regular)   Pulse 94   Temp 98.9  F (37.2  C) (Oral)   Resp 16   Ht 1.676 m (5' 5.98\")   Wt 78.2 kg (172 lb 8 oz)   SpO2 99%   BMI 27.86 kg/m      General: Well-appearing male, NAD  Eyes: EOMI, PERRL. No scleral icterus.  Cardiovascular: RRR, no m/g/r. No peripheral edema.  Respiratory: CTA bilaterally. No wheezes or crackles.  Neurologic: A/O x 4. Normal speech. Strength 5/5 bilaterally to upper and lower " extremities. Negative Romberg. Normal gait.   Skin: No rashes, petechiae, or bruising noted on exposed skin.    LABORATORY AND IMAGING STUDIES  Most Recent 3 CBC's:  Recent Labs   Lab Test 10/23/23  1302 10/16/23  0803 10/06/23  0714   WBC 5.0 11.2* 8.7   HGB 12.1* 13.4 12.6*   MCV 83 85 84    392 318   ANEUTAUTO 3.1 7.5 4.4    Most Recent 3 BMP's:  Recent Labs   Lab Test 10/23/23  1302 10/16/23  0803 10/06/23  0714    137 139   POTASSIUM 3.3* 3.5 3.6   CHLORIDE 99 99 105   CO2 24 25 23   BUN 24.1* 14.8 22.0   CR 0.84 0.79 0.85   ANIONGAP 12 13 11   OLE 8.6* 9.7 9.2   * 258* 141*   PROTTOTAL 6.4 7.4 6.9   ALBUMIN 3.8 4.3 4.2    Most Recent 2 LFT's:  Recent Labs   Lab Test 10/23/23  1302 10/16/23  0803   AST 18 17   ALT 16 23   ALKPHOS 110 140*   BILITOTAL 0.2 0.4    Most Recent TSH and T4:  Recent Labs   Lab Test 10/06/23  0714   TSH 3.07     Phos/Mag:  Lab Results   Component Value Date    MAG 2.3 09/14/2023      Lab Results   Component Value Date    B12 488 10/06/2023     Final Diagnosis   Specimen A                 Interpretation:                  Positive for malignancy  Metastatic squamous cell carcinoma (see comment)                 Adequacy:                 Satisfactory for evaluation         Electronically signed by Xavier Castro III, MD on 10/16/2023 at 12:43 PM   Comment    Cytological preparations and cell block show 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    Clinical Information    H/o SCC supraglottis with enlarged intraparotid lymph node   Gross Description    A(A). Parotid Gland, Right, :A. Parotid Gland, Right, , Fine Needle Aspirate:  Received are 2 fixed slides, processed for Pap stain, 2 air dried slides, processed for Diff Quik stain, and material in formalin, processed for one hematoxylin stained cell block.       I reviewed the above labs and parotid biopsy path results today.      ASSESSMENT AND PLAN  SCC supraglottis, [id number] (IRON):  Declined surgery. Planning chemoradiation but d/t logistics and large size of the primary tumor, started chemo first with carbo/paclitaxel on 10/16/23. Although not standard approach, he is not a candidate for cisplatin-based therapy due to hearing loss, comorbidities, etc., and not a candidate for TPF for the same.   -Proceed with C2 today, decrease paclitaxel to 140 mg/m2 due to severity of neuropathy with cycle 1  -Plan 2 cycles and CT neck + CAP, appts with Dr. Louise and Dr. Cordova.  Would move to chemoradiation afterward, with plan for C3 of carboplatin paclitaxel if needed.     R KAILASH: Met with Dr. Tapia, vascular surgery fellow 8/21/23. Recommended treating the cancer first, asa 81 and high dose atorva. Follow up 6 months with repeat US recommended . PET/CT 9/6/23 showed possible old R temporal infarct. Recent episode of confusion in early October (see 10/6 note for further detail) concerning for TIA. Has had stability since. Brain MRI and CTA without new findings, per my review    Peripheral neuropathy: improvement but we dicussed the risk/benefit of giving full dose again; specifically the uncertainty of improvement with continued dosing.     Hypokalemia: Potassium 2.9. replace PP today and start 20 mEq packet BID. Recheck in one week at [facility].     JULIANA: Mild, possibly more dyspepsia than true nausea. Continue Zofran and/or Compazine if needed.    Dental: s/p dental extractions (8 teeth) on 10/6. Has upcoming procedure to shave down jaw for dentures scheduled 11/6.  Intentional, one week chemo delay (today) planned to allow for recovery.    Dysphagia, aspiration: established with SLP, will see Jessica today. Has PEG but not using. Reports improvement of swallowing with one cycle of chemo    Liver issue: Says he was to have some sort of imaging at [facility] to look at his liver that had to be canceled due to the appts he needed for the cancer. Will try to figure out what that was about - not actively discussed again  today    50 minutes spent on the date of the encounter doing chart review, review of test results, interpretation of tests, patient visit, documentation, and discussion with other provider(s)    Olga Baker CNP on 11/13/2023 at 9:21 AM

## 2023-11-13 NOTE — NURSING NOTE
Chief Complaint   Patient presents with    Blood Draw     Labs drawn with piv start by rn.  VS taken.     Labs drawn with PIV start by rn.  Pt tolerated well.  VS taken and pt checked in for next appt.    Renu Navarro RN

## 2023-11-13 NOTE — Clinical Note
11/13/2023         RE: Ko Thakkar  [address]  [address]  [place] [address]        Dear Colleague,    Thank you for referring your patient, Ko Thakkar, to the [facility]. Please see a copy of my visit note below.         [facility] CANCER [facility]    PATIENT NAME: Ko Thakkar  MRN # [id number]   DATE OF VISIT: November 13, 2023  YOB: 1955     Otolaryngology: Dr. Denia Hardin  Radiation Oncology: Dr. Juwan Cordova   PCP: Dr. Alton Mota; [facility][note 3] in [place]     CANCER TYPE: SCC supraglottis  STAGE: [id number] (IRON)  ECOG PS: 1    SUMMARY  5/16/23 EGD for dysphagia. Gastritis and gastric diverticulum. Bx showed H. Pylori, treated. No atrophic gastritis or dysplasia  7/11/23 Swallow study at . Poor epiglottic inversion, penetration, aspiration  7/28/23 CT neck. 2.2 x 1.4 x 2.3 cm supraglottic mass, concern for paraglottic involvement, bilateral IB, IIA, III, IV adenopathy, 70% ICA stenosis   8/21/23 US carotid. 70-99% stenosis R ICA. <50% stenosis L ICA  8/29/23 FNA L level 2 node (IR). Path: SCC  9/6/23 PET/CT. FDG avid mass (SUV 13.5) right tonsil/lateral pharyngeal wall/glossotonsillar sulcus with slight extension to the R tongue base, right AE fold, right epiglottis, right piriform sinus, right posterolateral pharyngeal wall, posterior pharyngeal wall, no thyroid cartilage erosion, right retropharyngeal node, deep lobe parotid FDG avid mass, bilateral cervical nodes (right level II-IV, left level II-III), no distant disease, L mandibular canine with periapical abscess. R temporal lobe change suggestive of prior infarct. 5 mm LLL nodule.      SUBJECTIVE  Ace is seen today for a toxicity check following cycle 1 paclitaxel/carboplatin given 10/16/23.  -has pain in his extremities  -one episode of nausea, but overall mild   -not taking gabapentin   -eating better! Swallowing is easier  -7.7 A1C          -About 3 days after his  infusion he developed acute numbness and tingling in his feet and hands. In his lower extremities, this pain radiated proximally through his calves and his upper thighs. In the upper extremities, the pain radiated up both arms and across his neck.   -He was prescribed gabapentin 100 mg BID for the neuropathic pain which made him extremely tired. He drives for a living and didn't feel he could function like this and has stopped the medication. He was also under the impression this included codeine and is very mindful to avoid addicting medications as he is a recovering alcoholic and chemical dependent.  -Neuropathic pain has started to improved. He admits that he had symptoms preceding chemo but not nearly as severe. He had difficulty walking when the pain was at it's worst. Typically for general pain control he takes 10 tablets of ibuprofen (2000mg) at one time which he has been doing for years.  -Took Compazine and Zofran once yesterday for mild nausea, otherwise denies any symptoms  -G-tube was placed 10/13. This was sore for the first few days but has now improved. Miya flushes this daily. Denies any resistance  -Appetite and taste are improving. He is also coughing and choking far less with eating over the past week. Yesterday he pureed beef stew and was able to eat watered-down chili. Also eats smoothies daily.  -Denies any recurrent episodes of confusion or short-term memory changes   -Scheduled to see his PCP, Alton Mane MD at [facility] on 10/30 for diabetes follow-up. He and Miya plan to get their flu and COVID vaccines at that time.    PAST MEDICAL HISTORY  SCC as above  GERD  H/o food impaction in 2008 and 2014 (steak) related to Schatzki ring on EGD 10/13/14  DM2. Last A1c about 7.6 sometime in summer 2023  Dyslipidemia  Possible prior L temporal CVA  HTN  H.o spinal meningitis as a baby -   R arm surgery  Bone graft to forehead  Ex lap   Tinnitus secondary to treatment for meningitis  or from menigitis - bilateral   Hearing loss -- audiogram 10/2/2023  Numbness in the toes - mostly big toes   Crushing pills   No muscle aches or pains     CURRENT OUTPATIENT MEDICATIONS  Current Outpatient Medications   Medication    amLODIPine (NORVASC) 5 MG tablet    aspirin (ASA) 81 MG chewable tablet    atorvastatin (LIPITOR) 80 MG tablet    empagliflozin (JARDIANCE) 25 MG TABS tablet    glyBURIDE (DIABETA) 5 MG tablet    hydrochlorothiazide (HYDRODIURIL) 25 MG tablet    insulin glargine (LANTUS) 100 unit/mL injection    insulin NPH-Regular 70/30 (HUMULIN 70/30;NOVOLIN 70/30) (70-30) 100 UNIT/ML vial    liraglutide (VICTOZA) 18 MG/3ML solution    lisinopril (PRINIVIL,ZESTRIL) 5 MG tablet    lisinopril (ZESTRIL) 40 MG tablet    metFORMIN (GLUCOPHAGE) 500 MG tablet    ondansetron (ZOFRAN) 8 MG tablet    prochlorperazine (COMPAZINE) 10 MG tablet    therapeutic multivitamin (THERAGRAN) tablet     No current facility-administered medications for this visit.       ALLERGIES  No Known Allergies     PHYSICAL EXAM  There were no vitals taken for this visit.    General: Well-appearing male, NAD  Eyes: EOMI, PERRL. No scleral icterus.  Cardiovascular: RRR, no m/g/r. No peripheral edema.  Respiratory: CTA bilaterally. No wheezes or crackles.  Neurologic: A/O x 4. Normal speech. Strength 5/5 bilaterally to upper and lower extremities. Negative Romberg. Normal gait.   Skin: No rashes, petechiae, or bruising noted on exposed skin.    LABORATORY AND IMAGING STUDIES  Most Recent 3 CBC's:  Recent Labs   Lab Test 10/23/23  1302 10/16/23  0803 10/06/23  0714   WBC 5.0 11.2* 8.7   HGB 12.1* 13.4 12.6*   MCV 83 85 84    392 318   ANEUTAUTO 3.1 7.5 4.4    Most Recent 3 BMP's:  Recent Labs   Lab Test 10/23/23  1302 10/16/23  0803 10/06/23  0714    137 139   POTASSIUM 3.3* 3.5 3.6   CHLORIDE 99 99 105   CO2 24 25 23   BUN 24.1* 14.8 22.0   CR 0.84 0.79 0.85   ANIONGAP 12 13 11   OLE 8.6* 9.7 9.2   * 258* 141*    PROTTOTAL 6.4 7.4 6.9   ALBUMIN 3.8 4.3 4.2    Most Recent 2 LFT's:  Recent Labs   Lab Test 10/23/23  1302 10/16/23  0803   AST 18 17   ALT 16 23   ALKPHOS 110 140*   BILITOTAL 0.2 0.4    Most Recent TSH and T4:  Recent Labs   Lab Test 10/06/23  0714   TSH 3.07     Phos/Mag:  Lab Results   Component Value Date    MAG 2.3 09/14/2023      Lab Results   Component Value Date    B12 488 10/06/2023     Final Diagnosis   Specimen A                 Interpretation:                  Positive for malignancy  Metastatic squamous cell carcinoma (see comment)                 Adequacy:                 Satisfactory for evaluation         Electronically signed by Xavier Castro III, MD on 10/16/2023 at 12:43 PM   Comment    Cytological preparations and cell block show 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    Clinical Information    H/o SCC supraglottis with enlarged intraparotid lymph node   Gross Description    A(A). Parotid Gland, Right, :A. Parotid Gland, Right, , Fine Needle Aspirate:  Received are 2 fixed slides, processed for Pap stain, 2 air dried slides, processed for Diff Quik stain, and material in formalin, processed for one hematoxylin stained cell block.       I reviewed the above labs and parotid biopsy path results today.      ASSESSMENT AND PLAN  SCC supraglottis, [id number] (IRON): Declined surgery. Planning chemoradiation but d/t logistics and large size of the primary tumor, started chemo first with carbo/paclitaxel on 10/16/23. Although not standard approach, he is not a candidate for cisplatin-based therapy due to hearing loss, comorbidities, etc., and not a candidate for TPF for the same. Plan 2 cycles and CT neck + CAP, appts with Dr. Louise and Dr. Cordova.  Would move to chemoradiation afterward, with plan for C3 of carboplatin paclitaxel if needed. Now with G2 peripheral neuropathy (improving) and mild nausea following C1. Will likely need to decrease paclitaxel dose with  C2 although will continue to monitor recovery of paresthesias over the next few weeks before finalizing that decision.    R KAILASH: Met with Dr. Tapia, vascular surgery fellow 8/21/23. Recommended treating the cancer first, asa 81 and high dose atorva. Follow up 6 months with repeat US recommended . PET/CT 9/6/23 showed possible old R temporal infarct. Recent episode of confusion in early October (see 10/6 note for further detail) concerning for TIA. Brain MRI and CTA scheduled this evening. I've been in contact with Dr. Leonard with vascular and will update as needed based on imaging results or concerning recurrent symptoms.     Peripheral neuropathy: At least G2 but already improving. Okay to stop gabapentin. As above, may need to adjust Taxol with C2. Likely worse due to underlying diabetes. Cautioned against using high doses of ibuprofen. Specifically reviewed risk for renal toxicity and gastric ulcers.    Hypokalemia: Potassium 3.3 today. Recommended short-term supplement but declined. Prefers to manage with high-potassium foods such as bananas and spinach added to smoothies daily.    JULIANA: Mild, possibly more dyspepsia than true nausea. Continue Zofran and/or Compazine if needed.    Dental: s/p dental extractions (8 teeth) on 10/6. Has upcoming procedure to shave down jaw for dentures scheduled 11/6.  Intentional, one week chemo delay planned to allow for recovery.    Dysphagia, aspiration: Jessica Ramos visit 9/13/23, next video 10/10, known aspiration at baseline. Gtube scheduled 10/13    Secretions: Improving    H/o food impaction: lower esophagus, monitor. Globus sensation has improved    Liver issue: Says he was to have some sort of imaging at [facility] to look at his liver that had to be canceled due to the appts he needed for the cancer. Will try to figure out what that was about - not actively discussed again today             [facility]CER [facility]    PATIENT NAME: Ko Thakkar  MRN # [id number]   DATE  OF VISIT: November 13, 2023  YOB: 1955     Otolaryngology: Dr. Denia Hardin  Radiation Oncology: Dr. Juwan Cordova   PCP: Dr. Alton Mota; [facility] in [place]     CANCER TYPE: SCC supraglottis  STAGE: [id number] (IRON)  ECOG PS: 1    SUMMARY  5/16/23 EGD for dysphagia. Gastritis and gastric diverticulum. Bx showed H. Pylori, treated. No atrophic gastritis or dysplasia  7/11/23 Swallow study at . Poor epiglottic inversion, penetration, aspiration  7/28/23 CT neck. 2.2 x 1.4 x 2.3 cm supraglottic mass, concern for paraglottic involvement, bilateral IB, IIA, III, IV adenopathy, 70% ICA stenosis   8/21/23 US carotid. 70-99% stenosis R ICA. <50% stenosis L ICA  8/29/23 FNA L level 2 node (IR). Path: SCC  9/6/23 PET/CT. FDG avid mass (SUV 13.5) right tonsil/lateral pharyngeal wall/glossotonsillar sulcus with slight extension to the R tongue base, right AE fold, right epiglottis, right piriform sinus, right posterolateral pharyngeal wall, posterior pharyngeal wall, no thyroid cartilage erosion, right retropharyngeal node, deep lobe parotid FDG avid mass, bilateral cervical nodes (right level II-IV, left level II-III), no distant disease, L mandibular canine with periapical abscess. R temporal lobe change suggestive of prior infarct. 5 mm LLL nodule.      SUBJECTIVE  Ace is seen today for cycle 2 paclitaxel/carboplatin, first cycle given 10/16/23.  -the acute numbness/tingling of his hands and feet have improved. Almost to baseline. He reports chronic pain up both his calves and arms that pre-dates chemo. This is unchanged  -has noticed ease of swallowing and oral intake. Not using feeding tube for nutrition. Appetite and taste improved  -denies recurrent episodes of confusion or short term memory   -one episode of nausea after last chemo, but overall mild   -7.7 A1C, recently saw Alton Mota at [facility] [place]  -g tube sore, recently had T tacs removed    PAST MEDICAL  "HISTORY  SCC as above  GERD  H/o food impaction in 2008 and 2014 (steak) related to Schatzki ring on EGD 10/13/14  DM2. Last A1c about 7.6 sometime in summer 2023  Dyslipidemia  Possible prior L temporal CVA  HTN  H.o spinal meningitis as a baby -   R arm surgery  Bone graft to forehead  Ex lap   Tinnitus secondary to treatment for meningitis or from menigitis - bilateral   Hearing loss -- audiogram 10/2/2023  Numbness in the toes - mostly big toes   Crushing pills   No muscle aches or pains     CURRENT OUTPATIENT MEDICATIONS  Current Outpatient Medications   Medication     amLODIPine (NORVASC) 5 MG tablet     aspirin (ASA) 81 MG chewable tablet     atorvastatin (LIPITOR) 80 MG tablet     empagliflozin (JARDIANCE) 25 MG TABS tablet     glyBURIDE (DIABETA) 5 MG tablet     hydrochlorothiazide (HYDRODIURIL) 25 MG tablet     insulin glargine (LANTUS) 100 unit/mL injection     insulin NPH-Regular 70/30 (HUMULIN 70/30;NOVOLIN 70/30) (70-30) 100 UNIT/ML vial     liraglutide (VICTOZA) 18 MG/3ML solution     lisinopril (PRINIVIL,ZESTRIL) 5 MG tablet     lisinopril (ZESTRIL) 40 MG tablet     metFORMIN (GLUCOPHAGE) 500 MG tablet     ondansetron (ZOFRAN) 8 MG tablet     prochlorperazine (COMPAZINE) 10 MG tablet     therapeutic multivitamin (THERAGRAN) tablet     No current facility-administered medications for this visit.       ALLERGIES  No Known Allergies     PHYSICAL EXAM  BP (!) 143/77 (BP Location: Right arm, Patient Position: Sitting, Cuff Size: Adult Regular)   Pulse 94   Temp 98.9  F (37.2  C) (Oral)   Resp 16   Ht 1.676 m (5' 5.98\")   Wt 78.2 kg (172 lb 8 oz)   SpO2 99%   BMI 27.86 kg/m      General: Well-appearing male, NAD  Eyes: EOMI, PERRL. No scleral icterus.  Cardiovascular: RRR, no m/g/r. No peripheral edema.  Respiratory: CTA bilaterally. No wheezes or crackles.  Neurologic: A/O x 4. Normal speech. Strength 5/5 bilaterally to upper and lower extremities. Negative Romberg. Normal gait.   Skin: No rashes, " petechiae, or bruising noted on exposed skin.    LABORATORY AND IMAGING STUDIES  Most Recent 3 CBC's:  Recent Labs   Lab Test 10/23/23  1302 10/16/23  0803 10/06/23  0714   WBC 5.0 11.2* 8.7   HGB 12.1* 13.4 12.6*   MCV 83 85 84    392 318   ANEUTAUTO 3.1 7.5 4.4    Most Recent 3 BMP's:  Recent Labs   Lab Test 10/23/23  1302 10/16/23  0803 10/06/23  0714    137 139   POTASSIUM 3.3* 3.5 3.6   CHLORIDE 99 99 105   CO2 24 25 23   BUN 24.1* 14.8 22.0   CR 0.84 0.79 0.85   ANIONGAP 12 13 11   OLE 8.6* 9.7 9.2   * 258* 141*   PROTTOTAL 6.4 7.4 6.9   ALBUMIN 3.8 4.3 4.2    Most Recent 2 LFT's:  Recent Labs   Lab Test 10/23/23  1302 10/16/23  0803   AST 18 17   ALT 16 23   ALKPHOS 110 140*   BILITOTAL 0.2 0.4    Most Recent TSH and T4:  Recent Labs   Lab Test 10/06/23  0714   TSH 3.07     Phos/Mag:  Lab Results   Component Value Date    MAG 2.3 09/14/2023      Lab Results   Component Value Date    B12 488 10/06/2023     Final Diagnosis   Specimen A                 Interpretation:                  Positive for malignancy  Metastatic squamous cell carcinoma (see comment)                 Adequacy:                 Satisfactory for evaluation         Electronically signed by Xavier Castro III, MD on 10/16/2023 at 12:43 PM   Comment    Cytological preparations and cell block show 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    Clinical Information    H/o SCC supraglottis with enlarged intraparotid lymph node   Gross Description    A(A). Parotid Gland, Right, :A. Parotid Gland, Right, , Fine Needle Aspirate:  Received are 2 fixed slides, processed for Pap stain, 2 air dried slides, processed for Diff Quik stain, and material in formalin, processed for one hematoxylin stained cell block.       I reviewed the above labs and parotid biopsy path results today.      ASSESSMENT AND PLAN  SCC supraglottis, [id number] (IRON): Declined surgery. Planning chemoradiation but d/t logistics and  large size of the primary tumor, started chemo first with carbo/paclitaxel on 10/16/23. Although not standard approach, he is not a candidate for cisplatin-based therapy due to hearing loss, comorbidities, etc., and not a candidate for TPF for the same.   -Proceed with C2 today, decrease paclitaxel to 140 mg/m2 due to severity of neuropathy with cycle 1  -Plan 2 cycles and CT neck + CAP, appts with Dr. Louise and Dr. Cordova.  Would move to chemoradiation afterward, with plan for C3 of carboplatin paclitaxel if needed.     R KAILASH: Met with Dr. Tapia, vascular surgery fellow 8/21/23. Recommended treating the cancer first, asa 81 and high dose atorva. Follow up 6 months with repeat US recommended . PET/CT 9/6/23 showed possible old R temporal infarct. Recent episode of confusion in early October (see 10/6 note for further detail) concerning for TIA. Has had stability since. Brain MRI and CTA without new findings, per my review    Peripheral neuropathy: improvement but we dicussed the risk/benefit of giving full dose again; specifically the uncertainty of improvement with continued dosing.     Hypokalemia: Potassium 2.9. replace PP today and start 20 mEq packet BID. Recheck in one week at [facility].     JULIANA: Mild, possibly more dyspepsia than true nausea. Continue Zofran and/or Compazine if needed.    Dental: s/p dental extractions (8 teeth) on 10/6. Has upcoming procedure to shave down jaw for dentures scheduled 11/6.  Intentional, one week chemo delay (today) planned to allow for recovery.    Dysphagia, aspiration: established with SLP, will see Jessica today. Has PEG but not using. Reports improvement of swallowing with one cycle of chemo    Liver issue: Says he was to have some sort of imaging at [facility] to look at his liver that had to be canceled due to the appts he needed for the cancer. Will try to figure out what that was about - not actively discussed again today    50 minutes spent on the date of the encounter doing chart  review, review of test results, interpretation of tests, patient visit, documentation, and discussion with other provider(s)    Olga Baker CNP on 11/13/2023 at 9:21 AM                 Again, thank you for allowing me to participate in the care of your patient.        Sincerely,        Olga Baker CNP

## 2023-11-13 NOTE — TELEPHONE ENCOUNTER
Received call from pharmacists at [facility] regarding potassium prescription sent in today. They are stating they do not have the packets, they only carry the tablets. The tablets are dissolvable though. She is asking if okay to dispense tablets?    Secure message to Olga Baker    [id number] Olga responding okay if the tablets are dissolvable. Olga requests pharmacists is clear with administration instructions as pt will be putting mediation through his PEG.     [id number] Contacted pharmacy to inform them of provider response. Pharmacists verbalized understanding and stated they will make sure to be clear with administration instructions.

## 2023-11-13 NOTE — NURSING NOTE
"Oncology Rooming Note    November 13, 2023 8:44 AM   Ko Thakkar is a 67 year old male who presents for:    Chief Complaint   Patient presents with    Blood Draw     Labs drawn with piv start by rn.  VS taken.    Oncology Clinic Visit     [facility] RETURN - SQUAMOUS CELL CARCINOMA OF OROPHARYNX     Initial Vitals: BP (!) 143/77 (BP Location: Right arm, Patient Position: Sitting, Cuff Size: Adult Regular)   Pulse 94   Temp 98.9  F (37.2  C) (Oral)   Resp 16   Ht 1.676 m (5' 5.98\")   Wt 78.2 kg (172 lb 8 oz)   SpO2 99%   BMI 27.86 kg/m   Estimated body mass index is 27.86 kg/m  as calculated from the following:    Height as of this encounter: 1.676 m (5' 5.98\").    Weight as of this encounter: 78.2 kg (172 lb 8 oz). Body surface area is 1.91 meters squared.  Mild Pain (3) Comment: Data Unavailable   No LMP for male patient.  Allergies reviewed: Yes  Medications reviewed: Yes    Medications: Medication refills not needed today.  Pharmacy name entered into EPIC: [facility] [place] [place] - [address] Bahena LPN              "

## 2023-11-21 ENCOUNTER — LAB (OUTPATIENT)
Dept: LAB | Facility: HOSPITAL | Age: 68
End: 2023-11-21
Payer: COMMERCIAL

## 2023-11-21 ENCOUNTER — TELEPHONE (OUTPATIENT)
Dept: ONCOLOGY | Facility: CLINIC | Age: 68
End: 2023-11-21
Payer: COMMERCIAL

## 2023-11-21 DIAGNOSIS — E87.6 HYPOKALEMIA: ICD-10-CM

## 2023-11-21 LAB — POTASSIUM SERPL-SCNC: 3.5 MMOL/L (ref 3.4–5.3)

## 2023-11-21 PROCEDURE — 36415 COLL VENOUS BLD VENIPUNCTURE: CPT

## 2023-11-21 PROCEDURE — 84132 ASSAY OF SERUM POTASSIUM: CPT

## 2023-11-21 NOTE — TELEPHONE ENCOUNTER
Oncology Nutrition Services - phone call follow-up:     Called Ace to follow-up on his nutrition during cancer treatment.    He tells me that this last chemo cycle was very hard on him.    His intake has declined due to 'feeling sick'.  He continues to strive for adequate calories and protein with frequent meals and soft/pureed foods.    He has been drinking 2% milk and Muscle Milk shakes.   He continues to blend foods that have chunks and chooses a lot of soft/moist foods that take less effort to chew and swallow.    He has been adding a lot of butter to foods such as mashed potatoes, pancakes, soups etc.     He is not using his feeding tube for nutrition but has been flushing it for patency 1-2 times a day.   He understands the importance of having his FT in place and if/when he needs to use it.     Weight trends: down ~6 lb since initial RD consult, 10/18.   Wt Readings from Last 10 Encounters:   11/13/23 78.2 kg (172 lb 8 oz)   10/23/23 79.8 kg (176 lb)   10/16/23 80.9 kg (178 lb 4.8 oz)   10/13/23 82.3 kg (181 lb 6.4 oz)   10/06/23 82.8 kg (182 lb 8 oz)   09/19/23 82.6 kg (182 lb)   09/14/23 81.6 kg (180 lb)   09/13/23 80.3 kg (177 lb)   08/29/23 79.7 kg (175 lb 11.3 oz)   10/12/14 81.6 kg (180 lb)     Interventions;   Reviewed ways to increase kcal and protein intake and reviewed oral nutritional supplements (Ensure Plus/Boost Plus etc) to have daily. Suggested ways to incorporate these supplement and avoid flavor fatigue.  Suggested switching to whole milk as able. Suggested trying Fairlife and CIB as well.     Discussed principles of weight maintenance and oral intake recommendations for patients undergoing cancer treatment.  Reviewed the importance of maintaining current weight (within 5%, not more than 2 lb weight loss each week) during course of treatment and the recommendation of of using his feeding tube if unable to maintain weight with oral intake.  He expressed his understanding.    Encouraged Ace to  reach out to writer with future questions or concerns.     Meka Lee RD, , LD  [facility]  [phone]

## 2023-11-28 ENCOUNTER — HOSPITAL ENCOUNTER (OUTPATIENT)
Dept: PET IMAGING | Facility: CLINIC | Age: 68
Discharge: HOME | End: 2023-11-28
Attending: INTERNAL MEDICINE
Payer: COMMERCIAL

## 2023-11-28 ENCOUNTER — TELEPHONE (OUTPATIENT)
Dept: INTERVENTIONAL RADIOLOGY/VASCULAR | Facility: CLINIC | Age: 68
End: 2023-11-28
Payer: COMMERCIAL

## 2023-11-28 DIAGNOSIS — R13.10 DYSPHAGIA, UNSPECIFIED TYPE: Primary | ICD-10-CM

## 2023-11-28 DIAGNOSIS — C32.1 SCC (SQUAMOUS CELL CARCINOMA) OF SUPRAGLOTTIS (H): ICD-10-CM

## 2023-11-28 DIAGNOSIS — Z13.29 SCREENING FOR HYPOTHYROIDISM: ICD-10-CM

## 2023-11-28 DIAGNOSIS — E11.9 TYPE 2 DIABETES MELLITUS WITHOUT COMPLICATION, UNSPECIFIED WHETHER LONG TERM INSULIN USE (H): ICD-10-CM

## 2023-11-28 DIAGNOSIS — E43 SEVERE PROTEIN-CALORIE MALNUTRITION (H): Primary | ICD-10-CM

## 2023-11-28 PROCEDURE — 343N000001 HC RX 343: Performed by: INTERNAL MEDICINE

## 2023-11-28 PROCEDURE — 78816 PET IMAGE W/CT FULL BODY: CPT | Mod: PS

## 2023-11-28 PROCEDURE — 74177 CT ABD & PELVIS W/CONTRAST: CPT | Mod: 26 | Performed by: RADIOLOGY

## 2023-11-28 PROCEDURE — 71260 CT THORAX DX C+: CPT

## 2023-11-28 PROCEDURE — 250N000011 HC RX IP 250 OP 636: Performed by: INTERNAL MEDICINE

## 2023-11-28 PROCEDURE — A9552 F18 FDG: HCPCS | Performed by: INTERNAL MEDICINE

## 2023-11-28 PROCEDURE — 70491 CT SOFT TISSUE NECK W/DYE: CPT

## 2023-11-28 PROCEDURE — 71260 CT THORAX DX C+: CPT | Mod: 26 | Performed by: RADIOLOGY

## 2023-11-28 PROCEDURE — 70491 CT SOFT TISSUE NECK W/DYE: CPT | Mod: 26 | Performed by: RADIOLOGY

## 2023-11-28 PROCEDURE — 78816 PET IMAGE W/CT FULL BODY: CPT | Mod: 26 | Performed by: RADIOLOGY

## 2023-11-28 RX ORDER — IOPAMIDOL 755 MG/ML
10-135 INJECTION, SOLUTION INTRAVASCULAR ONCE
Status: COMPLETED | OUTPATIENT
Start: 2023-11-28 | End: 2023-11-28

## 2023-11-28 RX ADMIN — IOPAMIDOL 105 ML: 755 INJECTION, SOLUTION INTRAVENOUS at 10:12

## 2023-11-28 RX ADMIN — FLUDEOXYGLUCOSE F-18 10.36 MILLICURIE: 500 INJECTION, SOLUTION INTRAVENOUS at 09:14

## 2023-11-28 NOTE — TELEPHONE ENCOUNTER
I was contacted by patient's wife, Ondina,  with concerns regarding clogged gastrostomy tube.    Patient with PMH of SCC oral, dysphagia.  18 F TEO gastrostomy tube placed on 10/13/2023.    Per Ondina, patient has not used the tube for feedings at this time, but uses for daily medication and flushed.   Yesterday tube worked and flushed easily, but she is unable to flush fluid, or aspirate from gastrostomy tube today.  No other complaints.    IR provider made aware of status.  Orders placed for gastrostomy tube exchange.  I have provided Ace and wife the contact number for scheduling.    Mariah SANTILLAN  Interventional Radiology RN   [phone]

## 2023-11-29 ENCOUNTER — ONCOLOGY VISIT (OUTPATIENT)
Dept: ONCOLOGY | Facility: CLINIC | Age: 68
End: 2023-11-29
Attending: INTERNAL MEDICINE
Payer: COMMERCIAL

## 2023-11-29 ENCOUNTER — APPOINTMENT (OUTPATIENT)
Dept: LAB | Facility: CLINIC | Age: 68
End: 2023-11-29
Attending: INTERNAL MEDICINE
Payer: COMMERCIAL

## 2023-11-29 ENCOUNTER — TELEPHONE (OUTPATIENT)
Dept: RADIATION ONCOLOGY | Facility: CLINIC | Age: 68
End: 2023-11-29
Payer: COMMERCIAL

## 2023-11-29 VITALS
BODY MASS INDEX: 28.58 KG/M2 | RESPIRATION RATE: 16 BRPM | TEMPERATURE: 98 F | SYSTOLIC BLOOD PRESSURE: 164 MMHG | HEART RATE: 92 BPM | DIASTOLIC BLOOD PRESSURE: 81 MMHG | OXYGEN SATURATION: 98 % | WEIGHT: 177 LBS

## 2023-11-29 DIAGNOSIS — C32.1 SCC (SQUAMOUS CELL CARCINOMA) OF SUPRAGLOTTIS (H): ICD-10-CM

## 2023-11-29 DIAGNOSIS — Z13.29 SCREENING FOR HYPOTHYROIDISM: ICD-10-CM

## 2023-11-29 DIAGNOSIS — E43 SEVERE PROTEIN-CALORIE MALNUTRITION (H): ICD-10-CM

## 2023-11-29 LAB
ALBUMIN SERPL BCG-MCNC: 3.7 G/DL (ref 3.5–5.2)
ALP SERPL-CCNC: 123 U/L (ref 40–150)
ALT SERPL W P-5'-P-CCNC: 33 U/L (ref 0–70)
ANION GAP SERPL CALCULATED.3IONS-SCNC: 9 MMOL/L (ref 7–15)
AST SERPL W P-5'-P-CCNC: 25 U/L (ref 0–45)
BASOPHILS # BLD AUTO: 0 10E3/UL (ref 0–0.2)
BASOPHILS NFR BLD AUTO: 1 %
BILIRUB SERPL-MCNC: 0.2 MG/DL
BUN SERPL-MCNC: 15.6 MG/DL (ref 8–23)
CALCIUM SERPL-MCNC: 8.6 MG/DL (ref 8.8–10.2)
CHLORIDE SERPL-SCNC: 107 MMOL/L (ref 98–107)
CREAT SERPL-MCNC: 0.68 MG/DL (ref 0.67–1.17)
DEPRECATED HCO3 PLAS-SCNC: 24 MMOL/L (ref 22–29)
EGFRCR SERPLBLD CKD-EPI 2021: >90 ML/MIN/1.73M2
EOSINOPHIL # BLD AUTO: 0.2 10E3/UL (ref 0–0.7)
EOSINOPHIL NFR BLD AUTO: 5 %
ERYTHROCYTE [DISTWIDTH] IN BLOOD BY AUTOMATED COUNT: 13.9 % (ref 10–15)
GLUCOSE SERPL-MCNC: 279 MG/DL (ref 70–99)
HCT VFR BLD AUTO: 34.7 % (ref 40–53)
HGB BLD-MCNC: 11.6 G/DL (ref 13.3–17.7)
IMM GRANULOCYTES # BLD: 0 10E3/UL
IMM GRANULOCYTES NFR BLD: 0 %
LYMPHOCYTES # BLD AUTO: 1.3 10E3/UL (ref 0.8–5.3)
LYMPHOCYTES NFR BLD AUTO: 32 %
MAGNESIUM SERPL-MCNC: 1.7 MG/DL (ref 1.7–2.3)
MCH RBC QN AUTO: 28.5 PG (ref 26.5–33)
MCHC RBC AUTO-ENTMCNC: 33.4 G/DL (ref 31.5–36.5)
MCV RBC AUTO: 85 FL (ref 78–100)
MONOCYTES # BLD AUTO: 0.6 10E3/UL (ref 0–1.3)
MONOCYTES NFR BLD AUTO: 16 %
NEUTROPHILS # BLD AUTO: 1.8 10E3/UL (ref 1.6–8.3)
NEUTROPHILS NFR BLD AUTO: 46 %
NRBC # BLD AUTO: 0 10E3/UL
NRBC BLD AUTO-RTO: 0 /100
PHOSPHATE SERPL-MCNC: 3 MG/DL (ref 2.5–4.5)
PLATELET # BLD AUTO: 169 10E3/UL (ref 150–450)
POTASSIUM SERPL-SCNC: 4.1 MMOL/L (ref 3.4–5.3)
PROT SERPL-MCNC: 6.4 G/DL (ref 6.4–8.3)
RBC # BLD AUTO: 4.07 10E6/UL (ref 4.4–5.9)
SODIUM SERPL-SCNC: 140 MMOL/L (ref 135–145)
TSH SERPL DL<=0.005 MIU/L-ACNC: 3.46 UIU/ML (ref 0.3–4.2)
WBC # BLD AUTO: 4 10E3/UL (ref 4–11)

## 2023-11-29 PROCEDURE — 84100 ASSAY OF PHOSPHORUS: CPT | Performed by: INTERNAL MEDICINE

## 2023-11-29 PROCEDURE — 84443 ASSAY THYROID STIM HORMONE: CPT | Performed by: INTERNAL MEDICINE

## 2023-11-29 PROCEDURE — 80053 COMPREHEN METABOLIC PANEL: CPT | Performed by: INTERNAL MEDICINE

## 2023-11-29 PROCEDURE — 83735 ASSAY OF MAGNESIUM: CPT | Performed by: INTERNAL MEDICINE

## 2023-11-29 PROCEDURE — 99215 OFFICE O/P EST HI 40 MIN: CPT | Performed by: INTERNAL MEDICINE

## 2023-11-29 PROCEDURE — 85004 AUTOMATED DIFF WBC COUNT: CPT | Performed by: INTERNAL MEDICINE

## 2023-11-29 PROCEDURE — G0463 HOSPITAL OUTPT CLINIC VISIT: HCPCS | Performed by: INTERNAL MEDICINE

## 2023-11-29 PROCEDURE — 36415 COLL VENOUS BLD VENIPUNCTURE: CPT | Performed by: INTERNAL MEDICINE

## 2023-11-29 ASSESSMENT — PAIN SCALES - GENERAL: PAINLEVEL: MILD PAIN (2)

## 2023-11-29 NOTE — NURSING NOTE
"Oncology Rooming Note    November 29, 2023 3:05 PM   Ko Thakkar is a 67 year old male who presents for:    Chief Complaint   Patient presents with    Blood Draw     Labs collected from venipuncture by RN. Vitals taken. Checked in for appointment(s).      Oncology Clinic Visit     Squamous cell carcinoma     Initial Vitals: BP (!) 164/81   Pulse 92   Temp 98  F (36.7  C)   Resp 16   Wt 80.3 kg (177 lb)   SpO2 98%   BMI 28.58 kg/m   Estimated body mass index is 28.58 kg/m  as calculated from the following:    Height as of 11/13/23: 1.676 m (5' 5.98\").    Weight as of this encounter: 80.3 kg (177 lb). Body surface area is 1.93 meters squared.  Mild Pain (2) Comment: Data Unavailable   No LMP for male patient.  Allergies reviewed: Yes  Medications reviewed: Yes    Medications: Medication refills not needed today.  Pharmacy name entered into EPIC: [facility] [place], [place] - [address]    Clinical concerns:        Rayna Sandhu              "

## 2023-11-29 NOTE — TELEPHONE ENCOUNTER
Called pt to schedule follow up and CT SIM w/Dr. Cordova. Tentatively scheduled appts for Tuesday Dec 5th at 10:30 AM and 11:00 AM respectively. Left pt a v/m with info  Thank you

## 2023-11-29 NOTE — NURSING NOTE
Chief Complaint   Patient presents with    Blood Draw     Labs collected from venipuncture by RN. Vitals taken. Checked in for appointment(s).       Lee Ann Barraza RN

## 2023-11-29 NOTE — LETTER
11/29/2023         RE: Ko Thakkar  [address]  [address]  [place] [address]        Dear Colleague,    Thank you for referring your patient, Ko Thakkar, to the [facility]. Please see a copy of my visit note below.       [facility] CANCER [facility]    PATIENT NAME: Ko Thakkar  MRN # [id number]   DATE OF VISIT: November 29, 2023  YOB: 1955     Otolaryngology: Dr. Denia Hardin  Radiation Oncology: Dr. Juwan Cordova   PCP: Dr. Alton Mota; [facility] in [place]     CANCER TYPE: SCC supraglottis  STAGE: [id number] (IRON)  ECOG PS: 1    PD-L1:  NGS:     SUMMARY  5/16/23 EGD for dysphagia. Gastritis and gastric diverticulum. Bx showed H. Pylori, treated. No atrophic gastritis or dysplasia  7/11/23 Swallow study at . Poor epiglottic inversion, penetration, aspiration  7/28/23 CT neck. 2.2 x 1.4 x 2.3 cm supraglottic mass, concern for paraglottic involvement, bilateral IB, IIA, III, IV adenopathy, 70% ICA stenosis   8/21/23 US carotid. 70-99% stenosis R ICA. <50% stenosis L ICA  8/29/23 FNA L level 2 node (IR). Path: SCC  9/6/23 PET/CT. FDG avid mass (SUV 13.5) right tonsil/lateral pharyngeal wall/glossotonsillar sulcus with slight extension to the R tongue base, right AE fold, right epiglottis, right piriform sinus, right posterolateral pharyngeal wall, posterior pharyngeal wall, no thyroid cartilage erosion, right retropharyngeal node, deep lobe parotid FDG avid mass, bilateral cervical nodes (right level II-IV, left level II-III), no distant disease, L mandibular canine with periapical abscess. R temporal lobe change suggestive of prior infarct. 5 mm LLL nodule.  10/10/23 Video swallow.   10/16~11/13/23 C1-2 carboplatin paclitaxel. Given due to logistical issues with pre-chemoradiation evaluation that would have delayed start of any treatment substantially. Paclitaxel dose reduced to 140 mg/m2 C2 due to neuropathy  10p/23/23 CTA. 70%  narrowing R carotid carlos/bifurcation similar to 7/28/23 CT. Laterally projecting disc osteophyte complex resulting in mod-severe L vertebral artery narrowing at C3-4 level.  10/23/23 Brain MRI. No mets. Bilateral frontal and temporal lobe encephalomalacia, R > L. Mild volume loss.   11/28/23 PET/CT. Good PA.     ASSESSMENT AND PLAN  SCC supraglottis, [id number] (IRON): Declined surgery. Plannied chemoradiation but with all of the logistical issues and work needed prior to starting, also due to the large size of the primary tumor, started carboplatin + paclitaxel first, good PA after 2 cycles, especially considering the 1 month interval between the initial staging PET/CT and starting chemo. Was not a candidate for TPF due to comorbidities. Read still pending; will let him know if anything changes. Tiny pulm nodules largely look about the same to me - see description below. Discussed with Dr. Cordova, will move forward with chemorads assuming no surprises on the read. Not a cnadidate for evenb weekly cisplatin due to severe hearing loss. Will stick with weekly carboplatin paclitaxel. Sim 12/5. Anticipate starting ~12/18    Dental: Had extractions as planned prior to starting chemo, still needs bone shaved down some, which we delayed for chemo. His dentist has told Ace that the whole process is going to shelved until he's recovered from cancer treatment. Discussed that we recommend waiting a number of months after chemoradiation is completed prior to making dentures.     Neuropathy: Worse after C1 chemo, no worse after C2, will monitor losely with weekly carboplatin paclitaxel. B12 10/6/23 was ok. TSH has been ok. I don't think we need to check SPEP/UPEP, etc., right now    Hypokalemia: Resolved.     Ibuprofen use, arthritis: Emphasized the significant danger of taking that much ibuprofen, including risk of death from GI bleeding. Offered to refer to pain specialist to discuss non-opioid ways to manage pain, but he declined.      Melena: One time episode, no associated symptoms, not clear whether bleeding or not, hemoglobin down a little bit but not out of proportion to chemo. Discussed he needs to go to the ED if recurs, and/or develops other signs of GI bleeding.     Dysphagia, aspiration: Jessica Ramos visit 9/13/23, video 10/10, known aspiration at baseline although swallowing a little better with DE. Gtube 10/13, needs replacement, clogged. Not doing TF but using for meds. Will try coke to start but o/w scheduled for replacement this Fri.    R KAILASH: Met with Dr. Tapia, vascular surgery fellow 8/21. Recommended treating the cancer first, asa 81 and high dose atorva, follow up 6 months with repeat US. PET/CT showed possible old R temporal infarct. Not discussed today.     H/o food impaction: lower esophagus monitor    Liver issue: Says he was to have some sort of imaging at [facility] to look at his liver that had to be canceled due to the appts he needed for the cancer. Will try to figure out what that was about - not actively discussed again today. Might be related to his mom having cirrhosis without hepatitis or ETOH hx.    60 minutes spent by me on the date of the encounter doing chart review, history and exam, documentation and further activities per the note     Xochilt Louise MD  Associate Professor of Medicine  Hematology, Oncology and Transplantation      SUBJECTIVE  Mr. Thakkar (Ace) returns for follow up after 2 cycles of chemo.  Called triage 2 days ago.   L chest/armpit pain. ED recommended, declined. Comes and goes. Mostly when he's sitting still, not with activity.   Breathing ok. No leg swelling.   Tired, sleeping most of the day. Not sleeping well at night getting up to go to the bathroom. Sleeping in a recliner.   But has been able to continue working except for a couple of days. Supervisor is supportive.   Temp 99 last week - resolved  Sore throat, nasal congestion - resolved   Eating better this past week  Drinking a lot  of water  Covid test negative.   Neuropathy - no worse with C2 - feels like fingers and toes are starting to wake up. Numbness in the fingers. Stiffness in the toes.   One episode of black stool once last week.   No abd pain, N/V.   Doing ok with pills now. Swallowing better    Took ibuprofen 2000 mg at a time (10 tablets) for Gtube site pain - discussed at 10/23 visit with Juany GRAHAM not to do that. He's been doing that for many years and says lower doses don't work. Gabapentin made him tired and interfered with work    PAST MEDICAL HISTORY  SCC as above  GERD  H/o food impaction in 2008 and 2014 (steak) related to Schatzki ring on EGD 10/13/14  DM2. Last A1c about 7.6 sometime in summer 2023 and Nov 2023  Dyslipidemia  Possible prior L temporal CVA  HTN  H.o spinal meningitis as a baby -   R arm surgery  Bone graft to forehead  Ex lap   Tinnitus secondary to treatment for meningitis or from menigitis - bilateral   Hearing loss -- audiogram 10/2/2023  Numbness in the toes - mostly big toes   Crushing pills   No muscle aches or pains     CURRENT OUTPATIENT MEDICATIONS  Reviewed    ALLERGIES  No Known Allergies     PHYSICAL EXAM  BP (!) 164/81   Pulse 92   Temp 98  F (36.7  C)   Resp 16   Wt 80.3 kg (177 lb)   SpO2 98%   BMI 28.58 kg/m    GEN: NAD  HEENT: EOMI, no icterus, injection or pallor  EXT: no edema  NEURO: alert    LABORATORY AND IMAGING STUDIES    Labs 10/6, 10/23, 11/13, today were independently reviewed and interpreted by me    CT CAP and CT neck were personally reviewed and interpreted by me and I showed the images to Mercedes.    Tiny peripheral R lung nodule (7:44 and 7:71) unchanged  RUL and MELCHOR nodules unchanged  RML partially calcified nodule looks slightly smaller  7:69 MELCHOR GGO/nodule about the same  7:61 MELCHOR GGO unchanged  7:56 MELCHOR peripheral tiny nodule - hard to say whether there or not before            Xochilt Louise MD

## 2023-11-29 NOTE — PROGRESS NOTES
[facility] CANCER [facility]INIC    PATIENT NAME: Ko Thakkar  MRN # [id number]   DATE OF VISIT: November 29, 2023  YOB: 1955     Otolaryngology: Dr. Denia Hardin  Radiation Oncology: Dr. Juwan Cordova   PCP: Dr. Alton Mota; [facility] in [place]     CANCER TYPE: SCC supraglottis  STAGE: [id number] (IRON)  ECOG PS: 1    PD-L1:  NGS:     SUMMARY  5/16/23 EGD for dysphagia. Gastritis and gastric diverticulum. Bx showed H. Pylori, treated. No atrophic gastritis or dysplasia  7/11/23 Swallow study at [facility]. Poor epiglottic inversion, penetration, aspiration  7/28/23 CT neck. 2.2 x 1.4 x 2.3 cm supraglottic mass, concern for paraglottic involvement, bilateral IB, IIA, III, IV adenopathy, 70% ICA stenosis   8/21/23 US carotid. 70-99% stenosis R ICA. <50% stenosis L ICA  8/29/23 FNA L level 2 node (IR). Path: SCC  9/6/23 PET/CT. FDG avid mass (SUV 13.5) right tonsil/lateral pharyngeal wall/glossotonsillar sulcus with slight extension to the R tongue base, right AE fold, right epiglottis, right piriform sinus, right posterolateral pharyngeal wall, posterior pharyngeal wall, no thyroid cartilage erosion, right retropharyngeal node, deep lobe parotid FDG avid mass, bilateral cervical nodes (right level II-IV, left level II-III), no distant disease, L mandibular canine with periapical abscess. R temporal lobe change suggestive of prior infarct. 5 mm LLL nodule.  10/10/23 Video swallow.   10/16~11/13/23 C1-2 carboplatin paclitaxel. Given due to logistical issues with pre-chemoradiation evaluation that would have delayed start of any treatment substantially. Paclitaxel dose reduced to 140 mg/m2 C2 due to neuropathy  10p/23/23 CTA. 70% narrowing R carotid carlos/bifurcation similar to 7/28/23 CT. Laterally projecting disc osteophyte complex resulting in mod-severe L vertebral artery narrowing at C3-4 level.  10/23/23 Brain MRI. No mets. Bilateral frontal and temporal lobe encephalomalacia, R > L. Mild volume  loss.   11/28/23 PET/CT. Good KS.     ASSESSMENT AND PLAN  SCC supraglottis, [id number] (IRON): Declined surgery. Plannied chemoradiation but with all of the logistical issues and work needed prior to starting, also due to the large size of the primary tumor, started carboplatin + paclitaxel first, good KS after 2 cycles, especially considering the 1 month interval between the initial staging PET/CT and starting chemo. Was not a candidate for TPF due to comorbidities. Read still pending; will let him know if anything changes. Tiny pulm nodules largely look about the same to me - see description below. Discussed with Dr. Cordova, will move forward with chemorads assuming no surprises on the read. Not a cnadidate for evenb weekly cisplatin due to severe hearing loss. Will stick with weekly carboplatin paclitaxel. Sim 12/5. Anticipate starting ~12/18    Dental: Had extractions as planned prior to starting chemo, still needs bone shaved down some, which we delayed for chemo. His dentist has told [facility] that the whole process is going to shelved until he's recovered from cancer treatment. Discussed that we recommend waiting a number of months after chemoradiation is completed prior to making dentures.     Neuropathy: Worse after C1 chemo, no worse after C2, will monitor losely with weekly carboplatin paclitaxel. B12 10/6/23 was ok. TSH has been ok. I don't think we need to check SPEP/UPEP, etc., right now    Hypokalemia: Resolved.     Ibuprofen use, arthritis: Emphasized the significant danger of taking that much ibuprofen, including risk of death from GI bleeding. Offered to refer to pain specialist to discuss non-opioid ways to manage pain, but he declined.     Melena: One time episode, no associated symptoms, not clear whether bleeding or not, hemoglobin down a little bit but not out of proportion to chemo. Discussed he needs to go to the ED if recurs, and/or develops other signs of GI bleeding.     Dysphagia, aspiration:  Jessica Alisagueroadenike visit 9/13/23, video 10/10, known aspiration at baseline although swallowing a little better with UT. Gtube 10/13, needs replacement, clogged. Not doing TF but using for meds. Will try coke to start but o/w scheduled for replacement this Fri.    R KAILASH: Met with Dr. Tapia, vascular surgery fellow 8/21. Recommended treating the cancer first, asa 81 and high dose atorva, follow up 6 months with repeat US. PET/CT showed possible old R temporal infarct. Not discussed today.     H/o food impaction: lower esophagus monitor    Liver issue: Says he was to have some sort of imaging at [facility] to look at his liver that had to be canceled due to the appts he needed for the cancer. Will try to figure out what that was about - not actively discussed again today. Might be related to his mom having cirrhosis without hepatitis or ETOH hx.    60 minutes spent by me on the date of the encounter doing chart review, history and exam, documentation and further activities per the note     Xochilt Louise MD  Associate Professor of Medicine  Hematology, Oncology and Transplantation      SUBJECTIVE  Mr. Thakkar (Ace) returns for follow up after 2 cycles of chemo.  Called triage 2 days ago.   L chest/armpit pain. ED recommended, declined. Comes and goes. Mostly when he's sitting still, not with activity.   Breathing ok. No leg swelling.   Tired, sleeping most of the day. Not sleeping well at night getting up to go to the bathroom. Sleeping in a recliner.   But has been able to continue working except for a couple of days. Supervisor is supportive.   Temp 99 last week - resolved  Sore throat, nasal congestion - resolved   Eating better this past week  Drinking a lot of water  Covid test negative.   Neuropathy - no worse with C2 - feels like fingers and toes are starting to wake up. Numbness in the fingers. Stiffness in the toes.   One episode of black stool once last week.   No abd pain, N/V.   Doing ok with pills now. Swallowing  better    Took ibuprofen 2000 mg at a time (10 tablets) for Gtube site pain - discussed at 10/23 visit with Juany GLADYS not to do that. He's been doing that for many years and says lower doses don't work. Gabapentin made him tired and interfered with work    PAST MEDICAL HISTORY  SCC as above  GERD  H/o food impaction in 2008 and 2014 (steak) related to Schatzki ring on EGD 10/13/14  DM2. Last A1c about 7.6 sometime in summer 2023 and Nov 2023  Dyslipidemia  Possible prior L temporal CVA  HTN  H.o spinal meningitis as a baby -   R arm surgery  Bone graft to forehead  Ex lap   Tinnitus secondary to treatment for meningitis or from menigitis - bilateral   Hearing loss -- audiogram 10/2/2023  Numbness in the toes - mostly big toes   Crushing pills   No muscle aches or pains     CURRENT OUTPATIENT MEDICATIONS  Reviewed    ALLERGIES  No Known Allergies     PHYSICAL EXAM  BP (!) 164/81   Pulse 92   Temp 98  F (36.7  C)   Resp 16   Wt 80.3 kg (177 lb)   SpO2 98%   BMI 28.58 kg/m    GEN: NAD  HEENT: EOMI, no icterus, injection or pallor  EXT: no edema  NEURO: alert    LABORATORY AND IMAGING STUDIES    Labs 10/6, 10/23, 11/13, today were independently reviewed and interpreted by me    CT CAP and CT neck were personally reviewed and interpreted by me and I showed the images to Mercedes.    Tiny peripheral R lung nodule (7:44 and 7:71) unchanged  RUL and MELCHOR nodules unchanged  RML partially calcified nodule looks slightly smaller  7:69 MELCHOR GGO/nodule about the same  7:61 MELCHOR GGO unchanged  7:56 MELCHOR peripheral tiny nodule - hard to say whether there or not before

## 2023-12-05 ENCOUNTER — OFFICE VISIT (OUTPATIENT)
Dept: RADIATION ONCOLOGY | Facility: CLINIC | Age: 68
End: 2023-12-05
Attending: SURGERY
Payer: COMMERCIAL

## 2023-12-05 DIAGNOSIS — C10.9 SQUAMOUS CELL CARCINOMA OF OROPHARYNX (H): Primary | ICD-10-CM

## 2023-12-05 PROCEDURE — 77334 RADIATION TREATMENT AID(S): CPT | Mod: 26 | Performed by: SURGERY

## 2023-12-05 PROCEDURE — 77334 RADIATION TREATMENT AID(S): CPT | Performed by: SURGERY

## 2023-12-05 PROCEDURE — G0463 HOSPITAL OUTPT CLINIC VISIT: HCPCS | Mod: 25 | Performed by: SURGERY

## 2023-12-05 PROCEDURE — 77263 THER RADIOLOGY TX PLNG CPLX: CPT | Performed by: SURGERY

## 2023-12-05 RX ORDER — GABAPENTIN 300 MG/1
1200 CAPSULE ORAL 3 TIMES DAILY
Qty: 720 CAPSULE | Refills: 1 | Status: SHIPPED | OUTPATIENT
Start: 2023-12-05

## 2023-12-05 NOTE — LETTER
2023         RE: Ko Thakkar  [address]  [address]  [place] [address]        Dear Colleague,    Thank you for referring your patient, Ko Thakkar, to the [facility] RADIATION ONCOLOGY. Please see a copy of my visit note below.    Radiation Therapy Patient Education    Person involved with teaching: Patient    Patient educational needs for self management of treatment-related side effects assessment completed.  EPIC Patient Ed tab contains Patient Learning Assessment    Education Materials Given  Radiation Therapy for Head and Neck    Educational Topics Discussed  Side effects expected, Pain management, Skin care, Nutrition and weight loss, and When to call MD/RN    Response To Teaching  Verbalizes understanding    GYN Only  Vaginal Dilator-given and educated: N/A    Referrals sent: Dental, Speech and Swallowing, and Nutrition    Chemotherapy?  Yes: Notified medical oncology of  start date            Department of Radiation Oncology  [facility]    Health: Cancer Center  [facility]  [address] [place] [id number]  [phone]       Consultation Note    Name: Ko Thakkar MRN: [id number]   : 1955   Date of Service: Dec 5, 2023 Referring: Dr. Hardin/ Dr. Louise     Reason for consultation: T4N2M0 oropharyngeal SCC, p16 positive    History of Present Illness     Mr. Thakkar is a 67 year old male T4N2M0 oropharyngeal SCC, p16 positive. Tumor involves the right tonsil, posterior pharyngeal wall, and right AE fold, hypopharynx, with known right cord paralysis, with bilateral adenopathy including right RP node, right level IV, and questionable right parotid node. (Biopsy proven SCC).    Briefly, his oncologic history is as follows:    Patient initially presented in 2023 with worsening dysphagia particularly to solid foods.  This prompted further evaluation and he met with a gastroenterologist and  eventually had a EGD performed on 5/16/2023.    He eventually saw a speech pathologist on 7/11/2023, and was noted to have poor epiglottic inversion.  He also was noted to have aspiration on the swallow study.    A CT scan was obtained on 7/28/2023, which demonstrated a 2.3 cm enhancing mass in the supraglottis, with no erosion of the arytenoid or cricoid cartilage, along with bilateral adenopathy.  Of note there was also right carotid stenosis of 70%.  Follow-up ultrasound was performed on 8/21/2023, with 70 to 99% stenosis of the right ICA, and less than 50% stenosis of the left ICA.    He saw Dr. Hardin in August 2023, with scope exam demonstrating a tumor in the right tonsil extending towards the glossotonsillar sulcus, with fullness of the posterior pharyngeal wall on the right, with edema of the epiglottis primarily on the right-hand side, with tumor involving the right arytenoid, with vocal cord paralysis on the right, along with obstruction of the right piriform sinus.    He eventually went FNA of left neck lymph node which returned positive for squamous cell carcinoma,with p16 undetermined due to scant cells.    PET scan was obtained on 9/6/2023 which demonstrated increased FDG uptake in the right tonsil and glossotonsillar sulcus with slight extension into the right tongue base and to the lateral pharyngeal wall.  There is also evidence and uptake in the right AE fold and epiglottis with a effacement of the right piriform sinus and possible extension into the postcricoid mucosa.There is no evidence of any cartilage invasion.  There is also evidence of bilateral adenopathy, including a right lateral retropharyngeal lymph node, a questionable right deep lobe of the parotid, and right level 2, right level 3, right level 4, left level 2, 3.  There is no evidence of any distant metastatic disease.    He discussed options of surgery with , but this would involved a total laryngectomy, partial versus total  pharyngectomy and bilateral neck dissections and reconstruction.  At this point, patient does not wish to pursue any surgical options particularly as he enjoys singing at the [employer] and does not want to lose his voice, despite already having evidence of swallow dysfunction and voice dysfunction, evidence by aspiration as well as a right cord paralysis and dysphonia.    His case was discussed at head neck tumor board, with recommendation for proceeding with definitive chemoradiation versus induction chemotherapy.     He has met with Dr. Louise in medical oncology on 9/14/23, who discussed both options of concurrent chemoradiation versus induction chemotherapy.  He is deemed likely to be a candidate of cisplatin and possibly would be a candidate for carbo/Taxol.    Today, he is accompanied by his wife.  He states that he has had significant dysphagia to solid food since April 2023 with weight loss.  He does endorse aspirations with thin liquids.  He endorses dysphonia, but still is able to use his voice and saying.  He denies any odynophagia, otalgia, stridor, focal neurologic changes.     He does have evidence of poor dentition, and meeting with dental team at [facility] has not beed scheduled yet.    He also states that currently works as a .  He does have a former 4 pack a day smoking history since the age of 9, quit over 20 years ago, prior alcohol and drug abuser, quit over 20 years ago.  He currently lives with his wife, who is also undergoing a hysterectomy tomorrow.    Interval history:  Patient underwent a biopsy of the right parotid lymph node, which returned positive for squamous cell carcinoma consistent with metastatic disease.  He underwent induction chemotherapy under care of Dr. Louise with carboplatin paclitaxel from 10/16/2023 to 11/13/2023 due to concerns for possible delay in treatment and extensive of field size.  He underwent a PET CT scan on 11/28/2023, which demonstrates a good partial  response, without any evidence of distant metastatic disease.    He did have dental extractions performed prior to starting chemotherapy.  He has a feeding tube in place but is still tolerating p.o. intake.  He has lost weight since starting chemotherapy, but recently has been stable.  He states his voice is improved and starting chemotherapy.    Past Medical History:   No past medical history on file.    Past Surgical History:   Past Surgical History:   Procedure Laterality Date     ESOPHAGOSCOPY, GASTROSCOPY, DUODENOSCOPY (EGD), COMBINED N/A 10/12/2014    Procedure: ESOPHAGOGASTRODUODENOSCOPY;  Surgeon: Jh Ventura MD;  Location: [facility];  Service:      IR FINE NEEDLE ASPIRATION W ULTRASOUND  8/29/2023     IR FINE NEEDLE ASPIRATION W ULTRASOUND  10/13/2023     IR GASTROSTOMY TUBE PERCUTANEOUS PLCMNT  10/13/2023     LAPAROTOMY EXPLORATORY Left     spleen       Chemotherapy History:  No prior chemotherapy    Radiation History:  No prior radiation    Pregnant: No  Implanted Cardiac Devices: No    Medications:  Current Outpatient Medications   Medication     amLODIPine (NORVASC) 5 MG tablet     aspirin (ASA) 81 MG chewable tablet     atorvastatin (LIPITOR) 80 MG tablet     empagliflozin (JARDIANCE) 25 MG TABS tablet     gabapentin (NEURONTIN) 300 MG capsule     hydrochlorothiazide (HYDRODIURIL) 25 MG tablet     insulin NPH-Regular 70/30 (HUMULIN 70/30;NOVOLIN 70/30) (70-30) 100 UNIT/ML vial     liraglutide (VICTOZA) 18 MG/3ML solution     lisinopril (ZESTRIL) 40 MG tablet     metFORMIN (GLUCOPHAGE) 500 MG tablet     ondansetron (ZOFRAN) 8 MG tablet     potassium chloride (KLOR-CON) 20 MEQ packet     prochlorperazine (COMPAZINE) 10 MG tablet     therapeutic multivitamin (THERAGRAN) tablet     No current facility-administered medications for this visit.         Allergies:   No Known Allergies    Social History:  Tobacco: Former smoker, 4 pack a day for 30 years quit over 20 years ago  Alcohol: Former  alcohol abuser, quit over 20 years ago, former drug user quit over 20 years ago  Employment: Works as a     Family History:  No family history on file.    Review of Systems   A 10-point review of systems was performed. Pertinent findings are noted in the HPI.    Physical Exam   ECOG Status: 2    Vitals:  There were no vitals taken for this visit.    Gen: Alert, in NAD  Head: NC/AT  Eyes: PERRL, EOMI, sclera anicteric  Ears: No external auricular lesions  Nose/sinus: No rhinorrhea or epistaxis  Oral cavity/oropharynx: MMM, no visible oral cavity lesions, right tonsil mass with possible extension to posterior pharyngeal wall, poor dentition no teeth in the uppers, poor dentition no lower   Neck: Full ROM, supple, small lymphadenopathy bilaterally,  Pulm: No wheezing, stridor or respiratory distress  CV: Extremities are warm and well-perfused, no cyanosis, no pedal edema  Abdominal: Normal bowel sounds, soft, nontender, no masses  Musculoskeletal: Normal bulk and tone  Skin: Normal color and turgor  Neuro: A/Ox3, CN II-XII intact, normal gait         Imaging/Path/Labs   Imaging: per HPI, personally reviewed and in agreement    Path: per HPI, personally reviewed and in agreement    Labs: per HPI, personally reviewed and in agreement    Assessment      Mr. Thakkar is a 67 year old male [id number] oropharyngeal SCC, p16 status unknown. Tumor involves the right tonsil, posterior pharyngeal wall, and right AE fold, hypopharynx, with known right cord paralysis, with bilateral adenopathy including right RP node, right level IV, and questionable right parotid node (which was bx proven SCC). There is no evidence of any distant disease. He has evidence of swallow dysfunction with dysphagia and aspiration, and voice dysfunction with dysphonia and right cord paralysis.     Given extent of disease as well as potential delays in starting treatment, induction chemotherapy was at [facility] under care of Dr. Louise.  PET CT scan  after 2 cycles of carboplatin paclitaxel demonstrates a good partial response without any distant metastatic disease.      Plan     After extensive discussion, patient wishes to induction therapy, followed by assessment for chemoRT. He has completed 2C of induction chemotherapy, with PET/CT with good partial respnse, no distant disease. Plan for 70Gy/35fx to gross disease, elective coverage of bilateral necks. CT simulation was performed today, with plan to start RT 12/19/23.  Medical Oncology- Dr. Louise, he will continue weekly carboplatin/paclitaxel with radiation   Dental- cleared, has had extractions  Feeding tube- He has a feeding tube, but is still tolerating PO intake  With regard to right carotid artery stenosis, he has met with David/Willie, vascular surgery, with recommendation to treat cancer first, started on ASA and statin, will have follow up in 6 months.     All benefits and risks discussed, and patient is in agreement with the oncologic plan discussed above.     I have directly discussed this case with both Jarrett and Wilfred, who are also in agreement with this plan of care for this patient.    Thank you for allowing me to participate in your patient's care.  If you should require any additional information, please do not hesitate in contacting me.     Juwan Codrova MD  Department of Radiation Oncology  [facility]       Again, thank you for allowing me to participate in the care of your patient.        Sincerely,        Juwan Cordova MD

## 2023-12-05 NOTE — LETTER
2023         RE: Ko Thakkar  [address]  [address]  [place] [address]        Dear Colleague,    Thank you for referring your patient, Ko Thakkar, to the [facility] RADIATION ONCOLOGY. Please see a copy of my visit note below.       Department of Radiation Oncology  [facility]: Cancer Center  [facility]  [address] [place] [address]  [phone]       Follow up Note    Name: Ko Thakkar MRN: [id number]   : 1955   Date of Service: Dec 5, 2023 Referring: Dr. Hardin/ Dr. Louise     Reason for consultation: T4N2M0 oropharyngeal SCC, p16 positive     History of Present Illness     Mr. Thakkar is a 67 year old male T4N2M0 oropharyngeal SCC, p16 positive. Tumor involves the right tonsil, posterior pharyngeal wall, and right AE fold, hypopharynx, with known right cord paralysis, with bilateral adenopathy including right RP node, right level IV, and questionable right parotid node. (Biopsy proven SCC).     Briefly, his oncologic history is as follows:     Patient initially presented in 2023 with worsening dysphagia particularly to solid foods.  This prompted further evaluation and he met with a gastroenterologist and eventually had a EGD performed on 2023.     He eventually saw a speech pathologist on 2023, and was noted to have poor epiglottic inversion.  He also was noted to have aspiration on the swallow study.     A CT scan was obtained on 2023, which demonstrated a 2.3 cm enhancing mass in the supraglottis, with no erosion of the arytenoid or cricoid cartilage, along with bilateral adenopathy.  Of note there was also right carotid stenosis of 70%.  Follow-up ultrasound was performed on 2023, with 70 to 99% stenosis of the right ICA, and less than 50% stenosis of the left ICA.     He saw Dr. Hardin in 2023, with scope exam demonstrating a tumor in the right tonsil  extending towards the glossotonsillar sulcus, with fullness of the posterior pharyngeal wall on the right, with edema of the epiglottis primarily on the right-hand side, with tumor involving the right arytenoid, with vocal cord paralysis on the right, along with obstruction of the right piriform sinus.     He eventually went FNA of left neck lymph node which returned positive for squamous cell carcinoma,with p16 undetermined due to scant cells.     PET scan was obtained on 9/6/2023 which demonstrated increased FDG uptake in the right tonsil and glossotonsillar sulcus with slight extension into the right tongue base and to the lateral pharyngeal wall.  There is also evidence and uptake in the right AE fold and epiglottis with a effacement of the right piriform sinus and possible extension into the postcricoid mucosa.There is no evidence of any cartilage invasion.  There is also evidence of bilateral adenopathy, including a right lateral retropharyngeal lymph node, a questionable right deep lobe of the parotid, and right level 2, right level 3, right level 4, left level 2, 3.  There is no evidence of any distant metastatic disease.     He discussed options of surgery with , but this would involved a total laryngectomy, partial versus total pharyngectomy and bilateral neck dissections and reconstruction.  At this point, patient does not wish to pursue any surgical options particularly as he enjoys singing at the [employer] and does not want to lose his voice, despite already having evidence of swallow dysfunction and voice dysfunction, evidence by aspiration as well as a right cord paralysis and dysphonia.     His case was discussed at head neck tumor board, with recommendation for proceeding with definitive chemoradiation versus induction chemotherapy.      He has met with Dr. Louise in medical oncology on 9/14/23, who discussed both options of concurrent chemoradiation versus induction chemotherapy.  He is  deemed likely to be a candidate of cisplatin and possibly would be a candidate for carbo/Taxol.     Today, he is accompanied by his wife.  He states that he has had significant dysphagia to solid food since April 2023 with weight loss.  He does endorse aspirations with thin liquids.  He endorses dysphonia, but still is able to use his voice and saying.  He denies any odynophagia, otalgia, stridor, focal neurologic changes.      He does have evidence of poor dentition, and meeting with dental team at [facility] has not beed scheduled yet.     He also states that currently works as a .  He does have a former 4 pack a day smoking history since the age of 9, quit over 20 years ago, prior alcohol and drug abuser, quit over 20 years ago.  He currently lives with his wife, who is also undergoing a hysterectomy tomorrow.     Interval history:  Patient underwent a biopsy of the right parotid lymph node, which returned positive for squamous cell carcinoma consistent with metastatic disease.  He underwent induction chemotherapy under care of Dr. Louise with carboplatin paclitaxel from 10/16/2023 to 11/13/2023 due to concerns for possible delay in treatment and extensive of field size.  He underwent a PET CT scan on 11/28/2023, which demonstrates a good partial response, without any evidence of distant metastatic disease.     He did have dental extractions performed prior to starting chemotherapy.  He has a feeding tube in place but is still tolerating p.o. intake.  He has lost weight since starting chemotherapy, but recently has been stable.  He states his voice is improved and starting chemotherapy.    Past Medical History:   No past medical history on file.    Past Surgical History:   Past Surgical History:   Procedure Laterality Date     ESOPHAGOSCOPY, GASTROSCOPY, DUODENOSCOPY (EGD), COMBINED N/A 10/12/2014    Procedure: ESOPHAGOGASTRODUODENOSCOPY;  Surgeon: Jh Ventura MD;  Location: [facility];  Service:       IR FINE NEEDLE ASPIRATION W ULTRASOUND  8/29/2023     IR FINE NEEDLE ASPIRATION W ULTRASOUND  10/13/2023     IR GASTROSTOMY TUBE PERCUTANEOUS PLCMNT  10/13/2023     LAPAROTOMY EXPLORATORY Left     spleen       Chemotherapy History:  No prior chemotherapy    Radiation History:  No prior radiation    Pregnant: No  Implanted Cardiac Devices: No    Medications:  Current Outpatient Medications   Medication     amLODIPine (NORVASC) 5 MG tablet     aspirin (ASA) 81 MG chewable tablet     atorvastatin (LIPITOR) 80 MG tablet     empagliflozin (JARDIANCE) 25 MG TABS tablet     gabapentin (NEURONTIN) 300 MG capsule     hydrochlorothiazide (HYDRODIURIL) 25 MG tablet     insulin NPH-Regular 70/30 (HUMULIN 70/30;NOVOLIN 70/30) (70-30) 100 UNIT/ML vial     liraglutide (VICTOZA) 18 MG/3ML solution     lisinopril (ZESTRIL) 40 MG tablet     metFORMIN (GLUCOPHAGE) 500 MG tablet     ondansetron (ZOFRAN) 8 MG tablet     potassium chloride (KLOR-CON) 20 MEQ packet     prochlorperazine (COMPAZINE) 10 MG tablet     therapeutic multivitamin (THERAGRAN) tablet     No current facility-administered medications for this visit.         Allergies:   No Known Allergies    Social History:  Tobacco: Former smoker, 4 pack a day for 30 years quit over 20 years ago  Alcohol: Former alcohol abuser, quit over 20 years ago, former drug user quit over 20 years ago  Employment: Works as a     Family History:  No family history on file.    Review of Systems   A 10-point review of systems was performed. Pertinent findings are noted in the HPI.    Physical Exam   ECOG Status: 2    Vitals:  There were no vitals taken for this visit.    Gen: Alert, in NAD  Head: NC/AT  Eyes: PERRL, EOMI, sclera anicteric  Ears: No external auricular lesions  Nose/sinus: No rhinorrhea or epistaxis  Oral cavity/oropharynx: MMM, no visible oral cavity lesions, right tonsil mass with possible extension to posterior pharyngeal wall, poor dentition no teeth in the uppers, poor  dentition no lower   Neck: Full ROM, supple, small lymphadenopathy bilaterally,  Pulm: No wheezing, stridor or respiratory distress  CV: Extremities are warm and well-perfused, no cyanosis, no pedal edema  Abdominal: Normal bowel sounds, soft, nontender, no masses  Musculoskeletal: Normal bulk and tone  Skin: Normal color and turgor  Neuro: A/Ox3, CN II-XII intact, normal gait      Imaging/Path/Labs   Imaging: per HPI, personally reviewed and in agreement    Path: per HPI, personally reviewed and in agreement    Labs: per HPI, personally reviewed and in agreement    Assessment      Mr. Thakkar is a 68 year old male [id number] oropharyngeal SCC, p16 positive. Tumor involved the right tonsil, posterior pharyngeal wall, and right AE fold, hypopharynx, with known right cord paralysis, with bilateral adenopathy including right RP node, right level IV, and questionable right parotid node (which was bx proven SCC). There is no evidence of any distant disease. He has evidence of swallow dysfunction with dysphagia and aspiration, and voice dysfunction with dysphonia and right cord paralysis.      Given extent of disease as well as potential delays in starting treatment, induction chemotherapy was at [facility] under care of Dr. Louise.  PET CT scan after 2 cycles of carboplatin paclitaxel demonstrates a good partial response without any distant metastatic disease.    Plan     After extensive discussion, patient wishes to induction therapy, followed by assessment for chemoRT. He has completed 2C of induction chemotherapy, with PET/CT with good partial respnse, no distant disease. Plan for 70Gy/35fx to gross disease, elective coverage of bilateral necks. CT simulation was performed today, with plan to start RT 12/19/23.  Medical Oncology- Dr. Louise, he will continue weekly carboplatin/paclitaxel with radiation   Dental- cleared, has had extractions  Feeding tube- He has a feeding tube, but is still tolerating PO intake  With  regard to right carotid artery stenosis, he has met with David/Willie, vascular surgery, with recommendation to treat cancer first, started on ASA and statin, will have follow up in 6 months.      All benefits and risks discussed, and patient is in agreement with the oncologic plan discussed above.     Thank you for allowing me to participate in your patient's care.  If you should require any additional information, please do not hesitate in contacting me.     Juwan Cordova MD  Department of Radiation Oncology  [facility]

## 2023-12-05 NOTE — PROGRESS NOTES
Radiation Therapy Patient Education    Person involved with teaching: Patient    Patient educational needs for self management of treatment-related side effects assessment completed.  [facility] Patient Ed tab contains Patient Learning Assessment    Education Materials Given  Radiation Therapy for Head and Neck    Educational Topics Discussed  Side effects expected, Pain management, Skin care, Nutrition and weight loss, and When to call MD/RN    Response To Teaching  Verbalizes understanding    GYN Only  Vaginal Dilator-given and educated: N/A    Referrals sent: Dental, Speech and Swallowing, and Nutrition    Chemotherapy?  Yes: Notified medical oncology of 12/19 start date

## 2023-12-06 ENCOUNTER — APPOINTMENT (OUTPATIENT)
Dept: RADIATION ONCOLOGY | Facility: CLINIC | Age: 68
End: 2023-12-06
Payer: COMMERCIAL

## 2023-12-06 PROCEDURE — 77470 SPECIAL RADIATION TREATMENT: CPT | Performed by: SURGERY

## 2023-12-08 NOTE — PROGRESS NOTES
Department of Radiation Oncology  [facility]: Cancer Center  [facility]  [address] [place] [id number]  [phone]       Consultation Note    Name: Ko Thakkar MRN: [id number]   : 1955   Date of Service: Dec 5, 2023 Referring: Dr. Hardin/ Dr. Louise     Reason for consultation: T4N2M0 oropharyngeal SCC, p16 positive    History of Present Illness     Mr. Thakkar is a 67 year old male T4N2M0 oropharyngeal SCC, p16 positive. Tumor involves the right tonsil, posterior pharyngeal wall, and right AE fold, hypopharynx, with known right cord paralysis, with bilateral adenopathy including right RP node, right level IV, and questionable right parotid node. (Biopsy proven SCC).    Briefly, his oncologic history is as follows:    Patient initially presented in 2023 with worsening dysphagia particularly to solid foods.  This prompted further evaluation and he met with a gastroenterologist and eventually had a EGD performed on 2023.    He eventually saw a speech pathologist on 2023, and was noted to have poor epiglottic inversion.  He also was noted to have aspiration on the swallow study.    A CT scan was obtained on 2023, which demonstrated a 2.3 cm enhancing mass in the supraglottis, with no erosion of the arytenoid or cricoid cartilage, along with bilateral adenopathy.  Of note there was also right carotid stenosis of 70%.  Follow-up ultrasound was performed on 2023, with 70 to 99% stenosis of the right ICA, and less than 50% stenosis of the left ICA.    He saw Dr. Hardin in 2023, with scope exam demonstrating a tumor in the right tonsil extending towards the glossotonsillar sulcus, with fullness of the posterior pharyngeal wall on the right, with edema of the epiglottis primarily on the right-hand side, with tumor involving the right arytenoid, with vocal cord paralysis on the right, along with obstruction  of the right piriform sinus.    He eventually went FNA of left neck lymph node which returned positive for squamous cell carcinoma,with p16 undetermined due to scant cells.    PET scan was obtained on 9/6/2023 which demonstrated increased FDG uptake in the right tonsil and glossotonsillar sulcus with slight extension into the right tongue base and to the lateral pharyngeal wall.  There is also evidence and uptake in the right AE fold and epiglottis with a effacement of the right piriform sinus and possible extension into the postcricoid mucosa.There is no evidence of any cartilage invasion.  There is also evidence of bilateral adenopathy, including a right lateral retropharyngeal lymph node, a questionable right deep lobe of the parotid, and right level 2, right level 3, right level 4, left level 2, 3.  There is no evidence of any distant metastatic disease.    He discussed options of surgery with , but this would involved a total laryngectomy, partial versus total pharyngectomy and bilateral neck dissections and reconstruction.  At this point, patient does not wish to pursue any surgical options particularly as he enjoys singing at the [employer] and does not want to lose his voice, despite already having evidence of swallow dysfunction and voice dysfunction, evidence by aspiration as well as a right cord paralysis and dysphonia.    His case was discussed at head neck tumor board, with recommendation for proceeding with definitive chemoradiation versus induction chemotherapy.     He has met with Dr. Louise in medical oncology on 9/14/23, who discussed both options of concurrent chemoradiation versus induction chemotherapy.  He is deemed likely to be a candidate of cisplatin and possibly would be a candidate for carbo/Taxol.    Today, he is accompanied by his wife.  He states that he has had significant dysphagia to solid food since April 2023 with weight loss.  He does endorse aspirations with thin liquids.   He endorses dysphonia, but still is able to use his voice and saying.  He denies any odynophagia, otalgia, stridor, focal neurologic changes.     He does have evidence of poor dentition, and meeting with dental team at [facility] has not beed scheduled yet.    He also states that currently works as a .  He does have a former 4 pack a day smoking history since the age of 9, quit over 20 years ago, prior alcohol and drug abuser, quit over 20 years ago.  He currently lives with his wife, who is also undergoing a hysterectomy tomorrow.    Interval history:  Patient underwent a biopsy of the right parotid lymph node, which returned positive for squamous cell carcinoma consistent with metastatic disease.  He underwent induction chemotherapy under care of Dr. Louise with carboplatin paclitaxel from 10/16/2023 to 11/13/2023 due to concerns for possible delay in treatment and extensive of field size.  He underwent a PET CT scan on 11/28/2023, which demonstrates a good partial response, without any evidence of distant metastatic disease.    He did have dental extractions performed prior to starting chemotherapy.  He has a feeding tube in place but is still tolerating p.o. intake.  He has lost weight since starting chemotherapy, but recently has been stable.  He states his voice is improved and starting chemotherapy.    Past Medical History:   No past medical history on file.    Past Surgical History:   Past Surgical History:   Procedure Laterality Date    ESOPHAGOSCOPY, GASTROSCOPY, DUODENOSCOPY (EGD), COMBINED N/A 10/12/2014    Procedure: ESOPHAGOGASTRODUODENOSCOPY;  Surgeon: Jh Ventura MD;  Location: [facility];  Service:     IR FINE NEEDLE ASPIRATION W ULTRASOUND  8/29/2023    IR FINE NEEDLE ASPIRATION W ULTRASOUND  10/13/2023    IR GASTROSTOMY TUBE PERCUTANEOUS PLCMNT  10/13/2023    LAPAROTOMY EXPLORATORY Left     spleen       Chemotherapy History:  No prior chemotherapy    Radiation History:  No prior  radiation    Pregnant: No  Implanted Cardiac Devices: No    Medications:  Current Outpatient Medications   Medication    amLODIPine (NORVASC) 5 MG tablet    aspirin (ASA) 81 MG chewable tablet    atorvastatin (LIPITOR) 80 MG tablet    empagliflozin (JARDIANCE) 25 MG TABS tablet    gabapentin (NEURONTIN) 300 MG capsule    hydrochlorothiazide (HYDRODIURIL) 25 MG tablet    insulin NPH-Regular 70/30 (HUMULIN 70/30;NOVOLIN 70/30) (70-30) 100 UNIT/ML vial    liraglutide (VICTOZA) 18 MG/3ML solution    lisinopril (ZESTRIL) 40 MG tablet    metFORMIN (GLUCOPHAGE) 500 MG tablet    ondansetron (ZOFRAN) 8 MG tablet    potassium chloride (KLOR-CON) 20 MEQ packet    prochlorperazine (COMPAZINE) 10 MG tablet    therapeutic multivitamin (THERAGRAN) tablet     No current facility-administered medications for this visit.         Allergies:   No Known Allergies    Social History:  Tobacco: Former smoker, 4 pack a day for 30 years quit over 20 years ago  Alcohol: Former alcohol abuser, quit over 20 years ago, former drug user quit over 20 years ago  Employment: Works as a     Family History:  No family history on file.    Review of Systems   A 10-point review of systems was performed. Pertinent findings are noted in the HPI.    Physical Exam   ECOG Status: 2    Vitals:  There were no vitals taken for this visit.    Gen: Alert, in NAD  Head: NC/AT  Eyes: PERRL, EOMI, sclera anicteric  Ears: No external auricular lesions  Nose/sinus: No rhinorrhea or epistaxis  Oral cavity/oropharynx: MMM, no visible oral cavity lesions, right tonsil mass with possible extension to posterior pharyngeal wall, poor dentition no teeth in the uppers, poor dentition no lower   Neck: Full ROM, supple, small lymphadenopathy bilaterally,  Pulm: No wheezing, stridor or respiratory distress  CV: Extremities are warm and well-perfused, no cyanosis, no pedal edema  Abdominal: Normal bowel sounds, soft, nontender, no masses  Musculoskeletal: Normal bulk and  tone  Skin: Normal color and turgor  Neuro: A/Ox3, CN II-XII intact, normal gait         Imaging/Path/Labs   Imaging: per HPI, personally reviewed and in agreement    Path: per HPI, personally reviewed and in agreement    Labs: per HPI, personally reviewed and in agreement    Assessment      Mr. Thakkar is a 67 year old male [id number] oropharyngeal SCC, p16 status unknown. Tumor involves the right tonsil, posterior pharyngeal wall, and right AE fold, hypopharynx, with known right cord paralysis, with bilateral adenopathy including right RP node, right level IV, and questionable right parotid node (which was bx proven SCC). There is no evidence of any distant disease. He has evidence of swallow dysfunction with dysphagia and aspiration, and voice dysfunction with dysphonia and right cord paralysis.     Given extent of disease as well as potential delays in starting treatment, induction chemotherapy was at [facility] under care of Dr. Louise.  PET CT scan after 2 cycles of carboplatin paclitaxel demonstrates a good partial response without any distant metastatic disease.      Plan     After extensive discussion, patient wishes to induction therapy, followed by assessment for chemoRT. He has completed 2C of induction chemotherapy, with PET/CT with good partial respnse, no distant disease. Plan for 70Gy/35fx to gross disease, elective coverage of bilateral necks. CT simulation was performed today, with plan to start RT 12/19/23.  Medical Oncology- Dr. Louise, he will continue weekly carboplatin/paclitaxel with radiation   Dental- cleared, has had extractions  Feeding tube- He has a feeding tube, but is still tolerating PO intake  With regard to right carotid artery stenosis, he has met with David/Willie, vascular surgery, with recommendation to treat cancer first, started on ASA and statin, will have follow up in 6 months.     All benefits and risks discussed, and patient is in agreement with the oncologic plan  discussed above.     I have directly discussed this case with both Jarrett and Wilfred, who are also in agreement with this plan of care for this patient.    Thank you for allowing me to participate in your patient's care.  If you should require any additional information, please do not hesitate in contacting me.     Juwan Cordova MD  Department of Radiation Oncology  [facility]

## 2023-12-12 NOTE — PROGRESS NOTES
Department of Radiation Oncology  [facility]: Cancer Center  [facility]  [address] [place] [id number] [phone]       Follow up Note    Name: Ko Thakkar MRN: [id number]   : 1955   Date of Service: Dec 5, 2023 Referring: Dr. Hardin/ Dr. Louise     Reason for consultation: T4N2M0 oropharyngeal SCC, p16 positive     History of Present Illness     Mr. Thakkar is a 67 year old male T4N2M0 oropharyngeal SCC, p16 positive. Tumor involves the right tonsil, posterior pharyngeal wall, and right AE fold, hypopharynx, with known right cord paralysis, with bilateral adenopathy including right RP node, right level IV, and questionable right parotid node. (Biopsy proven SCC).     Briefly, his oncologic history is as follows:     Patient initially presented in 2023 with worsening dysphagia particularly to solid foods.  This prompted further evaluation and he met with a gastroenterologist and eventually had a EGD performed on 2023.     He eventually saw a speech pathologist on 2023, and was noted to have poor epiglottic inversion.  He also was noted to have aspiration on the swallow study.     A CT scan was obtained on 2023, which demonstrated a 2.3 cm enhancing mass in the supraglottis, with no erosion of the arytenoid or cricoid cartilage, along with bilateral adenopathy.  Of note there was also right carotid stenosis of 70%.  Follow-up ultrasound was performed on 2023, with 70 to 99% stenosis of the right ICA, and less than 50% stenosis of the left ICA.     He saw Dr. Hardin in 2023, with scope exam demonstrating a tumor in the right tonsil extending towards the glossotonsillar sulcus, with fullness of the posterior pharyngeal wall on the right, with edema of the epiglottis primarily on the right-hand side, with tumor involving the right arytenoid, with vocal cord paralysis on the right, along with  obstruction of the right piriform sinus.     He eventually went FNA of left neck lymph node which returned positive for squamous cell carcinoma,with p16 undetermined due to scant cells.     PET scan was obtained on 9/6/2023 which demonstrated increased FDG uptake in the right tonsil and glossotonsillar sulcus with slight extension into the right tongue base and to the lateral pharyngeal wall.  There is also evidence and uptake in the right AE fold and epiglottis with a effacement of the right piriform sinus and possible extension into the postcricoid mucosa.There is no evidence of any cartilage invasion.  There is also evidence of bilateral adenopathy, including a right lateral retropharyngeal lymph node, a questionable right deep lobe of the parotid, and right level 2, right level 3, right level 4, left level 2, 3.  There is no evidence of any distant metastatic disease.     He discussed options of surgery with , but this would involved a total laryngectomy, partial versus total pharyngectomy and bilateral neck dissections and reconstruction.  At this point, patient does not wish to pursue any surgical options particularly as he enjoys singing at the [employer] and does not want to lose his voice, despite already having evidence of swallow dysfunction and voice dysfunction, evidence by aspiration as well as a right cord paralysis and dysphonia.     His case was discussed at head neck tumor board, with recommendation for proceeding with definitive chemoradiation versus induction chemotherapy.      He has met with Dr. Louise in medical oncology on 9/14/23, who discussed both options of concurrent chemoradiation versus induction chemotherapy.  He is deemed likely to be a candidate of cisplatin and possibly would be a candidate for carbo/Taxol.     Today, he is accompanied by his wife.  He states that he has had significant dysphagia to solid food since April 2023 with weight loss.  He does endorse aspirations  with thin liquids.  He endorses dysphonia, but still is able to use his voice and saying.  He denies any odynophagia, otalgia, stridor, focal neurologic changes.      He does have evidence of poor dentition, and meeting with dental team at [facility] has not beed scheduled yet.     He also states that currently works as a .  He does have a former 4 pack a day smoking history since the age of 9, quit over 20 years ago, prior alcohol and drug abuser, quit over 20 years ago.  He currently lives with his wife, who is also undergoing a hysterectomy tomorrow.     Interval history:  Patient underwent a biopsy of the right parotid lymph node, which returned positive for squamous cell carcinoma consistent with metastatic disease.  He underwent induction chemotherapy under care of Dr. Louise with carboplatin paclitaxel from 10/16/2023 to 11/13/2023 due to concerns for possible delay in treatment and extensive of field size.  He underwent a PET CT scan on 11/28/2023, which demonstrates a good partial response, without any evidence of distant metastatic disease.     He did have dental extractions performed prior to starting chemotherapy.  He has a feeding tube in place but is still tolerating p.o. intake.  He has lost weight since starting chemotherapy, but recently has been stable.  He states his voice is improved and starting chemotherapy.    Past Medical History:   No past medical history on file.    Past Surgical History:   Past Surgical History:   Procedure Laterality Date    ESOPHAGOSCOPY, GASTROSCOPY, DUODENOSCOPY (EGD), COMBINED N/A 10/12/2014    Procedure: ESOPHAGOGASTRODUODENOSCOPY;  Surgeon: Jh Ventura MD;  Location: [facility];  Service:     IR FINE NEEDLE ASPIRATION W ULTRASOUND  8/29/2023    IR FINE NEEDLE ASPIRATION W ULTRASOUND  10/13/2023    IR GASTROSTOMY TUBE PERCUTANEOUS PLCMNT  10/13/2023    LAPAROTOMY EXPLORATORY Left     spleen       Chemotherapy History:  No prior chemotherapy    Radiation  History:  No prior radiation    Pregnant: No  Implanted Cardiac Devices: No    Medications:  Current Outpatient Medications   Medication    amLODIPine (NORVASC) 5 MG tablet    aspirin (ASA) 81 MG chewable tablet    atorvastatin (LIPITOR) 80 MG tablet    empagliflozin (JARDIANCE) 25 MG TABS tablet    gabapentin (NEURONTIN) 300 MG capsule    hydrochlorothiazide (HYDRODIURIL) 25 MG tablet    insulin NPH-Regular 70/30 (HUMULIN 70/30;NOVOLIN 70/30) (70-30) 100 UNIT/ML vial    liraglutide (VICTOZA) 18 MG/3ML solution    lisinopril (ZESTRIL) 40 MG tablet    metFORMIN (GLUCOPHAGE) 500 MG tablet    ondansetron (ZOFRAN) 8 MG tablet    potassium chloride (KLOR-CON) 20 MEQ packet    prochlorperazine (COMPAZINE) 10 MG tablet    therapeutic multivitamin (THERAGRAN) tablet     No current facility-administered medications for this visit.         Allergies:   No Known Allergies    Social History:  Tobacco: Former smoker, 4 pack a day for 30 years quit over 20 years ago  Alcohol: Former alcohol abuser, quit over 20 years ago, former drug user quit over 20 years ago  Employment: Works as a     Family History:  No family history on file.    Review of Systems   A 10-point review of systems was performed. Pertinent findings are noted in the HPI.    Physical Exam   ECOG Status: 2    Vitals:  There were no vitals taken for this visit.    Gen: Alert, in NAD  Head: NC/AT  Eyes: PERRL, EOMI, sclera anicteric  Ears: No external auricular lesions  Nose/sinus: No rhinorrhea or epistaxis  Oral cavity/oropharynx: MMM, no visible oral cavity lesions, right tonsil mass with possible extension to posterior pharyngeal wall, poor dentition no teeth in the uppers, poor dentition no lower   Neck: Full ROM, supple, small lymphadenopathy bilaterally,  Pulm: No wheezing, stridor or respiratory distress  CV: Extremities are warm and well-perfused, no cyanosis, no pedal edema  Abdominal: Normal bowel sounds, soft, nontender, no masses  Musculoskeletal:  Normal bulk and tone  Skin: Normal color and turgor  Neuro: A/Ox3, CN II-XII intact, normal gait      Imaging/Path/Labs   Imaging: per HPI, personally reviewed and in agreement    Path: per HPI, personally reviewed and in agreement    Labs: per HPI, personally reviewed and in agreement    Assessment      Mr. Thakkar is a 68 year old male [id number] oropharyngeal SCC, p16 positive. Tumor involved the right tonsil, posterior pharyngeal wall, and right AE fold, hypopharynx, with known right cord paralysis, with bilateral adenopathy including right RP node, right level IV, and questionable right parotid node (which was bx proven SCC). There is no evidence of any distant disease. He has evidence of swallow dysfunction with dysphagia and aspiration, and voice dysfunction with dysphonia and right cord paralysis.      Given extent of disease as well as potential delays in starting treatment, induction chemotherapy was at [facility] under care of Dr. Louise.  PET CT scan after 2 cycles of carboplatin paclitaxel demonstrates a good partial response without any distant metastatic disease.    Plan     After extensive discussion, patient wishes to induction therapy, followed by assessment for chemoRT. He has completed 2C of induction chemotherapy, with PET/CT with good partial respnse, no distant disease. Plan for 70Gy/35fx to gross disease, elective coverage of bilateral necks. CT simulation was performed today, with plan to start RT 12/19/23.  Medical Oncology- Dr. Louise, he will continue weekly carboplatin/paclitaxel with radiation   Dental- cleared, has had extractions  Feeding tube- He has a feeding tube, but is still tolerating PO intake  With regard to right carotid artery stenosis, he has met with David/Willie, vascular surgery, with recommendation to treat cancer first, started on ASA and statin, will have follow up in 6 months.      All benefits and risks discussed, and patient is in agreement with the oncologic  plan discussed above.     Thank you for allowing me to participate in your patient's care.  If you should require any additional information, please do not hesitate in contacting me.     Juwan Cordova MD  Department of Radiation Oncology  [facility]

## 2023-12-18 NOTE — PROGRESS NOTES
[facility] CANCER CLINIC    PATIENT NAME: Ko Thakkar  MRN # [id number]   DATE OF VISIT: December 19, 2023  YOB: 1955     Otolaryngology: Dr. Denia Hardin  Radiation Oncology: Dr. Juwan Cordova   PCP: Dr. Alton Mota; [facility] in [place]     CANCER TYPE: SCC supraglottis  STAGE: [id number] (IRON)  ECOG PS: 1    PD-L1:  NGS:     SUMMARY  5/16/23 EGD for dysphagia. Gastritis and gastric diverticulum. Bx showed H. Pylori, treated. No atrophic gastritis or dysplasia  7/11/23 Swallow study at [facility]. Poor epiglottic inversion, penetration, aspiration  7/28/23 CT neck. 2.2 x 1.4 x 2.3 cm supraglottic mass, concern for paraglottic involvement, bilateral IB, IIA, III, IV adenopathy, 70% ICA stenosis   8/21/23 US carotid. 70-99% stenosis R ICA. <50% stenosis L ICA  8/29/23 FNA L level 2 node (IR). Path: SCC  9/6/23 PET/CT. FDG avid mass (SUV 13.5) right tonsil/lateral pharyngeal wall/glossotonsillar sulcus with slight extension to the R tongue base, right AE fold, right epiglottis, right piriform sinus, right posterolateral pharyngeal wall, posterior pharyngeal wall, no thyroid cartilage erosion, right retropharyngeal node, deep lobe parotid FDG avid mass, bilateral cervical nodes (right level II-IV, left level II-III), no distant disease, L mandibular canine with periapical abscess. R temporal lobe change suggestive of prior infarct. 5 mm LLL nodule.  10/10/23 Video swallow.   10/16~11/13/23 C1-2 carboplatin paclitaxel. Given due to logistical issues with pre-chemoradiation evaluation that would have delayed start of any treatment substantially. Paclitaxel dose reduced to 140 mg/m2 C2 due to neuropathy  10p/23/23 CTA. 70% narrowing R carotid carlos/bifurcation similar to 7/28/23 CT. Laterally projecting disc osteophyte complex resulting in mod-severe L vertebral artery narrowing at C3-4 level.  10/23/23 Brain MRI. No mets. Bilateral frontal and temporal lobe encephalomalacia, R > L. Mild volume  loss.   11/28/23 PET/CT. Good DE.     ASSESSMENT AND PLAN  SCC supraglottis, [id number] (IRON): Declined surgery. Plannied chemoradiation but with all of the logistical issues and work needed prior to starting, also due to the large size of the primary tumor, started carboplatin + paclitaxel first. PET/CT confirmed good DE after 2 cycles. Will now move forward with chemoradiation. Not a candidate for cisplatin due to severe hearing loss. Will continue with weekly carboplatin/paclitaxel. Anticipate he will tolerated weekly chemo well given response to higher doses. Labs today stable and acceptable to proceed with day 1 carbo/taxol.  -RTC weekly for PALOMO visit with infusion      Dental: Had extractions as planned prior to starting chemo, still needs bone shaved down some, which was delayed to start chemo.  Ace has been told by his dentist the process is going to be postponed until he recovers from cancer treatment. Should wait many months following completion of chemoradiation before denture fitting.    Neuropathy: Worse after C1 chemo, no worse after C2. Monitor loosely with weekly carboplatin paclitaxel. B12 10/6/23 was ok. TSH has been ok.     Hypokalemia: Resolved.     Ibuprofen use, arthritis: Reports decreased use. Was most recently using for cancer-related pain which has improved with response to treatment. Again discouraged use in extremely high doses due to significant risk of bleeding.    Melena: One time episode, no associated symptoms, not clear whether bleeding or not. Denies recurrence. Has decreased NSAID use.    Dysphagia, aspiration: Jessica Ramos visit 9/13/23, video 10/10, known aspiration at baseline although swallowing a little better with DE. Gtube 10/13. Not doing TF but using for meds. Was clogged but now resolved w/o need for replacement and flushing well.    R KAILASH: Met with Dr. Tapia, vascular surgery fellow 8/21. Recommended treating the cancer first, asa 81 and high dose atorva, follow up 6  months with repeat US. PET/CT showed possible old R temporal infarct. Exhibited TIA symptoms in early October with 4 hours episode of confusion. MR brain and CTA head 10/23 w/o acute findings.    H/o food impaction: lower esophagus monitor    Liver issue: Says he was to have some sort of imaging at [facility] to look at his liver that had to be canceled due to the appts he needed for the cancer. Will try to figure out what that was about - not actively discussed again today. Might be related to his mom having cirrhosis without hepatitis or ETOH hx.    45 minutes spent on the date of the encounter doing chart review, review of test results, interpretation of tests, patient visit, and documentation     Juany Gallagher, OKSANA    SUBJECTIVE  Mr. Thakkar (Ace) is seen today for initiation of chemoradiation with weekly carbo/taxol.   -Feels well. Pain in neck and throat resolved  -No coughing with eating  -Flushing feeding tube, not using for nutrition  -Reports neuropathy in his feet that has been present for decades. Now also numbness in fingertips, perhaps slightly worse with C1 carbo/taxol. Fingertip symptoms only limit ability to button shirt but are otherwise not bothersome  -Reports he has significantly decreased ibuprofen use  -No melena, constipation, diarrhea    PAST MEDICAL HISTORY  SCC as above  GERD  H/o food impaction in 2008 and 2014 (steak) related to Schatzki ring on EGD 10/13/14  DM2. Last A1c about 7.6 sometime in summer 2023 and Nov 2023  Dyslipidemia  Possible prior L temporal CVA  HTN  H.o spinal meningitis as a baby -   R arm surgery  Bone graft to forehead  Ex lap   Tinnitus secondary to treatment for meningitis or from menigitis - bilateral   Hearing loss -- audiogram 10/2/2023  Numbness in the toes - mostly big toes   Crushing pills   No muscle aches or pains     CURRENT OUTPATIENT MEDICATIONS  Reviewed    ALLERGIES  No Known Allergies     PHYSICAL EXAM  BP (!) 161/78 (BP Location: Right arm, Patient  "Position: Chair, Cuff Size: Adult Large)   Pulse 89   Temp 98.4  F (36.9  C) (Oral)   Resp 18   Ht 1.676 m (5' 5.98\")   Wt 78.5 kg (173 lb 1.6 oz)   SpO2 99%   BMI 27.95 kg/m      General: Well-appearing male, NAD  Eyes: EOMI, PERRL. No scleral icterus.  ENT: Oral mucosa moist. No lesions, thrush.   Cardiovascular: RRR, no m/g/r. No peripheral edema .  Respiratory: CTA bilaterally. No wheezes or crackles.  Neurologic: Grossly nonfocal  Skin: No rashes, petechiae, or bruising noted on exposed skin.      LABORATORY AND IMAGING STUDIES  Most Recent 3 CBC's:  Recent Labs   Lab Test 12/19/23  0643 11/29/23  1501 11/13/23  0809   WBC 4.8 4.0 8.2   HGB 13.2* 11.6* 13.1*   MCV 86 85 86    169 251   ANEUTAUTO 2.4 1.8 5.0    Most Recent 3 BMP's:  Recent Labs   Lab Test 12/19/23  0643 11/29/23  1501 11/21/23  1630 11/13/23  0809    140  --  141   POTASSIUM 3.8 4.1 3.5 2.9*   CHLORIDE 107 107  --  112*   CO2 23 24  --  19*   BUN 9.3 15.6  --  12.6   CR 0.68 0.68  --  0.56*   ANIONGAP 11 9  --  10   OLE 9.1 8.6*  --  7.2*   * 279*  --  148*   PROTTOTAL 7.2 6.4  --  5.8*   ALBUMIN 4.2 3.7  --  3.3*    Most Recent 2 LFT's:  Recent Labs   Lab Test 12/19/23  0643 11/29/23  1501   AST 20 25   ALT 18 33   ALKPHOS 119 123   BILITOTAL 0.2 0.2    Most Recent TSH and T4:  Recent Labs   Lab Test 11/29/23  1501   TSH 3.46     Phos/Mag:  Lab Results   Component Value Date    PHOS 3.0 11/29/2023    MAG 1.7 11/29/2023    MAG 2.3 09/14/2023      I reviewed the above labs today.       "

## 2023-12-19 ENCOUNTER — APPOINTMENT (OUTPATIENT)
Dept: LAB | Facility: CLINIC | Age: 68
End: 2023-12-19
Attending: INTERNAL MEDICINE
Payer: COMMERCIAL

## 2023-12-19 ENCOUNTER — OFFICE VISIT (OUTPATIENT)
Dept: RADIATION ONCOLOGY | Facility: CLINIC | Age: 68
End: 2023-12-19
Attending: SURGERY
Payer: COMMERCIAL

## 2023-12-19 ENCOUNTER — INFUSION THERAPY VISIT (OUTPATIENT)
Dept: ONCOLOGY | Facility: CLINIC | Age: 68
End: 2023-12-19
Attending: INTERNAL MEDICINE
Payer: COMMERCIAL

## 2023-12-19 ENCOUNTER — THERAPY VISIT (OUTPATIENT)
Dept: SPEECH THERAPY | Facility: CLINIC | Age: 68
End: 2023-12-19
Payer: COMMERCIAL

## 2023-12-19 VITALS
HEART RATE: 89 BPM | RESPIRATION RATE: 18 BRPM | DIASTOLIC BLOOD PRESSURE: 78 MMHG | HEIGHT: 66 IN | BODY MASS INDEX: 27.82 KG/M2 | SYSTOLIC BLOOD PRESSURE: 161 MMHG | TEMPERATURE: 98.4 F | OXYGEN SATURATION: 99 % | WEIGHT: 173.1 LBS

## 2023-12-19 VITALS
DIASTOLIC BLOOD PRESSURE: 75 MMHG | HEART RATE: 94 BPM | BODY MASS INDEX: 27.94 KG/M2 | SYSTOLIC BLOOD PRESSURE: 188 MMHG | WEIGHT: 173 LBS

## 2023-12-19 DIAGNOSIS — G62.9 NEUROPATHY: ICD-10-CM

## 2023-12-19 DIAGNOSIS — C10.9 SQUAMOUS CELL CARCINOMA OF OROPHARYNX (H): Primary | ICD-10-CM

## 2023-12-19 DIAGNOSIS — C32.1 SCC (SQUAMOUS CELL CARCINOMA) OF SUPRAGLOTTIS (H): Primary | ICD-10-CM

## 2023-12-19 DIAGNOSIS — R13.10 DYSPHAGIA, UNSPECIFIED TYPE: ICD-10-CM

## 2023-12-19 DIAGNOSIS — R13.13 PHARYNGEAL DYSPHAGIA: ICD-10-CM

## 2023-12-19 DIAGNOSIS — I65.21 CAROTID ARTERY STENOSIS, ASYMPTOMATIC, RIGHT: ICD-10-CM

## 2023-12-19 LAB
ALBUMIN SERPL BCG-MCNC: 4.2 G/DL (ref 3.5–5.2)
ALP SERPL-CCNC: 119 U/L (ref 40–150)
ALT SERPL W P-5'-P-CCNC: 18 U/L (ref 0–70)
ANION GAP SERPL CALCULATED.3IONS-SCNC: 11 MMOL/L (ref 7–15)
AST SERPL W P-5'-P-CCNC: 20 U/L (ref 0–45)
BASOPHILS # BLD AUTO: 0.1 10E3/UL (ref 0–0.2)
BASOPHILS NFR BLD AUTO: 1 %
BILIRUB SERPL-MCNC: 0.2 MG/DL
BUN SERPL-MCNC: 9.3 MG/DL (ref 8–23)
CALCIUM SERPL-MCNC: 9.1 MG/DL (ref 8.8–10.2)
CHLORIDE SERPL-SCNC: 107 MMOL/L (ref 98–107)
CREAT SERPL-MCNC: 0.68 MG/DL (ref 0.67–1.17)
DEPRECATED HCO3 PLAS-SCNC: 23 MMOL/L (ref 22–29)
EGFRCR SERPLBLD CKD-EPI 2021: >90 ML/MIN/1.73M2
EOSINOPHIL # BLD AUTO: 0.2 10E3/UL (ref 0–0.7)
EOSINOPHIL NFR BLD AUTO: 5 %
ERYTHROCYTE [DISTWIDTH] IN BLOOD BY AUTOMATED COUNT: 14 % (ref 10–15)
GLUCOSE SERPL-MCNC: 136 MG/DL (ref 70–99)
HCT VFR BLD AUTO: 38.8 % (ref 40–53)
HGB BLD-MCNC: 13.2 G/DL (ref 13.3–17.7)
IMM GRANULOCYTES # BLD: 0 10E3/UL
IMM GRANULOCYTES NFR BLD: 0 %
LYMPHOCYTES # BLD AUTO: 1.6 10E3/UL (ref 0.8–5.3)
LYMPHOCYTES NFR BLD AUTO: 32 %
MCH RBC QN AUTO: 29.2 PG (ref 26.5–33)
MCHC RBC AUTO-ENTMCNC: 34 G/DL (ref 31.5–36.5)
MCV RBC AUTO: 86 FL (ref 78–100)
MONOCYTES # BLD AUTO: 0.6 10E3/UL (ref 0–1.3)
MONOCYTES NFR BLD AUTO: 12 %
NEUTROPHILS # BLD AUTO: 2.4 10E3/UL (ref 1.6–8.3)
NEUTROPHILS NFR BLD AUTO: 50 %
NRBC # BLD AUTO: 0 10E3/UL
NRBC BLD AUTO-RTO: 0 /100
PLATELET # BLD AUTO: 325 10E3/UL (ref 150–450)
POTASSIUM SERPL-SCNC: 3.8 MMOL/L (ref 3.4–5.3)
PROT SERPL-MCNC: 7.2 G/DL (ref 6.4–8.3)
RBC # BLD AUTO: 4.52 10E6/UL (ref 4.4–5.9)
SODIUM SERPL-SCNC: 141 MMOL/L (ref 135–145)
WBC # BLD AUTO: 4.8 10E3/UL (ref 4–11)

## 2023-12-19 PROCEDURE — 96417 CHEMO IV INFUS EACH ADDL SEQ: CPT

## 2023-12-19 PROCEDURE — 250N000013 HC RX MED GY IP 250 OP 250 PS 637: Performed by: INTERNAL MEDICINE

## 2023-12-19 PROCEDURE — 250N000011 HC RX IP 250 OP 636: Mod: JZ | Performed by: REGISTERED NURSE

## 2023-12-19 PROCEDURE — 258N000003 HC RX IP 258 OP 636: Performed by: REGISTERED NURSE

## 2023-12-19 PROCEDURE — 97803 MED NUTRITION INDIV SUBSEQ: CPT | Performed by: DIETITIAN, REGISTERED

## 2023-12-19 PROCEDURE — 96413 CHEMO IV INFUSION 1 HR: CPT

## 2023-12-19 PROCEDURE — 99215 OFFICE O/P EST HI 40 MIN: CPT | Performed by: REGISTERED NURSE

## 2023-12-19 PROCEDURE — 92526 ORAL FUNCTION THERAPY: CPT | Mod: GN | Performed by: SPEECH-LANGUAGE PATHOLOGIST

## 2023-12-19 PROCEDURE — 258N000003 HC RX IP 258 OP 636: Performed by: INTERNAL MEDICINE

## 2023-12-19 PROCEDURE — G0463 HOSPITAL OUTPT CLINIC VISIT: HCPCS | Mod: 25 | Performed by: REGISTERED NURSE

## 2023-12-19 PROCEDURE — 85025 COMPLETE CBC W/AUTO DIFF WBC: CPT

## 2023-12-19 PROCEDURE — 96375 TX/PRO/DX INJ NEW DRUG ADDON: CPT

## 2023-12-19 PROCEDURE — 80053 COMPREHEN METABOLIC PANEL: CPT

## 2023-12-19 PROCEDURE — 96367 TX/PROPH/DG ADDL SEQ IV INF: CPT

## 2023-12-19 PROCEDURE — 77386 HC IMRT TREATMENT DELIVERY, COMPLEX: CPT | Performed by: SURGERY

## 2023-12-19 PROCEDURE — 36415 COLL VENOUS BLD VENIPUNCTURE: CPT

## 2023-12-19 PROCEDURE — 250N000011 HC RX IP 250 OP 636: Performed by: INTERNAL MEDICINE

## 2023-12-19 RX ORDER — HEPARIN SODIUM,PORCINE 10 UNIT/ML
5-20 VIAL (ML) INTRAVENOUS DAILY PRN
Status: CANCELLED | OUTPATIENT
Start: 2023-12-19

## 2023-12-19 RX ORDER — MEPERIDINE HYDROCHLORIDE 25 MG/ML
25 INJECTION INTRAMUSCULAR; INTRAVENOUS; SUBCUTANEOUS EVERY 30 MIN PRN
Status: CANCELLED | OUTPATIENT
Start: 2023-12-19

## 2023-12-19 RX ORDER — METHYLPREDNISOLONE SODIUM SUCCINATE 125 MG/2ML
125 INJECTION, POWDER, LYOPHILIZED, FOR SOLUTION INTRAMUSCULAR; INTRAVENOUS
Status: CANCELLED
Start: 2023-12-19

## 2023-12-19 RX ORDER — HEPARIN SODIUM (PORCINE) LOCK FLUSH IV SOLN 100 UNIT/ML 100 UNIT/ML
5 SOLUTION INTRAVENOUS
Status: CANCELLED | OUTPATIENT
Start: 2023-12-19

## 2023-12-19 RX ORDER — ALBUTEROL SULFATE 0.83 MG/ML
2.5 SOLUTION RESPIRATORY (INHALATION)
Status: CANCELLED | OUTPATIENT
Start: 2023-12-19

## 2023-12-19 RX ORDER — DIPHENHYDRAMINE HCL 25 MG
50 CAPSULE ORAL ONCE
Status: CANCELLED
Start: 2023-12-19

## 2023-12-19 RX ORDER — DIPHENHYDRAMINE HYDROCHLORIDE 50 MG/ML
50 INJECTION INTRAMUSCULAR; INTRAVENOUS
Status: CANCELLED
Start: 2023-12-19

## 2023-12-19 RX ORDER — LORAZEPAM 2 MG/ML
0.5 INJECTION INTRAMUSCULAR EVERY 4 HOURS PRN
Status: CANCELLED | OUTPATIENT
Start: 2023-12-19

## 2023-12-19 RX ORDER — EPINEPHRINE 1 MG/ML
0.3 INJECTION, SOLUTION INTRAMUSCULAR; SUBCUTANEOUS EVERY 5 MIN PRN
Status: CANCELLED | OUTPATIENT
Start: 2023-12-19

## 2023-12-19 RX ORDER — ALBUTEROL SULFATE 90 UG/1
1-2 AEROSOL, METERED RESPIRATORY (INHALATION)
Status: CANCELLED
Start: 2023-12-19

## 2023-12-19 RX ORDER — DIPHENHYDRAMINE HCL 12.5 MG/5ML
50 SOLUTION ORAL ONCE
Status: COMPLETED | OUTPATIENT
Start: 2023-12-19 | End: 2023-12-19

## 2023-12-19 RX ADMIN — SODIUM CHLORIDE 250 ML: 9 INJECTION, SOLUTION INTRAVENOUS at 08:37

## 2023-12-19 RX ADMIN — DEXAMETHASONE SODIUM PHOSPHATE: 10 INJECTION, SOLUTION INTRAMUSCULAR; INTRAVENOUS at 08:42

## 2023-12-19 RX ADMIN — FAMOTIDINE 20 MG: 10 INJECTION, SOLUTION INTRAVENOUS at 08:39

## 2023-12-19 RX ADMIN — CARBOPLATIN 250 MG: 600 INJECTION, SOLUTION INTRAVENOUS at 10:53

## 2023-12-19 RX ADMIN — DIPHENHYDRAMINE HYDROCHLORIDE 50 MG: 12.5 LIQUID ORAL at 08:37

## 2023-12-19 RX ADMIN — PACLITAXEL 87 MG: 6 INJECTION, SOLUTION INTRAVENOUS at 09:44

## 2023-12-19 ASSESSMENT — PAIN SCALES - GENERAL: PAINLEVEL: MILD PAIN (2)

## 2023-12-19 NOTE — PROGRESS NOTES
[facility] PHYSICIANS  SPECIALIZING IN BREAKTHROUGHS  Radiation Oncology    On Treatment Visit Note      Ko Thakkar      Date: Dec 19, 2023   MRN: [id number]   : 1955  Diagnosis: SCC of Supraglottis        ID: Mr. Thakkar is a 68 year old male [id number] oropharyngeal SCC, p16 positive. Tumor involved the right tonsil, posterior pharyngeal wall, and right AE fold, hypopharynx, with known right cord paralysis, with bilateral adenopathy including right RP node, right level IV, and questionable right parotid node (which was bx proven SCC). There is no evidence of any distant disease. He has evidence of swallow dysfunction with dysphagia and aspiration, and voice dysfunction with dysphonia and right cord paralysis.      Given extent of disease as well as potential delays in starting treatment, induction chemotherapy was at [facility] under care of Dr. Louise.  PET CT scan after 2 cycles of carboplatin paclitaxel demonstrates a good partial response without any distant metastatic disease.    Reason for Visit:  On Radiation Treatment Visit       Treatment Summary to Date    Current Dose: 200/7000 cGy Fractions:       Chemotherapy  Chemo concurrent with radx?: Yes  Oncologist: Dr Louise  Drug Name/Frequency 1: Taxol/carboplatin    Subjective:   No complaints, tolerating RT well overall.    Pain Control: Gabapentin, starting escalation  Fluid Intake: Adequate, PO  Nutrition: PO  Rinses: Starting   Skin care: Aquaphor BID  Chemotherapy: yes    Nursing ROS:   Nutrition Alteration  Diet Type: Patient's Preference  Skin  Skin Reaction: 0 - No changes     ENT and Mouth Exam  Mucositis - Current: 0 - None   Cardiovascular  Respiratory effort: 1 - Normal - without distress  Gastrointestinal  Nausea: 0 - None     Psychosocial  Mood - Anxiety: 0 - Normal  Pain Assessment  0-10 Pain Scale: 0      Objective:   BP (!) 188/75   Pulse 94   Wt 78.5 kg (173 lb)   BMI 27.94 kg/m    Gen: Appears well, in no  acute distress  HN: no mucositis, no erythema   CV/Resp: rrr, breathing comfortably on room air  Neuro: CN 2-12 grossly intact, UE/LE full strength     Labs:  CBC RESULTS:   Recent Labs   Lab Test 12/19/23  0643   WBC 4.8   RBC 4.52   HGB 13.2*   HCT 38.8*   MCV 86   MCH 29.2   MCHC 34.0   RDW 14.0        ELECTROLYTES:  Recent Labs   Lab Test 12/19/23  0643      POTASSIUM 3.8   CHLORIDE 107   OLE 9.1   CO2 23   BUN 9.3   CR 0.68   *       Assessment:    Tolerating radiation therapy well.  All questions and concerns addressed.      Plan:   Continue current therapy.    Continue gabapentin, rinses, nutrition, hydration, skin care      Mosaiq chart and setup information reviewed  Ports checked and MVCT/IGRT images checked    Medication Review  Med list reviewed with patient?: Yes  Med list printed and given: Offered and declined    Educational Topic Discussed  Education Instructions: Reviewed    Juwan Cordova MD  Radiation Oncology   [facility]  Clinic: [phone]

## 2023-12-19 NOTE — PROGRESS NOTES
CLINICAL NUTRITION SERVICES - REASSESSMENT NOTE   EVALUATION OF PREVIOUS PLAN OF CARE:   Time Spent: 40 minutes  Visit Type: video  Pt accompanied by: his wife, Ondina  Referring Physician: Wilfred  C10.9 (ICD-10-CM) - Squamous cell carcinoma of oropharynx (H)   E11.9 (ICD-10-CM) - Type 2 diabetes mellitus without complications (H)        NUTRITION HISTORY  Factors affecting nutrition intake include:taste aversions, swallowing difficulty  Current diet/appetite: general diet, softer foods  Chemotherapy: Started 10/16 - Taxol  Radiation: started today, 12/19  PEG tube: placed on 10/13 (flushing only)  Monitoring from previous assessment:   -Food/Fluid intake - Ace tells me that he has been eating well.  He denies barriers to eating at this time.   -Weight trends - stable   Wt Readings from Last 10 Encounters:   12/19/23 78.5 kg (173 lb 1.6 oz)   11/29/23 80.3 kg (177 lb)   11/13/23 78.2 kg (172 lb 8 oz)   10/23/23 79.8 kg (176 lb)   10/16/23 80.9 kg (178 lb 4.8 oz)   10/13/23 82.3 kg (181 lb 6.4 oz)   10/06/23 82.8 kg (182 lb 8 oz)   09/19/23 82.6 kg (182 lb)   09/14/23 81.6 kg (180 lb)   09/13/23 80.3 kg (177 lb)     Previous Goals:   1.  Aim for 5-6 small frequent meals  2.  Aim for 2000-2400kcal and 80-100g protein, >8 cups non-caffeine fluids per day  3. Weight maintenance/no more than 1-2 lb wt loss each week during treatment  Evaluation: Met   Previous Nutrition Diagnosis:   Predicted suboptimal nutrient intake related cancer treatment to head/neck region, difficulty swallowing, PEG tube in place   Evaluation: No change   NEW FINDINGS:   No new findings   CURRENT NUTRITION DIAGNOSIS   Predicted suboptimal nutrient intake related cancer treatment to head/neck region, difficulty swallowing, PEG tube in place   INTERVENTIONS   Reviewed general, healthy diet.  Reviewed ways to increase kcal and protein intake and reviewed oral nutritional supplements (Ensure Plus/Boost Plus etc) to have daily prn.  Reviewed nutrition  goals during treatment as below.    Reviewed PO intake and transition to EN via PEG prn.   Goals  1.  Aim for 5-6 small frequent meals  2.  Aim for 2000-2400kcal and 80-100g protein, >8 cups non-caffeine fluids per day  3. Weight maintenance/no more than 1-2 lb wt loss each week during treatment   Follow-Up Plans: schedule for 2 week follow up  MONITORING AND EVALUATION:  -Food/beverage intake, Weight trends  Meka Lee RD, , LD  [facility]  [phone]

## 2023-12-19 NOTE — LETTER
2023         RE: Ko Thakkar  [address]  [address]  [place] [address]        Dear Colleague,    Thank you for referring your patient, Ko Thakkar, to the [facility] RADIATION ONCOLOGY. Please see a copy of my visit note below.    [facility] PHYSICIANS  SPECIALIZING IN BREAKTHROUGHS  Radiation Oncology    On Treatment Visit Note      Ko Thakkar      Date: Dec 19, 2023   MRN: [id number]   : 1955  Diagnosis: SCC of Supraglottis        ID: Mr. Thakkar is a 68 year old male [id number] oropharyngeal SCC, p16 positive. Tumor involved the right tonsil, posterior pharyngeal wall, and right AE fold, hypopharynx, with known right cord paralysis, with bilateral adenopathy including right RP node, right level IV, and questionable right parotid node (which was bx proven SCC). There is no evidence of any distant disease. He has evidence of swallow dysfunction with dysphagia and aspiration, and voice dysfunction with dysphonia and right cord paralysis.      Given extent of disease as well as potential delays in starting treatment, induction chemotherapy was at [facility] under care of Dr. Louise.  PET CT scan after 2 cycles of carboplatin paclitaxel demonstrates a good partial response without any distant metastatic disease.    Reason for Visit:  On Radiation Treatment Visit       Treatment Summary to Date    Current Dose: 200/7000 cGy Fractions:       Chemotherapy  Chemo concurrent with radx?: Yes  Oncologist: Dr Louise  Drug Name/Frequency 1: Taxol/carboplatin    Subjective:   No complaints, tolerating RT well overall.    Pain Control: Gabapentin, starting escalation  Fluid Intake: Adequate, PO  Nutrition: PO  Rinses: Starting   Skin care: Aquaphor BID  Chemotherapy: yes    Nursing ROS:   Nutrition Alteration  Diet Type: Patient's Preference  Skin  Skin Reaction: 0 - No changes     ENT and Mouth Exam  Mucositis - Current: 0 - None    Cardiovascular  Respiratory effort: 1 - Normal - without distress  Gastrointestinal  Nausea: 0 - None     Psychosocial  Mood - Anxiety: 0 - Normal  Pain Assessment  0-10 Pain Scale: 0      Objective:   BP (!) 188/75   Pulse 94   Wt 78.5 kg (173 lb)   BMI 27.94 kg/m    Gen: Appears well, in no acute distress  HN: no mucositis, no erythema   CV/Resp: rrr, breathing comfortably on room air  Neuro: CN 2-12 grossly intact, UE/LE full strength     Labs:  CBC RESULTS:   Recent Labs   Lab Test 12/19/23  0643   WBC 4.8   RBC 4.52   HGB 13.2*   HCT 38.8*   MCV 86   MCH 29.2   MCHC 34.0   RDW 14.0        ELECTROLYTES:  Recent Labs   Lab Test 12/19/23  0643      POTASSIUM 3.8   CHLORIDE 107   OLE 9.1   CO2 23   BUN 9.3   CR 0.68   *       Assessment:    Tolerating radiation therapy well.  All questions and concerns addressed.      Plan:   Continue current therapy.    Continue gabapentin, rinses, nutrition, hydration, skin care      Mosaiq chart and setup information reviewed  Ports checked and MVCT/IGRT images checked    Medication Review  Med list reviewed with patient?: Yes  Med list printed and given: Offered and declined    Educational Topic Discussed  Education Instructions: Reviewed    Juwan Cordova MD  Radiation Oncology   [facility]  Clinic: [phone]

## 2023-12-19 NOTE — NURSING NOTE
Chief Complaint   Patient presents with    Blood Draw     Labs drawn from PIV placed by RN. Line flushed with saline. Vitals taken. Pt checked in for appointment(s). Notified rooming staff to re-check bp, also sent in-basket to provider.  Yessi Fountain RN

## 2023-12-19 NOTE — PROGRESS NOTES
[facility] PHYSICIANS  SPECIALIZING IN BREAKTHROUGHS  Radiation Oncology    On Treatment Visit Note      Ko Thakkar      Date: Dec 19, 2023   MRN: [id number]   : 1955  Diagnosis: SCC of Supraglottis        ID: Mr. Thakkar is a 68 year old male [id number] oropharyngeal SCC, p16 positive. Tumor involved the right tonsil, posterior pharyngeal wall, and right AE fold, hypopharynx, with known right cord paralysis, with bilateral adenopathy including right RP node, right level IV, and questionable right parotid node (which was bx proven SCC). There is no evidence of any distant disease. He has evidence of swallow dysfunction with dysphagia and aspiration, and voice dysfunction with dysphonia and right cord paralysis.      Given extent of disease as well as potential delays in starting treatment, induction chemotherapy was at [facility] under care of Dr. Louise.  PET CT scan after 2 cycles of carboplatin paclitaxel demonstrates a good partial response without any distant metastatic disease.    Reason for Visit:  On Radiation Treatment Visit       Treatment Summary to Date    Current Dose: 200/7000 cGy Fractions:       Chemotherapy  Chemo concurrent with radx?: Yes  Oncologist: Dr Louise  Drug Name/Frequency 1: Taxol/carboplatin    Subjective:   No complaints, tolerating RT well overall.    Pain Control: Gabapentin, starting escalation  Fluid Intake: Adequate, PO  Nutrition: PO  Rinses: Starting   Skin care: Aquaphor BID  Chemotherapy: yes    Nursing ROS:   Nutrition Alteration  Diet Type: Patient's Preference  Skin  Skin Reaction: 0 - No changes     ENT and Mouth Exam  Mucositis - Current: 0 - None   Cardiovascular  Respiratory effort: 1 - Normal - without distress  Gastrointestinal  Nausea: 0 - None     Psychosocial  Mood - Anxiety: 0 - Normal  Pain Assessment  0-10 Pain Scale: 0      Objective:   BP (!) 188/75   Pulse 94   Wt 78.5 kg (173 lb)   BMI 27.94 kg/m    Gen: Appears well, in no  acute distress  HN: no mucositis, no erythema   CV/Resp: rrr, breathing comfortably on room air  Neuro: CN 2-12 grossly intact, UE/LE full strength     Labs:  CBC RESULTS:   Recent Labs   Lab Test 12/19/23  0643   WBC 4.8   RBC 4.52   HGB 13.2*   HCT 38.8*   MCV 86   MCH 29.2   MCHC 34.0   RDW 14.0        ELECTROLYTES:  Recent Labs   Lab Test 12/19/23  0643      POTASSIUM 3.8   CHLORIDE 107   OLE 9.1   CO2 23   BUN 9.3   CR 0.68   *       Assessment:    Tolerating radiation therapy well.  All questions and concerns addressed.      Plan:   Continue current therapy.    Continue gabapentin, rinses, nutrition, hydration, skin care      Mosaiq chart and setup information reviewed  Ports checked and MVCT/IGRT images checked    Medication Review  Med list reviewed with patient?: Yes  Med list printed and given: Offered and declined    Educational Topic Discussed  Education Instructions: Reviewed    Juwan Cordova MD  Radiation Oncology   [facility]  Clinic: [phone]

## 2023-12-19 NOTE — LETTER
2023         RE: Ko Thakkar  [address]  [address]  [place] [address]        Dear Colleague,    Thank you for referring your patient, Ko Thakkar, to the [facility] RADIATION ONCOLOGY. Please see a copy of my visit note below.    [facility] PHYSICIANS  SPECIALIZING IN BREAKTHROUGHS  Radiation Oncology    On Treatment Visit Note      Ko Thakkar      Date: Dec 19, 2023   MRN: [id number]   : 1955  Diagnosis: SCC of Supraglottis        ID: Mr. Thakkar is a 68 year old male [id number] oropharyngeal SCC, p16 positive. Tumor involved the right tonsil, posterior pharyngeal wall, and right AE fold, hypopharynx, with known right cord paralysis, with bilateral adenopathy including right RP node, right level IV, and questionable right parotid node (which was bx proven SCC). There is no evidence of any distant disease. He has evidence of swallow dysfunction with dysphagia and aspiration, and voice dysfunction with dysphonia and right cord paralysis.      Given extent of disease as well as potential delays in starting treatment, induction chemotherapy was at [facility] under care of Dr. Louise.  PET CT scan after 2 cycles of carboplatin paclitaxel demonstrates a good partial response without any distant metastatic disease.    Reason for Visit:  On Radiation Treatment Visit       Treatment Summary to Date    Current Dose: 200/7000 cGy Fractions:       Chemotherapy  Chemo concurrent with radx?: Yes  Oncologist: Dr Louise  Drug Name/Frequency 1: Taxol/carboplatin    Subjective:   No complaints, tolerating RT well overall.    Pain Control: Gabapentin, starting escalation  Fluid Intake: Adequate, PO  Nutrition: PO  Rinses: Starting   Skin care: Aquaphor BID  Chemotherapy: yes    Nursing ROS:   Nutrition Alteration  Diet Type: Patient's Preference  Skin  Skin Reaction: 0 - No changes     ENT and Mouth Exam  Mucositis - Current: 0 - None    Cardiovascular  Respiratory effort: 1 - Normal - without distress  Gastrointestinal  Nausea: 0 - None     Psychosocial  Mood - Anxiety: 0 - Normal  Pain Assessment  0-10 Pain Scale: 0      Objective:   BP (!) 188/75   Pulse 94   Wt 78.5 kg (173 lb)   BMI 27.94 kg/m    Gen: Appears well, in no acute distress  HN: no mucositis, no erythema   CV/Resp: rrr, breathing comfortably on room air  Neuro: CN 2-12 grossly intact, UE/LE full strength     Labs:  CBC RESULTS:   Recent Labs   Lab Test 12/19/23  0643   WBC 4.8   RBC 4.52   HGB 13.2*   HCT 38.8*   MCV 86   MCH 29.2   MCHC 34.0   RDW 14.0        ELECTROLYTES:  Recent Labs   Lab Test 12/19/23  0643      POTASSIUM 3.8   CHLORIDE 107   OLE 9.1   CO2 23   BUN 9.3   CR 0.68   *       Assessment:    Tolerating radiation therapy well.  All questions and concerns addressed.      Plan:   Continue current therapy.    Continue gabapentin, rinses, nutrition, hydration, skin care      Mosaiq chart and setup information reviewed  Ports checked and MVCT/IGRT images checked    Medication Review  Med list reviewed with patient?: Yes  Med list printed and given: Offered and declined    Educational Topic Discussed  Education Instructions: Reviewed    Juwan Cordova MD  Radiation Oncology   [facility]  Clinic: [phone]

## 2023-12-19 NOTE — PATIENT INSTRUCTIONS
[facility] Triage and after hours / weekends / holidays:  [phone]    Please call the triage or after hours line if you experience a temperature greater than or equal to 100.4, shaking chills, have uncontrolled nausea, vomiting and/or diarrhea, dizziness, shortness of breath, chest pain, bleeding, unexplained bruising, or if you have any other new/concerning symptoms, questions or concerns.      If you are having any concerning symptoms or wish to speak to a provider before your next infusion visit, please call triage to notify them so we can adequately serve you.     If you need a refill on a narcotic prescription or other medication, please call before your infusion appointment.

## 2023-12-19 NOTE — PROGRESS NOTES
Infusion Nursing Note:  Ko Thakkar presents today for Cycle 1, Day 1- Carboplatin (#3) and Taxol Infusion (has had 2 cycles previously)    Patient seen by provider today: Yes: Juany Gallagher CNP   present during visit today: Not Applicable.    Note: Patient reports feeling well on arrival to infusion suite today. Denies signs of infection: no fever, cough, chills, rash, chest pain, or worsening shortness of breath. No new questions, changes, or concerns since PALOMO appointment this morning. Wishes to proceed with treatment today.     Patient BP elevated today, patient took his BP medications prior to leaving infusion. Instructed patient to check BP at home and if he develops symptoms to call the triage line. Patient verbalized understanding.     Intravenous Access:  Peripheral IV placed.    Treatment Conditions:  Lab Results   Component Value Date    HGB 13.2 (L) 12/19/2023    WBC 4.8 12/19/2023    ANEUTAUTO 2.4 12/19/2023     12/19/2023        Lab Results   Component Value Date     12/19/2023    POTASSIUM 3.8 12/19/2023    MAG 1.7 11/29/2023    CR 0.68 12/19/2023    OLE 9.1 12/19/2023    BILITOTAL 0.2 12/19/2023    ALBUMIN 4.2 12/19/2023    ALT 18 12/19/2023    AST 20 12/19/2023       Results reviewed, labs MET treatment parameters, ok to proceed with treatment.      Post Infusion Assessment:  Patient tolerated infusion without incident.  Blood return noted pre and post infusion.  Site patent and intact, free from redness, edema or discomfort.  No evidence of extravasations.  Access discontinued per protocol.       Discharge Plan:   Patient declined prescription refills.  Discharge instructions reviewed with: Patient and Family.  Patient and/or family verbalized understanding of discharge instructions and all questions answered.  AVS to patient via [facility].  Patient will return 12/27/23 for next appointment.   Patient discharged in stable condition accompanied by: wife.  Departure  Mode: Ambulatory.      Emma Torres RN

## 2023-12-19 NOTE — NURSING NOTE
"Oncology Rooming Note    December 19, 2023 7:14 AM   Ko Thakkar is a 68 year old male who presents for:    Chief Complaint   Patient presents with    Blood Draw    Oncology Clinic Visit     [facility] RETURN - SQUAMOUS CELL CARCINOMA OF OROPHARYNX     Initial Vitals: BP (!) 161/78 (BP Location: Right arm, Patient Position: Chair, Cuff Size: Adult Large)   Pulse 89   Temp 98.4  F (36.9  C) (Oral)   Resp 18   Ht 1.676 m (5' 5.98\")   Wt 78.5 kg (173 lb 1.6 oz)   SpO2 99%   BMI 27.95 kg/m   Estimated body mass index is 27.95 kg/m  as calculated from the following:    Height as of this encounter: 1.676 m (5' 5.98\").    Weight as of this encounter: 78.5 kg (173 lb 1.6 oz). Body surface area is 1.91 meters squared.  Mild Pain (2) Comment: Data Unavailable   No LMP for male patient.  Allergies reviewed: Yes  Medications reviewed: Yes    Medications: Medication refills not needed today.  Pharmacy name entered into EPIC:    [facility] - [place], [place] - [address] [facility] - [place], [place] - [address] [facility]    Jorge Bahena LPN              "

## 2023-12-19 NOTE — Clinical Note
12/19/2023         RE: Ko Thakkar  [address]  [address]  [place] [address]        Dear Colleague,    Thank you for referring your patient, Ko Thakkar, to the [facility]. Please see a copy of my visit note below.       [facility]    PATIENT NAME: Ko Thakkar  MRN # [id number]   DATE OF VISIT: December 19, 2023  YOB: 1955     Otolaryngology: Dr. Denia Hardin  Radiation Oncology: Dr. Juwan Cordova   PCP: Dr. Alton Mota; [facility] in [place]     CANCER TYPE: SCC supraglottis  STAGE: [id number] (IRON)  ECOG PS: 1    PD-L1:  NGS:     SUMMARY  5/16/23 EGD for dysphagia. Gastritis and gastric diverticulum. Bx showed H. Pylori, treated. No atrophic gastritis or dysplasia  7/11/23 Swallow study at . Poor epiglottic inversion, penetration, aspiration  7/28/23 CT neck. 2.2 x 1.4 x 2.3 cm supraglottic mass, concern for paraglottic involvement, bilateral IB, IIA, III, IV adenopathy, 70% ICA stenosis   8/21/23 US carotid. 70-99% stenosis R ICA. <50% stenosis L ICA  8/29/23 FNA L level 2 node (IR). Path: SCC  9/6/23 PET/CT. FDG avid mass (SUV 13.5) right tonsil/lateral pharyngeal wall/glossotonsillar sulcus with slight extension to the R tongue base, right AE fold, right epiglottis, right piriform sinus, right posterolateral pharyngeal wall, posterior pharyngeal wall, no thyroid cartilage erosion, right retropharyngeal node, deep lobe parotid FDG avid mass, bilateral cervical nodes (right level II-IV, left level II-III), no distant disease, L mandibular canine with periapical abscess. R temporal lobe change suggestive of prior infarct. 5 mm LLL nodule.  10/10/23 Video swallow.   10/16~11/13/23 C1-2 carboplatin paclitaxel. Given due to logistical issues with pre-chemoradiation evaluation that would have delayed start of any treatment substantially. Paclitaxel dose reduced to 140 mg/m2 C2 due to neuropathy  10p/23/23 CTA. 70%  narrowing R carotid carlos/bifurcation similar to 7/28/23 CT. Laterally projecting disc osteophyte complex resulting in mod-severe L vertebral artery narrowing at C3-4 level.  10/23/23 Brain MRI. No mets. Bilateral frontal and temporal lobe encephalomalacia, R > L. Mild volume loss.   11/28/23 PET/CT. Good CT.     ASSESSMENT AND PLAN  SCC supraglottis, [id number] (IRON): Declined surgery. Plannied chemoradiation but with all of the logistical issues and work needed prior to starting, also due to the large size of the primary tumor, started carboplatin + paclitaxel first, good CT after 2 cycles, especially considering the 1 month interval between the initial staging PET/CT and starting chemo. Was not a candidate for TPF due to comorbidities. Read still pending; will let him know if anything changes. Tiny pulm nodules largely look about the same to me - see description below. Discussed with Dr. Cordova, will move forward with chemorads assuming no surprises on the read. Not a cnadidate for evenb weekly cisplatin due to severe hearing loss. Will stick with weekly carboplatin paclitaxel. Sim 12/5. Anticipate starting ~12/18    Dental: Had extractions as planned prior to starting chemo, still needs bone shaved down some, which we delayed for chemo. His dentist has told Ace that the whole process is going to shelved until he's recovered from cancer treatment. Discussed that we recommend waiting a number of months after chemoradiation is completed prior to making dentures.     Neuropathy: Worse after C1 chemo, no worse after C2, will monitor losely with weekly carboplatin paclitaxel. B12 10/6/23 was ok. TSH has been ok. I don't think we need to check SPEP/UPEP, etc., right now    Hypokalemia: Resolved.     Ibuprofen use, arthritis: Emphasized the significant danger of taking that much ibuprofen, including risk of death from GI bleeding. Offered to refer to pain specialist to discuss non-opioid ways to manage pain, but he declined.      Melena: One time episode, no associated symptoms, not clear whether bleeding or not, hemoglobin down a little bit but not out of proportion to chemo. Discussed he needs to go to the ED if recurs, and/or develops other signs of GI bleeding.     Dysphagia, aspiration: Jessica Ramos visit 9/13/23, video 10/10, known aspiration at baseline although swallowing a little better with NC. Gtube 10/13, needs replacement, clogged. Not doing TF but using for meds. Will try coke to start but o/w scheduled for replacement this Fri.    R KAILASH: Met with Dr. Tapia, vascular surgery fellow 8/21. Recommended treating the cancer first, asa 81 and high dose atorva, follow up 6 months with repeat US. PET/CT showed possible old R temporal infarct. Not discussed today.     H/o food impaction: lower esophagus monitor    Liver issue: Says he was to have some sort of imaging at [facility] to look at his liver that had to be canceled due to the appts he needed for the cancer. Will try to figure out what that was about - not actively discussed again today. Might be related to his mom having cirrhosis without hepatitis or ETOH hx.    *** minutes spent on the date of the encounter doing {2021 E&M time in:380953}     Juany Gallagher, OKSANA    SUBJECTIVE  Mr. Thakkar (Ace) is seen today for initiation of chemoradiation with weekly carbo/taxol.     ***    PAST MEDICAL HISTORY  SCC as above  GERD  H/o food impaction in 2008 and 2014 (steak) related to Schatzki ring on EGD 10/13/14  DM2. Last A1c about 7.6 sometime in summer 2023 and Nov 2023  Dyslipidemia  Possible prior L temporal CVA  HTN  H.o spinal meningitis as a baby -   R arm surgery  Bone graft to forehead  Ex lap   Tinnitus secondary to treatment for meningitis or from menigitis - bilateral   Hearing loss -- audiogram 10/2/2023  Numbness in the toes - mostly big toes   Crushing pills   No muscle aches or pains     CURRENT OUTPATIENT MEDICATIONS  Reviewed    ALLERGIES  No Known Allergies     PHYSICAL  EXAM  There were no vitals taken for this visit.    General: Well-appearing male in no acute distress.  Eyes: EOMI, PERRL. No scleral icterus.  ENT: Oral mucosa is moist without lesions or thrush.   Lymphatic: Neck is supple without cervical or supraclavicular lymphadenopathy.   Cardiovascular: RRR No murmurs, gallops, or rubs. No peripheral edema.  Respiratory: CTA bilaterally. No wheezes or crackles.  Gastrointestinal: BS +. Abdomen soft, non-tender. No palpable hepatosplenomegaly or masses.   Neurologic: Cranial nerves II through XII are grossly intact.  Skin: No rashes, petechiae, or bruising noted on exposed skin.      LABORATORY AND IMAGING STUDIES  Most Recent 3 CBC's:  Recent Labs   Lab Test 11/29/23  1501 11/13/23  0809 10/23/23  1302   WBC 4.0 8.2 5.0   HGB 11.6* 13.1* 12.1*   MCV 85 86 83    251 270   ANEUTAUTO 1.8 5.0 3.1    Most Recent 3 BMP's:  Recent Labs   Lab Test 11/29/23  1501 11/21/23  1630 11/13/23  0809 10/23/23  1302     --  141 135   POTASSIUM 4.1 3.5 2.9* 3.3*   CHLORIDE 107  --  112* 99   CO2 24  --  19* 24   BUN 15.6  --  12.6 24.1*   CR 0.68  --  0.56* 0.84   ANIONGAP 9  --  10 12   OLE 8.6*  --  7.2* 8.6*   *  --  148* 267*   PROTTOTAL 6.4  --  5.8* 6.4   ALBUMIN 3.7  --  3.3* 3.8    Most Recent 2 LFT's:  Recent Labs   Lab Test 11/29/23  1501 11/13/23  0809   AST 25 16   ALT 33 13   ALKPHOS 123 109   BILITOTAL 0.2 0.2    Most Recent TSH and T4:  Recent Labs   Lab Test 11/29/23  1501   TSH 3.46     Phos/Mag:  Lab Results   Component Value Date    PHOS 3.0 11/29/2023    MAG 1.7 11/29/2023    MAG 2.3 09/14/2023      I reviewed the above labs today.            [facility]    PATIENT NAME: Ko Thakkar  MRN # [id number]   DATE OF VISIT: December 19, 2023  YOB: 1955     Otolaryngology: Dr. Denia Hardin  Radiation Oncology: Dr. Juwan Cordova   PCP: Dr. Alton Mota; [facility] in [place]     CANCER TYPE: SCC supraglottis  STAGE:  [id number] (IRON)  ECOG PS: 1    PD-L1:  NGS:     SUMMARY  5/16/23 EGD for dysphagia. Gastritis and gastric diverticulum. Bx showed H. Pylori, treated. No atrophic gastritis or dysplasia  7/11/23 Swallow study at [facility]. Poor epiglottic inversion, penetration, aspiration  7/28/23 CT neck. 2.2 x 1.4 x 2.3 cm supraglottic mass, concern for paraglottic involvement, bilateral IB, IIA, III, IV adenopathy, 70% ICA stenosis   8/21/23 US carotid. 70-99% stenosis R ICA. <50% stenosis L ICA  8/29/23 FNA L level 2 node (IR). Path: SCC  9/6/23 PET/CT. FDG avid mass (SUV 13.5) right tonsil/lateral pharyngeal wall/glossotonsillar sulcus with slight extension to the R tongue base, right AE fold, right epiglottis, right piriform sinus, right posterolateral pharyngeal wall, posterior pharyngeal wall, no thyroid cartilage erosion, right retropharyngeal node, deep lobe parotid FDG avid mass, bilateral cervical nodes (right level II-IV, left level II-III), no distant disease, L mandibular canine with periapical abscess. R temporal lobe change suggestive of prior infarct. 5 mm LLL nodule.  10/10/23 Video swallow.   10/16~11/13/23 C1-2 carboplatin paclitaxel. Given due to logistical issues with pre-chemoradiation evaluation that would have delayed start of any treatment substantially. Paclitaxel dose reduced to 140 mg/m2 C2 due to neuropathy  10p/23/23 CTA. 70% narrowing R carotid carlos/bifurcation similar to 7/28/23 CT. Laterally projecting disc osteophyte complex resulting in mod-severe L vertebral artery narrowing at C3-4 level.  10/23/23 Brain MRI. No mets. Bilateral frontal and temporal lobe encephalomalacia, R > L. Mild volume loss.   11/28/23 PET/CT. Good MI.     ASSESSMENT AND PLAN  SCC supraglottis, [id number] (IRON): Declined surgery. Plannied chemoradiation but with all of the logistical issues and work needed prior to starting, also due to the large size of the primary tumor, started carboplatin + paclitaxel first. PET/CT confirmed  good KS after 2 cycles. Will now move forward with chemoradiation. Not a candidate for cisplatin due to severe hearing loss. Will continue with weekly carboplatin/paclitaxel. Anticipate he will tolerated weekly chemo well given response to higher doses. Labs today stable and acceptable to proceed with day 1 carbo/taxol.  -RTC weekly for PALOMO visit with infusion      Dental: Had extractions as planned prior to starting chemo, still needs bone shaved down some, which was delayed to start chemo.  Ace has been told by his dentist the process is going to be postponed until he recovers from cancer treatment. Should wait many months following completion of chemoradiation before denture fitting.    Neuropathy: Worse after C1 chemo, no worse after C2. Monitor loosely with weekly carboplatin paclitaxel. B12 10/6/23 was ok. TSH has been ok.     Hypokalemia: Resolved.     Ibuprofen use, arthritis: Reports decreased use. Was most recently using for cancer-related pain which has improved with response to treatment. Again discouraged use in extremely high doses due to significant risk of bleeding.    Melena: One time episode, no associated symptoms, not clear whether bleeding or not. Denies recurrence. Has decreased NSAID use.    Dysphagia, aspiration: Jessica Ramos visit 9/13/23, video 10/10, known aspiration at baseline although swallowing a little better with KS. Gtube 10/13. Not doing TF but using for meds. Was clogged but now resolved w/o need for replacement and flushing well.    R KAILASH: Met with Dr. Tapia, vascular surgery fellow 8/21. Recommended treating the cancer first, asa 81 and high dose atorva, follow up 6 months with repeat US. PET/CT showed possible old R temporal infarct. Exhibited TIA symptoms in early October with 4 hours episode of confusion. MR brain and CTA head 10/23 w/o acute findings.    H/o food impaction: lower esophagus monitor    Liver issue: Says he was to have some sort of imaging at [facility] to look at  his liver that had to be canceled due to the appts he needed for the cancer. Will try to figure out what that was about - not actively discussed again today. Might be related to his mom having cirrhosis without hepatitis or ETOH hx.    *** minutes spent on the date of the encounter doing {2021 E&M time in:577909}     Juany Gallagher, CNP    SUBJECTIVE  Mr. Thakkar (Ace) is seen today for initiation of chemoradiation with weekly carbo/taxol.   -Feels well. Pain in neck and throat resolved  -No coughing with eating  -Flushing feeding tube, not using for nutrition  -    PAST MEDICAL HISTORY  SCC as above  GERD  H/o food impaction in 2008 and 2014 (steak) related to Schatzki ring on EGD 10/13/14  DM2. Last A1c about 7.6 sometime in summer 2023 and Nov 2023  Dyslipidemia  Possible prior L temporal CVA  HTN  H.o spinal meningitis as a baby -   R arm surgery  Bone graft to forehead  Ex lap   Tinnitus secondary to treatment for meningitis or from menigitis - bilateral   Hearing loss -- audiogram 10/2/2023  Numbness in the toes - mostly big toes   Crushing pills   No muscle aches or pains     CURRENT OUTPATIENT MEDICATIONS  Reviewed    ALLERGIES  No Known Allergies     PHYSICAL EXAM  There were no vitals taken for this visit.    General: Well-appearing male in no acute distress.  Eyes: EOMI, PERRL. No scleral icterus.  ENT: Oral mucosa is moist without lesions or thrush.   Lymphatic: Neck is supple without cervical or supraclavicular lymphadenopathy.   Cardiovascular: RRR No murmurs, gallops, or rubs. No peripheral edema.  Respiratory: CTA bilaterally. No wheezes or crackles.  Gastrointestinal: BS +. Abdomen soft, non-tender. No palpable hepatosplenomegaly or masses.   Neurologic: Cranial nerves II through XII are grossly intact.  Skin: No rashes, petechiae, or bruising noted on exposed skin.      LABORATORY AND IMAGING STUDIES  Most Recent 3 CBC's:  Recent Labs   Lab Test 11/29/23  1501 11/13/23  0809 10/23/23  1302   WBC 4.0  8.2 5.0   HGB 11.6* 13.1* 12.1*   MCV 85 86 83    251 270   ANEUTAUTO 1.8 5.0 3.1    Most Recent 3 BMP's:  Recent Labs   Lab Test 11/29/23  1501 11/21/23  1630 11/13/23  0809 10/23/23  1302     --  141 135   POTASSIUM 4.1 3.5 2.9* 3.3*   CHLORIDE 107  --  112* 99   CO2 24  --  19* 24   BUN 15.6  --  12.6 24.1*   CR 0.68  --  0.56* 0.84   ANIONGAP 9  --  10 12   OLE 8.6*  --  7.2* 8.6*   *  --  148* 267*   PROTTOTAL 6.4  --  5.8* 6.4   ALBUMIN 3.7  --  3.3* 3.8    Most Recent 2 LFT's:  Recent Labs   Lab Test 11/29/23  1501 11/13/23  0809   AST 25 16   ALT 33 13   ALKPHOS 123 109   BILITOTAL 0.2 0.2    Most Recent TSH and T4:  Recent Labs   Lab Test 11/29/23  1501   TSH 3.46     Phos/Mag:  Lab Results   Component Value Date    PHOS 3.0 11/29/2023    MAG 1.7 11/29/2023    MAG 2.3 09/14/2023      I reviewed the above labs today.           Again, thank you for allowing me to participate in the care of your patient.        Sincerely,        Juany Gallagher, CNP

## 2023-12-20 ENCOUNTER — TELEPHONE (OUTPATIENT)
Dept: OTOLARYNGOLOGY | Facility: CLINIC | Age: 68
End: 2023-12-20
Payer: COMMERCIAL

## 2023-12-20 ENCOUNTER — APPOINTMENT (OUTPATIENT)
Dept: RADIATION ONCOLOGY | Facility: CLINIC | Age: 68
End: 2023-12-20
Attending: SURGERY
Payer: COMMERCIAL

## 2023-12-20 PROCEDURE — 77386 HC IMRT TREATMENT DELIVERY, COMPLEX: CPT | Performed by: SURGERY

## 2023-12-20 PROCEDURE — 77014 PR CT GUIDE FOR PLACEMENT RADIATION THERAPY FIELDS: CPT | Mod: 26 | Performed by: SURGERY

## 2023-12-21 ENCOUNTER — APPOINTMENT (OUTPATIENT)
Dept: RADIATION ONCOLOGY | Facility: CLINIC | Age: 68
End: 2023-12-21
Attending: SURGERY
Payer: COMMERCIAL

## 2023-12-21 DIAGNOSIS — C32.1 SCC (SQUAMOUS CELL CARCINOMA) OF SUPRAGLOTTIS (H): ICD-10-CM

## 2023-12-21 DIAGNOSIS — G89.3 CANCER ASSOCIATED PAIN: Primary | ICD-10-CM

## 2023-12-21 PROCEDURE — 77014 PR CT GUIDE FOR PLACEMENT RADIATION THERAPY FIELDS: CPT | Mod: 26 | Performed by: SURGERY

## 2023-12-21 PROCEDURE — 77386 HC IMRT TREATMENT DELIVERY, COMPLEX: CPT | Performed by: SURGERY

## 2023-12-22 ENCOUNTER — APPOINTMENT (OUTPATIENT)
Dept: RADIATION ONCOLOGY | Facility: CLINIC | Age: 68
End: 2023-12-22
Attending: SURGERY
Payer: COMMERCIAL

## 2023-12-22 PROCEDURE — 77386 HC IMRT TREATMENT DELIVERY, COMPLEX: CPT | Performed by: SURGERY

## 2023-12-22 PROCEDURE — 77014 PR CT GUIDE FOR PLACEMENT RADIATION THERAPY FIELDS: CPT | Mod: 26 | Performed by: SURGERY

## 2023-12-25 ENCOUNTER — TELEPHONE (OUTPATIENT)
Dept: NURSING | Facility: CLINIC | Age: 68
End: 2023-12-25
Payer: COMMERCIAL

## 2023-12-25 ENCOUNTER — HOSPITAL ENCOUNTER (EMERGENCY)
Facility: HOSPITAL | Age: 68
Discharge: HOME | End: 2023-12-25
Attending: STUDENT IN AN ORGANIZED HEALTH CARE EDUCATION/TRAINING PROGRAM | Admitting: STUDENT IN AN ORGANIZED HEALTH CARE EDUCATION/TRAINING PROGRAM
Payer: COMMERCIAL

## 2023-12-25 ENCOUNTER — NURSE TRIAGE (OUTPATIENT)
Dept: NURSING | Facility: CLINIC | Age: 68
End: 2023-12-25
Payer: COMMERCIAL

## 2023-12-25 VITALS
HEART RATE: 96 BPM | TEMPERATURE: 98.6 F | DIASTOLIC BLOOD PRESSURE: 81 MMHG | OXYGEN SATURATION: 95 % | BODY MASS INDEX: 27.48 KG/M2 | HEIGHT: 66 IN | SYSTOLIC BLOOD PRESSURE: 145 MMHG | RESPIRATION RATE: 18 BRPM | WEIGHT: 171 LBS

## 2023-12-25 DIAGNOSIS — Z46.59 ENCOUNTER FOR FEEDING TUBE PLACEMENT: ICD-10-CM

## 2023-12-25 PROCEDURE — 99282 EMERGENCY DEPT VISIT SF MDM: CPT

## 2023-12-25 ASSESSMENT — ACTIVITIES OF DAILY LIVING (ADL): ADLS_ACUITY_SCORE: 35

## 2023-12-25 NOTE — TELEPHONE ENCOUNTER
"Patient's spouse Ondina calling with consent to communicate on file. Reports the patient's G tube came out completely. Denies shock, shortness of breath and abdominal pain. Advised per protocol to go to the ER with patient to go to [facility] now.       Annalise Lindsey RN 12/25/23 3:55 AM   [facility] Triage Nurse Advisor    Reason for Disposition   G-tube (or PEG), J-tube, or GJ-tube came completely out of site in abdominal wall    Additional Information   Negative: Shock suspected (e.g., cold/pale/clammy skin, too weak to stand, low BP, rapid pulse)   Negative: [1] Shortness of breath AND [2] new-onset   Negative: Bluish (or gray) lips or face now   Negative: Sounds like a life-threatening emergency to the triager   Negative: SEVERE abdominal pain   Negative: [1] Vomiting AND [2] contains red blood or black (\"coffee ground\") material  (Exception: Few red streaks in vomit that only happened once.)   Negative: Tube contains red blood or black (\"coffee ground\") material   (Exception: Pink tinge of tube fluid occurs once and clears immediately with irrigation.)    Protocols used: Feeding Tube Symptoms and Kdbewcpsy-M-RC    "

## 2023-12-25 NOTE — DISCHARGE INSTRUCTIONS
Please call your [facility] surgical team tomorrow for another appointment for a G-tube placement.    Return for any bleeding around the site, increasing abdominal pain, or any other concerning symptoms.

## 2023-12-25 NOTE — TELEPHONE ENCOUNTER
Telephone Call:     Pt's wife calling to report that patient denisse to the ED to see if his feeding tube could be replaced, but they were unable to do so. Consent to communicate on file.     Pt is not using the tube for feedings and is able to eat orally. The ED asked patient to follow up with the [facility] on the plan. She is going to call them when they are open.     She was also advised to call back if patient has any new or worsening sx.     Cindy Dao RN  [facility] Nurse Advisor 12:29 PM 12/25/2023

## 2023-12-25 NOTE — ED TRIAGE NOTES
Pt is here with his wife. Pt went to the restroom and when he pulled down his abd binder, he accidentally pulled out his feeding tube. Hx of throat cancer, on chemo and radiation.      Triage Assessment (Adult)       Row Name 12/25/23 0422          Triage Assessment    Airway WDL WDL        Respiratory WDL    Respiratory WDL WDL        Cardiac WDL    Cardiac WDL WDL

## 2023-12-25 NOTE — ED PROVIDER NOTES
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  States he pulled it out accidentally this morning going to the bathroom.    No significant pain.  No bleeding.    Arrival he has an 18 Turkish in his hand.  Attempted to place it at bedside with lube and gentle pressure however patient did not tolerate this well, and I was unable to push the tube back in.  Concern for potential sealing up of the tract.  Had it placed in October.    Abdomen otherwise benign.  No signs of surgical issue.  Plan for outpatient follow-up with [facility].    Of note, patient does not need the G-tube at this point.  Still eats orally.  This tube was placed prophylactically for his most likely need in the near future because of his cancer treatment.                       Medical Decision Making    History:  Supplemental history from: Documented in chart, if applicable  External Record(s) reviewed: Documented in chart, if applicable.    Work Up:  Chart documentation includes differential considered and any EKGs or imaging independently interpreted by provider, where specified.  In additional to work up documented, I considered the following work up: Documented in chart, if applicable.    External consultation:  Discussion of management with another provider: Documented in chart, if applicable    Complicating factors:  Care impacted by chronic illness: N/A  Care affected by social determinants of health: N/A    Disposition considerations: Discharge. No recommendations on prescription strength medication(s). See documentation for any additional details.                    Critical Care     Performed by: Marcos Sneed or    Authorized by: Marcos Sneed  Total critical care time:  minutes  Critical care was necessary to treat or prevent imminent or life-threatening  deterioration of the following conditions:   Critical care was time spent personally by me on the following activities: development of treatment plan with patient or surrogate, discussions with consultants, examination of patient, evaluation of patient's response to treatment, obtaining history from patient or surrogate, ordering and performing treatments and interventions, ordering and review of laboratory studies, ordering and review of radiographic studies, re-evaluation of patient's condition and monitoring for potential decompensation.  Critical care time was exclusive of separately billable procedures and treating other patients.'    At the conclusion of the encounter I discussed the results of all the tests and the disposition. The questions were answered. The patient or family acknowledged understanding and was agreeable with the care plan.        MEDICATIONS GIVEN IN THE EMERGENCY DEPARTMENT:  Medications - No data to display    NEW PRESCRIPTIONS STARTED AT TODAY'S ED VISIT:  New Prescriptions    No medications on file       HPI     68-year-old history of head and neck cancer here after he excellently pulled his G-tube out.  No significant pain bleeding.  Flushed prior to bedtime without difficulty.            MEDICAL HISTORY     History reviewed. No pertinent past medical history.    Past Surgical History:   Procedure Laterality Date    ESOPHAGOSCOPY, GASTROSCOPY, DUODENOSCOPY (EGD), COMBINED N/A 10/12/2014    Procedure: ESOPHAGOGASTRODUODENOSCOPY;  Surgeon: Jh Ventura MD;  Location: [facility];  Service:     IR FINE NEEDLE ASPIRATION W ULTRASOUND  2023    IR FINE NEEDLE ASPIRATION W ULTRASOUND  10/13/2023    IR GASTROSTOMY TUBE PERCUTANEOUS PLCMNT  10/13/2023    LAPAROTOMY EXPLORATORY Left     spleen       Social History     Tobacco Use    Smoking status: Former     Types: Cigarettes     Quit date: 10/12/1999     Years since quittin.2     Passive exposure: Past    Smokeless tobacco:  "Never   Substance Use Topics    Alcohol use: No    Drug use: No       amLODIPine (NORVASC) 5 MG tablet  aspirin (ASA) 81 MG chewable tablet  atorvastatin (LIPITOR) 80 MG tablet  empagliflozin (JARDIANCE) 25 MG TABS tablet  gabapentin (NEURONTIN) 300 MG capsule  hydrochlorothiazide (HYDRODIURIL) 25 MG tablet  insulin NPH-Regular 70/30 (HUMULIN 70/30;NOVOLIN 70/30) (70-30) 100 UNIT/ML vial  liraglutide (VICTOZA) 18 MG/3ML solution  lisinopril (ZESTRIL) 40 MG tablet  metFORMIN (GLUCOPHAGE) 500 MG tablet  potassium chloride (KLOR-CON) 20 MEQ packet  therapeutic multivitamin (THERAGRAN) tablet            PHYSICAL EXAM     BP (!) 145/81   Pulse 96   Temp 98.6  F (37  C) (Oral)   Resp 18   Ht 1.676 m (5' 6\")   Wt 77.6 kg (171 lb)   SpO2 95%   BMI 27.60 kg/m        PHYSICAL EXAM:     General: Patient appears well, nontoxic, comfortable  HEENT: Moist mucous membranes,  No head trauma.    Abdominal: G-tube hole open.  No bleeding.  No pain on abdominal palpation.  Musculoskeletal: Full range of motion of joints, no deformities appreciated.  Neurological: Alert and oriented, grossly neurologically intact.  Psychological: Normal affect and mood.  Integument: No rashes appreciated          RESULTS       Labs Ordered and Resulted from Time of ED Arrival to Time of ED Departure - No data to display    No orders to display         PROCEDURES:  Procedures:  Procedures         Marcos Sneed DO  Emergency Medicine  [facility] EMERGENCY DEPARTMENT       Marcos Sneed DO  12/25/23 0453    "

## 2023-12-26 ENCOUNTER — OFFICE VISIT (OUTPATIENT)
Dept: RADIATION ONCOLOGY | Facility: CLINIC | Age: 68
End: 2023-12-26
Attending: SURGERY
Payer: COMMERCIAL

## 2023-12-26 ENCOUNTER — PATIENT OUTREACH (OUTPATIENT)
Dept: ONCOLOGY | Facility: CLINIC | Age: 68
End: 2023-12-26
Payer: COMMERCIAL

## 2023-12-26 VITALS
DIASTOLIC BLOOD PRESSURE: 73 MMHG | HEART RATE: 82 BPM | SYSTOLIC BLOOD PRESSURE: 110 MMHG | BODY MASS INDEX: 27.87 KG/M2 | WEIGHT: 172.7 LBS

## 2023-12-26 DIAGNOSIS — C32.1 SCC (SQUAMOUS CELL CARCINOMA) OF SUPRAGLOTTIS (H): Primary | ICD-10-CM

## 2023-12-26 PROCEDURE — 77336 RADIATION PHYSICS CONSULT: CPT | Performed by: SURGERY

## 2023-12-26 PROCEDURE — 77427 RADIATION TX MANAGEMENT X5: CPT | Performed by: RADIOLOGY

## 2023-12-26 PROCEDURE — 77386 HC IMRT TREATMENT DELIVERY, COMPLEX: CPT | Performed by: RADIOLOGY

## 2023-12-26 NOTE — PROGRESS NOTES
"[facility]: Cancer Care                                                                                          Phone call with Ace's wife, Ondina. She reports that Ace's G tube came out yesterday so they went to the ED. The ED was unable to replace it due to \"Concern for potential sealing up of the tract.\" Ace has not been using the tube aside from water flushes. They report he is eating and drinking okay.    They would like to hold off on replacing the tube for now. Ondina reports Ace is a \"whole different person\" without the G tube. Discussed that as he is starting chemoradiation, swallowing is going to become more difficult and painful, hence why the G tube was placed prophylactically. Advised that I will pass this along to the care team to get their input.    Addendum 12/27/23 12:19 PM  Spoke with Ondina and Ace following discussion with Dr. Louise. Informed them that the recommendation is for the tube to be replaced. They are agreeable to this. IR referral placed and message sent to ENT [facility] to inquire if there are any airway concerns.     Olga Miranda, RN, BSN  RN Care Coordinator  [facility]    "

## 2023-12-26 NOTE — PROGRESS NOTES
[facility] PHYSICIANS  SPECIALIZING IN BREAKTHROUGHS  Radiation Oncology    On Treatment Visit Note      Ko Thakkar      Date: Dec 26, 2023   MRN: [id number]   : 1955  Diagnosis: SCC of Supraglottis        ID: Mr. Thakkar is a 68 year old male [id number] oropharyngeal SCC, p16 positive. Tumor involved the right tonsil, posterior pharyngeal wall, and right AE fold, hypopharynx, with known right cord paralysis, with bilateral adenopathy including right RP node, right level IV, and questionable right parotid node (which was bx proven SCC). There is no evidence of any distant disease. He has evidence of swallow dysfunction with dysphagia and aspiration, and voice dysfunction with dysphonia and right cord paralysis.      Given extent of disease as well as potential delays in starting treatment, induction chemotherapy was at [facility] under care of Dr. Louise.  PET CT scan after 2 cycles of carboplatin paclitaxel demonstrates a good partial response without any distant metastatic disease.    Reason for Visit:  On Radiation Treatment Visit       Treatment Summary to Date  Head and necks Current Dose: 1000/7000 cGy Fractions:       Chemotherapy  Chemo concurrent with radx?: Yes  Oncologist: Dr Louise  Drug Name/Frequency 1: Taxol/carboplatin    Subjective:   No complaints, tolerating RT well overall.  He is staying at gabapentin 300 mg daily as per previous discussion with Dr. Cordova.    Pain Control: Gabapentin, starting escalation  Fluid Intake: Adequate, PO  Nutrition: PO  Rinses: Starting   Skin care: Aquaphor BID  Chemotherapy: yes    Nursing ROS:   Nutrition Alteration  Diet Type: Patient's Preference  Skin  Skin Reaction: 0 - No changes     ENT and Mouth Exam  Mucositis - Current: 0 - None   Cardiovascular  Respiratory effort: 1 - Normal - without distress  Gastrointestinal  Nausea: 0 - None     Psychosocial  Mood - Anxiety: 0 - Normal  Pain Assessment  0-10 Pain Scale: 0     Objective:    /73  Pulse 82  Wt 78.3 kg (173 lb)   BMI 27.94 kg/m    Gen: Appears well, in no acute distress  HN: no mucositis, no erythema   CV/Resp: rrr, breathing comfortably on room air  Neuro: CN 2-12 grossly intact, UE/LE full strength     Labs:  CBC RESULTS:   Recent Labs   Lab Test 12/19/23  0643   WBC 4.8   RBC 4.52   HGB 13.2*   HCT 38.8*   MCV 86   MCH 29.2   MCHC 34.0   RDW 14.0        ELECTROLYTES:  Recent Labs   Lab Test 12/19/23  0643      POTASSIUM 3.8   CHLORIDE 107   OLE 9.1   CO2 23   BUN 9.3   CR 0.68   *       Assessment:    Tolerating radiation therapy well.  All questions and concerns addressed.    Plan:   Continue current therapy.    Continue gabapentin, rinses, nutrition, hydration, skin care  Patient plans to hold gabapentin dose to 300 mg/day until he can talk to Dr. Cordova upon his return in 2 weeks.      Mosaiq chart and setup information reviewed  Ports checked and MVCT/IGRT images checked    Medication Review  Med list reviewed with patient?: Yes  Med list printed and given: Offered and declined    Educational Topic Discussed  Education Instructions: Reviewed    Olinda Mitchell MD  Radiation Oncology

## 2023-12-26 NOTE — Clinical Note
They are agreeable to replacing the tube. Dr Louise, I TE'd the IR referral. Also messaged Hallie in ENT to ask about any airway concerns. Let me know if anything else is needed. Thanks!

## 2023-12-26 NOTE — LETTER
2023         RE: Ko Thakkar  [address]  [address]  [place] [address]        Dear Colleague,    Thank you for referring your patient, Ko Thakkar, to the [facility] RADIATION ONCOLOGY. Please see a copy of my visit note below.    [facility] PHYSICIANS  SPECIALIZING IN BREAKTHROUGHS  Radiation Oncology    On Treatment Visit Note      Ko Thakkar      Date: Dec 26, 2023   MRN: [id number]   : 1955  Diagnosis: SCC of Supraglottis        ID: Mr. Thakkar is a 68 year old male [id number] oropharyngeal SCC, p16 positive. Tumor involved the right tonsil, posterior pharyngeal wall, and right AE fold, hypopharynx, with known right cord paralysis, with bilateral adenopathy including right RP node, right level IV, and questionable right parotid node (which was bx proven SCC). There is no evidence of any distant disease. He has evidence of swallow dysfunction with dysphagia and aspiration, and voice dysfunction with dysphonia and right cord paralysis.      Given extent of disease as well as potential delays in starting treatment, induction chemotherapy was at [facility] under care of Dr. Louise.  PET CT scan after 2 cycles of carboplatin paclitaxel demonstrates a good partial response without any distant metastatic disease.    Reason for Visit:  On Radiation Treatment Visit       Treatment Summary to Date  Head and necks Current Dose: 1000/7000 cGy Fractions:       Chemotherapy  Chemo concurrent with radx?: Yes  Oncologist: Dr Louise  Drug Name/Frequency 1: Taxol/carboplatin    Subjective:   No complaints, tolerating RT well overall.  He is staying at gabapentin 300 mg daily as per previous discussion with Dr. Cordova.    Pain Control: Gabapentin, starting escalation  Fluid Intake: Adequate, PO  Nutrition: PO  Rinses: Starting   Skin care: Aquaphor BID  Chemotherapy: yes    Nursing ROS:   Nutrition Alteration  Diet Type: Patient's Preference  Skin  Skin Reaction:  0 - No changes     ENT and Mouth Exam  Mucositis - Current: 0 - None   Cardiovascular  Respiratory effort: 1 - Normal - without distress  Gastrointestinal  Nausea: 0 - None     Psychosocial  Mood - Anxiety: 0 - Normal  Pain Assessment  0-10 Pain Scale: 0     Objective:   /73  Pulse 82  Wt 78.3 kg (173 lb)   BMI 27.94 kg/m    Gen: Appears well, in no acute distress  HN: no mucositis, no erythema   CV/Resp: rrr, breathing comfortably on room air  Neuro: CN 2-12 grossly intact, UE/LE full strength     Labs:  CBC RESULTS:   Recent Labs   Lab Test 12/19/23  0643   WBC 4.8   RBC 4.52   HGB 13.2*   HCT 38.8*   MCV 86   MCH 29.2   MCHC 34.0   RDW 14.0        ELECTROLYTES:  Recent Labs   Lab Test 12/19/23  0643      POTASSIUM 3.8   CHLORIDE 107   OLE 9.1   CO2 23   BUN 9.3   CR 0.68   *       Assessment:    Tolerating radiation therapy well.  All questions and concerns addressed.    Plan:   Continue current therapy.    Continue gabapentin, rinses, nutrition, hydration, skin care  Patient plans to hold gabapentin dose to 300 mg/day until he can talk to Dr. Cordova upon his return in 2 weeks.      Mosaiq chart and setup information reviewed  Ports checked and MVCT/IGRT images checked    Medication Review  Med list reviewed with patient?: Yes  Med list printed and given: Offered and declined    Educational Topic Discussed  Education Instructions: Reviewed    Olinda Mitchell MD  Radiation Oncology           Again, thank you for allowing me to participate in the care of your patient.        Sincerely,        Olinda Mitchell MD

## 2023-12-27 ENCOUNTER — APPOINTMENT (OUTPATIENT)
Dept: LAB | Facility: CLINIC | Age: 68
End: 2023-12-27
Attending: NURSE PRACTITIONER
Payer: COMMERCIAL

## 2023-12-27 ENCOUNTER — APPOINTMENT (OUTPATIENT)
Dept: RADIATION ONCOLOGY | Facility: CLINIC | Age: 68
End: 2023-12-27
Attending: SURGERY
Payer: COMMERCIAL

## 2023-12-27 ENCOUNTER — ONCOLOGY VISIT (OUTPATIENT)
Dept: ONCOLOGY | Facility: CLINIC | Age: 68
End: 2023-12-27
Attending: NURSE PRACTITIONER
Payer: COMMERCIAL

## 2023-12-27 ENCOUNTER — THERAPY VISIT (OUTPATIENT)
Dept: SPEECH THERAPY | Facility: CLINIC | Age: 68
End: 2023-12-27
Payer: COMMERCIAL

## 2023-12-27 VITALS
TEMPERATURE: 98.3 F | BODY MASS INDEX: 27.63 KG/M2 | SYSTOLIC BLOOD PRESSURE: 152 MMHG | DIASTOLIC BLOOD PRESSURE: 69 MMHG | HEART RATE: 87 BPM | RESPIRATION RATE: 19 BRPM | OXYGEN SATURATION: 98 % | WEIGHT: 171.2 LBS

## 2023-12-27 VITALS — SYSTOLIC BLOOD PRESSURE: 150 MMHG | DIASTOLIC BLOOD PRESSURE: 85 MMHG | RESPIRATION RATE: 16 BRPM

## 2023-12-27 DIAGNOSIS — R13.10 DYSPHAGIA, UNSPECIFIED TYPE: ICD-10-CM

## 2023-12-27 DIAGNOSIS — R11.0 CHEMOTHERAPY-INDUCED NAUSEA: ICD-10-CM

## 2023-12-27 DIAGNOSIS — T45.1X5A CHEMOTHERAPY-INDUCED NAUSEA: ICD-10-CM

## 2023-12-27 DIAGNOSIS — C32.1 SCC (SQUAMOUS CELL CARCINOMA) OF SUPRAGLOTTIS (H): ICD-10-CM

## 2023-12-27 DIAGNOSIS — C32.1 SCC (SQUAMOUS CELL CARCINOMA) OF SUPRAGLOTTIS (H): Primary | ICD-10-CM

## 2023-12-27 DIAGNOSIS — R13.12 DYSPHAGIA, OROPHARYNGEAL PHASE: Primary | ICD-10-CM

## 2023-12-27 DIAGNOSIS — E87.6 HYPOKALEMIA: ICD-10-CM

## 2023-12-27 DIAGNOSIS — C10.9 SQUAMOUS CELL CARCINOMA OF OROPHARYNX (H): ICD-10-CM

## 2023-12-27 LAB
ALBUMIN SERPL BCG-MCNC: 4 G/DL (ref 3.5–5.2)
ALP SERPL-CCNC: 101 U/L (ref 40–150)
ALT SERPL W P-5'-P-CCNC: 16 U/L (ref 0–70)
ANION GAP SERPL CALCULATED.3IONS-SCNC: 10 MMOL/L (ref 7–15)
AST SERPL W P-5'-P-CCNC: 17 U/L (ref 0–45)
BASOPHILS # BLD AUTO: 0.1 10E3/UL (ref 0–0.2)
BASOPHILS NFR BLD AUTO: 1 %
BILIRUB SERPL-MCNC: 0.3 MG/DL
BUN SERPL-MCNC: 14.5 MG/DL (ref 8–23)
CALCIUM SERPL-MCNC: 8.9 MG/DL (ref 8.8–10.2)
CHLORIDE SERPL-SCNC: 105 MMOL/L (ref 98–107)
CREAT SERPL-MCNC: 0.67 MG/DL (ref 0.67–1.17)
DEPRECATED HCO3 PLAS-SCNC: 21 MMOL/L (ref 22–29)
EGFRCR SERPLBLD CKD-EPI 2021: >90 ML/MIN/1.73M2
EOSINOPHIL # BLD AUTO: 0.2 10E3/UL (ref 0–0.7)
EOSINOPHIL NFR BLD AUTO: 3 %
ERYTHROCYTE [DISTWIDTH] IN BLOOD BY AUTOMATED COUNT: 13.7 % (ref 10–15)
GLUCOSE SERPL-MCNC: 250 MG/DL (ref 70–99)
HCT VFR BLD AUTO: 36.9 % (ref 40–53)
HGB BLD-MCNC: 12.4 G/DL (ref 13.3–17.7)
IMM GRANULOCYTES # BLD: 0 10E3/UL
IMM GRANULOCYTES NFR BLD: 1 %
LYMPHOCYTES # BLD AUTO: 1.1 10E3/UL (ref 0.8–5.3)
LYMPHOCYTES NFR BLD AUTO: 20 %
MCH RBC QN AUTO: 29 PG (ref 26.5–33)
MCHC RBC AUTO-ENTMCNC: 33.6 G/DL (ref 31.5–36.5)
MCV RBC AUTO: 86 FL (ref 78–100)
MONOCYTES # BLD AUTO: 0.5 10E3/UL (ref 0–1.3)
MONOCYTES NFR BLD AUTO: 9 %
NEUTROPHILS # BLD AUTO: 3.6 10E3/UL (ref 1.6–8.3)
NEUTROPHILS NFR BLD AUTO: 66 %
NRBC # BLD AUTO: 0 10E3/UL
NRBC BLD AUTO-RTO: 0 /100
PLATELET # BLD AUTO: 282 10E3/UL (ref 150–450)
POTASSIUM SERPL-SCNC: 4 MMOL/L (ref 3.4–5.3)
PROT SERPL-MCNC: 6.8 G/DL (ref 6.4–8.3)
RBC # BLD AUTO: 4.27 10E6/UL (ref 4.4–5.9)
SODIUM SERPL-SCNC: 136 MMOL/L (ref 135–145)
WBC # BLD AUTO: 5.4 10E3/UL (ref 4–11)

## 2023-12-27 PROCEDURE — 250N000013 HC RX MED GY IP 250 OP 250 PS 637: Performed by: NURSE PRACTITIONER

## 2023-12-27 PROCEDURE — 96367 TX/PROPH/DG ADDL SEQ IV INF: CPT

## 2023-12-27 PROCEDURE — 96375 TX/PRO/DX INJ NEW DRUG ADDON: CPT

## 2023-12-27 PROCEDURE — 96413 CHEMO IV INFUSION 1 HR: CPT

## 2023-12-27 PROCEDURE — G0463 HOSPITAL OUTPT CLINIC VISIT: HCPCS | Mod: 25 | Performed by: NURSE PRACTITIONER

## 2023-12-27 PROCEDURE — 77386 HC IMRT TREATMENT DELIVERY, COMPLEX: CPT | Performed by: SURGERY

## 2023-12-27 PROCEDURE — 36415 COLL VENOUS BLD VENIPUNCTURE: CPT

## 2023-12-27 PROCEDURE — 96417 CHEMO IV INFUS EACH ADDL SEQ: CPT

## 2023-12-27 PROCEDURE — 80053 COMPREHEN METABOLIC PANEL: CPT

## 2023-12-27 PROCEDURE — 92526 ORAL FUNCTION THERAPY: CPT | Mod: GN | Performed by: SPEECH-LANGUAGE PATHOLOGIST

## 2023-12-27 PROCEDURE — 258N000003 HC RX IP 258 OP 636: Performed by: NURSE PRACTITIONER

## 2023-12-27 PROCEDURE — 85025 COMPLETE CBC W/AUTO DIFF WBC: CPT

## 2023-12-27 PROCEDURE — 99215 OFFICE O/P EST HI 40 MIN: CPT | Performed by: NURSE PRACTITIONER

## 2023-12-27 PROCEDURE — 250N000011 HC RX IP 250 OP 636: Performed by: NURSE PRACTITIONER

## 2023-12-27 RX ORDER — LORAZEPAM 2 MG/ML
0.5 INJECTION INTRAMUSCULAR EVERY 4 HOURS PRN
Status: CANCELLED | OUTPATIENT
Start: 2023-12-27

## 2023-12-27 RX ORDER — MEPERIDINE HYDROCHLORIDE 25 MG/ML
25 INJECTION INTRAMUSCULAR; INTRAVENOUS; SUBCUTANEOUS EVERY 30 MIN PRN
Status: CANCELLED | OUTPATIENT
Start: 2023-12-27

## 2023-12-27 RX ORDER — EPINEPHRINE 1 MG/ML
0.3 INJECTION, SOLUTION INTRAMUSCULAR; SUBCUTANEOUS EVERY 5 MIN PRN
Status: CANCELLED | OUTPATIENT
Start: 2023-12-27

## 2023-12-27 RX ORDER — METHYLPREDNISOLONE SODIUM SUCCINATE 125 MG/2ML
125 INJECTION, POWDER, LYOPHILIZED, FOR SOLUTION INTRAMUSCULAR; INTRAVENOUS
Status: CANCELLED
Start: 2023-12-27

## 2023-12-27 RX ORDER — ALBUTEROL SULFATE 0.83 MG/ML
2.5 SOLUTION RESPIRATORY (INHALATION)
Status: CANCELLED | OUTPATIENT
Start: 2023-12-27

## 2023-12-27 RX ORDER — DIPHENHYDRAMINE HCL 12.5 MG/5ML
50 SOLUTION ORAL ONCE
Status: CANCELLED
Start: 2023-12-27 | End: 2023-12-27

## 2023-12-27 RX ORDER — DIPHENHYDRAMINE HCL 12.5 MG/5ML
50 SOLUTION ORAL ONCE
Status: COMPLETED | OUTPATIENT
Start: 2023-12-27 | End: 2023-12-27

## 2023-12-27 RX ORDER — HEPARIN SODIUM (PORCINE) LOCK FLUSH IV SOLN 100 UNIT/ML 100 UNIT/ML
5 SOLUTION INTRAVENOUS
Status: CANCELLED | OUTPATIENT
Start: 2023-12-27

## 2023-12-27 RX ORDER — ALBUTEROL SULFATE 90 UG/1
1-2 AEROSOL, METERED RESPIRATORY (INHALATION)
Status: CANCELLED
Start: 2023-12-27

## 2023-12-27 RX ORDER — DIPHENHYDRAMINE HYDROCHLORIDE 50 MG/ML
50 INJECTION INTRAMUSCULAR; INTRAVENOUS
Status: CANCELLED
Start: 2023-12-27

## 2023-12-27 RX ORDER — DIPHENHYDRAMINE HCL 25 MG
50 CAPSULE ORAL ONCE
Status: CANCELLED
Start: 2023-12-27

## 2023-12-27 RX ORDER — HEPARIN SODIUM,PORCINE 10 UNIT/ML
5-20 VIAL (ML) INTRAVENOUS DAILY PRN
Status: CANCELLED | OUTPATIENT
Start: 2023-12-27

## 2023-12-27 RX ADMIN — PACLITAXEL 87 MG: 6 INJECTION, SOLUTION INTRAVENOUS at 11:56

## 2023-12-27 RX ADMIN — DEXAMETHASONE SODIUM PHOSPHATE: 10 INJECTION, SOLUTION INTRAMUSCULAR; INTRAVENOUS at 11:20

## 2023-12-27 RX ADMIN — DIPHENHYDRAMINE HYDROCHLORIDE 50 MG: 12.5 LIQUID ORAL at 11:23

## 2023-12-27 RX ADMIN — FAMOTIDINE 20 MG: 10 INJECTION, SOLUTION INTRAVENOUS at 11:17

## 2023-12-27 RX ADMIN — SODIUM CHLORIDE 250 ML: 9 INJECTION, SOLUTION INTRAVENOUS at 11:17

## 2023-12-27 RX ADMIN — CARBOPLATIN 250 MG: 600 INJECTION, SOLUTION INTRAVENOUS at 13:16

## 2023-12-27 ASSESSMENT — PAIN SCALES - GENERAL: PAINLEVEL: NO PAIN (0)

## 2023-12-27 NOTE — NURSING NOTE
"Oncology Rooming Note    December 27, 2023 10:14 AM   Ko Thakkar is a 68 year old male who presents for:    Chief Complaint   Patient presents with    Oncology Clinic Visit     SCC (squamous cell carcinoma) of supraglottis    Blood Draw     Labs drawn with PIV start by RN.      Initial Vitals: BP (!) 152/69 (BP Location: Right arm, Patient Position: Sitting, Cuff Size: Adult Regular)   Pulse 87   Temp 98.3  F (36.8  C) (Oral)   Resp 19   Wt 77.7 kg (171 lb 3.2 oz)   SpO2 98%   BMI 27.63 kg/m   Estimated body mass index is 27.63 kg/m  as calculated from the following:    Height as of 12/25/23: 1.676 m (5' 6\").    Weight as of this encounter: 77.7 kg (171 lb 3.2 oz). Body surface area is 1.9 meters squared.  No Pain (0) Comment: Data Unavailable   No LMP for male patient.  Allergies reviewed: Yes  Medications reviewed: Yes    Medications: Medication refills not needed today.  Pharmacy name entered into EPIC:    [facility] - [place], [place] - [address] [facility] - [place], [place] - [address]    Frailty Screening:   Is the patient here for a new oncology consult visit in cancer care? 2. No      Clinical concerns: none       Earnestine Gonsalez"

## 2023-12-27 NOTE — PROGRESS NOTES
Infusion Nursing Note:  Ko Thakkar presents today for Day 8 Cycle 1 Taxol, Carboplatin (#4)  Patient seen by provider today: Olga Baker CNP   present during visit today: Not Applicable.    Note: Patient presents to infusion feeling ok. Pt denies new acute discomfort and states no acute complaints or concerns not addressed by PALOMO today.      Intravenous Access:  Peripheral IV placed.    Treatment Conditions:  Lab Results   Component Value Date    HGB 12.4 (L) 12/27/2023    WBC 5.4 12/27/2023    ANEUTAUTO 3.6 12/27/2023     12/27/2023        Lab Results   Component Value Date     12/27/2023    POTASSIUM 4.0 12/27/2023    MAG 1.7 11/29/2023    CR 0.67 12/27/2023    OLE 8.9 12/27/2023    BILITOTAL 0.3 12/27/2023    ALBUMIN 4.0 12/27/2023    ALT 16 12/27/2023    AST 17 12/27/2023       Results reviewed, labs MET treatment parameters, ok to proceed with treatment.      Post Infusion Assessment:  Patient tolerated infusion without incident.  Blood return noted pre and post infusion.  Site patent and intact, free from redness, edema or discomfort.  No evidence of extravasations.  Access discontinued per protocol.       Discharge Plan:   Patient declined prescription refills.  Discharge instructions reviewed with: Patient.  Patient and/or family verbalized understanding of discharge instructions and all questions answered.  AVS to patient via [employer].  Patient will return 1/3 for next appointment.   Patient discharged in stable condition accompanied by: wife.  Departure Mode: Ambulatory.      Woodrow Chao RN

## 2023-12-27 NOTE — PATIENT INSTRUCTIONS
[facility] Triage and after hours / weekends / holidays:  [phone]    Please call the triage or after hours line if you experience a temperature greater than or equal to 100.4, shaking chills, have uncontrolled nausea, vomiting and/or diarrhea, dizziness, shortness of breath, chest pain, bleeding, unexplained bruising, or if you have any other new/concerning symptoms, questions or concerns.      If you are having any concerning symptoms or wish to speak to a provider before your next infusion visit, please call triage to notify them so we can adequately serve you.     If you need a refill on a narcotic prescription or other medication, please call before your infusion appointment.                 December 2023 Sunday Monday Tuesday Wednesday Thursday Friday Saturday                            1     2       3     4  Happy Birthday!     5    RETURN RADIATION ONCOLOGY  10:30 AM   (30 min.)   Juwan Cordova MD [facility] Radiation Oncology    CT SIM  11:00 AM   (60 min.)   Juwan Cordova MD   [facility] Radiation Oncology 6    [place] PLANNING BILLING ONLY  10:40 AM   (30 min.)   Juwan Cordova MD   [facility] Radiation Oncology [place] 7     8     9       10     11     12     13     14     15     16       17     18    [place] PLANNING BILLING ONLY   7:00 AM   (30 min.)   Juwan Cordova MD   [facility] Radiation Oncology 19    LAB PERIPHERAL   6:45 AM   (15 min.)    [facility] DRAW   [facility]    RETURN ACTIVE TREATMENT   6:45 AM   (45 min.)   Juany Gallagher CNP   [facility]    SWALLOW TREATMENT   9:00 AM   (45 min.)   Rola Marcano, SLP   [facility] [place]    ONC INFUSION 2 HR (120 MIN)   9:00 AM   (120 min.)    ONC INFUSION NURSE   [facility]    UM NUTRITION VISIT  11:00 AM   (30 min.)   Meka Lowery, GABY   [facility]  [facility] Radiation Oncology    ADVANCED TREATMENT  11:30 AM   (30 min.)   Juwan Cordova MD   [facility] Radiation Oncology    OTV   3:30 PM   (15 min.)   Juwan Cordova MD   [facility] Radiation Oncology 20    TREATMENT  11:15 AM   (15 min.)   [facility] RAD ONC VARIAN   [facility] Radiation Oncology 21    TREATMENT  11:15 AM   (15 min.)   [facility] RAD ONC VARIAN   [facility] Radiation Oncology 22    TREATMENT  11:15 AM   (15 min.)   [facility] RAD ONC [facility] Radiation Oncology 23       24     25    Admission   4:26 AM   [facility] Emergency Department   (Discharge: 12/25/2023) 26    TREATMENT  11:15 AM   (15 min.)   [facility] RAD ONC VARIAN   [facility] Radiation Oncology    OTV  11:30 AM   (15 min.)   Olinda Mitchell MD   [facility] Radiation Oncology 27    TREATMENT   8:30 AM   (15 min.)   [facility] RAD ONC [facility] Radiation Oncology    LAB PERIPHERAL   9:45 AM   (15 min.)   [facility]   [facility]    RETURN ACTIVE TREATMENT  10:00 AM   (45 min.)   Olga Baker CNP   [facility]    ONC INFUSION 2 HR (120 MIN)  12:30 PM   (120 min.)   [facility] ONC INFUSION NURSE   [facility]    SWALLOW TREATMENT   1:00 PM   (45 min.)   Martine Grant   [facility] 28    TREATMENT  11:15 AM   (15 min.)   [facility] RAD ONC VARIAN   [facility] Radiation Oncology 29    TREATMENT  11:15 AM   (15 min.)   [facility] RAD ONC [facility] Radiation Oncology 30       31 January 2024 Sunday Monday Tuesday Wednesday Thursday Friday Saturday        1     2    [facility] NUTRITION VISIT  11:00 AM   (30 min.)   Meka Lowery RD   [facility] Radiation Oncology    TREATMENT  11:15 AM   (15 min.)   [facility] RAD  ONC [facility] Radiation Oncology    LAB PERIPHERAL  12:30 PM   (15 min.)   [facility] DRAW   [facility]    RETURN CCSL  12:45 PM   (45 min.)   Olga Baker CNP   [facility] 3    ONC INFUSION 2 HR (120 MIN)   9:00 AM   (120 min.)   [facility] ONC INFUSION NURSE   [facility]    SWALLOW TREATMENT   9:15 AM   (45 min.)   Jessica Ramos SLP   [facility] [place]    TREATMENT  11:15 AM   (15 min.)   [facility] RAD ONC [facility] Radiation Oncology 4    TREATMENT  11:15 AM   (15 min.)   [facility] RAD ONC [facility] Radiation Oncology 5    TREATMENT  10:00 AM   (15 min.)   [facility] RAD ONC [facility] Radiation Oncology 6       7     8    TREATMENT  11:15 AM   (15 min.)   [facility] RAD ONC [facility] Radiation Oncology 9    LAB PERIPHERAL   7:15 AM   (15 min.)   [facility] DRAW   [facility]    RETURN ACTIVE TREATMENT   7:45 AM   (45 min.)   Juany Gallagher CNP   [facility]    ONC INFUSION 2 HR (120 MIN)   9:00 AM   (120 min.)   [facility] ONC INFUSION NURSE   [facility]    SWALLOW TREATMENT  10:45 AM   (45 min.)   Jessica Ramos SLP   [facility] [place]    TREATMENT  11:15 AM   (15 min.)   [facility] RAD ONC [facility] Radiation Oncology 10    TREATMENT  11:15 AM   (15 min.)   [facility] RAD ONC [facility] Radiation Oncology 11    TREATMENT  11:15 AM   (15 min.)   [facility] RAD ONC [facility] Radiation Oncology 12    TREATMENT  11:15 AM   (15 min.)   [facility] RAD ONC [facility] Radiation On[facility] 13       14     15    TREATMENT  11:15 AM   (15 min.)   [facility] RAD ONC [facility] Radiation Oncology 16    LAB  PERIPHERAL   7:30 AM   (15 min.)   [facility] DRAW   [facility]    RETURN ACTIVE TREATMENT   7:45 AM   (45 min.)   Olga Baker CNP   [facility]    ONC INFUSION 2 HR (120 MIN)   9:00 AM   (120 min.)   [facility] ONC INFUSION NURSE   [facility]    SWALLOW TREATMENT   9:15 AM   (45 min.)   Martine Grant   [facility] [place]    TREATMENT  11:15 AM   (15 min.)   [facility] RAD ONC [facility] Radiation Oncology    OTV  11:30 AM   (15 min.)   Juwan Cordova MD   [facility] Radiation Oncology 17    TREATMENT  11:15 AM   (15 min.)   [facility] RAD ONC [facility] Radiation Oncology 18    TREATMENT  11:15 AM   (15 min.)   [facility] RAD ONC [facility] Radiation Oncology 19    TREATMENT  11:15 AM   (15 min.)   [facility] RAD ONC [facility] Radiation Oncology 20       21     22    TREATMENT  11:15 AM   (15 min.)   [facility] RAD ONC [facility] Radiation Oncology 23    LAB PERIPHERAL   8:15 AM   (15 min.)   [facility] DRAW   [facility]    RETURN ACTIVE TREATMENT   8:45 AM   (45 min.)   Juany Gallagher CNP   [facility]    ONC INFUSION 2 HR (120 MIN)  10:00 AM   (120 min.)   [facility] ONC INFUSION NURSE   [facility]    SWALLOW TREATMENT  10:45 AM   (45 min.)   Jessica Ramos SLP   [facility] [place]    TREATMENT  11:15 AM   (15 min.)   [facility] RAD ONC [facility] Radiation Oncology    OTV  11:30 AM   (15 min.)   Juwan Cordova MD   [facility] Radiation Oncology 24    TREATMENT  11:15 AM   (15 min.)   [facility] RAD ONC [facility] Radiation Oncology 25    TREATMENT  11:15 AM   (15 min.)   [facility] RAD ONC [facility] Radiation Oncology  26    TREATMENT  11:15 AM   (15 min.)   [facility] RAD ONC [facility] Radiation Oncology 27       28     29    TREATMENT  11:15 AM   (15 min.)   [facility] RAD ONC [facility] Radiation Oncology 30    TREATMENT  11:15 AM   (15 min.)   [facility] RAD ONC [facility] Radiation Oncology    OTV  11:30 AM   (15 min.)   Juwan Cordova MD   [facility] Radiation Oncology 31    TREATMENT  11:15 AM   (15 min.)   [facility] RAD ONC [facility] Radiation Oncology                                Lab Results:  Recent Results (from the past 12 hour(s))   Comprehensive metabolic panel    Collection Time: 12/27/23 10:06 AM   Result Value Ref Range    Sodium 136 135 - 145 mmol/L    Potassium 4.0 3.4 - 5.3 mmol/L    Carbon Dioxide (CO2) 21 (L) 22 - 29 mmol/L    Anion Gap 10 7 - 15 mmol/L    Urea Nitrogen 14.5 8.0 - 23.0 mg/dL    Creatinine 0.67 0.67 - 1.17 mg/dL    GFR Estimate >90 >60 mL/min/1.73m2    Calcium 8.9 8.8 - 10.2 mg/dL    Chloride 105 98 - 107 mmol/L    Glucose 250 (H) 70 - 99 mg/dL    Alkaline Phosphatase 101 40 - 150 U/L    AST 17 0 - 45 U/L    ALT 16 0 - 70 U/L    Protein Total 6.8 6.4 - 8.3 g/dL    Albumin 4.0 3.5 - 5.2 g/dL    Bilirubin Total 0.3 <=1.2 mg/dL   CBC with platelets and differential    Collection Time: 12/27/23 10:06 AM   Result Value Ref Range    WBC Count 5.4 4.0 - 11.0 10e3/uL    RBC Count 4.27 (L) 4.40 - 5.90 10e6/uL    Hemoglobin 12.4 (L) 13.3 - 17.7 g/dL    Hematocrit 36.9 (L) 40.0 - 53.0 %    MCV 86 78 - 100 fL    MCH 29.0 26.5 - 33.0 pg    MCHC 33.6 31.5 - 36.5 g/dL    RDW 13.7 10.0 - 15.0 %    Platelet Count 282 150 - 450 10e3/uL    % Neutrophils 66 %    % Lymphocytes 20 %    % Monocytes 9 %    % Eosinophils 3 %    % Basophils 1 %    % Immature Granulocytes 1 %    NRBCs per 100 WBC 0 <1 /100    Absolute Neutrophils 3.6 1.6 - 8.3 10e3/uL    Absolute Lymphocytes 1.1 0.8 - 5.3 10e3/uL    Absolute Monocytes 0.5 0.0 - 1.3 10e3/uL    Absolute  Eosinophils 0.2 0.0 - 0.7 10e3/uL    Absolute Basophils 0.1 0.0 - 0.2 10e3/uL    Absolute Immature Granulocytes 0.0 <=0.4 10e3/uL    Absolute NRBCs 0.0 10e3/uL

## 2023-12-27 NOTE — PROGRESS NOTES
[facility] CANCER CLINIC    PATIENT NAME: Ko Thakkar  MRN # [id number]   DATE OF VISIT: December 27, 2023  YOB: 1955     Otolaryngology: Dr. Denia Hardin  Radiation Oncology: Dr. Juwan Cordova   PCP: Dr. Alton Mota; [facility] in [place]     CANCER TYPE: SCC supraglottis  STAGE: [id number] (IRON)  ECOG PS: 1    PD-L1:  NGS:     SUMMARY  5/16/23 EGD for dysphagia. Gastritis and gastric diverticulum. Bx showed H. Pylori, treated. No atrophic gastritis or dysplasia  7/11/23 Swallow study at [facility]. Poor epiglottic inversion, penetration, aspiration  7/28/23 CT neck. 2.2 x 1.4 x 2.3 cm supraglottic mass, concern for paraglottic involvement, bilateral IB, IIA, III, IV adenopathy, 70% ICA stenosis   8/21/23 US carotid. 70-99% stenosis R ICA. <50% stenosis L ICA  8/29/23 FNA L level 2 node (IR). Path: SCC  9/6/23 PET/CT. FDG avid mass (SUV 13.5) right tonsil/lateral pharyngeal wall/glossotonsillar sulcus with slight extension to the R tongue base, right AE fold, right epiglottis, right piriform sinus, right posterolateral pharyngeal wall, posterior pharyngeal wall, no thyroid cartilage erosion, right retropharyngeal node, deep lobe parotid FDG avid mass, bilateral cervical nodes (right level II-IV, left level II-III), no distant disease, L mandibular canine with periapical abscess. R temporal lobe change suggestive of prior infarct. 5 mm LLL nodule.  10/10/23 Video swallow.   10/16~11/13/23 C1-2 carboplatin paclitaxel. Given due to logistical issues with pre-chemoradiation evaluation that would have delayed start of any treatment substantially. Paclitaxel dose reduced to 140 mg/m2 C2 due to neuropathy  10p/23/23 CTA. 70% narrowing R carotid carlos/bifurcation similar to 7/28/23 CT. Laterally projecting disc osteophyte complex resulting in mod-severe L vertebral artery narrowing at C3-4 level.  10/23/23 Brain MRI. No mets. Bilateral frontal and temporal lobe encephalomalacia, R > L. Mild volume  loss.   11/28/23 PET/CT. Good VT.       SUBJECTIVE  Mr. Thakkar (Ace) is seen today for day 8 weekly carbo/taxol, concurrent with XRT  -Feels well.  -G tube fell out which he is happy about  -no trouble eating/swallowing  -Reports neuropathy in his feet that has been present for decades. Now also numbness in fingertips, perhaps slightly worse with C1 carbo/taxol. Fingertip symptoms only limit ability to button shirt but are otherwise not bothersome  -Reports he has significantly decreased ibuprofen use  -No melena, constipation, diarrhea    PAST MEDICAL HISTORY  SCC as above  GERD  H/o food impaction in 2008 and 2014 (steak) related to Schatzki ring on EGD 10/13/14  DM2. Last A1c about 7.6 sometime in summer 2023 and Nov 2023  Dyslipidemia  Possible prior L temporal CVA  HTN  H.o spinal meningitis as a baby -   R arm surgery  Bone graft to forehead  Ex lap   Tinnitus secondary to treatment for meningitis or from menigitis - bilateral   Hearing loss -- audiogram 10/2/2023  Numbness in the toes - mostly big toes   Crushing pills   No muscle aches or pains     CURRENT OUTPATIENT MEDICATIONS  Reviewed    ALLERGIES  No Known Allergies     PHYSICAL EXAM  BP (!) 152/69 (BP Location: Right arm, Patient Position: Sitting, Cuff Size: Adult Regular)   Pulse 87   Temp 98.3  F (36.8  C) (Oral)   Resp 19   Wt 77.7 kg (171 lb 3.2 oz)   SpO2 98%   BMI 27.63 kg/m      General: Well-appearing male, NAD  Eyes: EOMI, PERRL. No scleral icterus.  ENT: Oral mucosa moist. No lesions, thrush.   Cardiovascular: RRR, no m/g/r. No peripheral edema .  Respiratory: CTA bilaterally. No wheezes or crackles.  Abd: CDI bandage over prior G tube  Neurologic: Grossly nonfocal  Skin: No rashes, petechiae, or bruising noted on exposed skin.      LABORATORY AND IMAGING STUDIES  Most Recent 3 CBC's:  Recent Labs   Lab Test 12/27/23  1006 12/19/23  0643 11/29/23  1501   WBC 5.4 4.8 4.0   HGB 12.4* 13.2* 11.6*   MCV 86 86 85    325 169    ANEUTAUTO 3.6 2.4 1.8    Most Recent 3 BMP's:  Recent Labs   Lab Test 12/19/23  0643 11/29/23  1501 11/21/23  1630 11/13/23  0809    140  --  141   POTASSIUM 3.8 4.1 3.5 2.9*   CHLORIDE 107 107  --  112*   CO2 23 24  --  19*   BUN 9.3 15.6  --  12.6   CR 0.68 0.68  --  0.56*   ANIONGAP 11 9  --  10   OLE 9.1 8.6*  --  7.2*   * 279*  --  148*   PROTTOTAL 7.2 6.4  --  5.8*   ALBUMIN 4.2 3.7  --  3.3*    Most Recent 2 LFT's:  Recent Labs   Lab Test 12/19/23  0643 11/29/23  1501   AST 20 25   ALT 18 33   ALKPHOS 119 123   BILITOTAL 0.2 0.2    Most Recent TSH and T4:  Recent Labs   Lab Test 11/29/23  1501   TSH 3.46     Phos/Mag:  Lab Results   Component Value Date    PHOS 3.0 11/29/2023    MAG 1.7 11/29/2023    MAG 2.3 09/14/2023      I reviewed the above labs today.      ASSESSMENT AND PLAN  SCC supraglottis, [id number] (IRON): Declined surgery. Planned chemoradiation but with all of the logistical issues and work needed prior to starting, also due to the large size of the primary tumor, started carboplatin + paclitaxel first. PET/CT confirmed good [place] after 2 cycles. Plan remained to transition to chemoradiation. Not a candidate for cisplatin due to severe hearing loss. Proceced with D8 weekly carboplatin/paclitaxel.   -RTC weekly for PALOMO visit with infusion      Dental: Had extractions as planned prior to starting chemo, still needs bone shaved down some, which was delayed to start chemo.  Ace has been told by his dentist the process is going to be postponed until he recovers from cancer treatment. Should wait many months following completion of chemoradiation before denture fitting.    Neuropathy: Worse after C1 chemo, no worse after C2. Monitor loosely with weekly carboplatin paclitaxel. B12 10/6/23 was ok. TSH has been ok.     Hypokalemia: Resolved.     Ibuprofen use, arthritis: Reports decreased use. Was most recently using for cancer-related pain which has improved with response to treatment. Again  discouraged use in extremely high doses due to significant risk of bleeding.    Melena: One time episode, no associated symptoms, not clear whether bleeding or not. Denies recurrence. Has decreased NSAID use.    Dysphagia, aspiration: Jessica Gregorioadenike visit 9/13/23, video 10/10, known aspiration at baseline although swallowing a little better with OK. Gtube 10/13--fell out 12/25 and not replaced yet. Discussed rationale for replacement in anticipation of progressive RT symptoms     R KAILASH: Met with Dr. Tapia, vascular surgery fellow 8/21. Recommended treating the cancer first, asa 81 and high dose atorva, follow up 6 months with repeat US. PET/CT showed possible old R temporal infarct. Exhibited TIA symptoms in early October with 4 hours episode of confusion. MR brain and CTA head 10/23 w/o acute findings.    H/o food impaction: lower esophagus; monitor    Liver issue: Says he was to have some sort of imaging at [facility] to look at his liver that had to be canceled due to the appts he needed for the cancer. Will try to figure out what that was about - not actively discussed again today. Might be related to his mom having cirrhosis without hepatitis or ETOH hx.    40 minutes spent on the date of the encounter doing chart review, review of test results, interpretation of tests, patient visit, documentation, and discussion with other provider(s)       Olga Baker CNP on 12/27/2023 at 10:52 AM

## 2023-12-27 NOTE — CONSULTS
Outpatient IR Referral  12/27/23    Ko Thakkar  [id number]      IR referral Request:    Authorizing: Xochilt Louise MD in [facility]   Referral: [id number] (Pending Review)      Expires: 3/27/2024 Priority: Routine: Next available opening  Assign to: ISAIAS BUI    Diagnosis: SCC (squamous cell carcinoma) of supraglottis (H) [C32.1]    Order Specific Questions  What is the reason for the IR order request?  Drains/Tubes          Will this be a management or placement of a drain/tube?  Placement          Placement of Drains/Tubes?  Gastrostomy          Patients clinical information/history?  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 Needs PEG for chemo/radiation therapy          Is there a specific location the exam needs to be scheduled at?  [place]          Call Back #  [phone]          Is the patient on aspirin, Plavix or blood thinners?  Yes - Patient may be asked to stop taking medications       ===  Procedure Approved for:    IR replacement percutaneous gastrostomy tube.  Patient should come prepared for possible sedation for new placement as needed.  Existing gastrostomy tube placed 10/13/2023 has been dislodged and not able to be replaced by emergency room personnel on 12/25/2023.    ===  Plan:  Procedure order placed.    Request sent to procedural scheduling work queue for appointment.    *Please note the date of procedure is tentative and that the Timing of Procedure is TBD Based on Staffing/Schedule and Triage*    Isaias Bui PA-C  Interventional Radiology   IR on-call pager: [phone]

## 2023-12-27 NOTE — NURSING NOTE
Chief Complaint   Patient presents with    Oncology Clinic Visit     SCC (squamous cell carcinoma) of supraglottis    Blood Draw     Labs drawn with PIV start by RN.      Labs drawn with PIV start by RN. Pt tolerated well. Vitals taken. Pt checked into next appointment.    Vickie Patel RN

## 2023-12-27 NOTE — LETTER
12/27/2023         RE: Ko Thakkar  [address]  [address]  [place] [address]        Dear Colleague,    Thank you for referring your patient, Ko Thakkar, to the [facility]. Please see a copy of my visit note below.       [facility]    PATIENT NAME: Ko Thakkar  MRN # [id number]   DATE OF VISIT: December 27, 2023  YOB: 1955     Otolaryngology: Dr. Denia Hardin  Radiation Oncology: Dr. Juwan Cordova   PCP: Dr. Alton Mota; [facility] in [place]     CANCER TYPE: SCC supraglottis  STAGE: [id number] (IRON)  ECOG PS: 1    PD-L1:  NGS:     SUMMARY  5/16/23 EGD for dysphagia. Gastritis and gastric diverticulum. Bx showed H. Pylori, treated. No atrophic gastritis or dysplasia  7/11/23 Swallow study at . Poor epiglottic inversion, penetration, aspiration  7/28/23 CT neck. 2.2 x 1.4 x 2.3 cm supraglottic mass, concern for paraglottic involvement, bilateral IB, IIA, III, IV adenopathy, 70% ICA stenosis   8/21/23 US carotid. 70-99% stenosis R ICA. <50% stenosis L ICA  8/29/23 FNA L level 2 node (IR). Path: SCC  9/6/23 PET/CT. FDG avid mass (SUV 13.5) right tonsil/lateral pharyngeal wall/glossotonsillar sulcus with slight extension to the R tongue base, right AE fold, right epiglottis, right piriform sinus, right posterolateral pharyngeal wall, posterior pharyngeal wall, no thyroid cartilage erosion, right retropharyngeal node, deep lobe parotid FDG avid mass, bilateral cervical nodes (right level II-IV, left level II-III), no distant disease, L mandibular canine with periapical abscess. R temporal lobe change suggestive of prior infarct. 5 mm LLL nodule.  10/10/23 Video swallow.   10/16~11/13/23 C1-2 carboplatin paclitaxel. Given due to logistical issues with pre-chemoradiation evaluation that would have delayed start of any treatment substantially. Paclitaxel dose reduced to 140 mg/m2 C2 due to neuropathy  10p/23/23 CTA. 70%  narrowing R carotid carlos/bifurcation similar to 7/28/23 CT. Laterally projecting disc osteophyte complex resulting in mod-severe L vertebral artery narrowing at C3-4 level.  10/23/23 Brain MRI. No mets. Bilateral frontal and temporal lobe encephalomalacia, R > L. Mild volume loss.   11/28/23 PET/CT. Good IA.       SUBJECTIVE  Mr. Thakkar (Ace) is seen today for day 8 weekly carbo/taxol, concurrent with XRT  -Feels well.  -G tube fell out which he is happy about  -no trouble eating/swallowing  -Reports neuropathy in his feet that has been present for decades. Now also numbness in fingertips, perhaps slightly worse with C1 carbo/taxol. Fingertip symptoms only limit ability to button shirt but are otherwise not bothersome  -Reports he has significantly decreased ibuprofen use  -No melena, constipation, diarrhea    PAST MEDICAL HISTORY  SCC as above  GERD  H/o food impaction in 2008 and 2014 (steak) related to Schatzki ring on EGD 10/13/14  DM2. Last A1c about 7.6 sometime in summer 2023 and Nov 2023  Dyslipidemia  Possible prior L temporal CVA  HTN  H.o spinal meningitis as a baby -   R arm surgery  Bone graft to forehead  Ex lap   Tinnitus secondary to treatment for meningitis or from menigitis - bilateral   Hearing loss -- audiogram 10/2/2023  Numbness in the toes - mostly big toes   Crushing pills   No muscle aches or pains     CURRENT OUTPATIENT MEDICATIONS  Reviewed    ALLERGIES  No Known Allergies     PHYSICAL EXAM  BP (!) 152/69 (BP Location: Right arm, Patient Position: Sitting, Cuff Size: Adult Regular)   Pulse 87   Temp 98.3  F (36.8  C) (Oral)   Resp 19   Wt 77.7 kg (171 lb 3.2 oz)   SpO2 98%   BMI 27.63 kg/m      General: Well-appearing male, NAD  Eyes: EOMI, PERRL. No scleral icterus.  ENT: Oral mucosa moist. No lesions, thrush.   Cardiovascular: RRR, no m/g/r. No peripheral edema .  Respiratory: CTA bilaterally. No wheezes or crackles.  Abd: CDI bandage over prior G tube  Neurologic: Grossly  nonfocal  Skin: No rashes, petechiae, or bruising noted on exposed skin.      LABORATORY AND IMAGING STUDIES  Most Recent 3 CBC's:  Recent Labs   Lab Test 12/27/23  1006 12/19/23  0643 11/29/23  1501   WBC 5.4 4.8 4.0   HGB 12.4* 13.2* 11.6*   MCV 86 86 85    325 169   ANEUTAUTO 3.6 2.4 1.8    Most Recent 3 BMP's:  Recent Labs   Lab Test 12/19/23  0643 11/29/23  1501 11/21/23  1630 11/13/23  0809    140  --  141   POTASSIUM 3.8 4.1 3.5 2.9*   CHLORIDE 107 107  --  112*   CO2 23 24  --  19*   BUN 9.3 15.6  --  12.6   CR 0.68 0.68  --  0.56*   ANIONGAP 11 9  --  10   OLE 9.1 8.6*  --  7.2*   * 279*  --  148*   PROTTOTAL 7.2 6.4  --  5.8*   ALBUMIN 4.2 3.7  --  3.3*    Most Recent 2 LFT's:  Recent Labs   Lab Test 12/19/23  0643 11/29/23  1501   AST 20 25   ALT 18 33   ALKPHOS 119 123   BILITOTAL 0.2 0.2    Most Recent TSH and T4:  Recent Labs   Lab Test 11/29/23  1501   TSH 3.46     Phos/Mag:  Lab Results   Component Value Date    PHOS 3.0 11/29/2023    MAG 1.7 11/29/2023    MAG 2.3 09/14/2023      I reviewed the above labs today.      ASSESSMENT AND PLAN  SCC supraglottis, [id number] (IRON): Declined surgery. Planned chemoradiation but with all of the logistical issues and work needed prior to starting, also due to the large size of the primary tumor, started carboplatin + paclitaxel first. PET/CT confirmed good AL after 2 cycles. Plan remained to transition to chemoradiation. Not a candidate for cisplatin due to severe hearing loss. Proceced with D8 weekly carboplatin/paclitaxel.   -RTC weekly for PALOMO visit with infusion      Dental: Had extractions as planned prior to starting chemo, still needs bone shaved down some, which was delayed to start chemo.  Ace has been told by his dentist the process is going to be postponed until he recovers from cancer treatment. Should wait many months following completion of chemoradiation before denture fitting.    Neuropathy: Worse after C1 chemo, no worse after  C2. Monitor loosely with weekly carboplatin paclitaxel. B12 10/6/23 was ok. TSH has been ok.     Hypokalemia: Resolved.     Ibuprofen use, arthritis: Reports decreased use. Was most recently using for cancer-related pain which has improved with response to treatment. Again discouraged use in extremely high doses due to significant risk of bleeding.    Melena: One time episode, no associated symptoms, not clear whether bleeding or not. Denies recurrence. Has decreased NSAID use.    Dysphagia, aspiration: Jessica Ramos visit 9/13/23, video 10/10, known aspiration at baseline although swallowing a little better with [place]. Gtube 10/13--fell out 12/25 and not replaced yet. Discussed rationale for replacement in anticipation of progressive RT symptoms     R KAILASH: Met with Dr. Tapia, vascular surgery fellow 8/21. Recommended treating the cancer first, asa 81 and high dose atorva, follow up 6 months with repeat US. PET/CT showed possible old R temporal infarct. Exhibited TIA symptoms in early October with 4 hours episode of confusion. MR brain and CTA head 10/23 w/o acute findings.    H/o food impaction: lower esophagus; monitor    Liver issue: Says he was to have some sort of imaging at [facility] to look at his liver that had to be canceled due to the appts he needed for the cancer. Will try to figure out what that was about - not actively discussed again today. Might be related to his mom having cirrhosis without hepatitis or ETOH hx.    40 minutes spent on the date of the encounter doing chart review, review of test results, interpretation of tests, patient visit, documentation, and discussion with other provider(s)       Olga Baker CNP on 12/27/2023 at 10:52 AM

## 2023-12-28 ENCOUNTER — APPOINTMENT (OUTPATIENT)
Dept: RADIATION ONCOLOGY | Facility: CLINIC | Age: 68
End: 2023-12-28
Attending: SURGERY
Payer: COMMERCIAL

## 2023-12-28 PROCEDURE — 77386 HC IMRT TREATMENT DELIVERY, COMPLEX: CPT | Performed by: SURGERY

## 2023-12-29 ENCOUNTER — APPOINTMENT (OUTPATIENT)
Dept: RADIATION ONCOLOGY | Facility: CLINIC | Age: 68
End: 2023-12-29
Attending: SURGERY
Payer: COMMERCIAL

## 2023-12-29 PROCEDURE — 77386 HC IMRT TREATMENT DELIVERY, COMPLEX: CPT | Performed by: SURGERY

## 2023-12-29 PROCEDURE — 77014 PR CT GUIDE FOR PLACEMENT RADIATION THERAPY FIELDS: CPT | Mod: 26 | Performed by: SURGERY

## 2023-12-29 PROCEDURE — 77427 RADIATION TX MANAGEMENT X5: CPT | Performed by: RADIOLOGY

## 2024-01-02 ENCOUNTER — APPOINTMENT (OUTPATIENT)
Dept: RADIATION ONCOLOGY | Facility: CLINIC | Age: 69
End: 2024-01-02
Attending: SURGERY
Payer: COMMERCIAL

## 2024-01-02 ENCOUNTER — ONCOLOGY VISIT (OUTPATIENT)
Dept: ONCOLOGY | Facility: CLINIC | Age: 69
End: 2024-01-02
Attending: REGISTERED NURSE
Payer: COMMERCIAL

## 2024-01-02 ENCOUNTER — APPOINTMENT (OUTPATIENT)
Dept: LAB | Facility: CLINIC | Age: 69
End: 2024-01-02
Attending: REGISTERED NURSE
Payer: COMMERCIAL

## 2024-01-02 ENCOUNTER — OFFICE VISIT (OUTPATIENT)
Dept: RADIATION ONCOLOGY | Facility: CLINIC | Age: 69
End: 2024-01-02
Attending: RADIOLOGY
Payer: COMMERCIAL

## 2024-01-02 VITALS
HEART RATE: 82 BPM | WEIGHT: 170 LBS | BODY MASS INDEX: 27.44 KG/M2 | DIASTOLIC BLOOD PRESSURE: 72 MMHG | SYSTOLIC BLOOD PRESSURE: 163 MMHG

## 2024-01-02 VITALS
SYSTOLIC BLOOD PRESSURE: 137 MMHG | TEMPERATURE: 98.6 F | HEART RATE: 79 BPM | BODY MASS INDEX: 27.6 KG/M2 | RESPIRATION RATE: 16 BRPM | OXYGEN SATURATION: 99 % | WEIGHT: 171 LBS | DIASTOLIC BLOOD PRESSURE: 74 MMHG

## 2024-01-02 DIAGNOSIS — G62.9 NEUROPATHY: ICD-10-CM

## 2024-01-02 DIAGNOSIS — C32.1 SCC (SQUAMOUS CELL CARCINOMA) OF SUPRAGLOTTIS (H): Primary | ICD-10-CM

## 2024-01-02 DIAGNOSIS — R13.10 DYSPHAGIA, UNSPECIFIED TYPE: ICD-10-CM

## 2024-01-02 DIAGNOSIS — E87.6 HYPOKALEMIA: ICD-10-CM

## 2024-01-02 LAB
ALBUMIN SERPL BCG-MCNC: 3.9 G/DL (ref 3.5–5.2)
ALP SERPL-CCNC: 102 U/L (ref 40–150)
ALT SERPL W P-5'-P-CCNC: 24 U/L (ref 0–70)
ANION GAP SERPL CALCULATED.3IONS-SCNC: 9 MMOL/L (ref 7–15)
AST SERPL W P-5'-P-CCNC: 22 U/L (ref 0–45)
BASOPHILS # BLD AUTO: 0.1 10E3/UL (ref 0–0.2)
BASOPHILS NFR BLD AUTO: 1 %
BILIRUB SERPL-MCNC: 0.3 MG/DL
BUN SERPL-MCNC: 20.5 MG/DL (ref 8–23)
CALCIUM SERPL-MCNC: 9.1 MG/DL (ref 8.8–10.2)
CHLORIDE SERPL-SCNC: 105 MMOL/L (ref 98–107)
CREAT SERPL-MCNC: 0.63 MG/DL (ref 0.67–1.17)
DEPRECATED HCO3 PLAS-SCNC: 24 MMOL/L (ref 22–29)
EGFRCR SERPLBLD CKD-EPI 2021: >90 ML/MIN/1.73M2
EOSINOPHIL # BLD AUTO: 0.1 10E3/UL (ref 0–0.7)
EOSINOPHIL NFR BLD AUTO: 2 %
ERYTHROCYTE [DISTWIDTH] IN BLOOD BY AUTOMATED COUNT: 13.9 % (ref 10–15)
GLUCOSE SERPL-MCNC: 90 MG/DL (ref 70–99)
HCT VFR BLD AUTO: 34.3 % (ref 40–53)
HGB BLD-MCNC: 11.3 G/DL (ref 13.3–17.7)
IMM GRANULOCYTES # BLD: 0 10E3/UL
IMM GRANULOCYTES NFR BLD: 1 %
LYMPHOCYTES # BLD AUTO: 0.9 10E3/UL (ref 0.8–5.3)
LYMPHOCYTES NFR BLD AUTO: 22 %
MCH RBC QN AUTO: 28.9 PG (ref 26.5–33)
MCHC RBC AUTO-ENTMCNC: 32.9 G/DL (ref 31.5–36.5)
MCV RBC AUTO: 88 FL (ref 78–100)
MONOCYTES # BLD AUTO: 0.4 10E3/UL (ref 0–1.3)
MONOCYTES NFR BLD AUTO: 10 %
NEUTROPHILS # BLD AUTO: 2.8 10E3/UL (ref 1.6–8.3)
NEUTROPHILS NFR BLD AUTO: 64 %
NRBC # BLD AUTO: 0 10E3/UL
NRBC BLD AUTO-RTO: 0 /100
PLATELET # BLD AUTO: 228 10E3/UL (ref 150–450)
POTASSIUM SERPL-SCNC: 4 MMOL/L (ref 3.4–5.3)
PROT SERPL-MCNC: 6.5 G/DL (ref 6.4–8.3)
RBC # BLD AUTO: 3.91 10E6/UL (ref 4.4–5.9)
SODIUM SERPL-SCNC: 138 MMOL/L (ref 135–145)
WBC # BLD AUTO: 4.3 10E3/UL (ref 4–11)

## 2024-01-02 PROCEDURE — 36415 COLL VENOUS BLD VENIPUNCTURE: CPT | Performed by: NURSE PRACTITIONER

## 2024-01-02 PROCEDURE — G0463 HOSPITAL OUTPT CLINIC VISIT: HCPCS | Performed by: NURSE PRACTITIONER

## 2024-01-02 PROCEDURE — 85025 COMPLETE CBC W/AUTO DIFF WBC: CPT | Performed by: NURSE PRACTITIONER

## 2024-01-02 PROCEDURE — 97803 MED NUTRITION INDIV SUBSEQ: CPT | Performed by: DIETITIAN, REGISTERED

## 2024-01-02 PROCEDURE — 80053 COMPREHEN METABOLIC PANEL: CPT | Performed by: NURSE PRACTITIONER

## 2024-01-02 PROCEDURE — 77386 HC IMRT TREATMENT DELIVERY, COMPLEX: CPT | Performed by: SURGERY

## 2024-01-02 PROCEDURE — 99214 OFFICE O/P EST MOD 30 MIN: CPT | Performed by: NURSE PRACTITIONER

## 2024-01-02 RX ORDER — DIPHENHYDRAMINE HYDROCHLORIDE 50 MG/ML
50 INJECTION INTRAMUSCULAR; INTRAVENOUS
Status: CANCELLED
Start: 2024-01-03

## 2024-01-02 RX ORDER — HEPARIN SODIUM,PORCINE 10 UNIT/ML
5-20 VIAL (ML) INTRAVENOUS DAILY PRN
Status: CANCELLED | OUTPATIENT
Start: 2024-01-03

## 2024-01-02 RX ORDER — EPINEPHRINE 1 MG/ML
0.3 INJECTION, SOLUTION INTRAMUSCULAR; SUBCUTANEOUS EVERY 5 MIN PRN
Status: CANCELLED | OUTPATIENT
Start: 2024-01-03

## 2024-01-02 RX ORDER — PEN NEEDLE, DIABETIC 32GX 5/32"
NEEDLE, DISPOSABLE MISCELLANEOUS
COMMUNITY
Start: 2023-12-20

## 2024-01-02 RX ORDER — LORAZEPAM 2 MG/ML
0.5 INJECTION INTRAMUSCULAR EVERY 4 HOURS PRN
Status: CANCELLED | OUTPATIENT
Start: 2024-01-03

## 2024-01-02 RX ORDER — ALBUTEROL SULFATE 0.83 MG/ML
2.5 SOLUTION RESPIRATORY (INHALATION)
Status: CANCELLED | OUTPATIENT
Start: 2024-01-03

## 2024-01-02 RX ORDER — ALBUTEROL SULFATE 90 UG/1
AEROSOL, METERED RESPIRATORY (INHALATION)
COMMUNITY
Start: 2023-12-20

## 2024-01-02 RX ORDER — DIPHENHYDRAMINE HCL 25 MG
50 CAPSULE ORAL ONCE
Status: CANCELLED
Start: 2024-01-03

## 2024-01-02 RX ORDER — ALBUTEROL SULFATE 90 UG/1
1-2 AEROSOL, METERED RESPIRATORY (INHALATION)
Status: CANCELLED
Start: 2024-01-03

## 2024-01-02 RX ORDER — METHYLPREDNISOLONE SODIUM SUCCINATE 125 MG/2ML
125 INJECTION, POWDER, LYOPHILIZED, FOR SOLUTION INTRAMUSCULAR; INTRAVENOUS
Status: CANCELLED
Start: 2024-01-03

## 2024-01-02 RX ORDER — INSULIN LISPRO 100 [IU]/ML
INJECTION, SUSPENSION SUBCUTANEOUS
COMMUNITY
Start: 2023-12-20

## 2024-01-02 RX ORDER — MEPERIDINE HYDROCHLORIDE 25 MG/ML
25 INJECTION INTRAMUSCULAR; INTRAVENOUS; SUBCUTANEOUS EVERY 30 MIN PRN
Status: CANCELLED | OUTPATIENT
Start: 2024-01-03

## 2024-01-02 RX ORDER — HEPARIN SODIUM (PORCINE) LOCK FLUSH IV SOLN 100 UNIT/ML 100 UNIT/ML
5 SOLUTION INTRAVENOUS
Status: CANCELLED | OUTPATIENT
Start: 2024-01-03

## 2024-01-02 RX ORDER — DIPHENHYDRAMINE HCL 12.5 MG/5ML
50 SOLUTION ORAL ONCE
Status: CANCELLED
Start: 2024-01-03 | End: 2024-01-03

## 2024-01-02 ASSESSMENT — PAIN SCALES - GENERAL: PAINLEVEL: NO PAIN (0)

## 2024-01-02 NOTE — NURSING NOTE
Chief Complaint   Patient presents with    Labs Only     Labs drawn via VPT by RN, VS done     Labs collected from venipuncture by RN. Vitals taken. Checked in for appointment(s).

## 2024-01-02 NOTE — LETTER
2024         RE: Ko Thakkar  [address]  [address]  [place] [address]        Dear Colleague,    Thank you for referring your patient, Ko Thakkar, to the [facility] RADIATION ONCOLOGY. Please see a copy of my visit note below.    [facility] PHYSICIANS  SPECIALIZING IN BREAKTHROUGHS  Radiation Oncology    On Treatment Visit Note      Ko Thakkar      Date: 2024  MRN: [id number]   : 1955  Diagnosis: SCC of Supraglottis        ID: Mr. Thakkar is a 68 year old male [id number] oropharyngeal SCC, p16 positive. Tumor involved the right tonsil, posterior pharyngeal wall, and right AE fold, hypopharynx, with known right cord paralysis, with bilateral adenopathy including right RP node, right level IV, and questionable right parotid node (which was bx proven SCC). There is no evidence of any distant disease. He has evidence of swallow dysfunction with dysphagia and aspiration, and voice dysfunction with dysphonia and right cord paralysis.      Given extent of disease as well as potential delays in starting treatment, induction chemotherapy was administered under care of Dr. Louise.  PET CT scan after 2 cycles of carboplatin paclitaxel demonstrates a good partial response without any distant metastatic disease.    Reason for Visit:  On Radiation Treatment Visit       Treatment Summary to Date  Head and necks Current Dose: 1800/7000 cGy Fractions:       Chemotherapy  Chemo concurrent with radx?: Yes  Oncologist: Dr Louise  Drug Name/Frequency 1: Taxol/carboplatin    Subjective:   No complaints, tolerating RT well overall.  He is staying at gabapentin 300 mg daily as per previous discussion with Dr. Cordova but wants to rethink dosing of gabapentin and is now willing to escalate the dose.  His PEG tube fell out and he did not get it replaced due to risk of false passage.    Pain Control: Gabapentin, starting escalation  Fluid Intake: Adequate,  PO  Nutrition: PO  Rinses: Starting   Skin care: Aquaphor BID  Chemotherapy: yes    Nursing ROS:   Nutrition Alteration  Diet Type: Patient's Preference  Nutrition Note: PEG fell out  Skin  Skin Reaction: 0 - No changes  Skin Intervention: Aquaphor     ENT and Mouth Exam  Mucositis - Current: 0 - None   ENT/Mouth Note: S&S 15-20  Cardiovascular  Respiratory effort: 1 - Normal - without distress  Gastrointestinal  Nausea: 0 - None     Psychosocial  Mood - Anxiety: 0 - Normal  Pain Assessment  0-10 Pain Scale: 0  Pain Treatment: Gabapentin, just starting    Objective:   /72  Pulse 82  Wt 77.1 kg (170 lb)   Gen: Appears well, in no acute distress  HN: no mucositis, no erythema   CV/Resp: rrr, breathing comfortably on room air  Neuro: CN 2-12 grossly intact, UE/LE full strength     Labs:  CBC RESULTS:   Recent Labs   Lab Test 12/19/23  0643   WBC 4.8   RBC 4.52   HGB 13.2*   HCT 38.8*   MCV 86   MCH 29.2   MCHC 34.0   RDW 14.0        ELECTROLYTES:  Recent Labs   Lab Test 12/19/23  0643      POTASSIUM 3.8   CHLORIDE 107   OLE 9.1   CO2 23   BUN 9.3   CR 0.68   *       Assessment:    Tolerating radiation therapy well.  All questions and concerns addressed.    Plan:   Continue current therapy.    Continue gabapentin, rinses, nutrition, hydration, skin care  Patient will now start escalating gabapentin dose.  Taper instructions which were again given to the patient in clinic today.      Mosaiq chart and setup information reviewed  Ports checked and MVCT/IGRT images checked    Medication Review  Med list reviewed with patient?: Yes  Med list printed and given: Offered and declined    Educational Topic Discussed  Education Instructions: Reviewed    Olinda Mitchell MD  Radiation Oncology

## 2024-01-02 NOTE — PROGRESS NOTES
[facility] PHYSICIANS  SPECIALIZING IN BREAKTHROUGHS  Radiation Oncology    On Treatment Visit Note      Ko Thakkar      Date: 2024  MRN: [id number]   : 1955  Diagnosis: SCC of Supraglottis        ID: Mr. Thakkar is a 68 year old male [id number] oropharyngeal SCC, p16 positive. Tumor involved the right tonsil, posterior pharyngeal wall, and right AE fold, hypopharynx, with known right cord paralysis, with bilateral adenopathy including right RP node, right level IV, and questionable right parotid node (which was bx proven SCC). There is no evidence of any distant disease. He has evidence of swallow dysfunction with dysphagia and aspiration, and voice dysfunction with dysphonia and right cord paralysis.      Given extent of disease as well as potential delays in starting treatment, induction chemotherapy was administered under care of Dr. Louise.  PET CT scan after 2 cycles of carboplatin paclitaxel demonstrates a good partial response without any distant metastatic disease.    Reason for Visit:  On Radiation Treatment Visit       Treatment Summary to Date  Head and necks Current Dose: 1800/7000 cGy Fractions:       Chemotherapy  Chemo concurrent with radx?: Yes  Oncologist: Dr Louise  Drug Name/Frequency 1: Taxol/carboplatin    Subjective:   No complaints, tolerating RT well overall.  He is staying at gabapentin 300 mg daily as per previous discussion with Dr. Cordova but wants to rethink dosing of gabapentin and is now willing to escalate the dose.  His PEG tube fell out and he did not get it replaced due to risk of false passage.    Pain Control: Gabapentin, starting escalation  Fluid Intake: Adequate, PO  Nutrition: PO  Rinses: Starting   Skin care: Aquaphor BID  Chemotherapy: yes    Nursing ROS:   Nutrition Alteration  Diet Type: Patient's Preference  Nutrition Note: PEG fell out  Skin  Skin Reaction: 0 - No changes  Skin Intervention: Aquaphor     ENT and Mouth  Exam  Mucositis - Current: 0 - None   ENT/Mouth Note: S&S 15-20  Cardiovascular  Respiratory effort: 1 - Normal - without distress  Gastrointestinal  Nausea: 0 - None     Psychosocial  Mood - Anxiety: 0 - Normal  Pain Assessment  0-10 Pain Scale: 0  Pain Treatment: Gabapentin, just starting    Objective:   /72  Pulse 82  Wt 77.1 kg (170 lb)   Gen: Appears well, in no acute distress  HN: no mucositis, no erythema   CV/Resp: rrr, breathing comfortably on room air  Neuro: CN 2-12 grossly intact, UE/LE full strength     Labs:  CBC RESULTS:   Recent Labs   Lab Test 12/19/23  0643   WBC 4.8   RBC 4.52   HGB 13.2*   HCT 38.8*   MCV 86   MCH 29.2   MCHC 34.0   RDW 14.0        ELECTROLYTES:  Recent Labs   Lab Test 12/19/23  0643      POTASSIUM 3.8   CHLORIDE 107   OLE 9.1   CO2 23   BUN 9.3   CR 0.68   *       Assessment:    Tolerating radiation therapy well.  All questions and concerns addressed.    Plan:   Continue current therapy.    Continue gabapentin, rinses, nutrition, hydration, skin care  Patient will now start escalating gabapentin dose.  Taper instructions which were again given to the patient in clinic today.      Mosaiq chart and setup information reviewed  Ports checked and MVCT/IGRT images checked    Medication Review  Med list reviewed with patient?: Yes  Med list printed and given: Offered and declined    Educational Topic Discussed  Education Instructions: Reviewed    Olinda Mitchell MD  Radiation Oncology

## 2024-01-02 NOTE — PROGRESS NOTES
CLINICAL NUTRITION SERVICES - REASSESSMENT NOTE   EVALUATION OF PREVIOUS PLAN OF CARE:   Time Spent: 15 minutes  Visit Type: return OTV  Pt accompanied by: his wife, Ondina  Referring Physician: Wilfred  C10.9 (ICD-10-CM) - Squamous cell carcinoma of oropharynx (H)   E11.9 (ICD-10-CM) - Type 2 diabetes mellitus without complications (H)         NUTRITION HISTORY  Factors affecting nutrition intake include:taste aversions, swallowing difficulty  Current diet/appetite: general diet, softer foods  Chemotherapy: Started 10/16 - Taxol  Radiation: started today, 12/19  PEG tube: fell out on 12/25, has not been replaced yet  Monitoring from previous assessment:   -Food/Fluid intake - Ace continues to focus on eating soft, moist foods.  He has been drinking protein shakes with 160 felicia, 30g protein.  He plans to freeze these as they turn out like ice cream.   -Weight trends - stable  Wt Readings from Last 10 Encounters:   01/02/24 77.1 kg (170 lb)   12/27/23 77.7 kg (171 lb 3.2 oz)   12/26/23 78.3 kg (172 lb 11.2 oz)   12/25/23 77.6 kg (171 lb)   12/19/23 78.5 kg (173 lb)   12/19/23 78.5 kg (173 lb 1.6 oz)   11/29/23 80.3 kg (177 lb)   11/13/23 78.2 kg (172 lb 8 oz)   10/23/23 79.8 kg (176 lb)   10/16/23 80.9 kg (178 lb 4.8 oz)     Previous Goals:   1.  Aim for 5-6 small frequent meals  2.  Aim for 2000-2400kcal and 80-100g protein, >8 cups non-caffeine fluids per day  3. Weight maintenance/no more than 1-2 lb wt loss each week during treatment  Evaluation: Met   Previous Nutrition Diagnosis:   Predicted suboptimal nutrient intake related cancer treatment to head/neck region, difficulty swallowing, PEG tube in place    Evaluation: No change   NEW FINDINGS:   No new findings   CURRENT NUTRITION DIAGNOSIS   Predicted suboptimal nutrient intake related cancer treatment to head/neck region, difficulty swallowing   INTERVENTIONS:  Reviewed general, healthy diet.  Reviewed ways to increase kcal and protein intake and reviewed oral  nutritional supplements (Ensure Plus/Boost Plus etc) to have daily prn. Encouraged to increase volume prn. Reviewed nutrition goals during treatment as below.  Reviewed replacing PEG prn.   Reviewed fluids and electrolyte goals. Encouraged to aim for at least 8-10 cups water/electrolyte per day.   Goals  1.  Aim for 5-6 small frequent meals  2.  Aim for 2000-2400kcal and 80-100g protein, >8 cups non-caffeine fluids per day  3. Weight maintenance/no more than 1-2 lb wt loss each week during treatment   Follow-Up Plans: schedule for 1 week follow up  MONITORING AND EVALUATION:  -Food/beverage intake, Weight trends  Meka Lee RD, , LD  [facility]  [phone]

## 2024-01-02 NOTE — LETTER
1/2/2024         RE: Ko Thakkar  [address]  [address]  [place] [address]        Dear Colleague,    Thank you for referring your patient, Ko Thakkar, to the [facility]. Please see a copy of my visit note below.       [facility] CANCER [facility]    PATIENT NAME: Ko Thakkar  MRN # [id number]   DATE OF VISIT: January 2, 2024 YOB: 1955     Otolaryngology: Dr. Denia Hardin  Radiation Oncology: Dr. Juwan Cordova   PCP: Dr. Alton Mota; [facility] in [place]     CANCER TYPE: SCC supraglottis  STAGE: [id number] (IRON)  ECOG PS: 1    SUMMARY  5/16/23 EGD for dysphagia. Gastritis and gastric diverticulum. Bx showed H. Pylori, treated. No atrophic gastritis or dysplasia  7/11/23 Swallow study at . Poor epiglottic inversion, penetration, aspiration  7/28/23 CT neck. 2.2 x 1.4 x 2.3 cm supraglottic mass, concern for paraglottic involvement, bilateral IB, IIA, III, IV adenopathy, 70% ICA stenosis   8/21/23 US carotid. 70-99% stenosis R ICA. <50% stenosis L ICA  8/29/23 FNA L level 2 node (IR). Path: SCC  9/6/23 PET/CT. FDG avid mass (SUV 13.5) right tonsil/lateral pharyngeal wall/glossotonsillar sulcus with slight extension to the R tongue base, right AE fold, right epiglottis, right piriform sinus, right posterolateral pharyngeal wall, posterior pharyngeal wall, no thyroid cartilage erosion, right retropharyngeal node, deep lobe parotid FDG avid mass, bilateral cervical nodes (right level II-IV, left level II-III), no distant disease, L mandibular canine with periapical abscess. R temporal lobe change suggestive of prior infarct. 5 mm LLL nodule.  10/10/23 Video swallow.   10/16~11/13/23 C1-2 carboplatin paclitaxel. Given due to logistical issues with pre-chemoradiation evaluation that would have delayed start of any treatment substantially. Paclitaxel dose reduced to 140 mg/m2 C2 due to neuropathy  10p/23/23 CTA. 70% narrowing R carotid  carlos/bifurcation similar to 7/28/23 CT. Laterally projecting disc osteophyte complex resulting in mod-severe L vertebral artery narrowing at C3-4 level.  10/23/23 Brain MRI. No mets. Bilateral frontal and temporal lobe encephalomalacia, R > L. Mild volume loss.   11/28/23 PET/CT. Good MT.       SUBJECTIVE  Mr. Thakkar (Ace) is seen today for day 15 weekly carbo/taxol, concurrent with XRT  -Feels well.  -G tube fell out which he is happy about  -no trouble eating/swallowing  -Reports neuropathy in his feet that has been present for decades. Now also numbness in fingertips, perhaps slightly worse with C1 carbo/taxol. Fingertip symptoms only limit ability to button shirt but are otherwise not bothersome  -Reports he has significantly decreased ibuprofen use  -No melena, constipation, diarrhea    PAST MEDICAL HISTORY  SCC as above  GERD  H/o food impaction in 2008 and 2014 (steak) related to Schatzki ring on EGD 10/13/14  DM2. Last A1c about 7.6 sometime in summer 2023 and Nov 2023  Dyslipidemia  Possible prior L temporal CVA  HTN  H.o spinal meningitis as a baby -   R arm surgery  Bone graft to forehead  Ex lap   Tinnitus secondary to treatment for meningitis or from menigitis - bilateral   Hearing loss -- audiogram 10/2/2023  Numbness in the toes - mostly big toes   Crushing pills   No muscle aches or pains     CURRENT OUTPATIENT MEDICATIONS  Reviewed    ALLERGIES  No Known Allergies     PHYSICAL EXAM  /74 (BP Location: Left arm, Patient Position: Sitting, Cuff Size: Adult Regular)   Pulse 79   Temp 98.6  F (37  C) (Oral)   Resp 16   Wt 77.6 kg (171 lb)   SpO2 99%   BMI 27.60 kg/m      General: Well-appearing male, NAD  Eyes: EOMI, PERRL. No scleral icterus.  ENT: Oral mucosa moist. No lesions, thrush.   Cardiovascular: RRR, no m/g/r. No peripheral edema .  Respiratory: CTA bilaterally. No wheezes or crackles.  Abd: g tube site healing  Neurologic: Grossly nonfocal  Skin: No rashes, petechiae, or bruising  noted on exposed skin.      LABORATORY AND IMAGING STUDIES  Most Recent 3 CBC's:  Recent Labs   Lab Test 01/02/24  1240 12/27/23  1006 12/19/23  0643   WBC 4.3 5.4 4.8   HGB 11.3* 12.4* 13.2*   MCV 88 86 86    282 325   ANEUTAUTO 2.8 3.6 2.4    Most Recent 3 BMP's:  Recent Labs   Lab Test 12/27/23  1006 12/19/23  0643 11/29/23  1501    141 140   POTASSIUM 4.0 3.8 4.1   CHLORIDE 105 107 107   CO2 21* 23 24   BUN 14.5 9.3 15.6   CR 0.67 0.68 0.68   ANIONGAP 10 11 9   OLE 8.9 9.1 8.6*   * 136* 279*   PROTTOTAL 6.8 7.2 6.4   ALBUMIN 4.0 4.2 3.7    Most Recent 2 LFT's:  Recent Labs   Lab Test 12/27/23  1006 12/19/23  0643   AST 17 20   ALT 16 18   ALKPHOS 101 119   BILITOTAL 0.3 0.2    Most Recent TSH and T4:  Recent Labs   Lab Test 11/29/23  1501   TSH 3.46     Phos/Mag:  Lab Results   Component Value Date    PHOS 3.0 11/29/2023    MAG 1.7 11/29/2023    MAG 2.3 09/14/2023      I reviewed the above labs today.      ASSESSMENT AND PLAN  SCC supraglottis, [id number] (IRON): Declined surgery. Planned chemoradiation but with all of the logistical issues and work needed prior to starting, also due to the large size of the primary tumor, started carboplatin + paclitaxel first. PET/CT confirmed good CT after 2 cycles. Plan remained to transition to chemoradiation. Not a candidate for cisplatin due to severe hearing loss. Proceced with D15 weekly carboplatin/paclitaxel tomorrow.   -RTC weekly for PALOMO visit with infusion      Dental: Had extractions as planned prior to starting chemo, still needs bone shaved down some, which was delayed to start chemo.  Ace has been told by his dentist the process is going to be postponed until he recovers from cancer treatment. Should wait many months following completion of chemoradiation before denture fitting.    Neuropathy: Worse after C1 chemo, no worse after C2. Monitor loosely with weekly carboplatin paclitaxel. B12 10/6/23 was ok. TSH has been ok.     Hypokalemia:  Resolved.     Ibuprofen use, arthritis: Reports decreased use. Was most recently using for cancer-related pain which has improved with response to treatment. Again discouraged use in extremely high doses due to significant risk of bleeding.    Dysphagia, aspiration: Jessica Ramos visit 9/13/23, video 10/10, known aspiration at baseline although swallowing a little better with MO. Gtube 10/13--fell out 12/25 and not replaced yet. Discussed rationale for replacement in anticipation of progressive RT symptoms, he tells me this is in the works    R KAILASH: Met with Dr. Tapia, vascular surgery fellow 8/21. Recommended treating the cancer first, asa 81 and high dose atorva, follow up 6 months with repeat US. PET/CT showed possible old R temporal infarct. Exhibited TIA symptoms in early October with 4 hours episode of confusion. MR brain and CTA head 10/23 w/o acute findings.    H/o food impaction: lower esophagus; monitor    Liver issue: Says he was to have some sort of imaging at [facility] to look at his liver that had to be canceled due to the appts he needed for the cancer. Will try to figure out what that was about - not actively discussed again today. Might be related to his mom having cirrhosis without hepatitis or ETOH hx.    30 minutes spent on the date of the encounter doing chart review, review of test results, interpretation of tests, patient visit, documentation, and discussion with other provider(s)       Olga Baker, CNP on 12/27/2023 at 10:52 AM

## 2024-01-02 NOTE — NURSING NOTE
"Oncology Rooming Note    January 2, 2024 1:06 PM   Ko Thakkar is a 68 year old male who presents for:    Chief Complaint   Patient presents with    Labs Only     Labs drawn via VPT by RN, MAURICE done    Oncology Clinic Visit     squamous cell carcinoma of supraglottis      Initial Vitals: /74 (BP Location: Left arm, Patient Position: Sitting, Cuff Size: Adult Regular)   Pulse 79   Temp 98.6  F (37  C) (Oral)   Resp 16   Wt 77.6 kg (171 lb)   SpO2 99%   BMI 27.60 kg/m   Estimated body mass index is 27.6 kg/m  as calculated from the following:    Height as of 12/25/23: 1.676 m (5' 6\").    Weight as of this encounter: 77.6 kg (171 lb). Body surface area is 1.9 meters squared.  No Pain (0) Comment: Data Unavailable   No LMP for male patient.  Allergies reviewed: Yes  Medications reviewed: Yes    Medications: Medication refills not needed today.  Pharmacy name entered into EPIC:    [facility] - [place], [place] - [address] - [place], [place] - [address]    Frailty Screening:   Is the patient here for a new oncology consult visit in cancer care? 2. No      Clinical concerns: none.       William Tee"

## 2024-01-02 NOTE — PROGRESS NOTES
[facility] CANCER [facility]    PATIENT NAME: Ko Thakkar  MRN # [id number]   DATE OF VISIT: January 2, 2024 YOB: 1955     Otolaryngology: Dr. Denia Hardin  Radiation Oncology: Dr. Juwan Cordova   PCP: Dr. Alton Mota; [facility] in [place]     CANCER TYPE: SCC supraglottis  STAGE: [id number] (IRON)  ECOG PS: 1    SUMMARY  5/16/23 EGD for dysphagia. Gastritis and gastric diverticulum. Bx showed H. Pylori, treated. No atrophic gastritis or dysplasia  7/11/23 Swallow study at . Poor epiglottic inversion, penetration, aspiration  7/28/23 CT neck. 2.2 x 1.4 x 2.3 cm supraglottic mass, concern for paraglottic involvement, bilateral IB, IIA, III, IV adenopathy, 70% ICA stenosis   8/21/23 US carotid. 70-99% stenosis R ICA. <50% stenosis L ICA  8/29/23 FNA L level 2 node (IR). Path: SCC  9/6/23 PET/CT. FDG avid mass (SUV 13.5) right tonsil/lateral pharyngeal wall/glossotonsillar sulcus with slight extension to the R tongue base, right AE fold, right epiglottis, right piriform sinus, right posterolateral pharyngeal wall, posterior pharyngeal wall, no thyroid cartilage erosion, right retropharyngeal node, deep lobe parotid FDG avid mass, bilateral cervical nodes (right level II-IV, left level II-III), no distant disease, L mandibular canine with periapical abscess. R temporal lobe change suggestive of prior infarct. 5 mm LLL nodule.  10/10/23 Video swallow.   10/16~11/13/23 C1-2 carboplatin paclitaxel. Given due to logistical issues with pre-chemoradiation evaluation that would have delayed start of any treatment substantially. Paclitaxel dose reduced to 140 mg/m2 C2 due to neuropathy  10p/23/23 CTA. 70% narrowing R carotid carlos/bifurcation similar to 7/28/23 CT. Laterally projecting disc osteophyte complex resulting in mod-severe L vertebral artery narrowing at C3-4 level.  10/23/23 Brain MRI. No mets. Bilateral frontal and temporal lobe encephalomalacia, R > L. Mild volume loss.    11/28/23 PET/CT. Good CO.       SUBJECTIVE  Mr. Thakkar (Ace) is seen today for day 15 weekly carbo/taxol, concurrent with XRT  -Feels well.  -G tube fell out which he is happy about  -no trouble eating/swallowing  -Reports neuropathy in his feet that has been present for decades. Now also numbness in fingertips, perhaps slightly worse with C1 carbo/taxol. Fingertip symptoms only limit ability to button shirt but are otherwise not bothersome  -Reports he has significantly decreased ibuprofen use  -No melena, constipation, diarrhea    PAST MEDICAL HISTORY  SCC as above  GERD  H/o food impaction in 2008 and 2014 (steak) related to Schatzki ring on EGD 10/13/14  DM2. Last A1c about 7.6 sometime in summer 2023 and Nov 2023  Dyslipidemia  Possible prior L temporal CVA  HTN  H.o spinal meningitis as a baby -   R arm surgery  Bone graft to forehead  Ex lap   Tinnitus secondary to treatment for meningitis or from menigitis - bilateral   Hearing loss -- audiogram 10/2/2023  Numbness in the toes - mostly big toes   Crushing pills   No muscle aches or pains     CURRENT OUTPATIENT MEDICATIONS  Reviewed    ALLERGIES  No Known Allergies     PHYSICAL EXAM  /74 (BP Location: Left arm, Patient Position: Sitting, Cuff Size: Adult Regular)   Pulse 79   Temp 98.6  F (37  C) (Oral)   Resp 16   Wt 77.6 kg (171 lb)   SpO2 99%   BMI 27.60 kg/m      General: Well-appearing male, NAD  Eyes: EOMI, PERRL. No scleral icterus.  ENT: Oral mucosa moist. No lesions, thrush.   Cardiovascular: RRR, no m/g/r. No peripheral edema .  Respiratory: CTA bilaterally. No wheezes or crackles.  Abd: g tube site healing  Neurologic: Grossly nonfocal  Skin: No rashes, petechiae, or bruising noted on exposed skin.      LABORATORY AND IMAGING STUDIES  Most Recent 3 CBC's:  Recent Labs   Lab Test 01/02/24  1240 12/27/23  1006 12/19/23  0643   WBC 4.3 5.4 4.8   HGB 11.3* 12.4* 13.2*   MCV 88 86 86    282 325   ANEUTAUTO 2.8 3.6 2.4    Most  Recent 3 BMP's:  Recent Labs   Lab Test 12/27/23  1006 12/19/23  0643 11/29/23  1501    141 140   POTASSIUM 4.0 3.8 4.1   CHLORIDE 105 107 107   CO2 21* 23 24   BUN 14.5 9.3 15.6   CR 0.67 0.68 0.68   ANIONGAP 10 11 9   OLE 8.9 9.1 8.6*   * 136* 279*   PROTTOTAL 6.8 7.2 6.4   ALBUMIN 4.0 4.2 3.7    Most Recent 2 LFT's:  Recent Labs   Lab Test 12/27/23  1006 12/19/23  0643   AST 17 20   ALT 16 18   ALKPHOS 101 119   BILITOTAL 0.3 0.2    Most Recent TSH and T4:  Recent Labs   Lab Test 11/29/23  1501   TSH 3.46     Phos/Mag:  Lab Results   Component Value Date    PHOS 3.0 11/29/2023    MAG 1.7 11/29/2023    MAG 2.3 09/14/2023      I reviewed the above labs today.      ASSESSMENT AND PLAN  SCC supraglottis, [id number] (IRON): Declined surgery. Planned chemoradiation but with all of the logistical issues and work needed prior to starting, also due to the large size of the primary tumor, started carboplatin + paclitaxel first. PET/CT confirmed good MD after 2 cycles. Plan remained to transition to chemoradiation. Not a candidate for cisplatin due to severe hearing loss. Proceced with D15 weekly carboplatin/paclitaxel tomorrow.   -RTC weekly for PALOMO visit with infusion      Dental: Had extractions as planned prior to starting chemo, still needs bone shaved down some, which was delayed to start chemo.  Ace has been told by his dentist the process is going to be postponed until he recovers from cancer treatment. Should wait many months following completion of chemoradiation before denture fitting.    Neuropathy: Worse after C1 chemo, no worse after C2. Monitor loosely with weekly carboplatin paclitaxel. B12 10/6/23 was ok. TSH has been ok.     Hypokalemia: Resolved.     Ibuprofen use, arthritis: Reports decreased use. Was most recently using for cancer-related pain which has improved with response to treatment. Again discouraged use in extremely high doses due to significant risk of bleeding.    Dysphagia,  aspiration: Jessica Gregorioadenike visit 9/13/23, video 10/10, known aspiration at baseline although swallowing a little better with DC. Gtube 10/13--fell out 12/25 and not replaced yet. Discussed rationale for replacement in anticipation of progressive RT symptoms, he tells me this is in the works    R KAILASH: Met with Dr. Tapia, vascular surgery fellow 8/21. Recommended treating the cancer first, asa 81 and high dose atorva, follow up 6 months with repeat US. PET/CT showed possible old R temporal infarct. Exhibited TIA symptoms in early October with 4 hours episode of confusion. MR brain and CTA head 10/23 w/o acute findings.    H/o food impaction: lower esophagus; monitor    Liver issue: Says he was to have some sort of imaging at [facility] to look at his liver that had to be canceled due to the appts he needed for the cancer. Will try to figure out what that was about - not actively discussed again today. Might be related to his mom having cirrhosis without hepatitis or ETOH hx.    30 minutes spent on the date of the encounter doing chart review, review of test results, interpretation of tests, patient visit, documentation, and discussion with other provider(s)       Olga Baker, CNP on 12/27/2023 at 10:52 AM

## 2024-01-03 ENCOUNTER — INFUSION THERAPY VISIT (OUTPATIENT)
Dept: ONCOLOGY | Facility: CLINIC | Age: 69
End: 2024-01-03
Attending: INTERNAL MEDICINE
Payer: COMMERCIAL

## 2024-01-03 ENCOUNTER — VIRTUAL VISIT (OUTPATIENT)
Dept: PALLIATIVE CARE | Facility: CLINIC | Age: 69
End: 2024-01-03
Attending: INTERNAL MEDICINE
Payer: COMMERCIAL

## 2024-01-03 ENCOUNTER — THERAPY VISIT (OUTPATIENT)
Dept: SPEECH THERAPY | Facility: CLINIC | Age: 69
End: 2024-01-03
Payer: COMMERCIAL

## 2024-01-03 ENCOUNTER — APPOINTMENT (OUTPATIENT)
Dept: RADIATION ONCOLOGY | Facility: CLINIC | Age: 69
End: 2024-01-03
Attending: SURGERY
Payer: COMMERCIAL

## 2024-01-03 VITALS
DIASTOLIC BLOOD PRESSURE: 71 MMHG | HEART RATE: 89 BPM | TEMPERATURE: 97.8 F | SYSTOLIC BLOOD PRESSURE: 124 MMHG | OXYGEN SATURATION: 99 % | RESPIRATION RATE: 16 BRPM

## 2024-01-03 DIAGNOSIS — G89.3 NEOPLASM RELATED PAIN: ICD-10-CM

## 2024-01-03 DIAGNOSIS — C32.1 SCC (SQUAMOUS CELL CARCINOMA) OF SUPRAGLOTTIS (H): Primary | ICD-10-CM

## 2024-01-03 DIAGNOSIS — R13.12 DYSPHAGIA, OROPHARYNGEAL PHASE: Primary | ICD-10-CM

## 2024-01-03 DIAGNOSIS — C10.9 SQUAMOUS CELL CARCINOMA OF OROPHARYNX (H): ICD-10-CM

## 2024-01-03 PROCEDURE — 96413 CHEMO IV INFUSION 1 HR: CPT

## 2024-01-03 PROCEDURE — 77386 HC IMRT TREATMENT DELIVERY, COMPLEX: CPT | Performed by: SURGERY

## 2024-01-03 PROCEDURE — 99205 OFFICE O/P NEW HI 60 MIN: CPT | Mod: 95 | Performed by: INTERNAL MEDICINE

## 2024-01-03 PROCEDURE — 250N000013 HC RX MED GY IP 250 OP 250 PS 637: Performed by: NURSE PRACTITIONER

## 2024-01-03 PROCEDURE — 96417 CHEMO IV INFUS EACH ADDL SEQ: CPT

## 2024-01-03 PROCEDURE — 250N000011 HC RX IP 250 OP 636: Performed by: NURSE PRACTITIONER

## 2024-01-03 PROCEDURE — 92526 ORAL FUNCTION THERAPY: CPT | Mod: GN | Performed by: SPEECH-LANGUAGE PATHOLOGIST

## 2024-01-03 PROCEDURE — 96367 TX/PROPH/DG ADDL SEQ IV INF: CPT

## 2024-01-03 PROCEDURE — 258N000003 HC RX IP 258 OP 636: Performed by: NURSE PRACTITIONER

## 2024-01-03 PROCEDURE — 77336 RADIATION PHYSICS CONSULT: CPT | Performed by: SURGERY

## 2024-01-03 PROCEDURE — 96375 TX/PRO/DX INJ NEW DRUG ADDON: CPT

## 2024-01-03 RX ORDER — PANTOPRAZOLE SODIUM 20 MG/1
40 TABLET, DELAYED RELEASE ORAL DAILY
Qty: 30 TABLET | Refills: 2 | Status: SHIPPED | OUTPATIENT
Start: 2024-01-03

## 2024-01-03 RX ORDER — DIPHENHYDRAMINE HCL 12.5 MG/5ML
50 SOLUTION ORAL ONCE
Status: COMPLETED | OUTPATIENT
Start: 2024-01-03 | End: 2024-01-03

## 2024-01-03 RX ADMIN — DEXAMETHASONE SODIUM PHOSPHATE: 10 INJECTION, SOLUTION INTRAMUSCULAR; INTRAVENOUS at 09:35

## 2024-01-03 RX ADMIN — PACLITAXEL 87 MG: 6 INJECTION, SOLUTION INTRAVENOUS at 10:03

## 2024-01-03 RX ADMIN — SODIUM CHLORIDE 250 ML: 9 INJECTION, SOLUTION INTRAVENOUS at 09:29

## 2024-01-03 RX ADMIN — CARBOPLATIN 250 MG: 600 INJECTION, SOLUTION INTRAVENOUS at 11:14

## 2024-01-03 RX ADMIN — FAMOTIDINE 20 MG: 10 INJECTION, SOLUTION INTRAVENOUS at 09:30

## 2024-01-03 RX ADMIN — DIPHENHYDRAMINE HYDROCHLORIDE 50 MG: 12.5 SOLUTION ORAL at 09:32

## 2024-01-03 ASSESSMENT — PAIN SCALES - GENERAL
PAINLEVEL: MODERATE PAIN (4)
PAINLEVEL: NO PAIN (0)

## 2024-01-03 NOTE — PROGRESS NOTES
"Palliative Care Outpatient Clinic      Patient ID:  Medical - He has DM2 and stage IRON SCC of the supraglottis dx 8/2023. 10-11/2023 carbo paclitaxel, then concurrent RT started late 12/2023.    Social - Lives with wife Aretha.  [employer] member.    Care Planning -     Opioid Safety -   Hx of tobacco, AUD and polySUD (\"anything you could get on the street\") including opioids.  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    History:  History gathered today from: patient, family/loved ones, medical chart; with Aretha.    I reviewed the patient's medical history in the chart and confirmed key details today with them; summarized above.  I reviewed with them the various roles of our palliative care program; qol support, sx management, mood/coping/counseling, and care planning.  The patient was referred with a focus on pain in context of hx of CHRISTINE.     Reviewed this with him. He is very worried about being prescribed opioids/being exposed to opioids due to his CHRISTINE hx.  Pain now \"5/10\". At times more severe. Using cold on R neck.    Gabapentin 300 mg caps: initially reluctant to take because he worried it was an opioid. But they researched it online and found out what it was. So he started taking it. He is currently on bid gabap and plans on increasing it.   Ibuprofen: taking about 1400 mg/day. Takes otc cimetedine prn. No gi problems or hx of PUD.  Tylenol: doesn't help much.  Right now pain is adequately controlled  Has a G tube.    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    Mood: good \"I'm beating this.\"  Sleep ok    PE: There were no vitals taken for this visit.   Wt Readings from Last 3 Encounters:   01/02/24 77.6 kg (171 lb)   01/02/24 77.1 kg (170 lb)   12/27/23 77.7 kg (171 lb 3.2 oz)     Alert NAD      Data reviewed:  I reviewed recent labs and imaging, my comments:  Na  38  Cr 0.63  LFTs nl  Hgb 11  Plt 228    PET 11/28  -Neck PET CT:   1. Mucosal tumor site: NI-RADS 2 a (Mild residual uptake by right  lateral " oropharyngeal wall, attention on follow up is recommended)  2. Neck: NI-RADS 2. (Residual mildly av id non enlarged right 2b/3  node. Follow up with a 3 month neck CT can be considered. Otherwise  attention on routine follow up).      -Whole body PET CT:   No evidence of distant metastasis.     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    Pain  Right now continue ibuprofen; d/w him he'll likely need more eventually and it's ok for him to increase to 2400 mg a day temporarily through the worst of the pain coming up. I Rx'd protonix as gastroprotection during this time.   Continue gabapentin and we discussed pushing it per protocol to this highest dose he can manage; not everyone can tolerate 1200 mg tid and that's ok if he can't.   Safe to add acetaminophen to the mix too.    D/w him--beyond that we typically use buprenorphine as the main analgesic in his situation; d/w him what bup is and how essentially it's non-addictive. Because it is nonetheless an opioid my sense is he'll decline ever going on that or any other opioid. If that happens we don't have a lot of great options apart from the non-opioids above. He has a GT and will be able to get nutrition even if pain limits swallowing. Duloxetine could be added too due to his general anti-nociceptive qualities.     Answered his Qs about this.  We'll see him in 3 weeks.       Over 60 minutes spent on the date of the encounter doing chart review, history and exam, patient education & counseling, documentation and other activities as noted above.    Thank you for involving us in the patient's care.   Sekou Headley MD / Palliative Medicine / Text me via [employer]  This note may have been composed with voice recognition software and there may be mistranscriptions.    Video-Visit Details  Video Start Time: 7:03 AM  Video End Time:7:41 AM  Originating Location  (pt. Location): Home  Distant Location (provider location):  Off-site  Platform used for Video Visit: [employer]itt

## 2024-01-03 NOTE — NURSING NOTE
Is the patient currently in the state of [place]? YES    Visit mode:VIDEO    If the visit is dropped, the patient can be reconnected by: VIDEO VISIT: Text to cell phone:   Telephone Information:   Mobile [phone]       Will anyone else be joining the visit? NO  (If patient encounters technical issues they should call [phone]    How would you like to obtain your AVS? MyChart    Are changes needed to the allergy or medication list? No    Reason for visit: No chief complaint on file.    Lorne FRANK

## 2024-01-03 NOTE — LETTER
"1/3/2024       RE: Ko Thakkar  [address]  [address]  [place] [address]     Dear Colleague,    Thank you for referring your patient, Ko Thakkar, [facility]. Please see a copy of my visit note below.    Palliative Care Outpatient Clinic      Patient ID:  Medical - He has DM2 and stage IRON SCC of the supraglottis dx 8/2023. 10-11/2023 carbo paclitaxel, then concurrent RT started late 12/2023.    Social - Lives with wife Aretha.  [employer] member.    Care Planning -     Opioid Safety -   Hx of tobacco, AUD and polySUD (\"anything you could get on the street\") including opioids.  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    History:  History gathered today from: patient, family/loved ones, medical chart; with Aretha.    I reviewed the patient's medical history in the chart and confirmed key details today with them; summarized above.  I reviewed with them the various roles of our palliative care program; qol support, sx management, mood/coping/counseling, and care planning.  The patient was referred with a focus on pain in context of hx of CHRISTINE.     Reviewed this with him. He is very worried about being prescribed opioids/being exposed to opioids due to his CHRISTINE hx.  Pain now \"5/10\". At times more severe. Using cold on R neck.    Gabapentin 300 mg caps: initially reluctant to take because he worried it was an opioid. But they researched it online and found out what it was. So he started taking it. He is currently on bid gabap and plans on increasing it.   Ibuprofen: taking about 1400 mg/day. Takes otc cimetedine prn. No gi problems or hx of PUD.  Tylenol: doesn't help much.  Right now pain is adequately controlled  Has a G tube.    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    Mood: good \"I'm beating this.\"  Sleep ok    PE: There were no vitals taken for this visit.   Wt Readings from Last 3 Encounters: "   01/02/24 77.6 kg (171 lb)   01/02/24 77.1 kg (170 lb)   12/27/23 77.7 kg (171 lb 3.2 oz)     Alert NAD      Data reviewed:  I reviewed recent labs and imaging, my comments:  Na  38  Cr 0.63  LFTs nl  Hgb 11  Plt 228    PET 11/28  -Neck PET CT:   1. Mucosal tumor site: NI-RADS 2 a (Mild residual uptake by right  lateral oropharyngeal wall, attention on follow up is recommended)  2. Neck: NI-RADS 2. (Residual mildly av id non enlarged right 2b/3  node. Follow up with a 3 month neck CT can be considered. Otherwise  attention on routine follow up).      -Whole body PET CT:   No evidence of distant metastasis.      database reviewed: y      Impression & Recommendations:  69 yo with IRON H&N CA undergoing definitive chemoradiation; hx of polySUD in long-term recovery.    Pain  Right now continue ibuprofen; d/w him he'll likely need more eventually and it's ok for him to increase to 2400 mg a day temporarily through the worst of the pain coming up. I Rx'd protonix as gastroprotection during this time.   Continue gabapentin and we discussed pushing it per protocol to this highest dose he can manage; not everyone can tolerate 1200 mg tid and that's ok if he can't.   Safe to add acetaminophen to the mix too.    D/w him--beyond that we typically use buprenorphine as the main analgesic in his situation; d/w him what bup is and how essentially it's non-addictive. Because it is nonetheless an opioid my sense is he'll decline ever going on that or any other opioid. If that happens we don't have a lot of great options apart from the non-opioids above. He has a GT and will be able to get nutrition even if pain limits swallowing. Duloxetine could be added too due to his general anti-nociceptive qualities.     Answered his Qs about this.  We'll see him in 3 weeks.       Over 60 minutes spent on the date of the encounter doing chart review, history and exam, patient education & counseling, documentation and other activities as  noted above.    Thank you for involving us in the patient's care.   Sekou Headley MD / Palliative Medicine / Text me via [employer]!  This note may have been composed with voice recognition software and there may be mistranscriptions.          Again, thank you for allowing me to participate in the care of your patient.      Sincerely,    Sekou Headley MD

## 2024-01-03 NOTE — PATIENT INSTRUCTIONS
[facility] Triage and after hours / weekends / holidays:  [phone]    Please call the triage or after hours line if you experience a temperature greater than or equal to 100.4, shaking chills, have uncontrolled nausea, vomiting and/or diarrhea, dizziness, shortness of breath, chest pain, bleeding, unexplained bruising, or if you have any other new/concerning symptoms, questions or concerns.      If you are having any concerning symptoms or wish to speak to a provider before your next infusion visit, please call triage to notify them so we can adequately serve you.     If you need a refill on a narcotic prescription or other medication, please call before your infusion appointment.

## 2024-01-03 NOTE — PROGRESS NOTES
Infusion Nursing Note:  Ko Thakkar presents today for Cycle 1 Day 15 Carboplatin (#5) and Taxol.    Patient seen by provider today: No: Olga Baker CNP 1/2/24.   present during visit today: Not Applicable.    Note: Patient denies any signs or symptoms of infection. Patient was seen and assessed by Olga Baker CNP yesterday. Patient offers no new complaints or concerns. Patient agreeable to treatment plan today.    Intravenous Access:  Peripheral IV placed by vascular access.    Treatment Conditions:  Lab Results   Component Value Date    HGB 11.3 (L) 01/02/2024    WBC 4.3 01/02/2024    ANEUTAUTO 2.8 01/02/2024     01/02/2024        Lab Results   Component Value Date     01/02/2024    POTASSIUM 4.0 01/02/2024    MAG 1.7 11/29/2023    CR 0.63 (L) 01/02/2024    OLE 9.1 01/02/2024    BILITOTAL 0.3 01/02/2024    ALBUMIN 3.9 01/02/2024    ALT 24 01/02/2024    AST 22 01/02/2024     Results reviewed, labs MET treatment parameters, ok to proceed with treatment.    Post Infusion Assessment:  Patient tolerated infusion without incident.  Blood return noted pre and post infusion.  Site patent and intact, free from redness, edema or discomfort.  No evidence of extravasations.  Access discontinued per protocol.     Discharge Plan:   Patient declined prescription refills.  Discharge instructions reviewed with: Patient and Family.  Patient and/or family verbalized understanding of discharge instructions and all questions answered.  AVS to patient via [employer].  Patient will return 01/09/24 for next appointment.   Patient discharged in stable condition accompanied by: self and family.  Departure Mode: Ambulatory.      Rina Gaitan RN

## 2024-01-04 ENCOUNTER — APPOINTMENT (OUTPATIENT)
Dept: RADIATION ONCOLOGY | Facility: CLINIC | Age: 69
End: 2024-01-04
Attending: SURGERY
Payer: COMMERCIAL

## 2024-01-04 PROCEDURE — 77386 HC IMRT TREATMENT DELIVERY, COMPLEX: CPT | Performed by: SURGERY

## 2024-01-04 PROCEDURE — 77014 PR CT GUIDE FOR PLACEMENT RADIATION THERAPY FIELDS: CPT | Mod: 26 | Performed by: STUDENT IN AN ORGANIZED HEALTH CARE EDUCATION/TRAINING PROGRAM

## 2024-01-05 ENCOUNTER — APPOINTMENT (OUTPATIENT)
Dept: RADIATION ONCOLOGY | Facility: CLINIC | Age: 69
End: 2024-01-05
Attending: SURGERY
Payer: COMMERCIAL

## 2024-01-05 PROCEDURE — 77014 PR CT GUIDE FOR PLACEMENT RADIATION THERAPY FIELDS: CPT | Mod: 26 | Performed by: RADIOLOGY

## 2024-01-05 PROCEDURE — 77386 HC IMRT TREATMENT DELIVERY, COMPLEX: CPT | Performed by: SURGERY

## 2024-01-08 ENCOUNTER — APPOINTMENT (OUTPATIENT)
Dept: RADIATION ONCOLOGY | Facility: CLINIC | Age: 69
End: 2024-01-08
Attending: SURGERY
Payer: COMMERCIAL

## 2024-01-08 PROCEDURE — 77386 HC IMRT TREATMENT DELIVERY, COMPLEX: CPT | Performed by: SURGERY

## 2024-01-08 PROCEDURE — 77014 PR CT GUIDE FOR PLACEMENT RADIATION THERAPY FIELDS: CPT | Mod: 26 | Performed by: RADIOLOGY

## 2024-01-08 NOTE — PROGRESS NOTES
[facility] CANCER [facility]    PATIENT NAME: Ko Thakkar  MRN # [id number]   DATE OF VISIT: January 9, 2024 YOB: 1955     Otolaryngology: Dr. Denia Hardin  Radiation Oncology: Dr. Juwan Cordova   PCP: Dr. Alton Mota; [facility] in [place]     CANCER TYPE: SCC supraglottis  STAGE: [id number] (IRON)  ECOG PS: 1    SUMMARY  5/16/23 EGD for dysphagia. Gastritis and gastric diverticulum. Bx showed H. Pylori, treated. No atrophic gastritis or dysplasia  7/11/23 Swallow study at . Poor epiglottic inversion, penetration, aspiration  7/28/23 CT neck. 2.2 x 1.4 x 2.3 cm supraglottic mass, concern for paraglottic involvement, bilateral IB, IIA, III, IV adenopathy, 70% ICA stenosis   8/21/23 US carotid. 70-99% stenosis R ICA. <50% stenosis L ICA  8/29/23 FNA L level 2 node (IR). Path: SCC  9/6/23 PET/CT. FDG avid mass (SUV 13.5) right tonsil/lateral pharyngeal wall/glossotonsillar sulcus with slight extension to the R tongue base, right AE fold, right epiglottis, right piriform sinus, right posterolateral pharyngeal wall, posterior pharyngeal wall, no thyroid cartilage erosion, right retropharyngeal node, deep lobe parotid FDG avid mass, bilateral cervical nodes (right level II-IV, left level II-III), no distant disease, L mandibular canine with periapical abscess. R temporal lobe change suggestive of prior infarct. 5 mm LLL nodule.  10/10/23 Video swallow.   10/16~11/13/23 C1-2 carboplatin paclitaxel. Given due to logistical issues with pre-chemoradiation evaluation that would have delayed start of any treatment substantially. Paclitaxel dose reduced to 140 mg/m2 C2 due to neuropathy  10p/23/23 CTA. 70% narrowing R carotid carlos/bifurcation similar to 7/28/23 CT. Laterally projecting disc osteophyte complex resulting in mod-severe L vertebral artery narrowing at C3-4 level.  10/23/23 Brain MRI. No mets. Bilateral frontal and temporal lobe encephalomalacia, R > L. Mild volume loss.    11/28/23 PET/CT. Good ME.       SUBJECTIVE  Mr. Thakkar (Ace) is seen today for day 22 weekly carbo/taxol, concurrent with XRT. Is having worsening throat discomfort and pain with swallowing. Still has a good appetite and forces himself to eat through the pain because his feeding tube replacement is not scheduled until 1/23. Is applying lotion frequently to radiation sites. Has two painful mouth lesions currently, is using salt-water gargles 3-6 times a day and finds it helpful. Denies worsening cough, fevers, chills, SOB, chest pain, or other s/s of acute illness. Neuropathy to fingers and toes is unchanged. Is now taking gabapentin 900 mg three times a day, denies excessive drowsiness. No additional complaints today.       PAST MEDICAL HISTORY  SCC as above  GERD  H/o food impaction in 2008 and 2014 (steak) related to Schatzki ring on EGD 10/13/14  DM2. Last A1c about 7.6 sometime in summer 2023 and Nov 2023  Dyslipidemia  Possible prior L temporal CVA  HTN  H.o spinal meningitis as a baby -   R arm surgery  Bone graft to forehead  Ex lap   Tinnitus secondary to treatment for meningitis or from menigitis - bilateral   Hearing loss -- audiogram 10/2/2023  Numbness in the toes - mostly big toes   Crushing pills   No muscle aches or pains     CURRENT OUTPATIENT MEDICATIONS  Current Outpatient Medications   Medication    albuterol (PROAIR HFA/PROVENTIL HFA/VENTOLIN HFA) 108 (90 Base) MCG/ACT inhaler    amLODIPine (NORVASC) 5 MG tablet    aspirin (ASA) 81 MG chewable tablet    atorvastatin (LIPITOR) 80 MG tablet    empagliflozin (JARDIANCE) 25 MG TABS tablet    gabapentin (NEURONTIN) 300 MG capsule    hydrochlorothiazide (HYDRODIURIL) 25 MG tablet    insulin lispro protamine-insulin lispro (HUMALOG MIX 75/25 KWIKPEN) (75-25) 100 UNIT/ML pen    insulin NPH-Regular 70/30 (HUMULIN 70/30;NOVOLIN 70/30) (70-30) 100 UNIT/ML vial    liraglutide (VICTOZA) 18 MG/3ML solution    lisinopril (ZESTRIL) 40 MG tablet    metFORMIN  (GLUCOPHAGE) 500 MG tablet    pantoprazole (PROTONIX) 20 MG EC tablet    potassium chloride (KLOR-CON) 20 MEQ packet    therapeutic multivitamin (THERAGRAN) tablet    TRUEPLUS 5-BEVEL PEN NEEDLES 32G X 4 MM miscellaneous     No current facility-administered medications for this visit.     Facility-Administered Medications Ordered in Other Visits   Medication    CARBOplatin 250 mg in sodium chloride 0.9 % 300 mL infusion    PACLitaxel (TAXOL) 87 mg in sodium chloride 0.9% in non-PVC container 289.5 mL infusion     ALLERGIES  No Known Allergies     PHYSICAL EXAM  BP (!) 153/68 (BP Location: Right arm, Patient Position: Sitting, Cuff Size: Adult Regular)   Pulse 102   Temp 97.7  F (36.5  C) (Oral)   Resp 16   Wt 77.9 kg (171 lb 12.8 oz)   SpO2 99%   BMI 27.73 kg/m      General: Well-appearing male, NAD.   Eyes: EOMI, PERRL. No scleral icterus.  ENT: Oral mucosa moist. Raw, irritated mucosa to throat, 2 bilateral mouth sores with erythema noted. No thrush. [place], no hearing aids.   Cardiovascular: RRR, no m/g/r. No peripheral edema .  Respiratory: CTA bilaterally. No wheezes or crackles.  Neurologic: Grossly nonfocal. Hoarse voice.  Skin: No rashes, petechiae, or bruising noted on exposed skin.      LABORATORY AND IMAGING STUDIES  Most Recent 3 CBC's:  Recent Labs   Lab Test 01/09/24  0801 01/02/24  1240 12/27/23  1006   WBC 3.4* 4.3 5.4   HGB 11.2* 11.3* 12.4*   MCV 86 88 86    228 282   ANEUTAUTO 2.0 2.8 3.6    Most Recent 3 BMP's:  Recent Labs   Lab Test 01/09/24  0801 01/02/24  1240 12/27/23  1006    138 136   POTASSIUM 3.6 4.0 4.0   CHLORIDE 104 105 105   CO2 25 24 21*   BUN 25.2* 20.5 14.5   CR 0.70 0.63* 0.67   ANIONGAP 10 9 10   OLE 9.2 9.1 8.9   * 90 250*   PROTTOTAL 6.6 6.5 6.8   ALBUMIN 3.8 3.9 4.0    Most Recent 2 LFT's:  Recent Labs   Lab Test 01/09/24  0801 01/02/24  1240   AST 17 22   ALT 14 24   ALKPHOS 101 102   BILITOTAL 0.5 0.3    Most Recent TSH and T4:  Recent Labs   Lab Test  11/29/23  1501   TSH 3.46     Phos/Mag:  Lab Results   Component Value Date    PHOS 3.0 11/29/2023    MAG 1.7 11/29/2023    MAG 2.3 09/14/2023      I reviewed the above labs today.      ASSESSMENT AND PLAN  SCC supraglottis, [id number] (IRON): Declined surgery. Planned chemoradiation but with all of the logistical issues and work needed prior to starting, also due to the large size of the primary tumor, started carboplatin + paclitaxel first. PET/CT confirmed good WV after 2 cycles. Plan remained to transition to chemoradiation. Not a candidate for cisplatin due to severe hearing loss. Proceced with D22 weekly carboplatin/paclitaxel   -RTC weekly for PALOMO visit with infusion      Dental: Had extractions as planned prior to starting chemo, still needs bone shaved down some, which was delayed to start chemo.  Ace has been told by his dentist the process is going to be postponed until he recovers from cancer treatment. Should wait many months following completion of chemoradiation before denture fitting. Did not discuss dental health today. Discussed continuing with salt water rinses for oral mucositis.     Neuropathy: Worse after C1 chemo, no worse after C2. Monitor loosely with weekly carboplatin paclitaxel. B12 10/6/23 was ok. TSH has been ok.     Ibuprofen use, arthritis: Reports decreased use. Was most recently using for cancer-related pain which has improved with response to treatment. Again discouraged use in extremely high doses due to significant risk of bleeding. Continues to use approximately 1000 mg/day but not daily, more intermittently.     Dysphagia, aspiration: Jessica Ramos visit 9/13/23, video 10/10, known aspiration at baseline although swallowing a little better with WV. Gtube 10/13--fell out 12/25 and not replaced yet. Replacement scheduled for 1/23. Discussed rationale for replacement in anticipation of progressive RT symptoms.     R KAILASH: Met with Dr. Tapia, vascular surgery fellow 8/21. Recommended  treating the cancer first, asa 81 and high dose atorva, follow up 6 months with repeat US. PET/CT showed possible old R temporal infarct. Exhibited TIA symptoms in early October with 4 hours episode of confusion. MR brain and CTA head 10/23 w/o acute findings. Did not discuss today, no acute cardiac s/s.     H/o food impaction: lower esophagus; monitor.    Liver issue: Says he was to have some sort of imaging at [facility] to look at his liver that had to be canceled due to the appts he needed for the cancer. Will try to figure out what that was about - not actively discussed again today. Might be related to his mom having cirrhosis without hepatitis or ETOH hx. Did not discuss this today.    Lena Duenas CNP    The patient was seen in conjunction with Lena Duenas CNP who served as a scribe for today's visit. I have reviewed and edited the above note, and agree with the above findings and plan.      Juany Gallagher CNP     35 minutes spent on the date of the encounter doing chart review, review of test results, interpretation of tests, patient visit, and documentation

## 2024-01-09 ENCOUNTER — APPOINTMENT (OUTPATIENT)
Dept: RADIATION ONCOLOGY | Facility: CLINIC | Age: 69
End: 2024-01-09
Attending: SURGERY
Payer: COMMERCIAL

## 2024-01-09 ENCOUNTER — INFUSION THERAPY VISIT (OUTPATIENT)
Dept: ONCOLOGY | Facility: CLINIC | Age: 69
End: 2024-01-09
Attending: REGISTERED NURSE
Payer: COMMERCIAL

## 2024-01-09 ENCOUNTER — APPOINTMENT (OUTPATIENT)
Dept: LAB | Facility: CLINIC | Age: 69
End: 2024-01-09
Attending: REGISTERED NURSE
Payer: COMMERCIAL

## 2024-01-09 ENCOUNTER — ALLIED HEALTH/NURSE VISIT (OUTPATIENT)
Dept: RADIATION ONCOLOGY | Facility: CLINIC | Age: 69
End: 2024-01-09
Attending: DIETITIAN, REGISTERED
Payer: COMMERCIAL

## 2024-01-09 ENCOUNTER — THERAPY VISIT (OUTPATIENT)
Dept: SPEECH THERAPY | Facility: CLINIC | Age: 69
End: 2024-01-09
Payer: COMMERCIAL

## 2024-01-09 ENCOUNTER — OFFICE VISIT (OUTPATIENT)
Dept: RADIATION ONCOLOGY | Facility: CLINIC | Age: 69
End: 2024-01-09
Attending: RADIOLOGY
Payer: COMMERCIAL

## 2024-01-09 VITALS
DIASTOLIC BLOOD PRESSURE: 74 MMHG | HEART RATE: 71 BPM | BODY MASS INDEX: 27.6 KG/M2 | SYSTOLIC BLOOD PRESSURE: 134 MMHG | WEIGHT: 171 LBS

## 2024-01-09 VITALS
RESPIRATION RATE: 16 BRPM | HEART RATE: 102 BPM | BODY MASS INDEX: 27.73 KG/M2 | DIASTOLIC BLOOD PRESSURE: 68 MMHG | WEIGHT: 171.8 LBS | SYSTOLIC BLOOD PRESSURE: 153 MMHG | OXYGEN SATURATION: 99 % | TEMPERATURE: 97.7 F

## 2024-01-09 DIAGNOSIS — C32.1 SCC (SQUAMOUS CELL CARCINOMA) OF SUPRAGLOTTIS (H): Primary | ICD-10-CM

## 2024-01-09 DIAGNOSIS — C10.9 SQUAMOUS CELL CARCINOMA OF OROPHARYNX (H): ICD-10-CM

## 2024-01-09 DIAGNOSIS — K12.31 MUCOSITIS DUE TO CHEMOTHERAPY: ICD-10-CM

## 2024-01-09 DIAGNOSIS — R13.10 DYSPHAGIA, UNSPECIFIED TYPE: ICD-10-CM

## 2024-01-09 DIAGNOSIS — G62.9 NEUROPATHY: ICD-10-CM

## 2024-01-09 DIAGNOSIS — R13.12 DYSPHAGIA, OROPHARYNGEAL PHASE: Primary | ICD-10-CM

## 2024-01-09 LAB
ALBUMIN SERPL BCG-MCNC: 3.8 G/DL (ref 3.5–5.2)
ALP SERPL-CCNC: 101 U/L (ref 40–150)
ALT SERPL W P-5'-P-CCNC: 14 U/L (ref 0–70)
ANION GAP SERPL CALCULATED.3IONS-SCNC: 10 MMOL/L (ref 7–15)
AST SERPL W P-5'-P-CCNC: 17 U/L (ref 0–45)
BASOPHILS # BLD AUTO: 0 10E3/UL (ref 0–0.2)
BASOPHILS NFR BLD AUTO: 1 %
BILIRUB SERPL-MCNC: 0.5 MG/DL
BUN SERPL-MCNC: 25.2 MG/DL (ref 8–23)
CALCIUM SERPL-MCNC: 9.2 MG/DL (ref 8.8–10.2)
CHLORIDE SERPL-SCNC: 104 MMOL/L (ref 98–107)
CREAT SERPL-MCNC: 0.7 MG/DL (ref 0.67–1.17)
DEPRECATED HCO3 PLAS-SCNC: 25 MMOL/L (ref 22–29)
EGFRCR SERPLBLD CKD-EPI 2021: >90 ML/MIN/1.73M2
EOSINOPHIL # BLD AUTO: 0.1 10E3/UL (ref 0–0.7)
EOSINOPHIL NFR BLD AUTO: 3 %
ERYTHROCYTE [DISTWIDTH] IN BLOOD BY AUTOMATED COUNT: 14.5 % (ref 10–15)
GLUCOSE SERPL-MCNC: 157 MG/DL (ref 70–99)
HCT VFR BLD AUTO: 32.9 % (ref 40–53)
HGB BLD-MCNC: 11.2 G/DL (ref 13.3–17.7)
IMM GRANULOCYTES # BLD: 0 10E3/UL
IMM GRANULOCYTES NFR BLD: 1 %
LYMPHOCYTES # BLD AUTO: 0.8 10E3/UL (ref 0.8–5.3)
LYMPHOCYTES NFR BLD AUTO: 25 %
MCH RBC QN AUTO: 29.3 PG (ref 26.5–33)
MCHC RBC AUTO-ENTMCNC: 34 G/DL (ref 31.5–36.5)
MCV RBC AUTO: 86 FL (ref 78–100)
MONOCYTES # BLD AUTO: 0.4 10E3/UL (ref 0–1.3)
MONOCYTES NFR BLD AUTO: 13 %
NEUTROPHILS # BLD AUTO: 2 10E3/UL (ref 1.6–8.3)
NEUTROPHILS NFR BLD AUTO: 57 %
NRBC # BLD AUTO: 0 10E3/UL
NRBC BLD AUTO-RTO: 0 /100
PLATELET # BLD AUTO: 212 10E3/UL (ref 150–450)
POTASSIUM SERPL-SCNC: 3.6 MMOL/L (ref 3.4–5.3)
PROT SERPL-MCNC: 6.6 G/DL (ref 6.4–8.3)
RBC # BLD AUTO: 3.82 10E6/UL (ref 4.4–5.9)
SODIUM SERPL-SCNC: 139 MMOL/L (ref 135–145)
WBC # BLD AUTO: 3.4 10E3/UL (ref 4–11)

## 2024-01-09 PROCEDURE — 96413 CHEMO IV INFUSION 1 HR: CPT

## 2024-01-09 PROCEDURE — 96367 TX/PROPH/DG ADDL SEQ IV INF: CPT

## 2024-01-09 PROCEDURE — 250N000013 HC RX MED GY IP 250 OP 250 PS 637: Performed by: REGISTERED NURSE

## 2024-01-09 PROCEDURE — 80053 COMPREHEN METABOLIC PANEL: CPT | Performed by: REGISTERED NURSE

## 2024-01-09 PROCEDURE — 85004 AUTOMATED DIFF WBC COUNT: CPT | Performed by: REGISTERED NURSE

## 2024-01-09 PROCEDURE — 97803 MED NUTRITION INDIV SUBSEQ: CPT | Performed by: DIETITIAN, REGISTERED

## 2024-01-09 PROCEDURE — 77386 HC IMRT TREATMENT DELIVERY, COMPLEX: CPT | Performed by: RADIOLOGY

## 2024-01-09 PROCEDURE — 36415 COLL VENOUS BLD VENIPUNCTURE: CPT | Performed by: REGISTERED NURSE

## 2024-01-09 PROCEDURE — 258N000003 HC RX IP 258 OP 636: Performed by: REGISTERED NURSE

## 2024-01-09 PROCEDURE — 96417 CHEMO IV INFUS EACH ADDL SEQ: CPT

## 2024-01-09 PROCEDURE — 250N000011 HC RX IP 250 OP 636: Performed by: REGISTERED NURSE

## 2024-01-09 PROCEDURE — 96375 TX/PRO/DX INJ NEW DRUG ADDON: CPT

## 2024-01-09 PROCEDURE — 99214 OFFICE O/P EST MOD 30 MIN: CPT | Performed by: REGISTERED NURSE

## 2024-01-09 PROCEDURE — G0463 HOSPITAL OUTPT CLINIC VISIT: HCPCS | Performed by: REGISTERED NURSE

## 2024-01-09 PROCEDURE — 92526 ORAL FUNCTION THERAPY: CPT | Mod: GN | Performed by: SPEECH-LANGUAGE PATHOLOGIST

## 2024-01-09 RX ORDER — HEPARIN SODIUM (PORCINE) LOCK FLUSH IV SOLN 100 UNIT/ML 100 UNIT/ML
5 SOLUTION INTRAVENOUS
Status: CANCELLED | OUTPATIENT
Start: 2024-01-10

## 2024-01-09 RX ORDER — ALBUTEROL SULFATE 90 UG/1
1-2 AEROSOL, METERED RESPIRATORY (INHALATION)
Status: CANCELLED
Start: 2024-01-10

## 2024-01-09 RX ORDER — DIPHENHYDRAMINE HCL 12.5 MG/5ML
50 SOLUTION ORAL ONCE
Status: COMPLETED | OUTPATIENT
Start: 2024-01-09 | End: 2024-01-09

## 2024-01-09 RX ORDER — LORAZEPAM 2 MG/ML
0.5 INJECTION INTRAMUSCULAR EVERY 4 HOURS PRN
Status: CANCELLED | OUTPATIENT
Start: 2024-01-10

## 2024-01-09 RX ORDER — DIPHENHYDRAMINE HCL 12.5 MG/5ML
50 SOLUTION ORAL ONCE
Status: CANCELLED
Start: 2024-01-10 | End: 2024-01-10

## 2024-01-09 RX ORDER — DIPHENHYDRAMINE HCL 25 MG
50 CAPSULE ORAL ONCE
Status: CANCELLED
Start: 2024-01-10

## 2024-01-09 RX ORDER — EPINEPHRINE 1 MG/ML
0.3 INJECTION, SOLUTION INTRAMUSCULAR; SUBCUTANEOUS EVERY 5 MIN PRN
Status: CANCELLED | OUTPATIENT
Start: 2024-01-10

## 2024-01-09 RX ORDER — ALBUTEROL SULFATE 0.83 MG/ML
2.5 SOLUTION RESPIRATORY (INHALATION)
Status: CANCELLED | OUTPATIENT
Start: 2024-01-10

## 2024-01-09 RX ORDER — METHYLPREDNISOLONE SODIUM SUCCINATE 125 MG/2ML
125 INJECTION, POWDER, LYOPHILIZED, FOR SOLUTION INTRAMUSCULAR; INTRAVENOUS
Status: CANCELLED
Start: 2024-01-10

## 2024-01-09 RX ORDER — MEPERIDINE HYDROCHLORIDE 25 MG/ML
25 INJECTION INTRAMUSCULAR; INTRAVENOUS; SUBCUTANEOUS EVERY 30 MIN PRN
Status: CANCELLED | OUTPATIENT
Start: 2024-01-10

## 2024-01-09 RX ORDER — DIPHENHYDRAMINE HYDROCHLORIDE 50 MG/ML
50 INJECTION INTRAMUSCULAR; INTRAVENOUS
Status: CANCELLED
Start: 2024-01-10

## 2024-01-09 RX ORDER — HEPARIN SODIUM,PORCINE 10 UNIT/ML
5-20 VIAL (ML) INTRAVENOUS DAILY PRN
Status: CANCELLED | OUTPATIENT
Start: 2024-01-10

## 2024-01-09 RX ADMIN — FAMOTIDINE 20 MG: 10 INJECTION, SOLUTION INTRAVENOUS at 09:30

## 2024-01-09 RX ADMIN — PACLITAXEL 87 MG: 6 INJECTION, SOLUTION INTRAVENOUS at 10:01

## 2024-01-09 RX ADMIN — DIPHENHYDRAMINE HYDROCHLORIDE 50 MG: 12.5 SOLUTION ORAL at 09:24

## 2024-01-09 RX ADMIN — DEXAMETHASONE SODIUM PHOSPHATE: 10 INJECTION, SOLUTION INTRAMUSCULAR; INTRAVENOUS at 09:33

## 2024-01-09 RX ADMIN — SODIUM CHLORIDE 250 ML: 9 INJECTION, SOLUTION INTRAVENOUS at 09:31

## 2024-01-09 RX ADMIN — CARBOPLATIN 250 MG: 10 INJECTION INTRAVENOUS at 11:03

## 2024-01-09 ASSESSMENT — PAIN SCALES - GENERAL: PAINLEVEL: NO PAIN (0)

## 2024-01-09 NOTE — NURSING NOTE
Chief Complaint   Patient presents with    Blood Draw     Labs drawn via PIV placed by VAT. VS taken.     Labs drawn from PIV placed by VAT. Line flushed with saline. Vitals taken. Pt checked in for appointment(s).     Destiny Trevino RN

## 2024-01-09 NOTE — PROGRESS NOTES
Infusion Nursing Note:  Ko Thakkar presents today for Cycle 1 Day 22 Taxol and Carboplatin (dose #6).    Patient seen by provider today: Yes: Juany Gallagher NP   present during visit today: Not Applicable.    Note: Pt presents to infusion feeling well. He offers no new concerns following his provider appointment today. Liquid Benadryl given for pre-medication.    Intravenous Access:  Peripheral IV placed.    Treatment Conditions:  Lab Results   Component Value Date    HGB 11.2 (L) 01/09/2024    WBC 3.4 (L) 01/09/2024    ANEUTAUTO 2.0 01/09/2024     01/09/2024        Lab Results   Component Value Date     01/09/2024    POTASSIUM 3.6 01/09/2024    MAG 1.7 11/29/2023    CR 0.70 01/09/2024    OLE 9.2 01/09/2024    BILITOTAL 0.5 01/09/2024    ALBUMIN 3.8 01/09/2024    ALT 14 01/09/2024    AST 17 01/09/2024     Results reviewed, labs MET treatment parameters, ok to proceed with treatment.      Post Infusion Assessment:  Patient tolerated infusion without incident.  Blood return noted pre and post infusion.  Site patent and intact, free from redness, edema or discomfort.  No evidence of extravasations.  Access discontinued per protocol.       Discharge Plan:   Patient declined prescription refills.  Discharge instructions reviewed with: Patient and Family.  Patient and/or family verbalized understanding of discharge instructions and all questions answered.  AVS to patient via [employer].  Patient will return 1/16/24 for next appointment.   Patient discharged in stable condition accompanied by: wife.  Departure Mode: Ambulatory.      Vesna Powell RN

## 2024-01-09 NOTE — PROGRESS NOTES
CLINICAL NUTRITION SERVICES - REASSESSMENT NOTE   EVALUATION OF PREVIOUS PLAN OF CARE:   Time Spent: 15 minutes  Visit Type: return OTV  Pt accompanied by: his wife, Ondina  Referring Physician: Wilfred  C10.9 (ICD-10-CM) - Squamous cell carcinoma of oropharynx (H)   E11.9 (ICD-10-CM) - Type 2 diabetes mellitus without complications (H)       Current diet/appetite: general diet, softer foods  Chemotherapy: Started 10/16 - Taxol  Radiation: started today, 12/19  PEG tube: fell out on 12/25, has not been replaced yet; will be getting it replaced on 1/23/24  Monitoring from previous assessment:   -Food/Fluid intake - Ace admits that eating has become more difficult.  He is more focused on soft solids and pureed foods at this time.   -Liquid meal replacement or supplement - Protein shake, 1-2/day, and home made protein shake made with Isagenix collagen powder  -Weight trends - stable   Wt Readings from Last 10 Encounters:   01/09/24 77.6 kg (171 lb)   01/09/24 77.9 kg (171 lb 12.8 oz)   01/02/24 77.6 kg (171 lb)   01/02/24 77.1 kg (170 lb)   12/27/23 77.7 kg (171 lb 3.2 oz)   12/26/23 78.3 kg (172 lb 11.2 oz)   12/25/23 77.6 kg (171 lb)   12/19/23 78.5 kg (173 lb)   12/19/23 78.5 kg (173 lb 1.6 oz)   11/29/23 80.3 kg (177 lb)       Previous Goals:   1.  Aim for 5-6 small frequent meals  2.  Aim for 2000-2400kcal and 80-100g protein, >8 cups non-caffeine fluids per day  3. Weight maintenance/no more than 1-2 lb wt loss each week during treatment  Evaluation: Not met   Previous Nutrition Diagnosis:   Predicted suboptimal nutrient intake related cancer treatment to head/neck region, difficulty swallowing   Evaluation: Declining   NEW FINDINGS:   Odynophagia   CURRENT NUTRITION DIAGNOSIS   Inadequate oral intake related to odynophagia as evidenced by patient report choosing softer foods, will be getting FT replaced in two week   INTERVENTIONS:  Reviewed general, healthy diet.  Reviewed ways to increase kcal and protein intake  and reviewed oral nutritional supplements (Ensure Plus/Boost Plus etc) to have daily prn. Encouraged to increase volume prn. Reviewed nutrition goals during treatment as below.  Reviewed replacing PEG prn.   Reviewed fluids and electrolyte goals. Encouraged to aim for at least 8-10 cups water/electrolyte per day.   Goals  1.  Aim for 5-6 small frequent meals  2.  Aim for 2000-2400kcal and 80-100g protein, >8 cups non-caffeine fluids per day  3. Weight maintenance/no more than 1-2 lb wt loss each week during treatment   Follow-Up Plans: schedule for 1 week follow up  MONITORING AND EVALUATION: follow up in two weeks, 1/23  -Food/beverage intake, need for initiation of EN, Weight trends    Meka Lee RD, , LD  [facility]  [phone]

## 2024-01-09 NOTE — LETTER
2024         RE: Ko Thakkar  [address]  [address]  [place] [address]        Dear Colleague,    Thank you for referring your patient, Ko Thakkar, to the [facility] RADIATION ONCOLOGY. Please see a copy of my visit note below.    [facility] PHYSICIANS  SPECIALIZING IN BREAKTHROUGHS  Radiation Oncology    On Treatment Visit Note      Ko Thakkar      Date: 2024   MRN: [id number]   : 1955  Diagnosis: SCC of Supraglottis    Treatment Summary to Date   Oropharynx and bilateral necks Current Dose: 2800/7000 cGy Fractions:       Chemotherapy  Chemo concurrent with radx?: Yes  Oncologist: Dr Louise  Drug Name/Frequency 1: Taxol/carboplatin    Subjective: Ace is doing well this week. Pain is under control with gabapentin. He continues to use aquaphor with no desquamation.     Nursing ROS:   Nutrition Alteration  Diet Type: Patient's Preference  Nutrition Note: PEG fell out  Skin  Skin Reaction: 0 - No changes  Skin Intervention: Aquaphor     ENT and Mouth Exam  Mucositis - Current: 0 - None   ENT/Mouth Note: S&S 15-20  Cardiovascular  Respiratory effort: 1 - Normal - without distress  Gastrointestinal  Nausea: 0 - None     Psychosocial  Mood - Anxiety: 0 - Normal  Pain Assessment  0-10 Pain Scale: 0  Pain Treatment: Gabapentin 900 mg TID    Objective:   /74   Pulse 71   Wt 77.6 kg (171 lb)   BMI 27.60 kg/m    Gen: Appears well, NAD  Skin: Mild diffuse erythema over treatment field    Assessment:    Tolerating radiation therapy well.  All questions and concerns addressed.    Plan:   Continue current therapy    Mosaiq chart and setup information reviewed. Imaging reviewed.     Rayna Haile MD  Pager: [phone]      Again, thank you for allowing me to participate in the care of your patient.        Sincerely,        Rayna Haile MD

## 2024-01-09 NOTE — PATIENT INSTRUCTIONS
[facility] Triage and after hours / weekends / holidays:  [phone]    Please call the triage or after hours line if you experience a temperature greater than or equal to 100.4, shaking chills, have uncontrolled nausea, vomiting and/or diarrhea, dizziness, shortness of breath, chest pain, bleeding, unexplained bruising, or if you have any other new/concerning symptoms, questions or concerns.      If you are having any concerning symptoms or wish to speak to a provider before your next infusion visit, please call triage to notify them so we can adequately serve you.     If you need a refill on a narcotic prescription or other medication, please call before your infusion appointment.                January 2024 Sunday Monday Tuesday Wednesday Thursday Friday Saturday        1     2    [facility] NUTRITION VISIT  11:00 AM   (30 min.)   Meka Lowery RD   [facility]    TREATMENT  11:15 AM   (15 min.)   P RAD ONC VARIAN   [facility]    OTV  11:30 AM   (15 min.)   Olinda Mitchell MD   [facility]    LAB PERIPHERAL  12:30 PM   (15 min.)    [facility]   [facility]    RETURN CCSL  12:45 PM   (45 min.)   Olga Baker CNP   [facility] 3    NEW PALLIATIVE   6:45 AM   (80 min.)   Sekou Headley MD   [facility]    ONC INFUSION 2 HR (120 MIN)   9:00 AM   (120 min.)    ONC INFUSION NURSE   [facility]    SWALLOW TREATMENT   9:15 AM   (45 min.)   Jessica Ramos, SLP   [facility] [place]    TREATMENT  11:15 AM   (15 min.)   P RAD ONC VARIAN   [facility] 4    TREATMENT  11:15 AM   (15 min.)   [facility] RAD ONC VARIAN   [facility] 5    TREATMENT  10:00 AM   (15 min.)   P RAD ONC  VARIAN   [facility] 6       7     8    TREATMENT  11:15 AM   (15 min.)   [facility] RAD ONC VARIAN   [facility] 9    LAB PERIPHERAL   7:15 AM   (15 min.)   [facility]   [facility]    RETURN ACTIVE TREATMENT   7:45 AM   (45 min.)   Juany Gallagher CNP   [facility]    ONC INFUSION 2 HR (120 MIN)   9:00 AM   (120 min.)   [facility] ONC INFUSION NURSE   [facility]    SWALLOW TREATMENT  10:45 AM   (45 min.)   Jessica Ramos SLP   [facility] [place]    TREATMENT  11:15 AM   (15 min.)   [facility] RAD ONC [facility]    OTV  11:30 AM   (15 min.)   Marissa Sung MD   [facility]    [facility] NUTRITION VISIT  11:30 AM   (30 min.)   Meka Lowery RD   [facility] 10    TREATMENT  11:15 AM   (15 min.)   [facility] RAD ONC [facility] 11    TREATMENT  11:15 AM   (15 min.)   [facility] ONC [facility] 12    TREATMENT  11:15 AM   (15 min.)   [facility] ONC [facility] 13       14     15    TREATMENT  11:15 AM   (15 min.)   [facility] RAD ONC [facility] 16    LAB PERIPHERAL   7:30 AM   (15 min.)   [facility]   [facility]    RETURN ACTIVE TREATMENT   7:45 AM   (45 min.)   Olga Baker CNP   [facility]    ONC INFUSION 2 HR (120 MIN)   9:00 AM   (120 min.)   [facility] ONC INFUSION NURSE   [facility]    SWALLOW TREATMENT   9:15 AM   (45 min.)   Martine Grant   [facility] [place]    TREATMENT  11:15 AM   (15 min.)   [facility] RAD ONC [facility]  Oncology    OTV  11:30 AM   (15 min.)   Juwan Cordova MD   [facility] 17    TREATMENT  11:15 AM   (15 min.)   [facility] RAD ONC [facility] 18    TREATMENT  11:15 AM   (15 min.)   [facility] ONC [facility] 19    TREATMENT  11:15 AM   (15 min.)   [facility] ONC [facility] 20       21     22    TREATMENT  11:15 AM   (15 min.)   [facility] RAD ONC [facility] 23    PROCEDURE - 7 HR   7:30 AM   (420 min.)   U2A ROOM 15   [facility] [address] [place]    IR G TUBE PERCUTANEOUS PLCMNT   7:30 AM   (120 min.)   [id number]   [facility]    LAB PERIPHERAL   8:15 AM   (15 min.)    [facility]   [facility]    RETURN ACTIVE TREATMENT   8:45 AM   (45 min.)   Juany Gallagher CNP   [facility]    ONC INFUSION 2 HR (120 MIN)  10:00 AM   (120 min.)    ONC INFUSION NURSE   [facility]    SWALLOW TREATMENT  10:45 AM   (45 min.)   Jessica Ramos, SLP   [facility]    TREATMENT  11:15 AM   (15 min.)   [facility] ONC [facility]    OTV  11:30 AM   (15 min.)   Juwan Cordova MD   [facility] 24    TREATMENT  11:15 AM   (15 min.)   [facility] ONC [facility] 25    TREATMENT  11:15 AM   (15 min.)   [facility] ONC [facility] 26    TREATMENT  11:15 AM   (15 min.)   [facility] ONC [facility] 27       28     29    TREATMENT  11:15 AM   (15 min.)   [facility] ONC [facility] 30    TREATMENT  11:15 AM   (15 min.)   [facility] ONC [facility]    OTV  11:30  AM   (15 min.)   Juwan Cordova MD   [facility] 31    TREATMENT  11:15 AM   (15 min.)   [facility]                            February 2024 Sunday Monday Tuesday Wednesday Thursday Friday Saturday                       1    TREATMENT  11:15 AM   (15 min.)   [facility] 2    TREATMENT  11:15 AM   (15 min.)   [facility] 3       4     5    TREATMENT  11:15 AM   (15 min.)   [facility] 6    NEW PALLIATIVE   9:05 AM   (80 min.)   Jorge Mack MD   [facility] [place]    TREATMENT  11:15 AM   (15 min.)   [facility]    OTV  11:30 AM   (15 min.)   Juwan Cordova MD   [facility] 7    TREATMENT  11:15 AM   (15 min.)   [facility] 8     9     10       11     12     13     14     15     16     17       18     19    US CAROTID BILATERAL  10:45 AM   (30 min.)   [facility] US [address] [place] [address] [facility]ascular [facility] [place]    RETURN VASCULAR PATIENT  11:15 AM   (30 min.)   [facility] [address] [place] [address] Vascular [facility] 20     21     22     23     24       25     26     27     28     29                               Lab Results:  Recent Results (from the past 12 hour(s))   Comprehensive metabolic panel    Collection Time: 01/09/24  8:01 AM   Result Value Ref Range    Sodium 139 135 - 145 mmol/L    Potassium 3.6 3.4 - 5.3 mmol/L    Carbon Dioxide (CO2) 25 22 - 29 mmol/L    Anion Gap 10 7 - 15 mmol/L    Urea Nitrogen 25.2 (H) 8.0 - 23.0 mg/dL    Creatinine 0.70 0.67 - 1.17 mg/dL    GFR Estimate >90 >60 mL/min/1.73m2    Calcium 9.2 8.8 - 10.2 mg/dL    Chloride 104 98 - 107 mmol/L    Glucose 157 (H) 70 - 99  mg/dL    Alkaline Phosphatase 101 40 - 150 U/L    AST 17 0 - 45 U/L    ALT 14 0 - 70 U/L    Protein Total 6.6 6.4 - 8.3 g/dL    Albumin 3.8 3.5 - 5.2 g/dL    Bilirubin Total 0.5 <=1.2 mg/dL   CBC with platelets and differential    Collection Time: 01/09/24  8:01 AM   Result Value Ref Range    WBC Count 3.4 (L) 4.0 - 11.0 10e3/uL    RBC Count 3.82 (L) 4.40 - 5.90 10e6/uL    Hemoglobin 11.2 (L) 13.3 - 17.7 g/dL    Hematocrit 32.9 (L) 40.0 - 53.0 %    MCV 86 78 - 100 fL    MCH 29.3 26.5 - 33.0 pg    MCHC 34.0 31.5 - 36.5 g/dL    RDW 14.5 10.0 - 15.0 %    Platelet Count 212 150 - 450 10e3/uL    % Neutrophils 57 %    % Lymphocytes 25 %    % Monocytes 13 %    % Eosinophils 3 %    % Basophils 1 %    % Immature Granulocytes 1 %    NRBCs per 100 WBC 0 <1 /100    Absolute Neutrophils 2.0 1.6 - 8.3 10e3/uL    Absolute Lymphocytes 0.8 0.8 - 5.3 10e3/uL    Absolute Monocytes 0.4 0.0 - 1.3 10e3/uL    Absolute Eosinophils 0.1 0.0 - 0.7 10e3/uL    Absolute Basophils 0.0 0.0 - 0.2 10e3/uL    Absolute Immature Granulocytes 0.0 <=0.4 10e3/uL    Absolute NRBCs 0.0 10e3/uL

## 2024-01-09 NOTE — Clinical Note
1/9/2024         RE: Ko Thakkar  [address]  [address]  [place] [address]        Dear Colleague,    Thank you for referring your patient, Ko Thakkar, to the [facility]CER [facility]. Please see a copy of my visit note below.       [facility] CANCER [facility]    PATIENT NAME: Ko Thakkar  MRN # [id number]   DATE OF VISIT: January 9, 2024 YOB: 1955     Otolaryngology: Dr. Denia Hardin  Radiation Oncology: Dr. Juwan Cordova   PCP: Dr. Alton Mota; [facility] in [place]     CANCER TYPE: SCC supraglottis  STAGE: [id number] (IRON)  ECOG PS: 1    SUMMARY  5/16/23 EGD for dysphagia. Gastritis and gastric diverticulum. Bx showed H. Pylori, treated. No atrophic gastritis or dysplasia  7/11/23 Swallow study at . Poor epiglottic inversion, penetration, aspiration  7/28/23 CT neck. 2.2 x 1.4 x 2.3 cm supraglottic mass, concern for paraglottic involvement, bilateral IB, IIA, III, IV adenopathy, 70% ICA stenosis   8/21/23 US carotid. 70-99% stenosis R ICA. <50% stenosis L ICA  8/29/23 FNA L level 2 node (IR). Path: SCC  9/6/23 PET/CT. FDG avid mass (SUV 13.5) right tonsil/lateral pharyngeal wall/glossotonsillar sulcus with slight extension to the R tongue base, right AE fold, right epiglottis, right piriform sinus, right posterolateral pharyngeal wall, posterior pharyngeal wall, no thyroid cartilage erosion, right retropharyngeal node, deep lobe parotid FDG avid mass, bilateral cervical nodes (right level II-IV, left level II-III), no distant disease, L mandibular canine with periapical abscess. R temporal lobe change suggestive of prior infarct. 5 mm LLL nodule.  10/10/23 Video swallow.   10/16~11/13/23 C1-2 carboplatin paclitaxel. Given due to logistical issues with pre-chemoradiation evaluation that would have delayed start of any treatment substantially. Paclitaxel dose reduced to 140 mg/m2 C2 due to neuropathy  10p/23/23 CTA. 70% narrowing R carotid  carlos/bifurcation similar to 7/28/23 CT. Laterally projecting disc osteophyte complex resulting in mod-severe L vertebral artery narrowing at C3-4 level.  10/23/23 Brain MRI. No mets. Bilateral frontal and temporal lobe encephalomalacia, R > L. Mild volume loss.   11/28/23 PET/CT. Good KS.       SUBJECTIVE  Mr. Thakkar (Ace) is seen today for day 22 weekly carbo/taxol, concurrent with XRT. Is having worsening throat discomfort and pain with swallowing. Still has a good appetite and forces himself to eat through the pain because his feeding tube replacement is not scheduled until 1/23. Is applying lotion frequently to radiation sites. Has two painful mouth lesions currently, is using salt-water gargles 3-6 times a day and finds it helpful. Denies worsening cough, fevers, chills, SOB, chest pain, or other s/s of acute illness. Neuropathy to fingers and toes is unchanged. Is now taking gabapentin 900 mg three times a day, denies excessive drowsiness. No additional complaints today.       PAST MEDICAL HISTORY  SCC as above  GERD  H/o food impaction in 2008 and 2014 (steak) related to Schatzki ring on EGD 10/13/14  DM2. Last A1c about 7.6 sometime in summer 2023 and Nov 2023  Dyslipidemia  Possible prior L temporal CVA  HTN  H.o spinal meningitis as a baby -   R arm surgery  Bone graft to forehead  Ex lap   Tinnitus secondary to treatment for meningitis or from menigitis - bilateral   Hearing loss -- audiogram 10/2/2023  Numbness in the toes - mostly big toes   Crushing pills   No muscle aches or pains     CURRENT OUTPATIENT MEDICATIONS  Reviewed    ALLERGIES  No Known Allergies     PHYSICAL EXAM  BP (!) 153/68 (BP Location: Right arm, Patient Position: Sitting, Cuff Size: Adult Regular)   Pulse 102   Temp 97.7  F (36.5  C) (Oral)   Resp 16   Wt 77.9 kg (171 lb 12.8 oz)   SpO2 99%   BMI 27.73 kg/m      General: Well-appearing male, NAD.   Eyes: EOMI, PERRL. No scleral icterus.  ENT: Oral mucosa moist. Raw, irritated  mucosa to throat, 2 bilateral mouth sores with erythema noted. No thrush. [place], no hearing aids.   Cardiovascular: RRR, no m/g/r. No peripheral edema .  Respiratory: CTA bilaterally. No wheezes or crackles.  Abd: g tube site concealed under bandage, bandage CDI.   Neurologic: Grossly nonfocal. Hoarse voice.  Skin: No rashes, petechiae, or bruising noted on exposed skin.      LABORATORY AND IMAGING STUDIES  Most Recent 3 CBC's:  Recent Labs   Lab Test 01/09/24  0801 01/02/24  1240 12/27/23  1006   WBC 3.4* 4.3 5.4   HGB 11.2* 11.3* 12.4*   MCV 86 88 86    228 282   ANEUTAUTO 2.0 2.8 3.6    Most Recent 3 BMP's:  Recent Labs   Lab Test 01/09/24  0801 01/02/24  1240 12/27/23  1006    138 136   POTASSIUM 3.6 4.0 4.0   CHLORIDE 104 105 105   CO2 25 24 21*   BUN 25.2* 20.5 14.5   CR 0.70 0.63* 0.67   ANIONGAP 10 9 10   OLE 9.2 9.1 8.9   * 90 250*   PROTTOTAL 6.6 6.5 6.8   ALBUMIN 3.8 3.9 4.0    Most Recent 2 LFT's:  Recent Labs   Lab Test 01/09/24  0801 01/02/24  1240   AST 17 22   ALT 14 24   ALKPHOS 101 102   BILITOTAL 0.5 0.3    Most Recent TSH and T4:  Recent Labs   Lab Test 11/29/23  1501   TSH 3.46     Phos/Mag:  Lab Results   Component Value Date    PHOS 3.0 11/29/2023    MAG 1.7 11/29/2023    MAG 2.3 09/14/2023      I reviewed the above labs today.      ASSESSMENT AND PLAN  SCC supraglottis, [id number] (IRON): Declined surgery. Planned chemoradiation but with all of the logistical issues and work needed prior to starting, also due to the large size of the primary tumor, started carboplatin + paclitaxel first. PET/CT confirmed good CO after 2 cycles. Plan remained to transition to chemoradiation. Not a candidate for cisplatin due to severe hearing loss. Proceced with D22 weekly carboplatin/paclitaxel   -RTC weekly for PALOMO visit with infusion      Dental: Had extractions as planned prior to starting chemo, still needs bone shaved down some, which was delayed to start chemo.  Ace has been told by his  dentist the process is going to be postponed until he recovers from cancer treatment. Should wait many months following completion of chemoradiation before denture fitting. Did not discuss dental health today. Discussed continuing with salt water rinses for oral mucositis.     Neuropathy: Worse after C1 chemo, no worse after C2. Monitor loosely with weekly carboplatin paclitaxel. B12 10/6/23 was ok. TSH has been ok.     Ibuprofen use, arthritis: Reports decreased use. Was most recently using for cancer-related pain which has improved with response to treatment. Again discouraged use in extremely high doses due to significant risk of bleeding. Continues to use approximately 1000 mg/day but not daily, more intermittently.     Dysphagia, aspiration: Jessica Ramos visit 9/13/23, video 10/10, known aspiration at baseline although swallowing a little better with ND. Gtube 10/13--fell out 12/25 and not replaced yet. Replacement scheduled for 1/23. Discussed rationale for replacement in anticipation of progressive RT symptoms.     R KAILASH: Met with Dr. Tapia, vascular surgery fellow 8/21. Recommended treating the cancer first, asa 81 and high dose atorva, follow up 6 months with repeat US. PET/CT showed possible old R temporal infarct. Exhibited TIA symptoms in early October with 4 hours episode of confusion. MR brain and CTA head 10/23 w/o acute findings. Did not discuss today, no acute cardiac s/s.     H/o food impaction: lower esophagus; monitor.    Liver issue: Says he was to have some sort of imaging at [facility] to look at his liver that had to be canceled due to the appts he needed for the cancer. Will try to figure out what that was about - not actively discussed again today. Might be related to his mom having cirrhosis without hepatitis or ETOH hx. Did not discuss this today.        [facility] CANCER [facility]    PATIENT NAME: Ko Thakkar  MRN # [id number]   DATE OF VISIT:  January 9, 2024 YOB: 1955     Otolaryngology: Dr. Denia Hardin  Radiation Oncology: Dr. Juwan Cordova   PCP: Dr. Alton Mota; [facility] in [place]     CANCER TYPE: SCC supraglottis  STAGE: [id number] (IRON)  ECOG PS: 1    SUMMARY  5/16/23 EGD for dysphagia. Gastritis and gastric diverticulum. Bx showed H. Pylori, treated. No atrophic gastritis or dysplasia  7/11/23 Swallow study at . Poor epiglottic inversion, penetration, aspiration  7/28/23 CT neck. 2.2 x 1.4 x 2.3 cm supraglottic mass, concern for paraglottic involvement, bilateral IB, IIA, III, IV adenopathy, 70% ICA stenosis   8/21/23 US carotid. 70-99% stenosis R ICA. <50% stenosis L ICA  8/29/23 FNA L level 2 node (IR). Path: SCC  9/6/23 PET/CT. FDG avid mass (SUV 13.5) right tonsil/lateral pharyngeal wall/glossotonsillar sulcus with slight extension to the R tongue base, right AE fold, right epiglottis, right piriform sinus, right posterolateral pharyngeal wall, posterior pharyngeal wall, no thyroid cartilage erosion, right retropharyngeal node, deep lobe parotid FDG avid mass, bilateral cervical nodes (right level II-IV, left level II-III), no distant disease, L mandibular canine with periapical abscess. R temporal lobe change suggestive of prior infarct. 5 mm LLL nodule.  10/10/23 Video swallow.   10/16~11/13/23 C1-2 carboplatin paclitaxel. Given due to logistical issues with pre-chemoradiation evaluation that would have delayed start of any treatment substantially. Paclitaxel dose reduced to 140 mg/m2 C2 due to neuropathy  10p/23/23 CTA. 70% narrowing R carotid carlos/bifurcation similar to 7/28/23 CT. Laterally projecting disc osteophyte complex resulting in mod-severe L vertebral artery narrowing at C3-4 level.  10/23/23 Brain MRI. No mets. Bilateral frontal and temporal lobe encephalomalacia, R > L. Mild volume loss.   11/28/23 PET/CT. Good ID.       SUBJECTIVE  Mr. Thakkar (Ace) is seen today for day 22 weekly carbo/taxol,  concurrent with XRT. Is having worsening throat discomfort and pain with swallowing. Still has a good appetite and forces himself to eat through the pain because his feeding tube replacement is not scheduled until 1/23. Is applying lotion frequently to radiation sites. Has two painful mouth lesions currently, is using salt-water gargles 3-6 times a day and finds it helpful. Denies worsening cough, fevers, chills, SOB, chest pain, or other s/s of acute illness. Neuropathy to fingers and toes is unchanged. Is now taking gabapentin 900 mg three times a day, denies excessive drowsiness. No additional complaints today.       PAST MEDICAL HISTORY  SCC as above  GERD  H/o food impaction in 2008 and 2014 (steak) related to Schatzki ring on EGD 10/13/14  DM2. Last A1c about 7.6 sometime in summer 2023 and Nov 2023  Dyslipidemia  Possible prior L temporal CVA  HTN  H.o spinal meningitis as a baby -   R arm surgery  Bone graft to forehead  Ex lap   Tinnitus secondary to treatment for meningitis or from menigitis - bilateral   Hearing loss -- audiogram 10/2/2023  Numbness in the toes - mostly big toes   Crushing pills   No muscle aches or pains     CURRENT OUTPATIENT MEDICATIONS  Current Outpatient Medications   Medication     albuterol (PROAIR HFA/PROVENTIL HFA/VENTOLIN HFA) 108 (90 Base) MCG/ACT inhaler     amLODIPine (NORVASC) 5 MG tablet     aspirin (ASA) 81 MG chewable tablet     atorvastatin (LIPITOR) 80 MG tablet     empagliflozin (JARDIANCE) 25 MG TABS tablet     gabapentin (NEURONTIN) 300 MG capsule     hydrochlorothiazide (HYDRODIURIL) 25 MG tablet     insulin lispro protamine-insulin lispro (HUMALOG MIX 75/25 KWIKPEN) (75-25) 100 UNIT/ML pen     insulin NPH-Regular 70/30 (HUMULIN 70/30;NOVOLIN 70/30) (70-30) 100 UNIT/ML vial     liraglutide (VICTOZA) 18 MG/3ML solution     lisinopril (ZESTRIL) 40 MG tablet     metFORMIN (GLUCOPHAGE) 500 MG tablet     pantoprazole (PROTONIX) 20 MG EC tablet     potassium chloride  (KLOR-CON) 20 MEQ packet     therapeutic multivitamin (THERAGRAN) tablet     TRUEPLUS 5-BEVEL PEN NEEDLES 32G X 4 MM miscellaneous     No current facility-administered medications for this visit.     Facility-Administered Medications Ordered in Other Visits   Medication     CARBOplatin 250 mg in sodium chloride 0.9 % 300 mL infusion     PACLitaxel (TAXOL) 87 mg in sodium chloride 0.9% in non-PVC container 289.5 mL infusion     ALLERGIES  No Known Allergies     PHYSICAL EXAM  BP (!) 153/68 (BP Location: Right arm, Patient Position: Sitting, Cuff Size: Adult Regular)   Pulse 102   Temp 97.7  F (36.5  C) (Oral)   Resp 16   Wt 77.9 kg (171 lb 12.8 oz)   SpO2 99%   BMI 27.73 kg/m      General: Well-appearing male, NAD.   Eyes: EOMI, PERRL. No scleral icterus.  ENT: Oral mucosa moist. Raw, irritated mucosa to throat, 2 bilateral mouth sores with erythema noted. No thrush. [place], no hearing aids.   Cardiovascular: RRR, no m/g/r. No peripheral edema .  Respiratory: CTA bilaterally. No wheezes or crackles.  Neurologic: Grossly nonfocal. Hoarse voice.  Skin: No rashes, petechiae, or bruising noted on exposed skin.      LABORATORY AND IMAGING STUDIES  Most Recent 3 CBC's:  Recent Labs   Lab Test 01/09/24  0801 01/02/24  1240 12/27/23  1006   WBC 3.4* 4.3 5.4   HGB 11.2* 11.3* 12.4*   MCV 86 88 86    228 282   ANEUTAUTO 2.0 2.8 3.6    Most Recent 3 BMP's:  Recent Labs   Lab Test 01/09/24  0801 01/02/24  1240 12/27/23  1006    138 136   POTASSIUM 3.6 4.0 4.0   CHLORIDE 104 105 105   CO2 25 24 21*   BUN 25.2* 20.5 14.5   CR 0.70 0.63* 0.67   ANIONGAP 10 9 10   OLE 9.2 9.1 8.9   * 90 250*   PROTTOTAL 6.6 6.5 6.8   ALBUMIN 3.8 3.9 4.0    Most Recent 2 LFT's:  Recent Labs   Lab Test 01/09/24  0801 01/02/24  1240   AST 17 22   ALT 14 24   ALKPHOS 101 102   BILITOTAL 0.5 0.3    Most Recent TSH and T4:  Recent Labs   Lab Test 11/29/23  1501   TSH 3.46     Phos/Mag:  Lab Results   Component Value Date    PHOS 3.0  11/29/2023    MAG 1.7 11/29/2023    MAG 2.3 09/14/2023      I reviewed the above labs today.      ASSESSMENT AND PLAN  SCC supraglottis, [id number] (IRON): Declined surgery. Planned chemoradiation but with all of the logistical issues and work needed prior to starting, also due to the large size of the primary tumor, started carboplatin + paclitaxel first. PET/CT confirmed good ID after 2 cycles. Plan remained to transition to chemoradiation. Not a candidate for cisplatin due to severe hearing loss. Proceced with D22 weekly carboplatin/paclitaxel   -RTC weekly for PALOMO visit with infusion      Dental: Had extractions as planned prior to starting chemo, still needs bone shaved down some, which was delayed to start chemo.  Ace has been told by his dentist the process is going to be postponed until he recovers from cancer treatment. Should wait many months following completion of chemoradiation before denture fitting. Did not discuss dental health today. Discussed continuing with salt water rinses for oral mucositis.     Neuropathy: Worse after C1 chemo, no worse after C2. Monitor loosely with weekly carboplatin paclitaxel. B12 10/6/23 was ok. TSH has been ok.     Ibuprofen use, arthritis: Reports decreased use. Was most recently using for cancer-related pain which has improved with response to treatment. Again discouraged use in extremely high doses due to significant risk of bleeding. Continues to use approximately 1000 mg/day but not daily, more intermittently.     Dysphagia, aspiration: Jessica Ramos visit 9/13/23, video 10/10, known aspiration at baseline although swallowing a little better with ID. Gtube 10/13--fell out 12/25 and not replaced yet. Replacement scheduled for 1/23. Discussed rationale for replacement in anticipation of progressive RT symptoms.     R KAILASH: Met with Dr. Tapia, vascular surgery fellow 8/21. Recommended treating the cancer first, asa 81 and high dose atorva, follow up 6 months with repeat  US. PET/CT showed possible old R temporal infarct. Exhibited TIA symptoms in early October with 4 hours episode of confusion. MR brain and CTA head 10/23 w/o acute findings. Did not discuss today, no acute cardiac s/s.     H/o food impaction: lower esophagus; monitor.    Liver issue: Says he was to have some sort of imaging at [facility] to look at his liver that had to be canceled due to the appts he needed for the cancer. Will try to figure out what that was about - not actively discussed again today. Might be related to his mom having cirrhosis without hepatitis or ETOH hx. Did not discuss this today.    Lena Duenas CNP    The patient was seen in conjunction with Lena Duenas CNP who served as a scribe for today's visit. I have reviewed and edited the above note, and agree with the above findings and plan.      Juany Gallagher CNP     35 minutes spent on the date of the encounter doing chart review, review of test results, interpretation of tests, patient visit, and documentation              Again, thank you for allowing me to participate in the care of your patient.        Sincerely,        Juany Gallagher CNP

## 2024-01-10 ENCOUNTER — APPOINTMENT (OUTPATIENT)
Dept: RADIATION ONCOLOGY | Facility: CLINIC | Age: 69
End: 2024-01-10
Attending: SURGERY
Payer: COMMERCIAL

## 2024-01-10 PROCEDURE — 77427 RADIATION TX MANAGEMENT X5: CPT | Performed by: RADIOLOGY

## 2024-01-10 PROCEDURE — 77386 HC IMRT TREATMENT DELIVERY, COMPLEX: CPT | Performed by: SURGERY

## 2024-01-10 PROCEDURE — 77014 PR CT GUIDE FOR PLACEMENT RADIATION THERAPY FIELDS: CPT | Mod: 26 | Performed by: RADIOLOGY

## 2024-01-10 PROCEDURE — 77336 RADIATION PHYSICS CONSULT: CPT | Performed by: SURGERY

## 2024-01-12 ENCOUNTER — APPOINTMENT (OUTPATIENT)
Dept: RADIATION ONCOLOGY | Facility: CLINIC | Age: 69
End: 2024-01-12
Attending: SURGERY
Payer: COMMERCIAL

## 2024-01-12 PROCEDURE — 77386 HC IMRT TREATMENT DELIVERY, COMPLEX: CPT | Performed by: SURGERY

## 2024-01-12 PROCEDURE — 77014 PR CT GUIDE FOR PLACEMENT RADIATION THERAPY FIELDS: CPT | Mod: 26 | Performed by: RADIOLOGY

## 2024-01-15 ENCOUNTER — APPOINTMENT (OUTPATIENT)
Dept: RADIATION ONCOLOGY | Facility: CLINIC | Age: 69
End: 2024-01-15
Attending: SURGERY
Payer: COMMERCIAL

## 2024-01-15 PROCEDURE — 77014 PR CT GUIDE FOR PLACEMENT RADIATION THERAPY FIELDS: CPT | Mod: 26 | Performed by: RADIOLOGY

## 2024-01-15 PROCEDURE — 77386 HC IMRT TREATMENT DELIVERY, COMPLEX: CPT | Performed by: SURGERY

## 2024-01-16 ENCOUNTER — OFFICE VISIT (OUTPATIENT)
Dept: RADIATION ONCOLOGY | Facility: CLINIC | Age: 69
End: 2024-01-16
Attending: SURGERY
Payer: COMMERCIAL

## 2024-01-16 VITALS
DIASTOLIC BLOOD PRESSURE: 74 MMHG | SYSTOLIC BLOOD PRESSURE: 142 MMHG | HEART RATE: 78 BPM | WEIGHT: 160 LBS | BODY MASS INDEX: 25.82 KG/M2

## 2024-01-16 DIAGNOSIS — C10.9 SQUAMOUS CELL CARCINOMA OF OROPHARYNX (H): ICD-10-CM

## 2024-01-16 DIAGNOSIS — C32.1 SCC (SQUAMOUS CELL CARCINOMA) OF SUPRAGLOTTIS (H): Primary | ICD-10-CM

## 2024-01-16 PROCEDURE — 77386 HC IMRT TREATMENT DELIVERY, COMPLEX: CPT | Performed by: SURGERY

## 2024-01-16 NOTE — LETTER
2024         RE: Ko Thakkar  [address]  [address]  [place] [address]        Dear Colleague,    Thank you for referring your patient, Ko Thakkar, to the [facility] RADIATION ONCOLOGY. Please see a copy of my visit note below.    [facility] PHYSICIANS  SPECIALIZING IN BREAKTHROUGHS  Radiation Oncology    On Treatment Visit Note      Ko Thakkar      Date: 2024   MRN: [id number]   : 1955  Diagnosis: SCC of Supraglottis        ID: Mr. Thakkar is a 68 year old male [id number] oropharyngeal SCC, p16 positive. Tumor involved the right tonsil, posterior pharyngeal wall, and right AE fold, hypopharynx, with known right cord paralysis, with bilateral adenopathy including right RP node, right level IV, and questionable right parotid node (which was bx proven SCC). There is no evidence of any distant disease. He has evidence of swallow dysfunction with dysphagia and aspiration, and voice dysfunction with dysphonia and right cord paralysis.      Given extent of disease as well as potential delays in starting treatment, induction chemotherapy was administered under care of Dr. Louise.  PET CT scan after 2 cycles of carboplatin paclitaxel demonstrates a good partial response without any distant metastatic disease.    Reason for Visit:  On Radiation Treatment Visit       Treatment Summary to Date    Current Dose: 3600/7000 cGy Fractions:       Chemotherapy  Chemo concurrent with radx?: Yes  Oncologist: Dr Louise  Drug Name/Frequency 1: Taxol/carboplatin    Subjective:   Pain in oropharynx, lack of taste, feeding tube came out at the end of December and has not yet been replaced.  He has lost 10 pounds over the last week. He is now on a softer diet secondary to pain. He also has lack of appetite due to lack of taste.     Pain Control: Gabapentin 900 mg TID  Fluid Intake: Adequate   Nutrition: feeding tube came out 23, not replaced yet, getting  replaced 1/23/24  Rinses: 15x  Skin care: Aquaphor BID  Chemotherapy: Yes    Nursing ROS:   Nutrition Alteration  Diet Type: Patient's Preference  Nutrition Note: PEG fell out, new one Tuesday  Skin  Skin Reaction: 0 - No changes  Skin Intervention: Aquaphor     ENT and Mouth Exam  Mucositis - Current: 0 - None   ENT/Mouth Note: S&S 15-20  Cardiovascular  Respiratory effort: 1 - Normal - without distress  Gastrointestinal  Nausea: 0 - None     Psychosocial  Mood - Anxiety: 0 - Normal  Pain Assessment  0-10 Pain Scale: 0  Pain Treatment: Gabapentin 900 mg TID      Objective:   BP (!) 142/74   Pulse 78   Wt 72.6 kg (160 lb)   BMI 25.82 kg/m    Gen: Appears well, in no acute distress  HN: Oropharyngeal mucositis, neck with mild erythema, no skin breakdown  CV/Resp: rrr, breathing comfortably on room air  Neuro: CN 2-12 grossly intact, UE/LE full strength     Labs:  CBC RESULTS:   Recent Labs   Lab Test 01/09/24  0801   WBC 3.4*   RBC 3.82*   HGB 11.2*   HCT 32.9*   MCV 86   MCH 29.3   MCHC 34.0   RDW 14.5        ELECTROLYTES:  Recent Labs   Lab Test 01/09/24  0801      POTASSIUM 3.6   CHLORIDE 104   OLE 9.2   CO2 25   BUN 25.2*   CR 0.70   *       Assessment:    Tolerating radiation therapy well.  All questions and concerns addressed.      Plan:   Continue current therapy.    Continue gabapentin, rinses, nutrition, hydration, skin care  Rx for magic mouth wash sent      [employer] chart and setup information reviewed  Ports checked and MVCT/IGRT images checked    Medication Review  Med list reviewed with patient?: Yes  Med list printed and given: Offered and declined         Juwan Cordova MD  Radiation Oncology   [facility]  Clinic: [phone]

## 2024-01-16 NOTE — PROGRESS NOTES
[facility] PHYSICIANS  SPECIALIZING IN BREAKTHROUGHS  Radiation Oncology    On Treatment Visit Note      Ko Thakkar      Date: 2024   MRN: [id number]   : 1955  Diagnosis: SCC of Supraglottis        ID: Mr. Thakkar is a 68 year old male [id number] oropharyngeal SCC, p16 positive. Tumor involved the right tonsil, posterior pharyngeal wall, and right AE fold, hypopharynx, with known right cord paralysis, with bilateral adenopathy including right RP node, right level IV, and questionable right parotid node (which was bx proven SCC). There is no evidence of any distant disease. He has evidence of swallow dysfunction with dysphagia and aspiration, and voice dysfunction with dysphonia and right cord paralysis.      Given extent of disease as well as potential delays in starting treatment, induction chemotherapy was administered under care of Dr. Louise.  PET CT scan after 2 cycles of carboplatin paclitaxel demonstrates a good partial response without any distant metastatic disease.    Reason for Visit:  On Radiation Treatment Visit       Treatment Summary to Date    Current Dose: 3600/7000 cGy Fractions:       Chemotherapy  Chemo concurrent with radx?: Yes  Oncologist: Dr Louise  Drug Name/Frequency 1: Taxol/carboplatin    Subjective:   Pain in oropharynx, lack of taste, feeding tube came out at the end of December and has not yet been replaced.  He has lost 10 pounds over the last week. He is now on a softer diet secondary to pain. He also has lack of appetite due to lack of taste.     Pain Control: Gabapentin 900 mg TID  Fluid Intake: Adequate   Nutrition: feeding tube came out 23, not replaced yet, getting replaced 24  Rinses: 15x  Skin care: Aquaphor BID  Chemotherapy: Yes    Nursing ROS:   Nutrition Alteration  Diet Type: Patient's Preference  Nutrition Note: PEG fell out, new one Tuesday  Skin  Skin Reaction: 0 - No changes  Skin Intervention: Aquaphor     ENT and  Mouth Exam  Mucositis - Current: 0 - None   ENT/Mouth Note: S&S 15-20  Cardiovascular  Respiratory effort: 1 - Normal - without distress  Gastrointestinal  Nausea: 0 - None     Psychosocial  Mood - Anxiety: 0 - Normal  Pain Assessment  0-10 Pain Scale: 0  Pain Treatment: Gabapentin 900 mg TID      Objective:   BP (!) 142/74   Pulse 78   Wt 72.6 kg (160 lb)   BMI 25.82 kg/m    Gen: Appears well, in no acute distress  HN: Oropharyngeal mucositis, neck with mild erythema, no skin breakdown  CV/Resp: rrr, breathing comfortably on room air  Neuro: CN 2-12 grossly intact, UE/LE full strength     Labs:  CBC RESULTS:   Recent Labs   Lab Test 01/09/24  0801   WBC 3.4*   RBC 3.82*   HGB 11.2*   HCT 32.9*   MCV 86   MCH 29.3   MCHC 34.0   RDW 14.5        ELECTROLYTES:  Recent Labs   Lab Test 01/09/24  0801      POTASSIUM 3.6   CHLORIDE 104   OLE 9.2   CO2 25   BUN 25.2*   CR 0.70   *       Assessment:    Tolerating radiation therapy well.  All questions and concerns addressed.      Plan:   Continue current therapy.    Continue gabapentin, rinses, nutrition, hydration, skin care  Rx for magic mouth wash sent      [employer] chart and setup information reviewed  Ports checked and MVCT/IGRT images checked    Medication Review  Med list reviewed with patient?: Yes  Med list printed and given: Offered and declined         Juwan Cordova MD  Radiation Oncology   [facility]  Clinic: [phone]       3

## 2024-01-17 ENCOUNTER — APPOINTMENT (OUTPATIENT)
Dept: RADIATION ONCOLOGY | Facility: CLINIC | Age: 69
End: 2024-01-17
Attending: SURGERY
Payer: COMMERCIAL

## 2024-01-17 ENCOUNTER — MYC MEDICAL ADVICE (OUTPATIENT)
Dept: INTERVENTIONAL RADIOLOGY/VASCULAR | Facility: CLINIC | Age: 69
End: 2024-01-17
Payer: COMMERCIAL

## 2024-01-17 PROCEDURE — 77386 HC IMRT TREATMENT DELIVERY, COMPLEX: CPT | Performed by: SURGERY

## 2024-01-17 PROCEDURE — 77014 PR CT GUIDE FOR PLACEMENT RADIATION THERAPY FIELDS: CPT | Mod: 26 | Performed by: SURGERY

## 2024-01-18 ENCOUNTER — APPOINTMENT (OUTPATIENT)
Dept: RADIATION ONCOLOGY | Facility: CLINIC | Age: 69
End: 2024-01-18
Attending: SURGERY
Payer: COMMERCIAL

## 2024-01-18 PROCEDURE — 77427 RADIATION TX MANAGEMENT X5: CPT | Performed by: SURGERY

## 2024-01-18 PROCEDURE — 77014 PR CT GUIDE FOR PLACEMENT RADIATION THERAPY FIELDS: CPT | Mod: 26 | Performed by: SURGERY

## 2024-01-18 PROCEDURE — 77386 HC IMRT TREATMENT DELIVERY, COMPLEX: CPT | Performed by: SURGERY

## 2024-01-18 PROCEDURE — 77336 RADIATION PHYSICS CONSULT: CPT | Performed by: SURGERY

## 2024-01-19 ENCOUNTER — APPOINTMENT (OUTPATIENT)
Dept: RADIATION ONCOLOGY | Facility: CLINIC | Age: 69
End: 2024-01-19
Attending: SURGERY
Payer: COMMERCIAL

## 2024-01-19 PROCEDURE — 77386 HC IMRT TREATMENT DELIVERY, COMPLEX: CPT | Performed by: SURGERY

## 2024-01-19 PROCEDURE — 77014 PR CT GUIDE FOR PLACEMENT RADIATION THERAPY FIELDS: CPT | Mod: 26 | Performed by: RADIOLOGY

## 2024-01-20 NOTE — PROGRESS NOTES
[facility] CANCER CLINIC    PATIENT NAME: Ko Thakkar  MRN # [id number]   DATE OF VISIT: January 22, 2024 YOB: 1955     Otolaryngology: Dr. Denia Hardin  Radiation Oncology: Dr. Juwan Cordova   PCP: Dr. Alton Mota; [facility] in [place]     CANCER TYPE: SCC supraglottis  STAGE: [id number] (IRON)  ECOG PS: 1    SUMMARY  5/16/23 EGD for dysphagia. Gastritis and gastric diverticulum. Bx showed H. Pylori, treated. No atrophic gastritis or dysplasia  7/11/23 Swallow study at . Poor epiglottic inversion, penetration, aspiration  7/28/23 CT neck. 2.2 x 1.4 x 2.3 cm supraglottic mass, concern for paraglottic involvement, bilateral IB, IIA, III, IV adenopathy, 70% ICA stenosis   8/21/23 US carotid. 70-99% stenosis R ICA. <50% stenosis L ICA  8/29/23 FNA L level 2 node (IR). Path: SCC  9/6/23 PET/CT. FDG avid mass (SUV 13.5) right tonsil/lateral pharyngeal wall/glossotonsillar sulcus with slight extension to the R tongue base, right AE fold, right epiglottis, right piriform sinus, right posterolateral pharyngeal wall, posterior pharyngeal wall, no thyroid cartilage erosion, right retropharyngeal node, deep lobe parotid FDG avid mass, bilateral cervical nodes (right level II-IV, left level II-III), no distant disease, L mandibular canine with periapical abscess. R temporal lobe change suggestive of prior infarct. 5 mm LLL nodule.  10/10/23 Video swallow.   10/16~11/13/23 C1-2 carboplatin paclitaxel. Given due to logistical issues with pre-chemoradiation evaluation that would have delayed start of any treatment substantially. Paclitaxel dose reduced to 140 mg/m2 C2 due to neuropathy  10p/23/23 CTA. 70% narrowing R carotid carlos/bifurcation similar to 7/28/23 CT. Laterally projecting disc osteophyte complex resulting in mod-severe L vertebral artery narrowing at C3-4 level.  10/23/23 Brain MRI. No mets. Bilateral frontal and temporal lobe encephalomalacia, R > L. Mild volume loss.    11/28/23 PET/CT. Good HI.       SUBJECTIVE  Mr. Thakkar (Ace) is seen today for his 5th carbo/taxol infusion, concurrent with XRT. He missed his chemotherapy infusion last week.  -More difficulty with oral intake. Still eating but more limited with foods. Able to drink large amounts of fluids  -FT scheduled to be replaced tomorrow  -Gained a few pounds back this week  -No nausea  -Mouth increasingly dry  -Some nausea related to clearing out thick secretions. Takes antiemetics occasionally  -Using magic mouthwash, biotene lozenges and salt/soda rinses    PAST MEDICAL HISTORY  SCC as above  GERD  H/o food impaction in 2008 and 2014 (steak) related to Schatzki ring on EGD 10/13/14  DM2. Last A1c about 7.6 sometime in summer 2023 and Nov 2023  Dyslipidemia  Possible prior L temporal CVA  HTN  H.o spinal meningitis as a baby -   R arm surgery  Bone graft to forehead  Ex lap   Tinnitus secondary to treatment for meningitis or from menigitis - bilateral   Hearing loss -- audiogram 10/2/2023  Numbness in the toes - mostly big toes   Crushing pills   No muscle aches or pains     CURRENT OUTPATIENT MEDICATIONS  Current Outpatient Medications   Medication    albuterol (PROAIR HFA/PROVENTIL HFA/VENTOLIN HFA) 108 (90 Base) MCG/ACT inhaler    amLODIPine (NORVASC) 5 MG tablet    aspirin (ASA) 81 MG chewable tablet    atorvastatin (LIPITOR) 80 MG tablet    empagliflozin (JARDIANCE) 25 MG TABS tablet    gabapentin (NEURONTIN) 300 MG capsule    hydrochlorothiazide (HYDRODIURIL) 25 MG tablet    insulin lispro protamine-insulin lispro (HUMALOG MIX 75/25 KWIKPEN) (75-25) 100 UNIT/ML pen    insulin NPH-Regular 70/30 (HUMULIN 70/30;NOVOLIN 70/30) (70-30) 100 UNIT/ML vial    liraglutide (VICTOZA) 18 MG/3ML solution    lisinopril (ZESTRIL) 40 MG tablet    magic mouthwash (ENTER INGREDIENTS IN COMMENTS) suspension    metFORMIN (GLUCOPHAGE) 500 MG tablet    pantoprazole (PROTONIX) 20 MG EC tablet    potassium chloride (KLOR-CON) 20 MEQ  packet    therapeutic multivitamin (THERAGRAN) tablet    TRUEPLUS 5-BEVEL PEN NEEDLES 32G X 4 MM miscellaneous     No current facility-administered medications for this visit.     Facility-Administered Medications Ordered in Other Visits   Medication    CARBOplatin 250 mg in sodium chloride 0.9 % 300 mL infusion    PACLitaxel (TAXOL) 87 mg in sodium chloride 0.9% in non-PVC container 289.5 mL infusion     ALLERGIES  No Known Allergies     PHYSICAL EXAM  BP (!) 143/67   Pulse 82   Temp 98.5  F (36.9  C) (Oral)   Resp 18   Wt 74.9 kg (165 lb 3.2 oz)   SpO2 99%   BMI 26.66 kg/m      General: Well-appearing male, NAD.   Eyes: EOMI, PERRL. No scleral icterus.  ENT: Oral mucosa moist. Diffused mucositis to posterior tongue and OP with erythema. [place], no hearing aids.   Cardiovascular: RRR, no m/g/r. No peripheral edema .  Respiratory: CTA bilaterally. No wheezes or crackles.  Neurologic: Grossly nonfocal. Hoarse voice.  Skin: No rashes, petechiae, or bruising noted on exposed skin.      LABORATORY AND IMAGING STUDIES  Most Recent 3 CBC's:  Recent Labs   Lab Test 01/22/24  1402 01/09/24  0801 01/02/24  1240   WBC 4.7 3.4* 4.3   HGB 10.3* 11.2* 11.3*   MCV 87 86 88    212 228   ANEUTAUTO 3.6 2.0 2.8    Most Recent 3 BMP's:  Recent Labs   Lab Test 01/22/24  1402 01/09/24  0801 01/02/24  1240   * 139 138   POTASSIUM 4.2 3.6 4.0   CHLORIDE 100 104 105   CO2 25 25 24   BUN 13.5 25.2* 20.5   CR 0.74 0.70 0.63*   ANIONGAP 9 10 9   OLE 9.0 9.2 9.1   * 157* 90   PROTTOTAL 6.7 6.6 6.5   ALBUMIN 3.6 3.8 3.9    Most Recent 2 LFT's:  Recent Labs   Lab Test 01/22/24  1402 01/09/24  0801   AST 16 17   ALT 12 14   ALKPHOS 120 101   BILITOTAL 0.4 0.5    Most Recent TSH and T4:  Recent Labs   Lab Test 11/29/23  1501   TSH 3.46     Phos/Mag:  Lab Results   Component Value Date    PHOS 3.0 11/29/2023    MAG 1.7 11/29/2023    MAG 2.3 09/14/2023      I reviewed the above labs today.      ASSESSMENT AND PLAN  SCC  supraglottis, [id number] (IRON): Declined surgery. Planned chemoradiation but with all of the logistical issues and work needed prior to starting, also due to the large size of the primary tumor, started carboplatin + paclitaxel first. PET/CT confirmed good MD after 2 cycles. Plan remained to transition to chemoradiation. Not a candidate for cisplatin due to severe hearing loss. Tolerating chemotherapy well. Toxicities from RT as expected. Proceed with D29 (delayed 1 week) weekly carboplatin/paclitaxel   -RTC weekly for PALOMO visit with infusion    -Discussed possibility of 7th infusion versus only IV hydration/monitoring. Will keep appointments as-scheduled    Mucositis, odynophagia: Increasing as expected. Continue s/s rinses, Biotene, Magic Mouthwash. Gabapentin management per rad onc.     Dental: Had extractions as planned prior to starting chemo, still needs bone shaved down some, which was delayed to start chemo.  Ace has been told by his dentist the process is going to be postponed until he recovers from cancer treatment. Should wait many months following completion of chemoradiation before denture fitting.     Neuropathy: Worse after C1 chemo, no worse after C2. Monitor loosely with weekly carboplatin paclitaxel. B12 10/6/23 was ok. TSH has been ok.     Ibuprofen use, arthritis: Reports decreased use. Was most recently using for cancer-related pain which has improved with response to treatment. Again discouraged use in extremely high doses due to significant risk of bleeding. Continues to use approximately 1000 mg/day but not daily, more intermittently.     Dysphagia, aspiration: Jessica Ramos visit 9/13/23, video 10/10, known aspiration at baseline although swallowing a little better with MD. Gtube 10/13--fell out 12/25 and not replaced yet. Replacement scheduled for 1/23. Discussed rationale for replacement in anticipation of progressive RT symptoms. Weight decreasing with progressive difficulty with oral  intake so should definitely use for supplementation as soon as possible.    R KAILASH: Met with Dr. Tapia, vascular surgery fellow 8/21. Recommended treating the cancer first, asa 81 and high dose atorva, follow up 6 months with repeat US. PET/CT showed possible old R temporal infarct. Exhibited TIA symptoms in early October with 4 hours episode of confusion. MR brain and CTA head 10/23 w/o acute findings. Did not discuss today, no acute cardiac s/s.     H/o food impaction: lower esophagus; monitor.    Liver issue: Says he was to have some sort of imaging at [facility] to look at his liver that had to be canceled due to the appts he needed for the cancer. Will try to figure out what that was about - not actively discussed again today. Might be related to his mom having cirrhosis without hepatitis or ETOH hx. Did not discuss this today.    Juany Gallagher, CNP

## 2024-01-22 ENCOUNTER — ONCOLOGY VISIT (OUTPATIENT)
Dept: ONCOLOGY | Facility: CLINIC | Age: 69
End: 2024-01-22
Attending: INTERNAL MEDICINE
Payer: COMMERCIAL

## 2024-01-22 ENCOUNTER — APPOINTMENT (OUTPATIENT)
Dept: LAB | Facility: CLINIC | Age: 69
End: 2024-01-22
Attending: INTERNAL MEDICINE
Payer: COMMERCIAL

## 2024-01-22 ENCOUNTER — APPOINTMENT (OUTPATIENT)
Dept: RADIATION ONCOLOGY | Facility: CLINIC | Age: 69
End: 2024-01-22
Attending: SURGERY
Payer: COMMERCIAL

## 2024-01-22 ENCOUNTER — THERAPY VISIT (OUTPATIENT)
Dept: SPEECH THERAPY | Facility: CLINIC | Age: 69
End: 2024-01-22
Payer: COMMERCIAL

## 2024-01-22 VITALS
HEART RATE: 82 BPM | TEMPERATURE: 98.5 F | SYSTOLIC BLOOD PRESSURE: 143 MMHG | DIASTOLIC BLOOD PRESSURE: 67 MMHG | BODY MASS INDEX: 26.66 KG/M2 | OXYGEN SATURATION: 99 % | WEIGHT: 165.2 LBS | RESPIRATION RATE: 18 BRPM

## 2024-01-22 DIAGNOSIS — C10.9 SQUAMOUS CELL CARCINOMA OF OROPHARYNX (H): ICD-10-CM

## 2024-01-22 DIAGNOSIS — R13.10 ODYNOPHAGIA: ICD-10-CM

## 2024-01-22 DIAGNOSIS — C32.1 SCC (SQUAMOUS CELL CARCINOMA) OF SUPRAGLOTTIS (H): Primary | ICD-10-CM

## 2024-01-22 DIAGNOSIS — R13.12 DYSPHAGIA, OROPHARYNGEAL PHASE: Primary | ICD-10-CM

## 2024-01-22 DIAGNOSIS — R13.13 PHARYNGEAL DYSPHAGIA: ICD-10-CM

## 2024-01-22 DIAGNOSIS — K12.31 MUCOSITIS DUE TO CHEMOTHERAPY: ICD-10-CM

## 2024-01-22 DIAGNOSIS — R13.10 DYSPHAGIA, UNSPECIFIED TYPE: ICD-10-CM

## 2024-01-22 LAB
ALBUMIN SERPL BCG-MCNC: 3.6 G/DL (ref 3.5–5.2)
ALP SERPL-CCNC: 120 U/L (ref 40–150)
ALT SERPL W P-5'-P-CCNC: 12 U/L (ref 0–70)
ANION GAP SERPL CALCULATED.3IONS-SCNC: 9 MMOL/L (ref 7–15)
AST SERPL W P-5'-P-CCNC: 16 U/L (ref 0–45)
BASOPHILS # BLD AUTO: 0 10E3/UL (ref 0–0.2)
BASOPHILS NFR BLD AUTO: 1 %
BILIRUB SERPL-MCNC: 0.4 MG/DL
BUN SERPL-MCNC: 13.5 MG/DL (ref 8–23)
CALCIUM SERPL-MCNC: 9 MG/DL (ref 8.8–10.2)
CHLORIDE SERPL-SCNC: 100 MMOL/L (ref 98–107)
CREAT SERPL-MCNC: 0.74 MG/DL (ref 0.67–1.17)
DEPRECATED HCO3 PLAS-SCNC: 25 MMOL/L (ref 22–29)
EGFRCR SERPLBLD CKD-EPI 2021: >90 ML/MIN/1.73M2
EOSINOPHIL # BLD AUTO: 0.1 10E3/UL (ref 0–0.7)
EOSINOPHIL NFR BLD AUTO: 2 %
ERYTHROCYTE [DISTWIDTH] IN BLOOD BY AUTOMATED COUNT: 15.2 % (ref 10–15)
GLUCOSE SERPL-MCNC: 307 MG/DL (ref 70–99)
HCT VFR BLD AUTO: 30.3 % (ref 40–53)
HGB BLD-MCNC: 10.3 G/DL (ref 13.3–17.7)
IMM GRANULOCYTES # BLD: 0 10E3/UL
IMM GRANULOCYTES NFR BLD: 0 %
LYMPHOCYTES # BLD AUTO: 0.5 10E3/UL (ref 0.8–5.3)
LYMPHOCYTES NFR BLD AUTO: 11 %
MCH RBC QN AUTO: 29.6 PG (ref 26.5–33)
MCHC RBC AUTO-ENTMCNC: 34 G/DL (ref 31.5–36.5)
MCV RBC AUTO: 87 FL (ref 78–100)
MONOCYTES # BLD AUTO: 0.5 10E3/UL (ref 0–1.3)
MONOCYTES NFR BLD AUTO: 11 %
NEUTROPHILS # BLD AUTO: 3.6 10E3/UL (ref 1.6–8.3)
NEUTROPHILS NFR BLD AUTO: 75 %
NRBC # BLD AUTO: 0 10E3/UL
NRBC BLD AUTO-RTO: 0 /100
PLATELET # BLD AUTO: 169 10E3/UL (ref 150–450)
POTASSIUM SERPL-SCNC: 4.2 MMOL/L (ref 3.4–5.3)
PROT SERPL-MCNC: 6.7 G/DL (ref 6.4–8.3)
RBC # BLD AUTO: 3.48 10E6/UL (ref 4.4–5.9)
SODIUM SERPL-SCNC: 134 MMOL/L (ref 135–145)
WBC # BLD AUTO: 4.7 10E3/UL (ref 4–11)

## 2024-01-22 PROCEDURE — 250N000013 HC RX MED GY IP 250 OP 250 PS 637: Performed by: REGISTERED NURSE

## 2024-01-22 PROCEDURE — 92526 ORAL FUNCTION THERAPY: CPT | Mod: GN | Performed by: SPEECH-LANGUAGE PATHOLOGIST

## 2024-01-22 PROCEDURE — 96367 TX/PROPH/DG ADDL SEQ IV INF: CPT

## 2024-01-22 PROCEDURE — 250N000011 HC RX IP 250 OP 636: Performed by: REGISTERED NURSE

## 2024-01-22 PROCEDURE — 85025 COMPLETE CBC W/AUTO DIFF WBC: CPT | Performed by: REGISTERED NURSE

## 2024-01-22 PROCEDURE — 99214 OFFICE O/P EST MOD 30 MIN: CPT | Performed by: REGISTERED NURSE

## 2024-01-22 PROCEDURE — 80053 COMPREHEN METABOLIC PANEL: CPT | Performed by: REGISTERED NURSE

## 2024-01-22 PROCEDURE — 96417 CHEMO IV INFUS EACH ADDL SEQ: CPT

## 2024-01-22 PROCEDURE — 258N000003 HC RX IP 258 OP 636: Performed by: REGISTERED NURSE

## 2024-01-22 PROCEDURE — 96375 TX/PRO/DX INJ NEW DRUG ADDON: CPT

## 2024-01-22 PROCEDURE — 96413 CHEMO IV INFUSION 1 HR: CPT

## 2024-01-22 PROCEDURE — G0463 HOSPITAL OUTPT CLINIC VISIT: HCPCS | Mod: 25 | Performed by: REGISTERED NURSE

## 2024-01-22 PROCEDURE — 77386 HC IMRT TREATMENT DELIVERY, COMPLEX: CPT | Performed by: SURGERY

## 2024-01-22 PROCEDURE — 36415 COLL VENOUS BLD VENIPUNCTURE: CPT | Performed by: REGISTERED NURSE

## 2024-01-22 RX ORDER — HEPARIN SODIUM,PORCINE 10 UNIT/ML
5-20 VIAL (ML) INTRAVENOUS DAILY PRN
Status: CANCELLED | OUTPATIENT
Start: 2024-01-22

## 2024-01-22 RX ORDER — DIPHENHYDRAMINE HCL 12.5 MG/5ML
50 SOLUTION ORAL ONCE
Status: CANCELLED
Start: 2024-01-22 | End: 2024-01-22

## 2024-01-22 RX ORDER — ALBUTEROL SULFATE 0.83 MG/ML
2.5 SOLUTION RESPIRATORY (INHALATION)
Status: CANCELLED | OUTPATIENT
Start: 2024-01-22

## 2024-01-22 RX ORDER — METHYLPREDNISOLONE SODIUM SUCCINATE 125 MG/2ML
125 INJECTION, POWDER, LYOPHILIZED, FOR SOLUTION INTRAMUSCULAR; INTRAVENOUS
Status: CANCELLED
Start: 2024-01-22

## 2024-01-22 RX ORDER — DIPHENHYDRAMINE HYDROCHLORIDE 50 MG/ML
50 INJECTION INTRAMUSCULAR; INTRAVENOUS
Status: CANCELLED
Start: 2024-01-22

## 2024-01-22 RX ORDER — LORAZEPAM 2 MG/ML
0.5 INJECTION INTRAMUSCULAR EVERY 4 HOURS PRN
Status: CANCELLED | OUTPATIENT
Start: 2024-01-22

## 2024-01-22 RX ORDER — DIPHENHYDRAMINE HCL 25 MG
50 CAPSULE ORAL ONCE
Status: CANCELLED
Start: 2024-01-22

## 2024-01-22 RX ORDER — EPINEPHRINE 1 MG/ML
0.3 INJECTION, SOLUTION INTRAMUSCULAR; SUBCUTANEOUS EVERY 5 MIN PRN
Status: CANCELLED | OUTPATIENT
Start: 2024-01-22

## 2024-01-22 RX ORDER — ALBUTEROL SULFATE 90 UG/1
1-2 AEROSOL, METERED RESPIRATORY (INHALATION)
Status: CANCELLED
Start: 2024-01-22

## 2024-01-22 RX ORDER — MEPERIDINE HYDROCHLORIDE 25 MG/ML
25 INJECTION INTRAMUSCULAR; INTRAVENOUS; SUBCUTANEOUS EVERY 30 MIN PRN
Status: CANCELLED | OUTPATIENT
Start: 2024-01-22

## 2024-01-22 RX ORDER — DIPHENHYDRAMINE HCL 12.5 MG/5ML
50 SOLUTION ORAL ONCE
Status: COMPLETED | OUTPATIENT
Start: 2024-01-22 | End: 2024-01-22

## 2024-01-22 RX ORDER — HEPARIN SODIUM (PORCINE) LOCK FLUSH IV SOLN 100 UNIT/ML 100 UNIT/ML
5 SOLUTION INTRAVENOUS
Status: CANCELLED | OUTPATIENT
Start: 2024-01-22

## 2024-01-22 RX ADMIN — CARBOPLATIN 250 MG: 10 INJECTION INTRAVENOUS at 16:28

## 2024-01-22 RX ADMIN — FAMOTIDINE 20 MG: 10 INJECTION, SOLUTION INTRAVENOUS at 14:48

## 2024-01-22 RX ADMIN — PACLITAXEL 87 MG: 6 INJECTION, SOLUTION INTRAVENOUS at 15:09

## 2024-01-22 RX ADMIN — SODIUM CHLORIDE 250 ML: 9 INJECTION, SOLUTION INTRAVENOUS at 14:46

## 2024-01-22 RX ADMIN — DEXAMETHASONE SODIUM PHOSPHATE: 10 INJECTION, SOLUTION INTRAMUSCULAR; INTRAVENOUS at 14:49

## 2024-01-22 RX ADMIN — DIPHENHYDRAMINE HYDROCHLORIDE 50 MG: 12.5 SOLUTION ORAL at 14:50

## 2024-01-22 ASSESSMENT — PAIN SCALES - GENERAL: PAINLEVEL: MODERATE PAIN (5)

## 2024-01-22 NOTE — PROGRESS NOTES
[facility]                                                                                   OUTPATIENT SPEECH LANGUAGE PATHOLOGY    PLAN OF TREATMENT FOR OUTPATIENT REHABILITATION   Patient's Last Name, First Name, Ko Almeida YOB: 1955   Provider's Name   [facility]   Medical Record No.  [id number]     Onset Date: 10/10/23 Start of Care Date: 10/10/23     Medical Diagnosis:  Squamous cell carcinoma of the supraglottis      SLP Treatment Diagnosis: moderate oropharyngeal dysphagia  Plan of Treatment  Frequency/Duration: 2-4x/month  / 18 months     Certification date from 01/07/24   To 04/08/24          See note for plan of treatment details and functional goals     Rola Marcano, SLP                         I CERTIFY THE NEED FOR THESE SERVICES FURNISHED UNDER        THIS PLAN OF TREATMENT AND WHILE UNDER MY CARE     (Physician attestation of this document indicates review and certification of the therapy plan).              Referring Provider:  Dr. Denia Hardin    Initial Assessment  See Epic Evaluation- 10/10/23

## 2024-01-22 NOTE — Clinical Note
1/22/2024         RE: Ko Thakkar  [address]  [address]  [place] [address]        Dear Colleague,    Thank you for referring your patient, Ko Thakkar, to the [facility] CANCER [facility]INIC. Please see a copy of my visit note below.       [facility] CANCER [facility]    PATIENT NAME: Ko Thakkar  MRN # [id number]   DATE OF VISIT: January 22, 2024 YOB: 1955     Otolaryngology: Dr. Denia Hardin  Radiation Oncology: Dr. Juwan Cordova   PCP: Dr. Alton Mota; [facility] in [place]     CANCER TYPE: SCC supraglottis  STAGE: [id number] (IRON)  ECOG PS: 1    SUMMARY  5/16/23 EGD for dysphagia. Gastritis and gastric diverticulum. Bx showed H. Pylori, treated. No atrophic gastritis or dysplasia  7/11/23 Swallow study at . Poor epiglottic inversion, penetration, aspiration  7/28/23 CT neck. 2.2 x 1.4 x 2.3 cm supraglottic mass, concern for paraglottic involvement, bilateral IB, IIA, III, IV adenopathy, 70% ICA stenosis   8/21/23 US carotid. 70-99% stenosis R ICA. <50% stenosis L ICA  8/29/23 FNA L level 2 node (IR). Path: SCC  9/6/23 PET/CT. FDG avid mass (SUV 13.5) right tonsil/lateral pharyngeal wall/glossotonsillar sulcus with slight extension to the R tongue base, right AE fold, right epiglottis, right piriform sinus, right posterolateral pharyngeal wall, posterior pharyngeal wall, no thyroid cartilage erosion, right retropharyngeal node, deep lobe parotid FDG avid mass, bilateral cervical nodes (right level II-IV, left level II-III), no distant disease, L mandibular canine with periapical abscess. R temporal lobe change suggestive of prior infarct. 5 mm LLL nodule.  10/10/23 Video swallow.   10/16~11/13/23 C1-2 carboplatin paclitaxel. Given due to logistical issues with pre-chemoradiation evaluation that would have delayed start of any treatment substantially. Paclitaxel dose reduced to 140 mg/m2 C2 due to neuropathy  10p/23/23 CTA. 70% narrowing R carotid  carlos/bifurcation similar to 7/28/23 CT. Laterally projecting disc osteophyte complex resulting in mod-severe L vertebral artery narrowing at C3-4 level.  10/23/23 Brain MRI. No mets. Bilateral frontal and temporal lobe encephalomalacia, R > L. Mild volume loss.   11/28/23 PET/CT. Good TN.       SUBJECTIVE  Mr. Thakkar (Ace) is seen today for his 5th carbo/taxol infusion, concurrent with XRT. He missed his chemotherapy infusion last week.  -More difficulty with oral intake. Still eating but more limited with foods. Able to drink large amounts of fluids  -FT scheduled to be replaced tomorrow  -Gained a few pounds back this week  -No nausea  -Mouth increasingly dry  -Some nausea related to clearing out thick secretions. Takes antiemetics occasionally  -Using magic mouthwash, biotene lozenges and salt/soda rinses    PAST MEDICAL HISTORY  SCC as above  GERD  H/o food impaction in 2008 and 2014 (steak) related to Schatzki ring on EGD 10/13/14  DM2. Last A1c about 7.6 sometime in summer 2023 and Nov 2023  Dyslipidemia  Possible prior L temporal CVA  HTN  H.o spinal meningitis as a baby -   R arm surgery  Bone graft to forehead  Ex lap   Tinnitus secondary to treatment for meningitis or from menigitis - bilateral   Hearing loss -- audiogram 10/2/2023  Numbness in the toes - mostly big toes   Crushing pills   No muscle aches or pains     CURRENT OUTPATIENT MEDICATIONS  Current Outpatient Medications   Medication    albuterol (PROAIR HFA/PROVENTIL HFA/VENTOLIN HFA) 108 (90 Base) MCG/ACT inhaler    amLODIPine (NORVASC) 5 MG tablet    aspirin (ASA) 81 MG chewable tablet    atorvastatin (LIPITOR) 80 MG tablet    empagliflozin (JARDIANCE) 25 MG TABS tablet    gabapentin (NEURONTIN) 300 MG capsule    hydrochlorothiazide (HYDRODIURIL) 25 MG tablet    insulin lispro protamine-insulin lispro (HUMALOG MIX 75/25 KWIKPEN) (75-25) 100 UNIT/ML pen    insulin NPH-Regular 70/30 (HUMULIN 70/30;NOVOLIN 70/30) (70-30) 100 UNIT/ML vial     liraglutide (VICTOZA) 18 MG/3ML solution    lisinopril (ZESTRIL) 40 MG tablet    magic mouthwash (ENTER INGREDIENTS IN COMMENTS) suspension    metFORMIN (GLUCOPHAGE) 500 MG tablet    pantoprazole (PROTONIX) 20 MG EC tablet    potassium chloride (KLOR-CON) 20 MEQ packet    therapeutic multivitamin (THERAGRAN) tablet    TRUEPLUS 5-BEVEL PEN NEEDLES 32G X 4 MM miscellaneous     No current facility-administered medications for this visit.     Facility-Administered Medications Ordered in Other Visits   Medication    CARBOplatin 250 mg in sodium chloride 0.9 % 300 mL infusion    PACLitaxel (TAXOL) 87 mg in sodium chloride 0.9% in non-PVC container 289.5 mL infusion     ALLERGIES  No Known Allergies     PHYSICAL EXAM  BP (!) 143/67   Pulse 82   Temp 98.5  F (36.9  C) (Oral)   Resp 18   Wt 74.9 kg (165 lb 3.2 oz)   SpO2 99%   BMI 26.66 kg/m      General: Well-appearing male, NAD.   Eyes: EOMI, PERRL. No scleral icterus.  ENT: Oral mucosa moist. Diffused mucositis to posterior tongue and OP with erythema. [place], no hearing aids.   Cardiovascular: RRR, no m/g/r. No peripheral edema .  Respiratory: CTA bilaterally. No wheezes or crackles.  Neurologic: Grossly nonfocal. Hoarse voice.  Skin: No rashes, petechiae, or bruising noted on exposed skin.      LABORATORY AND IMAGING STUDIES  Most Recent 3 CBC's:  Recent Labs   Lab Test 01/22/24  1402 01/09/24  0801 01/02/24  1240   WBC 4.7 3.4* 4.3   HGB 10.3* 11.2* 11.3*   MCV 87 86 88    212 228   ANEUTAUTO 3.6 2.0 2.8    Most Recent 3 BMP's:  Recent Labs   Lab Test 01/22/24  1402 01/09/24  0801 01/02/24  1240   * 139 138   POTASSIUM 4.2 3.6 4.0   CHLORIDE 100 104 105   CO2 25 25 24   BUN 13.5 25.2* 20.5   CR 0.74 0.70 0.63*   ANIONGAP 9 10 9   OLE 9.0 9.2 9.1   * 157* 90   PROTTOTAL 6.7 6.6 6.5   ALBUMIN 3.6 3.8 3.9    Most Recent 2 LFT's:  Recent Labs   Lab Test 01/22/24  1402 01/09/24  0801   AST 16 17   ALT 12 14   ALKPHOS 120 101   BILITOTAL 0.4 0.5     Most Recent TSH and T4:  Recent Labs   Lab Test 11/29/23  1501   TSH 3.46     Phos/Mag:  Lab Results   Component Value Date    PHOS 3.0 11/29/2023    MAG 1.7 11/29/2023    MAG 2.3 09/14/2023      I reviewed the above labs today.      ASSESSMENT AND PLAN  SCC supraglottis, [id number] (IRON): Declined surgery. Planned chemoradiation but with all of the logistical issues and work needed prior to starting, also due to the large size of the primary tumor, started carboplatin + paclitaxel first. PET/CT confirmed good MA after 2 cycles. Plan remained to transition to chemoradiation. Not a candidate for cisplatin due to severe hearing loss. Tolerating chemotherapy well. Toxicities from RT as expected. Proceed with D29 (delayed 1 week) weekly carboplatin/paclitaxel   -RTC weekly for PALOMO visit with infusion    -Discussed possibility of 7th infusion versus only IV hydration/monitoring. Will keep appointments as-scheduled    Mucositis, odynophagia: Increasing as expected. Continue s/s rinses, Biotene, Magic Mouthwash. Gabapentin management per rad onc.     Dental: Had extractions as planned prior to starting chemo, still needs bone shaved down some, which was delayed to start chemo.  Ace has been told by his dentist the process is going to be postponed until he recovers from cancer treatment. Should wait many months following completion of chemoradiation before denture fitting.     Neuropathy: Worse after C1 chemo, no worse after C2. Monitor loosely with weekly carboplatin paclitaxel. B12 10/6/23 was ok. TSH has been ok.     Ibuprofen use, arthritis: Reports decreased use. Was most recently using for cancer-related pain which has improved with response to treatment. Again discouraged use in extremely high doses due to significant risk of bleeding. Continues to use approximately 1000 mg/day but not daily, more intermittently.     Dysphagia, aspiration: Jessica Ramos visit 9/13/23, video 10/10, known aspiration at baseline although  swallowing a little better with UT. Gtube 10/13--fell out 12/25 and not replaced yet. Replacement scheduled for 1/23. Discussed rationale for replacement in anticipation of progressive RT symptoms. Weight decreasing with progressive difficulty with oral intake so should definitely use for supplementation as soon as possible.    R KAILASH: Met with Dr. Tapia, vascular surgery fellow 8/21. Recommended treating the cancer first, asa 81 and high dose atorva, follow up 6 months with repeat US. PET/CT showed possible old R temporal infarct. Exhibited TIA symptoms in early October with 4 hours episode of confusion. MR brain and CTA head 10/23 w/o acute findings. Did not discuss today, no acute cardiac s/s.     H/o food impaction: lower esophagus; monitor.    Liver issue: Says he was to have some sort of imaging at [facility] to look at his liver that had to be canceled due to the appts he needed for the cancer. Will try to figure out what that was about - not actively discussed again today. Might be related to his mom having cirrhosis without hepatitis or ETOH hx. Did not discuss this today.    Juany Gallagher, OKSANA                  Again, thank you for allowing me to participate in the care of your patient.        Sincerely,        Juany Gallagher, OKSANA

## 2024-01-22 NOTE — PROGRESS NOTES
[facility] PHYSICIANS  SPECIALIZING IN BREAKTHROUGHS  Radiation Oncology    On Treatment Visit Note      Ko Thakkar      Date: 2024   MRN: [id number]   : 1955  Diagnosis: SCC of Supraglottis    Treatment Summary to Date   Oropharynx and bilateral necks Current Dose: 2800/7000 cGy Fractions:       Chemotherapy  Chemo concurrent with radx?: Yes  Oncologist: Dr Louise  Drug Name/Frequency 1: Taxol/carboplatin    Subjective: Ace is doing well this week. Pain is under control with gabapentin. He continues to use aquaphor with no desquamation.     Nursing ROS:   Nutrition Alteration  Diet Type: Patient's Preference  Nutrition Note: PEG fell out  Skin  Skin Reaction: 0 - No changes  Skin Intervention: Aquaphor     ENT and Mouth Exam  Mucositis - Current: 0 - None   ENT/Mouth Note: S&S 15-20  Cardiovascular  Respiratory effort: 1 - Normal - without distress  Gastrointestinal  Nausea: 0 - None     Psychosocial  Mood - Anxiety: 0 - Normal  Pain Assessment  0-10 Pain Scale: 0  Pain Treatment: Gabapentin 900 mg TID    Objective:   /74   Pulse 71   Wt 77.6 kg (171 lb)   BMI 27.60 kg/m    Gen: Appears well, NAD  Skin: Mild diffuse erythema over treatment field    Assessment:    Tolerating radiation therapy well.  All questions and concerns addressed.    Plan:   Continue current therapy    Mosaiq chart and setup information reviewed. Imaging reviewed.     Rayna Haile MD  Pager: [phone]

## 2024-01-22 NOTE — NURSING NOTE
Chief Complaint   Patient presents with    Blood Draw     Vitals, blood drawn and PIV placed by clinical assistant. Pt checked into appt.     VIOLETTA Melendez LPN

## 2024-01-22 NOTE — PROGRESS NOTES
Infusion Nursing Note:  Ko Thakkar presents today for C1D29 Taxol/Carboplatin (dose#7).    Patient seen by provider today: Yes: Juany Gallagher NP   present during visit today: Not Applicable.    Note: No intervention for pain today.      Intravenous Access:  Peripheral IV placed.    Treatment Conditions:  Lab Results   Component Value Date    HGB 10.3 (L) 01/22/2024    WBC 4.7 01/22/2024    ANEUTAUTO 3.6 01/22/2024     01/22/2024        Lab Results   Component Value Date     (L) 01/22/2024    POTASSIUM 4.2 01/22/2024    MAG 1.7 11/29/2023    CR 0.74 01/22/2024    OLE 9.0 01/22/2024    BILITOTAL 0.4 01/22/2024    ALBUMIN 3.6 01/22/2024    ALT 12 01/22/2024    AST 16 01/22/2024       Results reviewed, labs MET treatment parameters, ok to proceed with treatment.      Post Infusion Assessment:  Patient tolerated infusion without incident.  Blood return noted pre and post infusion.  Site patent and intact, free from redness, edema or discomfort.  No evidence of extravasations.  Access discontinued per protocol.       Discharge Plan:   Prescription refills given for Magic mouth wash.  Discharge instructions reviewed with: Patient and Family.  Patient and/or family verbalized understanding of discharge instructions and all questions answered.  AVS to patient via [employer].  Patient will return 1/29/24 for next appointment.   Patient discharged in stable condition accompanied by: self and wife.  Departure Mode: Ambulatory.      KYAW JOYCE RN

## 2024-01-23 ENCOUNTER — ALLIED HEALTH/NURSE VISIT (OUTPATIENT)
Dept: RADIATION ONCOLOGY | Facility: CLINIC | Age: 69
End: 2024-01-23
Attending: DIETITIAN, REGISTERED
Payer: COMMERCIAL

## 2024-01-23 ENCOUNTER — OFFICE VISIT (OUTPATIENT)
Dept: RADIATION ONCOLOGY | Facility: CLINIC | Age: 69
End: 2024-01-23
Attending: SURGERY
Payer: COMMERCIAL

## 2024-01-23 ENCOUNTER — APPOINTMENT (OUTPATIENT)
Dept: INTERVENTIONAL RADIOLOGY/VASCULAR | Facility: CLINIC | Age: 69
End: 2024-01-23
Attending: INTERNAL MEDICINE
Payer: COMMERCIAL

## 2024-01-23 ENCOUNTER — APPOINTMENT (OUTPATIENT)
Dept: MEDSURG UNIT | Facility: CLINIC | Age: 69
End: 2024-01-23
Attending: INTERNAL MEDICINE
Payer: COMMERCIAL

## 2024-01-23 ENCOUNTER — HOSPITAL ENCOUNTER (OUTPATIENT)
Facility: CLINIC | Age: 69
Discharge: HOME | End: 2024-01-23
Attending: INTERNAL MEDICINE | Admitting: STUDENT IN AN ORGANIZED HEALTH CARE EDUCATION/TRAINING PROGRAM
Payer: COMMERCIAL

## 2024-01-23 VITALS
SYSTOLIC BLOOD PRESSURE: 155 MMHG | RESPIRATION RATE: 14 BRPM | HEART RATE: 79 BPM | BODY MASS INDEX: 25.79 KG/M2 | OXYGEN SATURATION: 98 % | HEIGHT: 66 IN | WEIGHT: 160.5 LBS | TEMPERATURE: 98 F | DIASTOLIC BLOOD PRESSURE: 77 MMHG

## 2024-01-23 VITALS
DIASTOLIC BLOOD PRESSURE: 76 MMHG | BODY MASS INDEX: 25.82 KG/M2 | WEIGHT: 160 LBS | HEART RATE: 77 BPM | SYSTOLIC BLOOD PRESSURE: 145 MMHG

## 2024-01-23 DIAGNOSIS — R13.10 DYSPHAGIA, UNSPECIFIED TYPE: Primary | ICD-10-CM

## 2024-01-23 DIAGNOSIS — C32.1 SCC (SQUAMOUS CELL CARCINOMA) OF SUPRAGLOTTIS (H): Primary | ICD-10-CM

## 2024-01-23 DIAGNOSIS — R13.10 DYSPHAGIA, UNSPECIFIED TYPE: ICD-10-CM

## 2024-01-23 DIAGNOSIS — C32.1 SCC (SQUAMOUS CELL CARCINOMA) OF SUPRAGLOTTIS (H): ICD-10-CM

## 2024-01-23 LAB
ANION GAP SERPL CALCULATED.3IONS-SCNC: 12 MMOL/L (ref 7–15)
BUN SERPL-MCNC: 25.9 MG/DL (ref 8–23)
CALCIUM SERPL-MCNC: 9.9 MG/DL (ref 8.8–10.2)
CHLORIDE SERPL-SCNC: 104 MMOL/L (ref 98–107)
CREAT SERPL-MCNC: 0.7 MG/DL (ref 0.67–1.17)
DEPRECATED HCO3 PLAS-SCNC: 23 MMOL/L (ref 22–29)
EGFRCR SERPLBLD CKD-EPI 2021: >90 ML/MIN/1.73M2
GLUCOSE SERPL-MCNC: 162 MG/DL (ref 70–99)
INR PPP: 1.05 (ref 0.85–1.15)
POTASSIUM SERPL-SCNC: 4.2 MMOL/L (ref 3.4–5.3)
SODIUM SERPL-SCNC: 139 MMOL/L (ref 135–145)

## 2024-01-23 PROCEDURE — 36415 COLL VENOUS BLD VENIPUNCTURE: CPT | Performed by: INTERNAL MEDICINE

## 2024-01-23 PROCEDURE — 250N000013 HC RX MED GY IP 250 OP 250 PS 637

## 2024-01-23 PROCEDURE — 250N000011 HC RX IP 250 OP 636

## 2024-01-23 PROCEDURE — 999N000142 HC STATISTIC PROCEDURE PREP ONLY

## 2024-01-23 PROCEDURE — C1887 CATHETER, GUIDING: HCPCS

## 2024-01-23 PROCEDURE — 250N000009 HC RX 250

## 2024-01-23 PROCEDURE — 99152 MOD SED SAME PHYS/QHP 5/>YRS: CPT | Mod: GC | Performed by: STUDENT IN AN ORGANIZED HEALTH CARE EDUCATION/TRAINING PROGRAM

## 2024-01-23 PROCEDURE — 272N000569 HC SHEATH CR6

## 2024-01-23 PROCEDURE — 258N000003 HC RX IP 258 OP 636: Performed by: NURSE PRACTITIONER

## 2024-01-23 PROCEDURE — 85610 PROTHROMBIN TIME: CPT | Performed by: NURSE PRACTITIONER

## 2024-01-23 PROCEDURE — 77386 HC IMRT TREATMENT DELIVERY, COMPLEX: CPT | Performed by: SURGERY

## 2024-01-23 PROCEDURE — 49440 PLACE GASTROSTOMY TUBE PERC: CPT

## 2024-01-23 PROCEDURE — 97803 MED NUTRITION INDIV SUBSEQ: CPT | Mod: XU | Performed by: DIETITIAN, REGISTERED

## 2024-01-23 PROCEDURE — 999N000134 HC STATISTIC PP CARE STAGE 3

## 2024-01-23 PROCEDURE — 250N000011 HC RX IP 250 OP 636: Performed by: NURSE PRACTITIONER

## 2024-01-23 PROCEDURE — 272N000151 HC KIT CR11

## 2024-01-23 PROCEDURE — C1769 GUIDE WIRE: HCPCS

## 2024-01-23 PROCEDURE — 49440 PLACE GASTROSTOMY TUBE PERC: CPT | Mod: GC | Performed by: STUDENT IN AN ORGANIZED HEALTH CARE EDUCATION/TRAINING PROGRAM

## 2024-01-23 PROCEDURE — 80048 BASIC METABOLIC PNL TOTAL CA: CPT | Performed by: INTERNAL MEDICINE

## 2024-01-23 RX ORDER — DEXTROSE MONOHYDRATE 25 G/50ML
25-50 INJECTION, SOLUTION INTRAVENOUS
Status: DISCONTINUED | OUTPATIENT
Start: 2024-01-23 | End: 2024-01-23 | Stop reason: HOSPADM

## 2024-01-23 RX ORDER — SODIUM CHLORIDE 9 MG/ML
INJECTION, SOLUTION INTRAVENOUS CONTINUOUS
Status: DISCONTINUED | OUTPATIENT
Start: 2024-01-23 | End: 2024-01-23 | Stop reason: HOSPADM

## 2024-01-23 RX ORDER — LIDOCAINE HYDROCHLORIDE 10 MG/ML
1-30 INJECTION, SOLUTION EPIDURAL; INFILTRATION; INTRACAUDAL; PERINEURAL
Status: COMPLETED | OUTPATIENT
Start: 2024-01-23 | End: 2024-01-23

## 2024-01-23 RX ORDER — IBUPROFEN 200 MG
600 TABLET ORAL
Status: COMPLETED | OUTPATIENT
Start: 2024-01-23 | End: 2024-01-23

## 2024-01-23 RX ORDER — FLUMAZENIL 0.1 MG/ML
0.2 INJECTION, SOLUTION INTRAVENOUS
Status: DISCONTINUED | OUTPATIENT
Start: 2024-01-23 | End: 2024-01-23 | Stop reason: HOSPADM

## 2024-01-23 RX ORDER — NALOXONE HYDROCHLORIDE 0.4 MG/ML
0.4 INJECTION, SOLUTION INTRAMUSCULAR; INTRAVENOUS; SUBCUTANEOUS
Status: DISCONTINUED | OUTPATIENT
Start: 2024-01-23 | End: 2024-01-23 | Stop reason: HOSPADM

## 2024-01-23 RX ORDER — CEFAZOLIN SODIUM 2 G/100ML
2 INJECTION, SOLUTION INTRAVENOUS
Status: COMPLETED | OUTPATIENT
Start: 2024-01-23 | End: 2024-01-23

## 2024-01-23 RX ORDER — LIDOCAINE 40 MG/G
CREAM TOPICAL
Status: DISCONTINUED | OUTPATIENT
Start: 2024-01-23 | End: 2024-01-23 | Stop reason: HOSPADM

## 2024-01-23 RX ORDER — NALOXONE HYDROCHLORIDE 0.4 MG/ML
0.2 INJECTION, SOLUTION INTRAMUSCULAR; INTRAVENOUS; SUBCUTANEOUS
Status: DISCONTINUED | OUTPATIENT
Start: 2024-01-23 | End: 2024-01-23 | Stop reason: HOSPADM

## 2024-01-23 RX ORDER — NICOTINE POLACRILEX 4 MG
15-30 LOZENGE BUCCAL
Status: DISCONTINUED | OUTPATIENT
Start: 2024-01-23 | End: 2024-01-23 | Stop reason: HOSPADM

## 2024-01-23 RX ORDER — FENTANYL CITRATE 50 UG/ML
25-50 INJECTION, SOLUTION INTRAMUSCULAR; INTRAVENOUS EVERY 5 MIN PRN
Status: DISCONTINUED | OUTPATIENT
Start: 2024-01-23 | End: 2024-01-23 | Stop reason: HOSPADM

## 2024-01-23 RX ORDER — LIDOCAINE HYDROCHLORIDE 20 MG/ML
JELLY TOPICAL ONCE
Status: COMPLETED | OUTPATIENT
Start: 2024-01-23 | End: 2024-01-23

## 2024-01-23 RX ORDER — IBUPROFEN 200 MG
1400 TABLET ORAL EVERY 4 HOURS PRN
COMMUNITY

## 2024-01-23 RX ADMIN — MIDAZOLAM 1 MG: 1 INJECTION INTRAMUSCULAR; INTRAVENOUS at 09:23

## 2024-01-23 RX ADMIN — GLUCAGON 1 MG: KIT at 09:45

## 2024-01-23 RX ADMIN — MIDAZOLAM 1 MG: 1 INJECTION INTRAMUSCULAR; INTRAVENOUS at 09:33

## 2024-01-23 RX ADMIN — LIDOCAINE HYDROCHLORIDE: 20 JELLY TOPICAL at 09:55

## 2024-01-23 RX ADMIN — IBUPROFEN 600 MG: 200 TABLET, FILM COATED ORAL at 12:40

## 2024-01-23 RX ADMIN — FENTANYL CITRATE 50 MCG: 50 INJECTION, SOLUTION INTRAMUSCULAR; INTRAVENOUS at 09:32

## 2024-01-23 RX ADMIN — LIDOCAINE HYDROCHLORIDE 6 ML: 10 INJECTION, SOLUTION EPIDURAL; INFILTRATION; INTRACAUDAL; PERINEURAL at 09:54

## 2024-01-23 RX ADMIN — CEFAZOLIN SODIUM 2 G: 2 INJECTION, SOLUTION INTRAVENOUS at 08:04

## 2024-01-23 RX ADMIN — FENTANYL CITRATE 50 MCG: 50 INJECTION, SOLUTION INTRAMUSCULAR; INTRAVENOUS at 09:23

## 2024-01-23 RX ADMIN — SODIUM CHLORIDE: 9 INJECTION, SOLUTION INTRAVENOUS at 08:03

## 2024-01-23 ASSESSMENT — ACTIVITIES OF DAILY LIVING (ADL)
ADLS_ACUITY_SCORE: 35

## 2024-01-23 NOTE — PROGRESS NOTES
CLINICAL NUTRITION SERVICES - REASSESSMENT NOTE   EVALUATION OF PREVIOUS PLAN OF CARE:   Time Spent: 30 minutes  Visit Type: return OTV  Pt accompanied by: his wife, Ondina  Referring Physician: Wilfred  C10.9 (ICD-10-CM) - Squamous cell carcinoma of oropharynx (H)   E11.9 (ICD-10-CM) - Type 2 diabetes mellitus without complications (H)       Current diet/appetite: general diet, softer foods  Chemotherapy: Started 10/16 - Taxol  Radiation: started today, 12/19  PEG tube: placed 1/23/24  Monitoring from previous assessment:   -Food/Fluid intake - Minimal PO intake due to dysphagia and odynophagia. Patient will need to rely on his PEG tube for 100% of his estimated nutritional needs.   -Liquid meal replacement or supplement - sips of protein shakes and smoothies.   -Weight trends - down 11 lb x past 2 weeks   Wt Readings from Last 8 Encounters:   01/23/24 72.8 kg (160 lb 8 oz)   01/22/24 74.9 kg (165 lb 3.2 oz)   01/16/24 72.6 kg (160 lb)   01/09/24 77.6 kg (171 lb)   01/09/24 77.9 kg (171 lb 12.8 oz)   01/02/24 77.6 kg (171 lb)   01/02/24 77.1 kg (170 lb)   12/27/23 77.7 kg (171 lb 3.2 oz)     Previous Goals:   1.  Aim for 5-6 small frequent meals  2.  Aim for 2000-2400kcal and 80-100g protein, >8 cups non-caffeine fluids per day  3. Weight maintenance/no more than 1-2 lb wt loss each week during treatment  Evaluation: Not met   Previous Nutrition Diagnosis:   Inadequate oral intake related to odynophagia as evidenced by patient report choosing softer foods, will be getting FT replaced in two week    Evaluation: Declining   NEW FINDINGS:   --PEG placed today  --Weight loss - 7% x past 2 weeks   CURRENT NUTRITION DIAGNOSIS   Inadequate protein-energy intake related to dysphagia, odynophagia as evidenced by 11 lb (7%) wt loss x past 2 weeks, PO intake <25%    INTERVENTIONS   Recommendations / Nutrition Prescription   RECOMMENDATIONS FOR MD/PROVIDER TO ORDER:   Enteral Nutrition   Formula: Osmolite 1.5 felicia  Volume: 6  cartons/day (1422 mL, 1086 ml free water)  Provisions:  2130kcal (28kcal/kg), 90 g protein (1.2g/kg), 288g CHO, 0g fiber  Dosing wt: 76kg      Suggested Tube feeding schedule via gravity feedings  Day 1: 1 carton formula TID spread 3-4 hours apart   Day 2: 1 1/2 cartons formula TID spread 3-4 hours apart   Day 3:  2  cartons formula TID spread 3-4 hours apart   Flush with 30-60mL water before and after each feeding  Flush with additional 120 mL water QID for additional hydration and tube patency     Implementation  EN Composition, EN Schedule, and Feeding Tube Flush - reviewed nutrition support via PEG tube.  Reviewed volume goals, MOA, water flushes etc.   Message sent to [facility] intake and RNCC regarding benefit check and need for supplies and formula asap.   Goals   EN to meet 100% estimated nutrition needs via PEG tube (6 cartons/day)  Fluids goals - 6-8 cups water/electrolytes via PO and PEG tube as able    Follow up/Monitoring: One week follow up  Enteral Nutrition intake and Weight    Meka Lee RD, , LD  [facility]  [phone]

## 2024-01-23 NOTE — PROGRESS NOTES
Patient reports ibuprofen did not help pain at G-tube site at all. Reports pain is worse, patient is extremely agitated, upset, yelling at writer about pain not being controlled. Writer called Dr. Crocker asking to come talk to patient at bedside about other options for pain control. Patient is unable to listen to writer about options at this time due to anger and agitation. Dr. Crocker talking to patient at bedside.     At 1430 patient got up from bed, got dressed and asked for IV be taken out, stated he was leaving since the 4 hours was complete. G tube clamped. Site intact, dressing clean and dry. RN offered discharge teaching for the G tube and patient refused, supplies given and patient left. Patient has home infusion referral and registered dietician will follow up with patient. Discharge paperwork and supplies sent with patient. Patient discharged in stable condition with wife driving patient home.

## 2024-01-23 NOTE — PROGRESS NOTES
Pt prepped for G-tube placement.PIV placed and labs sent and NS started.Consent signed and questions answered.

## 2024-01-23 NOTE — PROGRESS NOTES
Patient brought to radiation and returned, met with oncologist and nutritionist, tube feedings will be started and ordered, home infusion will meet with patient in the next day or so. Upon arrival to the unit patient c/o increased pain at the G-tube site, stating pain increases when coughing. No swelling or bleeding noted. Patient requesting ibuprofen for pain. Dr. Peterson called 600 mg of oral ibuprofen ordered.

## 2024-01-23 NOTE — LETTER
2024         RE: Ko Thakkar  [address]  [address]  [place] [address]        Dear Colleague,    Thank you for referring your patient, Ko Thakkar, to the [facility] RADIATION ONCOLOGY. Please see a copy of my visit note below.    [facility] PHYSICIANS  SPECIALIZING IN BREAKTHROUGHS  Radiation Oncology    On Treatment Visit Note      Ko Thakkar      Date: 2024   MRN: [id number]   : 1955  Diagnosis: SCC of Supraglottis        ID: Mr. Thakkar is a 68 year old male [id number] oropharyngeal SCC, p16 positive. Tumor involved the right tonsil, posterior pharyngeal wall, and right AE fold, hypopharynx, with known right cord paralysis, with bilateral adenopathy including right RP node, right level IV, and questionable right parotid node (which was bx proven SCC). There is no evidence of any distant disease. He has evidence of swallow dysfunction with dysphagia and aspiration, and voice dysfunction with dysphonia and right cord paralysis.      Given extent of disease as well as potential delays in starting treatment, induction chemotherapy was administered under care of Dr. Louise.  PET CT scan after 2 cycles of carboplatin paclitaxel demonstrates a good partial response without any distant metastatic disease.    Reason for Visit:  On Radiation Treatment Visit       Treatment Summary to Date    Current Dose: 4600/7000 cGy Fractions: 123/35      Chemotherapy  Chemo concurrent with radx?: Yes  Oncologist: Dr Louise  Drug Name/Frequency 1: Taxol/carboplatin    Subjective:   Oral cavity/oropharyngeal pain much improved with the use of Magic mouthwash, currently on gabapentin 1200 mg 3 times daily with improvement in pain.  Weight is now stable, although he has lost 10 lbs since start of RT.  He underwent feeding tube placement today.    Pain Control: Gabapentin 1200 mg 3 times daily  Fluid Intake: Adequate  Nutrition: Adequate  Rinses: 15 times  Skin  care: Aquaphor twice daily      Nursing ROS:   Nutrition Alteration  Diet Type: Patient's Preference  Nutrition Note: PEG  Skin  Skin Reaction: 1 - Faint erythema or dry desquamation  Skin Intervention: Aquaphor     ENT and Mouth Exam  Mucositis - Current: 1 - Generalized erythema  ENT/Mouth Note: S&S 15-20  Cardiovascular  Respiratory effort: 1 - Normal - without distress  Gastrointestinal  Nausea: 0 - None     Psychosocial  Mood - Anxiety: 0 - Normal  Pain Assessment  0-10 Pain Scale: 3  Pain Treatment: Gabapentin 1300 mg TID      Objective:   BP (!) 145/76   Pulse 77   Wt 72.6 kg (160 lb)   BMI 25.82 kg/m    Gen: Appears well, in no acute distress  HN/Skin: mild erythema of the neck, no skin breakdown, OC/OP mucositis  CV/Resp: rrr, breathing comfortably on room air  Neuro: CN 2-12 grossly intact, UE/LE full strength     Labs:  CBC RESULTS:   Recent Labs   Lab Test 01/22/24  1402   WBC 4.7   RBC 3.48*   HGB 10.3*   HCT 30.3*   MCV 87   MCH 29.6   MCHC 34.0   RDW 15.2*        ELECTROLYTES:  Recent Labs   Lab Test 01/23/24  0749      POTASSIUM 4.2   CHLORIDE 104   OLE 9.9   CO2 23   BUN 25.9*   CR 0.70   *       Assessment:    Tolerating radiation therapy well.  All questions and concerns addressed.      Plan:   Continue current therapy.    Continue gabapentin, rinses, nutrition, hydration, skin care      Mosaiq chart and setup information reviewed  Ports checked and MVCT/IGRT images checked    Medication Review  Med list reviewed with patient?: Yes  Med list printed and given: Offered and declined         Juwan Cordova MD  Radiation Oncology   [facility]  Clinic: [phone]        Again, thank you for allowing me to participate in the care of your patient.        Sincerely,        Juwan Cordova MD

## 2024-01-23 NOTE — PROGRESS NOTES
Patient returned to 2A s/p G tube placement. G tube in in lower left abdomen, set to gravity drainage for 4 hour, NPO for 4 hours  until 1430.  Split gauze dressing placed. No swelling or bleeding, denies pain. VSS. Has scheduled radiation at 1145, will escort patient to appt and then bring back to 2A for recovery.

## 2024-01-23 NOTE — PROCEDURES
[facility]    Procedure: IR Procedure Note    Date/Time: 1/23/2024 10:33 AM    Performed by: Vinay Crocker MD  Authorized by: Vinay Crocker MD  IR Fellow Physician: Vinay Crocker  Other(s) attending procedure: SJ Lange MD      UNIVERSAL PROTOCOL   Site Marked: NA  Prior Images Obtained and Reviewed:  Yes  Required items: Required blood products, implants, devices and special equipment available    Patient identity confirmed:  Verbally with patient, arm band, provided demographic data and hospital-assigned identification number  Patient was reevaluated immediately before administering moderate or deep sedation or anesthesia  Confirmation Checklist:  Patient's identity using two indicators, relevant allergies, procedure was appropriate and matched the consent or emergent situation and correct equipment/implants were available  Time out: Immediately prior to the procedure a time out was called    Universal Protocol: the Joint Commission Universal Protocol was followed    Preparation: Patient was prepped and draped in usual sterile fashion    ESBL (mL):  1     ANESTHESIA    Anesthesia:  Local infiltration  Local Anesthetic:  Lidocaine 1% without epinephrine  Anesthetic Total (mL):  15      SEDATION  Patient Sedated: Yes    Sedation Type:  Moderate (conscious) sedation  Sedation:  Fentanyl and midazolam  Vital signs: Vital signs monitored during sedation    See dictated procedure note for full details.  Findings: See dictation.    Specimens: none    Complications: None    Condition: Stable    Plan: Return to 2A.  G tube to gravity drainage for 4 hours and NPO during that period. If no or minimal drainage after 4 hours ok to use gastrostomy tube. If patient meeting discharge criteria at that point  they are ok  to discharge.      PROCEDURE  Describe Procedure: Successful insertion of 18 Fr Gastrostomy tube. No immediate complication.  Patient Tolerance:  Patient  tolerated the procedure well with no immediate complications  Length of time physician/provider present for 1:1 monitoring during sedation: 45

## 2024-01-23 NOTE — PRE-PROCEDURE
GENERAL PRE-PROCEDURE:   Procedure:  Image guided placement of gastrostomy tube via new versus old tract  Date/Time:  1/23/2024 8:09 AM    Verbal consent obtained?: Yes    Written consent obtained?: Yes    Risks and benefits: Risks, benefits and alternatives were discussed    Consent given by:  Patient  Patient states understanding of procedure being performed: Yes    Patient's understanding of procedure matches consent: Yes    Procedure consent matches procedure scheduled: Yes    Expected level of sedation:  Moderate  Appropriately NPO:  Yes  ASA Class:  2  Mallampati  :  Grade 2- soft palate, base of uvula, tonsillar pillars, and portion of posterior pharyngeal wall visible  Lungs:  Lungs clear with good breath sounds bilaterally  Heart:  Normal heart sounds and rate  History & Physical reviewed:  History and physical reviewed and no updates needed  Statement of review:  I have reviewed the lab findings, diagnostic data, medications, and the plan for sedation

## 2024-01-23 NOTE — DISCHARGE INSTRUCTIONS
Interventional Radiology  Patient Discharge Instructions  Post G Tube Placement:      For some patients, the tube puts food and medicine into the body. For others, it drains fluid from the stomach. Your doctor will decide how long you will have the tube.    A disc or balloon inside the body will hold the tube in place. The balloon is filled with water.  You may notice one or two  anchors  (buttons, stitches, T-tacks, or T-fasteners) around your tube/site. These hold the stomach to the inside wall of the belly.    Self-care the first few days  Do not eat or drink for the first four hours. (You may take medicine with small sips of water.)  Stick to liquids for the first day and then advance your diet as tolerated, if you are able to take in oral foods.  If you are starting tube feedings, this should have been prearranged with your clinic.    Check your tube site often. Call your doctor if your side effects of pain, redness or bleeding get worse. It is normal to have some drainage (fluid leaking) around the tube.    Limit heavy activity (lifting, straining or exercise).     If you received IV medicine to make you sleepy: You may feel drowsy, forgetful and unsteady.     No driving until the day after surgery. No alcohol for 24 hours.    Activity   You may resume your normal activities as tolerated, however, most patients have site pain for about a week following placement.  Minimal use of your abdominal muscles will decrease the pain.  Keep the tube secure at all times (you may tape it to your skin or use the Flexi-Trak device) and avoid tugging on it.  Bathing  You may shower 24 hours after the tube is placed.  Remove the dressing before your shower, reapply afterwards.  DO NOT submerge in water of any kind for 3 weeks after placement.  Once the site is healed, which is around 3 weeks, for most patients, you may go in bathtubs, spas and swimming pools where the water is clean or chlorinated.  Be sure the caps are  tight.  Your doctor may ask you to cover the tube site with a plastic bandage when swimming.    Do not swim in lakes, oceans or non-chlorinated public schmitt for the duration of your tube being in place.  When swimming or any activity where the tube could be inadvertently pulled, wearing a t-shirt or one piece bathing suit (for women) decreases the risk.  Basic tube care  Always wash your hands before touching the tube.    Do not use ointment or hydrogen peroxide near the tube. You may use a thin layer of skin balm, like Desitin around the site after it is clean and dry.     After 3 weeks:  If you have a G-tube:  - Once each day, pull gently on the tube.  It should move in and out slightly (about   to    inch). You may see a slight rounding of the skin as you pull up. If the tube is too tight to move in and out, call your nurse or doctor.  - We may ask you to gently rotate the tube. If so, roll it between your thumb and forefinger.  This breaks up any flaps of tissue that have formed around the tube inside the stomach.  Never rotate a J-tube or a GJ-tube.    Changing the bandage  Wear a bandage until the site has healed and there is no leaking fluid. Change the bandage at least once a day and each time it gets wet or soiled.  1. Gather these supplies:  - Plastic bag  - Gauze or split gauze (4×4 or 2×2)  - Paper tape (1 or 2 inches wide)  2. Clean your work area with household  and water (or rubbing alcohol). Wash your hands.  3. Remove the old bandage. Place it in the bag.  4. Wash your hands again. Clean the tube site.  5. Replace the bandage.   If you have a disk: Slide split gauze under and around the tube (or use two pieces of gauze).  Tape the gauze in place.   If you don t have a disk: Fold one piece of gauze in half, placing it below the tube site.  Fold another piece of gauze in half, placing it over the tube site. Tape the gauze in place.  6. Secure the tube to the skin. If you don t have a disk, be  sure the tube is at a 90-degree angle.   First, place a piece of a tape across the skin. Then, use a second piece of tape to secure the tube over the first piece.  7 Do not attach the tube to clothing, except during feedings.    Flushing Your Tube  Flush the tube with water (clean, drinkable tap water is fine) before and after each feeding, as well as between medications if you choose to use your tube to instill medications.    If you are not using your tube, it needs to be flushed with 20-30 cc of clean tap water twice daily.    Follow-up  Contact the Interventional Radiology Clinic to schedule your suture removal two weeks after tube placement at [phone]  Routine tube changes are recommended every 6-9 months.  After 6 weeks most tubes can be changed to a skin level,  button  type device.  Tube removals are performed in our clinic    The following questions should be first directed to the doctor that referred you for the feeding tube  Which feeding formula to use?  How long will the tube be needed?  Calorie requirement for your specific problem?  How much water you need to give through your tube daily?  Nausea, vomiting, diarrhea, or weight loss problems.    When to call for help  Call your doctor if you have   A fever that is:  - over 101 F (38.3 C) if taken under the tongue  - over 100 F (37.8 C) if taken under your arm.   Vomiting (throwing up)   Diarrhea (loose, watery stools)   Constipation (hard, dry stools) for more than 24 hours  Call your care coordinator or Interventional Radiology if you have:   Bleeding, drainage or swelling at the tube site.   A painful, bright red rash   Severe pain or pain that does not improve with medicine.   A plugged tube and you cannot flush it.   A tube that has come out.   Fluid leaking around the tube.   Any questions or problems with your tube.  If you need help after business hours or on a holiday, go to Emergency unless you can reach home care or your care  coordinator.  Phone numbers  [x] [facility]:   [place]: [phone]      Monday-Friday 7:30 am to 4:00 pm  * After 4pm and on weekends, call the hospital  at [phone] and ask to have the on call Interventional Radiologist paged.

## 2024-01-23 NOTE — PROGRESS NOTES
[facility] PHYSICIANS  SPECIALIZING IN BREAKTHROUGHS  Radiation Oncology    On Treatment Visit Note      Ko Thakkar      Date: 2024   MRN: [id number]   : 1955  Diagnosis: SCC of Supraglottis        ID: Mr. Thakkar is a 68 year old male [id number] oropharyngeal SCC, p16 positive. Tumor involved the right tonsil, posterior pharyngeal wall, and right AE fold, hypopharynx, with known right cord paralysis, with bilateral adenopathy including right RP node, right level IV, and questionable right parotid node (which was bx proven SCC). There is no evidence of any distant disease. He has evidence of swallow dysfunction with dysphagia and aspiration, and voice dysfunction with dysphonia and right cord paralysis.      Given extent of disease as well as potential delays in starting treatment, induction chemotherapy was administered under care of Dr. Louise.  PET CT scan after 2 cycles of carboplatin paclitaxel demonstrates a good partial response without any distant metastatic disease.    Reason for Visit:  On Radiation Treatment Visit       Treatment Summary to Date    Current Dose: 4600/7000 cGy Fractions: 123/35      Chemotherapy  Chemo concurrent with radx?: Yes  Oncologist: Dr Louise  Drug Name/Frequency 1: Taxol/carboplatin    Subjective:   Oral cavity/oropharyngeal pain much improved with the use of Magic mouthwash, currently on gabapentin 1200 mg 3 times daily with improvement in pain.  Weight is now stable, although he has lost 10 lbs since start of RT.  He underwent feeding tube placement today.    Pain Control: Gabapentin 1200 mg 3 times daily  Fluid Intake: Adequate  Nutrition: Adequate  Rinses: 15 times  Skin care: Aquaphor twice daily      Nursing ROS:   Nutrition Alteration  Diet Type: Patient's Preference  Nutrition Note: PEG  Skin  Skin Reaction: 1 - Faint erythema or dry desquamation  Skin Intervention: Aquaphor     ENT and Mouth Exam  Mucositis - Current: 1 - Generalized  erythema  ENT/Mouth Note: S&S 15-20  Cardiovascular  Respiratory effort: 1 - Normal - without distress  Gastrointestinal  Nausea: 0 - None     Psychosocial  Mood - Anxiety: 0 - Normal  Pain Assessment  0-10 Pain Scale: 3  Pain Treatment: Gabapentin 1300 mg TID      Objective:   BP (!) 145/76   Pulse 77   Wt 72.6 kg (160 lb)   BMI 25.82 kg/m    Gen: Appears well, in no acute distress  HN/Skin: mild erythema of the neck, no skin breakdown, OC/OP mucositis  CV/Resp: rrr, breathing comfortably on room air  Neuro: CN 2-12 grossly intact, UE/LE full strength     Labs:  CBC RESULTS:   Recent Labs   Lab Test 01/22/24  1402   WBC 4.7   RBC 3.48*   HGB 10.3*   HCT 30.3*   MCV 87   MCH 29.6   MCHC 34.0   RDW 15.2*        ELECTROLYTES:  Recent Labs   Lab Test 01/23/24  0749      POTASSIUM 4.2   CHLORIDE 104   OLE 9.9   CO2 23   BUN 25.9*   CR 0.70   *       Assessment:    Tolerating radiation therapy well.  All questions and concerns addressed.      Plan:   Continue current therapy.    Continue gabapentin, rinses, nutrition, hydration, skin care      Mosaiq chart and setup information reviewed  Ports checked and MVCT/IGRT images checked    Medication Review  Med list reviewed with patient?: Yes  Med list printed and given: Offered and declined         Juwan Cordova MD  Radiation Oncology   [facility]  Clinic: [phone]

## 2024-01-23 NOTE — PROGRESS NOTES
Patient Name: Ko Thakkar  Medical Record Number: [id number]  Today's Date: 1/23/2024    Procedure: Image guided percutaneous gastric tube placement  Proceduralist: Dr Obi GARCIA, Nazario GARCIA  Pathology present: N/A    Procedure Start: 0936  Procedure end: 1020  Sedation medications administered: Midazolam 2 mg, Fentanyl 100 mcg     Report given to: Jessica CHOI RN  : N/A    Other Notes: Pt arrived to IR room 4 from . Consent reviewed. Pt denies any questions or concerns regarding procedure. Pt positioned supine and monitored per protocol. Pt tolerated procedure without any noted complications. Pt transferred back to .

## 2024-01-24 ENCOUNTER — APPOINTMENT (OUTPATIENT)
Dept: RADIATION ONCOLOGY | Facility: CLINIC | Age: 69
End: 2024-01-24
Attending: SURGERY
Payer: COMMERCIAL

## 2024-01-24 ENCOUNTER — HOME INFUSION (PRE-WILLOW HOME INFUSION) (OUTPATIENT)
Dept: PHARMACY | Facility: CLINIC | Age: 69
End: 2024-01-24
Payer: COMMERCIAL

## 2024-01-24 PROCEDURE — 77014 PR CT GUIDE FOR PLACEMENT RADIATION THERAPY FIELDS: CPT | Mod: 26 | Performed by: SURGERY

## 2024-01-24 PROCEDURE — 77386 HC IMRT TREATMENT DELIVERY, COMPLEX: CPT | Performed by: SURGERY

## 2024-01-24 NOTE — PROGRESS NOTES
Therapy: Enteral  Insurance: [facility] Medicare Adv     Co-Insurance: 80/20  Max Out of Pocket: $5400  Met: $80    Pt has coverage for Enteral through their [facility] Medicare Adv plan. Pt meets Medicare criteria for Enteral. Ordering provider must document anticipated BALBIR of 90 days or greater in pt's medical charts in order for Medicare to cover. If not documented, pt will not be covered.     [facility] cannot bill for nursing if pt is homebound, if not we can provide nursing if pt agrees to self-pay an additional $90 per visit.    In reference to referral from [facility] cancer clinic received 01/23/24 to check for Enteral coverage.    Please contact Intake with any questions, [phone] or In Basket pool,  Home Infusion ([phone]).

## 2024-01-25 ENCOUNTER — APPOINTMENT (OUTPATIENT)
Dept: RADIATION ONCOLOGY | Facility: CLINIC | Age: 69
End: 2024-01-25
Attending: SURGERY
Payer: COMMERCIAL

## 2024-01-25 PROCEDURE — 77336 RADIATION PHYSICS CONSULT: CPT | Performed by: SURGERY

## 2024-01-25 PROCEDURE — 77386 HC IMRT TREATMENT DELIVERY, COMPLEX: CPT | Performed by: SURGERY

## 2024-01-25 PROCEDURE — 77014 PR CT GUIDE FOR PLACEMENT RADIATION THERAPY FIELDS: CPT | Mod: 26 | Performed by: SURGERY

## 2024-01-25 PROCEDURE — 77427 RADIATION TX MANAGEMENT X5: CPT | Performed by: SURGERY

## 2024-01-25 NOTE — PROGRESS NOTES
[facility] CANCER [facility]    PATIENT NAME: Ko Thakkar  MRN # [id number]   DATE OF VISIT: January 29, 2024 YOB: 1955     Otolaryngology: Dr. Denia Hardin  Radiation Oncology: Dr. Juwan Cordova   PCP: Dr. Alton Mota; [facility] [place]     CANCER TYPE: SCC supraglottis  STAGE: [id number] (IRON)  ECOG PS: 1    SUMMARY  5/16/23 EGD for dysphagia. Gastritis and gastric diverticulum. Bx showed H. Pylori, treated. No atrophic gastritis or dysplasia  7/11/23 Swallow study at . Poor epiglottic inversion, penetration, aspiration  7/28/23 CT neck. 2.2 x 1.4 x 2.3 cm supraglottic mass, concern for paraglottic involvement, bilateral IB, IIA, III, IV adenopathy, 70% ICA stenosis   8/21/23 US carotid. 70-99% stenosis R ICA. <50% stenosis L ICA  8/29/23 FNA L level 2 node (IR). Path: SCC  9/6/23 PET/CT. FDG avid mass (SUV 13.5) right tonsil/lateral pharyngeal wall/glossotonsillar sulcus with slight extension to the R tongue base, right AE fold, right epiglottis, right piriform sinus, right posterolateral pharyngeal wall, posterior pharyngeal wall, no thyroid cartilage erosion, right retropharyngeal node, deep lobe parotid FDG avid mass, bilateral cervical nodes (right level II-IV, left level II-III), no distant disease, L mandibular canine with periapical abscess. R temporal lobe change suggestive of prior infarct. 5 mm LLL nodule.  10/10/23 Video swallow.   10/16~11/13/23 C1-2 carboplatin paclitaxel. Given due to logistical issues with pre-chemoradiation evaluation that would have delayed start of any treatment substantially. Paclitaxel dose reduced to 140 mg/m2 C2 due to neuropathy  10p/23/23 CTA. 70% narrowing R carotid carlos/bifurcation similar to 7/28/23 CT. Laterally projecting disc osteophyte complex resulting in mod-severe L vertebral artery narrowing at C3-4 level.  10/23/23 Brain MRI. No mets. Bilateral frontal and temporal lobe encephalomalacia, R > L. Mild volume loss.    11/28/23 PET/CT. Good MD.       SUBJECTIVE  Mr. Thakkar (Ace) is seen today for his 6th carbo/taxol infusion, concurrent with XRT.   -More difficulty with oral intake--stuff doesn't taste good and thick secretions.  -FT, 3 cartons daily   -weight fluctuating  -Some nausea related to clearing out thick secretions. Takes antiemetics occasionally  -Using magic mouthwash, biotene lozenges and salt/soda rinses    PAST MEDICAL HISTORY  SCC as above  GERD  H/o food impaction in 2008 and 2014 (steak) related to Schatzki ring on EGD 10/13/14  DM2. Last A1c about 7.6 sometime in summer 2023 and Nov 2023  Dyslipidemia  Possible prior L temporal CVA  HTN  H.o spinal meningitis as a baby -   R arm surgery  Bone graft to forehead  Ex lap   Tinnitus secondary to treatment for meningitis or from menigitis - bilateral   Hearing loss -- audiogram 10/2/2023  Numbness in the toes - mostly big toes   Crushing pills   No muscle aches or pains     CURRENT OUTPATIENT MEDICATIONS  Current Outpatient Medications   Medication    albuterol (PROAIR HFA/PROVENTIL HFA/VENTOLIN HFA) 108 (90 Base) MCG/ACT inhaler    amLODIPine (NORVASC) 5 MG tablet    aspirin (ASA) 81 MG chewable tablet    atorvastatin (LIPITOR) 80 MG tablet    empagliflozin (JARDIANCE) 25 MG TABS tablet    gabapentin (NEURONTIN) 300 MG capsule    hydrochlorothiazide (HYDRODIURIL) 25 MG tablet    ibuprofen (ADVIL/MOTRIN) 200 MG tablet    insulin lispro protamine-insulin lispro (HUMALOG MIX 75/25 KWIKPEN) (75-25) 100 UNIT/ML pen    insulin NPH-Regular 70/30 (HUMULIN 70/30;NOVOLIN 70/30) (70-30) 100 UNIT/ML vial    liraglutide (VICTOZA) 18 MG/3ML solution    lisinopril (ZESTRIL) 40 MG tablet    magic mouthwash (ENTER INGREDIENTS IN COMMENTS) suspension    metFORMIN (GLUCOPHAGE) 500 MG tablet    pantoprazole (PROTONIX) 20 MG EC tablet    potassium chloride (KLOR-CON) 20 MEQ packet    therapeutic multivitamin (THERAGRAN) tablet    TRUEPLUS 5-BEVEL PEN NEEDLES 32G X 4 MM miscellaneous      No current facility-administered medications for this visit.     ALLERGIES  No Known Allergies     PHYSICAL EXAM  There were no vitals taken for this visit.    General: Well-appearing male, NAD.   Eyes: EOMI, PERRL. No scleral icterus.  ENT: Oral mucosa moist. Diffused mucositis to posterior tongue and OP with erythema. [place], no hearing aids.   Cardiovascular: RRR, no m/g/r. No peripheral edema .  Respiratory: CTA bilaterally. No wheezes or crackles.  Neurologic: Grossly nonfocal. Hoarse voice.  Skin: No rashes, petechiae, or bruising noted on exposed skin.      LABORATORY AND IMAGING STUDIES  Most Recent 3 CBC's:  Recent Labs   Lab Test 01/22/24  1402 01/09/24  0801 01/02/24  1240   WBC 4.7 3.4* 4.3   HGB 10.3* 11.2* 11.3*   MCV 87 86 88    212 228   ANEUTAUTO 3.6 2.0 2.8    Most Recent 3 BMP's:  Recent Labs   Lab Test 01/23/24  0749 01/22/24  1402 01/09/24  0801 01/02/24  1240    134* 139 138   POTASSIUM 4.2 4.2 3.6 4.0   CHLORIDE 104 100 104 105   CO2 23 25 25 24   BUN 25.9* 13.5 25.2* 20.5   CR 0.70 0.74 0.70 0.63*   ANIONGAP 12 9 10 9   OLE 9.9 9.0 9.2 9.1   * 307* 157* 90   PROTTOTAL  --  6.7 6.6 6.5   ALBUMIN  --  3.6 3.8 3.9    Most Recent 2 LFT's:  Recent Labs   Lab Test 01/22/24  1402 01/09/24  0801   AST 16 17   ALT 12 14   ALKPHOS 120 101   BILITOTAL 0.4 0.5    Most Recent TSH and T4:  Recent Labs   Lab Test 11/29/23  1501   TSH 3.46     Phos/Mag:  Lab Results   Component Value Date    PHOS 3.0 11/29/2023    MAG 1.7 11/29/2023    MAG 2.3 09/14/2023      I reviewed the above labs today.      ASSESSMENT AND PLAN  SCC supraglottis, [id number] (IRON): Declined surgery. Planned chemoradiation but with all of the logistical issues and work needed prior to starting, also due to the large size of the primary tumor, started carboplatin + paclitaxel first. PET/CT confirmed good AR after 2 cycles. Plan remained to transition to chemoradiation. Not a candidate for cisplatin due to severe hearing  loss. Tolerating chemotherapy well. Toxicities from RT as expected. Proceed with D 36 weekly carboplatin/paclitaxel   -RTC weekly for PALOMO visit with infusion    -Discussed possibility of 7th infusion versus only IV hydration/monitoring. Will keep appointments as-scheduled    Mucositis, odynophagia: Increasing as expected. Continue s/s rinses, Biotene, Magic Mouthwash. Gabapentin management per rad onc.     Dental: Had extractions as planned prior to starting chemo, still needs bone shaved down some, which was delayed to start chemo.  Ace has been told by his dentist the process is going to be postponed until he recovers from cancer treatment. Should wait many months following completion of chemoradiation before denture fitting.     Neuropathy: Worse after C1 chemo, no worse after C2. Monitor loosely with weekly carboplatin paclitaxel. B12 10/6/23 was ok. TSH has been ok.     Ibuprofen use, arthritis: Reports decreased use. Was most recently using for cancer-related pain which has improved with response to treatment. Again discouraged use in extremely high doses due to significant risk of bleeding. Continues to use approximately 1000 mg/day but not daily, more intermittently.     Dysphagia, aspiration: Jessica Ramos visit 9/13/23, video 10/10, known aspiration at baseline although swallowing a little better with CO. Gtube 10/13--fell out 12/25; replaced 1/23.   -has follow up with Meka GRIFFIN tomorrow, weight down a bit so may need to increase feeds. Currently doing feeds TID    R KAILASH: Met with Dr. Tapia, vascular surgery fellow 8/21. Recommended treating the cancer first, asa 81 and high dose atorva, follow up 6 months with repeat US. PET/CT showed possible old R temporal infarct. Exhibited TIA symptoms in early October with 4 hours episode of confusion. MR brain and CTA head 10/23 w/o acute findings. Did not discuss today, no acute cardiac s/s.     H/o food impaction: lower esophagus; monitor.    Liver issue: Says he  was to have some sort of imaging at [facility] to look at his liver that had to be canceled due to the appts he needed for the cancer. Will try to figure out what that was about - not actively discussed again today. Might be related to his mom having cirrhosis without hepatitis or ETOH hx. Did not discuss this today.    40 minutes spent on the date of the encounter doing chart review, review of test results, interpretation of tests, patient visit, documentation, and discussion with other provider(s)     Olga Baker, CNP

## 2024-01-26 ENCOUNTER — APPOINTMENT (OUTPATIENT)
Dept: RADIATION ONCOLOGY | Facility: CLINIC | Age: 69
End: 2024-01-26
Attending: SURGERY
Payer: COMMERCIAL

## 2024-01-26 PROCEDURE — 77014 PR CT GUIDE FOR PLACEMENT RADIATION THERAPY FIELDS: CPT | Mod: 26 | Performed by: SURGERY

## 2024-01-26 PROCEDURE — 77386 HC IMRT TREATMENT DELIVERY, COMPLEX: CPT | Performed by: SURGERY

## 2024-01-29 ENCOUNTER — ONCOLOGY VISIT (OUTPATIENT)
Dept: ONCOLOGY | Facility: CLINIC | Age: 69
End: 2024-01-29
Attending: REGISTERED NURSE
Payer: COMMERCIAL

## 2024-01-29 ENCOUNTER — APPOINTMENT (OUTPATIENT)
Dept: RADIATION ONCOLOGY | Facility: CLINIC | Age: 69
End: 2024-01-29
Attending: SURGERY
Payer: COMMERCIAL

## 2024-01-29 ENCOUNTER — THERAPY VISIT (OUTPATIENT)
Dept: SPEECH THERAPY | Facility: CLINIC | Age: 69
End: 2024-01-29
Payer: COMMERCIAL

## 2024-01-29 ENCOUNTER — APPOINTMENT (OUTPATIENT)
Dept: LAB | Facility: CLINIC | Age: 69
End: 2024-01-29
Attending: INTERNAL MEDICINE
Payer: COMMERCIAL

## 2024-01-29 ENCOUNTER — INFUSION THERAPY VISIT (OUTPATIENT)
Dept: ONCOLOGY | Facility: CLINIC | Age: 69
End: 2024-01-29
Attending: INTERNAL MEDICINE
Payer: COMMERCIAL

## 2024-01-29 VITALS
DIASTOLIC BLOOD PRESSURE: 77 MMHG | RESPIRATION RATE: 16 BRPM | OXYGEN SATURATION: 99 % | SYSTOLIC BLOOD PRESSURE: 127 MMHG | BODY MASS INDEX: 25.28 KG/M2 | WEIGHT: 156.6 LBS | TEMPERATURE: 98.3 F | HEART RATE: 103 BPM

## 2024-01-29 VITALS
RESPIRATION RATE: 18 BRPM | TEMPERATURE: 98.8 F | DIASTOLIC BLOOD PRESSURE: 77 MMHG | BODY MASS INDEX: 25.41 KG/M2 | SYSTOLIC BLOOD PRESSURE: 143 MMHG | OXYGEN SATURATION: 97 % | WEIGHT: 157.4 LBS | HEART RATE: 97 BPM

## 2024-01-29 DIAGNOSIS — R13.10 DYSPHAGIA, UNSPECIFIED TYPE: ICD-10-CM

## 2024-01-29 DIAGNOSIS — T45.1X5A CHEMOTHERAPY-INDUCED NAUSEA: ICD-10-CM

## 2024-01-29 DIAGNOSIS — R13.13 PHARYNGEAL DYSPHAGIA: ICD-10-CM

## 2024-01-29 DIAGNOSIS — R11.0 CHEMOTHERAPY-INDUCED NAUSEA: ICD-10-CM

## 2024-01-29 DIAGNOSIS — C32.1 SCC (SQUAMOUS CELL CARCINOMA) OF SUPRAGLOTTIS (H): Primary | ICD-10-CM

## 2024-01-29 DIAGNOSIS — C10.9 SQUAMOUS CELL CARCINOMA OF OROPHARYNX (H): ICD-10-CM

## 2024-01-29 DIAGNOSIS — R13.12 DYSPHAGIA, OROPHARYNGEAL PHASE: Primary | ICD-10-CM

## 2024-01-29 DIAGNOSIS — G62.9 NEUROPATHY: ICD-10-CM

## 2024-01-29 DIAGNOSIS — R13.10 ODYNOPHAGIA: ICD-10-CM

## 2024-01-29 LAB
ALBUMIN SERPL BCG-MCNC: 4 G/DL (ref 3.5–5.2)
ALP SERPL-CCNC: 123 U/L (ref 40–150)
ALT SERPL W P-5'-P-CCNC: 21 U/L (ref 0–70)
ANION GAP SERPL CALCULATED.3IONS-SCNC: 10 MMOL/L (ref 7–15)
AST SERPL W P-5'-P-CCNC: 16 U/L (ref 0–45)
BASOPHILS # BLD AUTO: 0 10E3/UL (ref 0–0.2)
BASOPHILS NFR BLD AUTO: 1 %
BILIRUB SERPL-MCNC: 0.3 MG/DL
BUN SERPL-MCNC: 21.6 MG/DL (ref 8–23)
CALCIUM SERPL-MCNC: 9.6 MG/DL (ref 8.8–10.2)
CHLORIDE SERPL-SCNC: 104 MMOL/L (ref 98–107)
CREAT SERPL-MCNC: 0.69 MG/DL (ref 0.67–1.17)
DEPRECATED HCO3 PLAS-SCNC: 26 MMOL/L (ref 22–29)
EGFRCR SERPLBLD CKD-EPI 2021: >90 ML/MIN/1.73M2
EOSINOPHIL # BLD AUTO: 0.1 10E3/UL (ref 0–0.7)
EOSINOPHIL NFR BLD AUTO: 2 %
ERYTHROCYTE [DISTWIDTH] IN BLOOD BY AUTOMATED COUNT: 14.9 % (ref 10–15)
GLUCOSE SERPL-MCNC: 132 MG/DL (ref 70–99)
HCT VFR BLD AUTO: 34.6 % (ref 40–53)
HGB BLD-MCNC: 11.5 G/DL (ref 13.3–17.7)
IMM GRANULOCYTES # BLD: 0 10E3/UL
IMM GRANULOCYTES NFR BLD: 1 %
LYMPHOCYTES # BLD AUTO: 0.5 10E3/UL (ref 0.8–5.3)
LYMPHOCYTES NFR BLD AUTO: 14 %
MCH RBC QN AUTO: 29.4 PG (ref 26.5–33)
MCHC RBC AUTO-ENTMCNC: 33.2 G/DL (ref 31.5–36.5)
MCV RBC AUTO: 89 FL (ref 78–100)
MONOCYTES # BLD AUTO: 0.3 10E3/UL (ref 0–1.3)
MONOCYTES NFR BLD AUTO: 10 %
NEUTROPHILS # BLD AUTO: 2.4 10E3/UL (ref 1.6–8.3)
NEUTROPHILS NFR BLD AUTO: 72 %
NRBC # BLD AUTO: 0 10E3/UL
NRBC BLD AUTO-RTO: 0 /100
PLATELET # BLD AUTO: 270 10E3/UL (ref 150–450)
POTASSIUM SERPL-SCNC: 4 MMOL/L (ref 3.4–5.3)
PROT SERPL-MCNC: 7.4 G/DL (ref 6.4–8.3)
RBC # BLD AUTO: 3.91 10E6/UL (ref 4.4–5.9)
SODIUM SERPL-SCNC: 140 MMOL/L (ref 135–145)
WBC # BLD AUTO: 3.3 10E3/UL (ref 4–11)

## 2024-01-29 PROCEDURE — 77386 HC IMRT TREATMENT DELIVERY, COMPLEX: CPT | Performed by: SURGERY

## 2024-01-29 PROCEDURE — 96375 TX/PRO/DX INJ NEW DRUG ADDON: CPT

## 2024-01-29 PROCEDURE — 96367 TX/PROPH/DG ADDL SEQ IV INF: CPT

## 2024-01-29 PROCEDURE — 250N000013 HC RX MED GY IP 250 OP 250 PS 637: Performed by: NURSE PRACTITIONER

## 2024-01-29 PROCEDURE — 80053 COMPREHEN METABOLIC PANEL: CPT | Performed by: NURSE PRACTITIONER

## 2024-01-29 PROCEDURE — 250N000011 HC RX IP 250 OP 636: Performed by: NURSE PRACTITIONER

## 2024-01-29 PROCEDURE — G0463 HOSPITAL OUTPT CLINIC VISIT: HCPCS | Mod: 25 | Performed by: NURSE PRACTITIONER

## 2024-01-29 PROCEDURE — 99215 OFFICE O/P EST HI 40 MIN: CPT | Mod: 24 | Performed by: NURSE PRACTITIONER

## 2024-01-29 PROCEDURE — 258N000003 HC RX IP 258 OP 636: Performed by: NURSE PRACTITIONER

## 2024-01-29 PROCEDURE — 96417 CHEMO IV INFUS EACH ADDL SEQ: CPT

## 2024-01-29 PROCEDURE — 85025 COMPLETE CBC W/AUTO DIFF WBC: CPT | Performed by: NURSE PRACTITIONER

## 2024-01-29 PROCEDURE — 92526 ORAL FUNCTION THERAPY: CPT | Mod: GN | Performed by: SPEECH-LANGUAGE PATHOLOGIST

## 2024-01-29 PROCEDURE — 96413 CHEMO IV INFUSION 1 HR: CPT

## 2024-01-29 PROCEDURE — 36415 COLL VENOUS BLD VENIPUNCTURE: CPT | Performed by: NURSE PRACTITIONER

## 2024-01-29 RX ORDER — DIPHENHYDRAMINE HCL 25 MG
50 CAPSULE ORAL ONCE
Status: CANCELLED
Start: 2024-01-29

## 2024-01-29 RX ORDER — HEPARIN SODIUM,PORCINE 10 UNIT/ML
5-20 VIAL (ML) INTRAVENOUS DAILY PRN
Status: CANCELLED | OUTPATIENT
Start: 2024-01-29

## 2024-01-29 RX ORDER — DIPHENHYDRAMINE HCL 12.5 MG/5ML
50 SOLUTION ORAL ONCE
Status: COMPLETED | OUTPATIENT
Start: 2024-01-29 | End: 2024-01-29

## 2024-01-29 RX ORDER — DIPHENHYDRAMINE HCL 12.5 MG/5ML
SOLUTION ORAL
COMMUNITY
Start: 2024-01-22

## 2024-01-29 RX ORDER — LORAZEPAM 2 MG/ML
0.5 INJECTION INTRAMUSCULAR EVERY 4 HOURS PRN
Status: CANCELLED | OUTPATIENT
Start: 2024-01-29

## 2024-01-29 RX ORDER — DIPHENHYDRAMINE HCL 12.5 MG/5ML
50 SOLUTION ORAL ONCE
Status: CANCELLED
Start: 2024-01-29 | End: 2024-01-29

## 2024-01-29 RX ORDER — ALBUTEROL SULFATE 90 UG/1
1-2 AEROSOL, METERED RESPIRATORY (INHALATION)
Status: CANCELLED
Start: 2024-01-29

## 2024-01-29 RX ORDER — HEPARIN SODIUM (PORCINE) LOCK FLUSH IV SOLN 100 UNIT/ML 100 UNIT/ML
5 SOLUTION INTRAVENOUS
Status: CANCELLED | OUTPATIENT
Start: 2024-01-29

## 2024-01-29 RX ORDER — DIPHENHYDRAMINE HYDROCHLORIDE 50 MG/ML
50 INJECTION INTRAMUSCULAR; INTRAVENOUS
Status: CANCELLED
Start: 2024-01-29

## 2024-01-29 RX ORDER — METHYLPREDNISOLONE SODIUM SUCCINATE 125 MG/2ML
125 INJECTION, POWDER, LYOPHILIZED, FOR SOLUTION INTRAMUSCULAR; INTRAVENOUS
Status: CANCELLED
Start: 2024-01-29

## 2024-01-29 RX ORDER — MEPERIDINE HYDROCHLORIDE 25 MG/ML
25 INJECTION INTRAMUSCULAR; INTRAVENOUS; SUBCUTANEOUS EVERY 30 MIN PRN
Status: CANCELLED | OUTPATIENT
Start: 2024-01-29

## 2024-01-29 RX ORDER — EPINEPHRINE 1 MG/ML
0.3 INJECTION, SOLUTION INTRAMUSCULAR; SUBCUTANEOUS EVERY 5 MIN PRN
Status: CANCELLED | OUTPATIENT
Start: 2024-01-29

## 2024-01-29 RX ORDER — ALBUTEROL SULFATE 0.83 MG/ML
2.5 SOLUTION RESPIRATORY (INHALATION)
Status: CANCELLED | OUTPATIENT
Start: 2024-01-29

## 2024-01-29 RX ADMIN — CARBOPLATIN 250 MG: 10 INJECTION INTRAVENOUS at 15:36

## 2024-01-29 RX ADMIN — SODIUM CHLORIDE 82 MG: 9 INJECTION, SOLUTION INTRAVENOUS at 14:25

## 2024-01-29 RX ADMIN — FAMOTIDINE 20 MG: 10 INJECTION, SOLUTION INTRAVENOUS at 13:48

## 2024-01-29 RX ADMIN — DEXAMETHASONE SODIUM PHOSPHATE: 10 INJECTION, SOLUTION INTRAMUSCULAR; INTRAVENOUS at 13:53

## 2024-01-29 RX ADMIN — DIPHENHYDRAMINE HYDROCHLORIDE 50 MG: 12.5 SOLUTION ORAL at 13:50

## 2024-01-29 RX ADMIN — SODIUM CHLORIDE 250 ML: 9 INJECTION, SOLUTION INTRAVENOUS at 13:48

## 2024-01-29 ASSESSMENT — PAIN SCALES - GENERAL: PAINLEVEL: NO PAIN (0)

## 2024-01-29 NOTE — PROGRESS NOTES
Infusion Nursing Note:  Ko Thakkar presents today for Cycle 1 Day 36 Paclitaxel/Carboplatin (#8).    Patient seen by provider today: Yes: Olga Baker NP   present during visit today: Not Applicable.    Note: Pt arrives to infusion today feeling okay. Pt has no further questions or concerns following his appointment with Olga Baker NP. Pt will proceed with treatment today.       Intravenous Access:  Peripheral IV placed.    Treatment Conditions:  Lab Results   Component Value Date    HGB 11.5 (L) 01/29/2024    WBC 3.3 (L) 01/29/2024    ANEUTAUTO 2.4 01/29/2024     01/29/2024        Lab Results   Component Value Date     01/29/2024    POTASSIUM 4.0 01/29/2024    MAG 1.7 11/29/2023    CR 0.69 01/29/2024    OLE 9.6 01/29/2024    BILITOTAL 0.3 01/29/2024    ALBUMIN 4.0 01/29/2024    ALT 21 01/29/2024    AST 16 01/29/2024       Results reviewed, labs MET treatment parameters, ok to proceed with treatment.      Post Infusion Assessment:  Patient tolerated infusion without incident.  Blood return noted pre and post infusion.  Site patent and intact, free from redness, edema or discomfort.  No evidence of extravasations.  Access discontinued per protocol.       Discharge Plan:   Patient declined prescription refills.  Discharge instructions reviewed with: Patient and Family.  Patient and/or family verbalized understanding of discharge instructions and all questions answered.  AVS to patient via [employer].  Patient will return 2/5 for next appointment.   Patient discharged in stable condition accompanied by: self and wife.  Departure Mode: Ambulatory.      Lisa Richardson RN

## 2024-01-29 NOTE — NURSING NOTE
Chief Complaint   Patient presents with    Blood Draw     Labs drawn via piv by RN in lab     Labs drawn from PIV placed by RN. Line flushed with saline. Vitals taken. Pt checked in for appointment(s).   Yessi Fountain RN

## 2024-01-29 NOTE — LETTER
1/29/2024         RE: Ko Thakkar  [address]  [address]  [place] [address]        Dear Colleague,    Thank you for referring your patient, Ko Thakkar, to the [facility]. Please see a copy of my visit note below.       [facility] CANCER [facility]    PATIENT NAME: Ko Thakkar  MRN # [id number]   DATE OF VISIT: January 29, 2024 YOB: 1955     Otolaryngology: Dr. Denia Hardin  Radiation Oncology: Dr. Juwan Cordova   PCP: Dr. Alton Mota; [facility] [place]     CANCER TYPE: SCC supraglottis  STAGE: [id number] (IRON)  ECOG PS: 1    SUMMARY  5/16/23 EGD for dysphagia. Gastritis and gastric diverticulum. Bx showed H. Pylori, treated. No atrophic gastritis or dysplasia  7/11/23 Swallow study at . Poor epiglottic inversion, penetration, aspiration  7/28/23 CT neck. 2.2 x 1.4 x 2.3 cm supraglottic mass, concern for paraglottic involvement, bilateral IB, IIA, III, IV adenopathy, 70% ICA stenosis   8/21/23 US carotid. 70-99% stenosis R ICA. <50% stenosis L ICA  8/29/23 FNA L level 2 node (IR). Path: SCC  9/6/23 PET/CT. FDG avid mass (SUV 13.5) right tonsil/lateral pharyngeal wall/glossotonsillar sulcus with slight extension to the R tongue base, right AE fold, right epiglottis, right piriform sinus, right posterolateral pharyngeal wall, posterior pharyngeal wall, no thyroid cartilage erosion, right retropharyngeal node, deep lobe parotid FDG avid mass, bilateral cervical nodes (right level II-IV, left level II-III), no distant disease, L mandibular canine with periapical abscess. R temporal lobe change suggestive of prior infarct. 5 mm LLL nodule.  10/10/23 Video swallow.   10/16~11/13/23 C1-2 carboplatin paclitaxel. Given due to logistical issues with pre-chemoradiation evaluation that would have delayed start of any treatment substantially. Paclitaxel dose reduced to 140 mg/m2 C2 due to neuropathy  10p/23/23 CTA. 70% narrowing R carotid  carlos/bifurcation similar to 7/28/23 CT. Laterally projecting disc osteophyte complex resulting in mod-severe L vertebral artery narrowing at C3-4 level.  10/23/23 Brain MRI. No mets. Bilateral frontal and temporal lobe encephalomalacia, R > L. Mild volume loss.   11/28/23 PET/CT. Good MO.       SUBJECTIVE  Mr. Thakkar (Ace) is seen today for his 6th carbo/taxol infusion, concurrent with XRT.   -More difficulty with oral intake--stuff doesn't taste good and thick secretions.  -FT, 3 cartons daily   -weight fluctuating  -Some nausea related to clearing out thick secretions. Takes antiemetics occasionally  -Using magic mouthwash, biotene lozenges and salt/soda rinses    PAST MEDICAL HISTORY  SCC as above  GERD  H/o food impaction in 2008 and 2014 (steak) related to Schatzki ring on EGD 10/13/14  DM2. Last A1c about 7.6 sometime in summer 2023 and Nov 2023  Dyslipidemia  Possible prior L temporal CVA  HTN  H.o spinal meningitis as a baby -   R arm surgery  Bone graft to forehead  Ex lap   Tinnitus secondary to treatment for meningitis or from menigitis - bilateral   Hearing loss -- audiogram 10/2/2023  Numbness in the toes - mostly big toes   Crushing pills   No muscle aches or pains     CURRENT OUTPATIENT MEDICATIONS  Current Outpatient Medications   Medication    albuterol (PROAIR HFA/PROVENTIL HFA/VENTOLIN HFA) 108 (90 Base) MCG/ACT inhaler    amLODIPine (NORVASC) 5 MG tablet    aspirin (ASA) 81 MG chewable tablet    atorvastatin (LIPITOR) 80 MG tablet    empagliflozin (JARDIANCE) 25 MG TABS tablet    gabapentin (NEURONTIN) 300 MG capsule    hydrochlorothiazide (HYDRODIURIL) 25 MG tablet    ibuprofen (ADVIL/MOTRIN) 200 MG tablet    insulin lispro protamine-insulin lispro (HUMALOG MIX 75/25 KWIKPEN) (75-25) 100 UNIT/ML pen    insulin NPH-Regular 70/30 (HUMULIN 70/30;NOVOLIN 70/30) (70-30) 100 UNIT/ML vial    liraglutide (VICTOZA) 18 MG/3ML solution    lisinopril (ZESTRIL) 40 MG tablet    magic mouthwash (ENTER  INGREDIENTS IN COMMENTS) suspension    metFORMIN (GLUCOPHAGE) 500 MG tablet    pantoprazole (PROTONIX) 20 MG EC tablet    potassium chloride (KLOR-CON) 20 MEQ packet    therapeutic multivitamin (THERAGRAN) tablet    TRUEPLUS 5-BEVEL PEN NEEDLES 32G X 4 MM miscellaneous     No current facility-administered medications for this visit.     ALLERGIES  No Known Allergies     PHYSICAL EXAM  There were no vitals taken for this visit.    General: Well-appearing male, NAD.   Eyes: EOMI, PERRL. No scleral icterus.  ENT: Oral mucosa moist. Diffused mucositis to posterior tongue and OP with erythema. [place], no hearing aids.   Cardiovascular: RRR, no m/g/r. No peripheral edema .  Respiratory: CTA bilaterally. No wheezes or crackles.  Neurologic: Grossly nonfocal. Hoarse voice.  Skin: No rashes, petechiae, or bruising noted on exposed skin.      LABORATORY AND IMAGING STUDIES  Most Recent 3 CBC's:  Recent Labs   Lab Test 01/22/24  1402 01/09/24  0801 01/02/24  1240   WBC 4.7 3.4* 4.3   HGB 10.3* 11.2* 11.3*   MCV 87 86 88    212 228   ANEUTAUTO 3.6 2.0 2.8    Most Recent 3 BMP's:  Recent Labs   Lab Test 01/23/24  0749 01/22/24  1402 01/09/24  0801 01/02/24  1240    134* 139 138   POTASSIUM 4.2 4.2 3.6 4.0   CHLORIDE 104 100 104 105   CO2 23 25 25 24   BUN 25.9* 13.5 25.2* 20.5   CR 0.70 0.74 0.70 0.63*   ANIONGAP 12 9 10 9   OLE 9.9 9.0 9.2 9.1   * 307* 157* 90   PROTTOTAL  --  6.7 6.6 6.5   ALBUMIN  --  3.6 3.8 3.9    Most Recent 2 LFT's:  Recent Labs   Lab Test 01/22/24  1402 01/09/24  0801   AST 16 17   ALT 12 14   ALKPHOS 120 101   BILITOTAL 0.4 0.5    Most Recent TSH and T4:  Recent Labs   Lab Test 11/29/23  1501   TSH 3.46     Phos/Mag:  Lab Results   Component Value Date    PHOS 3.0 11/29/2023    MAG 1.7 11/29/2023    MAG 2.3 09/14/2023      I reviewed the above labs today.      ASSESSMENT AND PLAN  SCC supraglottis, [id number] (IRON): Declined surgery. Planned chemoradiation but with all of the logistical  issues and work needed prior to starting, also due to the large size of the primary tumor, started carboplatin + paclitaxel first. PET/CT confirmed good CA after 2 cycles. Plan remained to transition to chemoradiation. Not a candidate for cisplatin due to severe hearing loss. Tolerating chemotherapy well. Toxicities from RT as expected. Proceed with D 36 weekly carboplatin/paclitaxel   -RTC weekly for PALOMO visit with infusion    -Discussed possibility of 7th infusion versus only IV hydration/monitoring. Will keep appointments as-scheduled    Mucositis, odynophagia: Increasing as expected. Continue s/s rinses, Biotene, Magic Mouthwash. Gabapentin management per rad onc.     Dental: Had extractions as planned prior to starting chemo, still needs bone shaved down some, which was delayed to start chemo.  Ace has been told by his dentist the process is going to be postponed until he recovers from cancer treatment. Should wait many months following completion of chemoradiation before denture fitting.     Neuropathy: Worse after C1 chemo, no worse after C2. Monitor loosely with weekly carboplatin paclitaxel. B12 10/6/23 was ok. TSH has been ok.     Ibuprofen use, arthritis: Reports decreased use. Was most recently using for cancer-related pain which has improved with response to treatment. Again discouraged use in extremely high doses due to significant risk of bleeding. Continues to use approximately 1000 mg/day but not daily, more intermittently.     Dysphagia, aspiration: Jessica Ramos visit 9/13/23, video 10/10, known aspiration at baseline although swallowing a little better with CA. Gtube 10/13--fell out 12/25; replaced 1/23.   -has follow up with Meka GRIFFIN tomorrow, weight down a bit so may need to increase feeds. Currently doing feeds TID    R KAILASH: Met with Dr. Tapia, vascular surgery fellow 8/21. Recommended treating the cancer first, asa 81 and high dose atorva, follow up 6 months with repeat US. PET/CT showed  possible old R temporal infarct. Exhibited TIA symptoms in early October with 4 hours episode of confusion. MR brain and CTA head 10/23 w/o acute findings. Did not discuss today, no acute cardiac s/s.     H/o food impaction: lower esophagus; monitor.    Liver issue: Says he was to have some sort of imaging at [facility] to look at his liver that had to be canceled due to the appts he needed for the cancer. Will try to figure out what that was about - not actively discussed again today. Might be related to his mom having cirrhosis without hepatitis or ETOH hx. Did not discuss this today.    40 minutes spent on the date of the encounter doing chart review, review of test results, interpretation of tests, patient visit, documentation, and discussion with other provider(s)     Olga Baker, CNP

## 2024-01-29 NOTE — NURSING NOTE
"Oncology Rooming Note    January 29, 2024 12:59 PM   Ko Thakkar is a 68 year old male who presents for:    Chief Complaint   Patient presents with    Oncology Clinic Visit     Squamous cell carcinoma of oropharynx     Initial Vitals: /77 (BP Location: Right arm, Patient Position: Sitting, Cuff Size: Adult Regular)   Pulse 103   Temp 98.3  F (36.8  C) (Oral)   Resp 16   Wt 71 kg (156 lb 9.6 oz)   SpO2 99%   BMI 25.28 kg/m   Estimated body mass index is 25.28 kg/m  as calculated from the following:    Height as of 1/23/24: 1.676 m (5' 6\").    Weight as of this encounter: 71 kg (156 lb 9.6 oz). Body surface area is 1.82 meters squared.  No Pain (0) Comment: Data Unavailable   No LMP for male patient.  Allergies reviewed: Yes  Medications reviewed: Yes    Medications: Medication refills not needed today.  Pharmacy name entered into EPIC:    [facility] - [place], [place] - [address] [facility] - [place], [place] - [address]    Frailty Screening:   Is the patient here for a new oncology consult visit in cancer care? 2. No      Clinical concerns: Pt complaining of mouth/throat dryness & discomfort.      Mikki Brown, EMT  1/29/2024              "

## 2024-01-29 NOTE — PATIENT INSTRUCTIONS
Contact Numbers  [facility]: [phone] (for symptom and scheduling needs)    Please call the [facility] Triage line if you experience a temperature greater than or equal to 100.4, shaking chills, have uncontrolled nausea, vomiting and/or diarrhea, dizziness, shortness of breath, chest pain, bleeding, unexplained bruising, or if you have any other new/concerning symptoms, questions or concerns.     If you are having any concerning symptoms or wish to speak to a provider before your next infusion visit, please call your care coordinator or triage to notify them so we can adequately serve you.     If you need a refill on a narcotic prescription or other medication, please call triage before your infusion appointment.       January 2024 Sunday Monday Tuesday Wednesday Thursday Friday Saturday        1     2    P NUTRITION VISIT  11:00 AM   (30 min.)   Meka Lowery RD   [facility] Radiation Oncology    TREATMENT  11:15 AM   (15 min.)   P RAD ONC VARIAN[address] [place] [address] Radiation Oncology    OTV  11:30 AM   (15 min.)   Olinda Mitchell MD   [facility] Radiation Oncology    LAB PERIPHERAL  12:30 PM   (15 min.)   [facility]   [facility]    RETURN CCSL  12:45 PM   (45 min.)   Olga Baker CNP   [facility] 3    NEW PALLIATIVE   6:45 AM   (80 min.)   Sekou Headley MD   [facility]    ONC INFUSION 2 HR (120 MIN)   9:00 AM   (120 min.)   [facility] ONC INFUSION NURSE   [facility]    SWALLOW TREATMENT   9:15 AM   (45 min.)   Jessica Ramos, SLP   [facility] [place]    TREATMENT  11:15 AM   (15 min.)   P RAD ONC VARIAN[address] [place] [address] Radiation Oncology 4    TREATMENT  11:15 AM   (15 min.)   [facility] ONC VAR[facility] [place] [address] Radiation Oncology 5    TREATMENT  10:00 AM    (15 min.)   [facility] RAD ONC VARIAN1   [facility] Radiation Oncology 6       7     8    TREATMENT  11:15 AM   (15 min.)   [facility] RAD ONC VARIAN1   [facility] Radiation Oncology 9    LAB PERIPHERAL   7:15 AM   (15 min.)   [facility]   [facility]    RETURN ACTIVE TREATMENT   7:45 AM   (45 min.)   Juany Gallagher CNP   [facility]    ONC INFUSION 2 HR (120 MIN)   9:00 AM   (120 min.)   UC ONC INFUSION NURSE   [facility]    SWALLOW TREATMENT  10:45 AM   (45 min.)   Jessica Ramos SLP   [facility] [place]    TREATMENT  11:15 AM   (15 min.)   [facility] RAD ONC VARIAN[address] [place] [address] Radiation Oncology    OTV  11:30 AM   (15 min.)   Rayna Haile MD   [facility] Radiation Oncology    [facility] NUTRITION VISIT  11:30 AM   (30 min.)   Meka Lowery RD   [facility] Radiation Oncology 10    TREATMENT  11:15 AM   (15 min.)   [facility] ONC VARIAN[address] [place] [address] Radiation Oncology 11     12    TREATMENT  11:15 AM   (15 min.)   [facility] ONC VARIAN[address] [place] [address] Radiation Oncology 13       14     15    TREATMENT  11:15 AM   (15 min.)   [facility] ONC VARIAN[address] [place] [address] Radiation Oncology 16    TREATMENT  11:15 AM   (45 min.)   [facility] ONC VARIAN[address] [place] [address] Radiation Oncology    OTV  11:30 AM   (15 min.)   Juwan Cordova MD   [facility] Radiation Oncology 17    TREATMENT   2:45 PM   (45 min.)   [facility] ONC VARIAN[address] [place] [address] Radiation Oncology 18    TREATMENT  11:45 AM   (45 min.)   [facility] ONC VARIAN[address] [place] [address] Radiation Oncology 19    TREATMENT  11:45 AM   (45 min.)   [facility] RAD ONC VARIAN[address] [place] [address] Radiation Oncology 20       21     22    TREATMENT  11:45 AM   (45 min.)   [facility] ONC VARIAN[address] [place] [address] Radiation  Oncology    LAB PERIPHERAL   1:15 PM   (15 min.)   [facility]   [facility]    RETURN ACTIVE TREATMENT   1:30 PM   (45 min.)   Juany Gallagher CNP   [facility]    ONC INFUSION 2 HR (120 MIN)   2:30 PM   (120 min.)   [facility] ONC INFUSION NURSE   [facility]    SWALLOW TREATMENT   3:00 PM   (45 min.)   Rola Marcano SLP   [facility] 23    Admission   7:17 AM   Dipesh Lange MD   [facility] [place]   (Discharge: 1/23/2024)    PROCEDURE - 7 HR   7:30 AM   (420 min.)   U2A ROOM 15   [facility] [address] [place] [address]    IR G TUBE PERCUTANEOUS PLCMNT   7:30 AM   (120 min.)   UUIR4   [facility] Interventional Radiology    TREATMENT  11:45 AM   (45 min.)   [facility] RAD ONC VARIAN[address] [place] [address] Radiation Oncology    [facility] NUTRITION VISIT  11:45 AM   (30 min.)   Meka Lowery RD   [facility] Radiation Oncology    OTV  12:00 PM   (15 min.)   Juwan Cordova MD   [facility] Radiation Oncology 24    TREATMENT  11:45 AM   (45 min.)   [facility] RAD ONC VARIAN1   [facility] Radiation Oncology 25    TREATMENT  11:45 AM   (45 min.)   [facility] RAD ONC VARIAN1   [facility] Radiation Oncology 26    TREATMENT  11:45 AM   (45 min.)   [facility] RAD ONC VARIAN1   [facility] Radiation Oncology 27       28     29    TREATMENT  11:45 AM   (45 min.)   [facility] RAD ONC VARIAN[address] [place] [address] Radiation Oncology    LAB PERIPHERAL  12:15 PM   (15 min.)   [facility]   [facility]    RETURN ACTIVE TREATMENT  12:30 PM   (45 min.)   Olga Baker CNP   [facility]    ONC INFUSION 2 HR (120 MIN)   2:30 PM   (120 min.)   [facility] ONC INFUSION NURSE   [facility]    SWALLOW TREATMENT   3:00 PM   (45 min.)   Jeet  LLOYD Canela   [facility] [place] 30    UMP NUTRITION VISIT  11:45 AM   (30 min.)   Meka Lowery RD   [facility] Radiation Oncology    TREATMENT  11:45 AM   (45 min.)   [facility] RAD ONC VARIAN1   [facility] Radiation Oncology    OTV  12:00 PM   (15 min.)   Juwan Cordova MD   [facility] Radiation Oncology    RETURN CCSL   4:05 PM   (40 min.)   Sekou Headley MD   [facility] 31    TREATMENT  11:45 AM   (45 min.)   [facility] RAD ONC VARIAN1   [facility] Radiation Oncology                            February 2024 Sunday Monday Tuesday Wednesday Thursday Friday Saturday                       1    TREATMENT  11:45 AM   (45 min.)   [facility] RAD ONC VARIAN1   [facility] Radiation Oncology 2    TREATMENT  11:45 AM   (45 min.)   [facility] RAD ONC VARIAN[address] [place] [address] Radiation Oncology 3       4     5    TREATMENT  11:45 AM   (45 min.)   [facility] RAD ONC VARIAN[address] [place] [address] Radiation Oncology    LAB PERIPHERAL   1:15 PM   (15 min.)   [facility]   [facility]    RETURN ACTIVE TREATMENT   1:30 PM   (45 min.)   Olga Baker CNP   [facility]    ONC INFUSION 2 HR (120 MIN)   2:30 PM   (120 min.)    ONC INFUSION NURSE   [facility]    SWALLOW TREATMENT   3:00 PM   (45 min.)   Rola Marcano SLP   [facility] 6    NEW PALLIATIVE   9:05 AM   (80 min.)   Jorge Mack MD   [facility] Radiation Oncology [place]    TREATMENT  11:45 AM   (45 min.)   [facility] RAD ONC VARIAN1   [facility] Radiation Oncology    OTV  12:00 PM   (15 min.)   Juwan Cordova MD   [facility] Radiation Oncology 7    TREATMENT  11:45 AM   (45 min.)   [facility] RAD ONC VARIAN1   [facility] Radiation Oncology  8    TREATMENT   7:45 AM   (45 min.)   [facility] RAD ONC VARIAN1   [facility] Radiation Oncology    RETURN CCSL  11:00 AM   (30 min.)   Xochilt Louise MD   [facility] 9     10       11     12     13     14     15     16     17       18     19    US CAROTID BILATERAL  10:45 AM   (30 min.)   MPLW VC US 3   [facility] [place]    RETURN VASCULAR PATIENT  11:15 AM   (30 min.)   [facility] VASC FELLOW CLINIC 2   [facility] 20     21     22     23     24       25     26     27     28     29                               Lab Results:  Recent Results (from the past 12 hour(s))   Comprehensive metabolic panel    Collection Time: 01/29/24 12:37 PM   Result Value Ref Range    Sodium 140 135 - 145 mmol/L    Potassium 4.0 3.4 - 5.3 mmol/L    Carbon Dioxide (CO2) 26 22 - 29 mmol/L    Anion Gap 10 7 - 15 mmol/L    Urea Nitrogen 21.6 8.0 - 23.0 mg/dL    Creatinine 0.69 0.67 - 1.17 mg/dL    GFR Estimate >90 >60 mL/min/1.73m2    Calcium 9.6 8.8 - 10.2 mg/dL    Chloride 104 98 - 107 mmol/L    Glucose 132 (H) 70 - 99 mg/dL    Alkaline Phosphatase 123 40 - 150 U/L    AST 16 0 - 45 U/L    ALT 21 0 - 70 U/L    Protein Total 7.4 6.4 - 8.3 g/dL    Albumin 4.0 3.5 - 5.2 g/dL    Bilirubin Total 0.3 <=1.2 mg/dL   CBC with platelets and differential    Collection Time: 01/29/24 12:37 PM   Result Value Ref Range    WBC Count 3.3 (L) 4.0 - 11.0 10e3/uL    RBC Count 3.91 (L) 4.40 - 5.90 10e6/uL    Hemoglobin 11.5 (L) 13.3 - 17.7 g/dL    Hematocrit 34.6 (L) 40.0 - 53.0 %    MCV 89 78 - 100 fL    MCH 29.4 26.5 - 33.0 pg    MCHC 33.2 31.5 - 36.5 g/dL    RDW 14.9 10.0 - 15.0 %    Platelet Count 270 150 - 450 10e3/uL    % Neutrophils 72 %    % Lymphocytes 14 %    % Monocytes 10 %    % Eosinophils 2 %    % Basophils 1 %    % Immature Granulocytes 1 %    NRBCs per 100 WBC 0 <1 /100    Absolute Neutrophils 2.4 1.6 - 8.3 10e3/uL    Absolute Lymphocytes 0.5 (L)  0.8 - 5.3 10e3/uL    Absolute Monocytes 0.3 0.0 - 1.3 10e3/uL    Absolute Eosinophils 0.1 0.0 - 0.7 10e3/uL    Absolute Basophils 0.0 0.0 - 0.2 10e3/uL    Absolute Immature Granulocytes 0.0 <=0.4 10e3/uL    Absolute NRBCs 0.0 10e3/uL

## 2024-01-30 ENCOUNTER — OFFICE VISIT (OUTPATIENT)
Dept: RADIATION ONCOLOGY | Facility: CLINIC | Age: 69
End: 2024-01-30
Attending: SURGERY
Payer: COMMERCIAL

## 2024-01-30 ENCOUNTER — VIRTUAL VISIT (OUTPATIENT)
Dept: PALLIATIVE CARE | Facility: CLINIC | Age: 69
End: 2024-01-30
Attending: INTERNAL MEDICINE
Payer: COMMERCIAL

## 2024-01-30 VITALS
DIASTOLIC BLOOD PRESSURE: 89 MMHG | WEIGHT: 156 LBS | SYSTOLIC BLOOD PRESSURE: 121 MMHG | HEART RATE: 74 BPM | BODY MASS INDEX: 25.18 KG/M2

## 2024-01-30 VITALS — BODY MASS INDEX: 25.39 KG/M2 | HEIGHT: 66 IN | WEIGHT: 158 LBS

## 2024-01-30 DIAGNOSIS — C32.1 SCC (SQUAMOUS CELL CARCINOMA) OF SUPRAGLOTTIS (H): Primary | ICD-10-CM

## 2024-01-30 DIAGNOSIS — G89.3 NEOPLASM RELATED PAIN: ICD-10-CM

## 2024-01-30 DIAGNOSIS — C10.9 SQUAMOUS CELL CARCINOMA OF OROPHARYNX (H): ICD-10-CM

## 2024-01-30 PROCEDURE — 99214 OFFICE O/P EST MOD 30 MIN: CPT | Mod: 24 | Performed by: INTERNAL MEDICINE

## 2024-01-30 PROCEDURE — 97803 MED NUTRITION INDIV SUBSEQ: CPT | Performed by: DIETITIAN, REGISTERED

## 2024-01-30 PROCEDURE — 77386 HC IMRT TREATMENT DELIVERY, COMPLEX: CPT | Performed by: SURGERY

## 2024-01-30 ASSESSMENT — PAIN SCALES - GENERAL: PAINLEVEL: MILD PAIN (2)

## 2024-01-30 NOTE — PROGRESS NOTES
"Palliative Care Outpatient Clinic      Patient ID:  Medical - He has DM2 and stage IRON SCC of the supraglottis dx 8/2023. 10-11/2023 carbo paclitaxel, then concurrent RT started late 12/2023, curative intent.  +G tube.     Social - Lives with wife Ayesha Frank member.     Care Planning -      Opioid Safety -   Hx of tobacco, AUD and polySUD (\"anything you could get on the street\") including opioids.  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     History:  History gathered today from: patient, family/loved ones, medical chart    Pain is modest overall.   On 1200 mg tid gabapentin; makes him sleepy  Remains on ibuprofen  Painful to eat; taking orals though; also tube fed  Difficult to talk sometimes  But overall feels his pain is adequately controlled  RT done 2/8    PE: Ht 1.676 m (5' 6\")   Wt 71.7 kg (158 lb)   BMI 25.50 kg/m     Wt Readings from Last 3 Encounters:   01/30/24 71.7 kg (158 lb)   01/30/24 70.8 kg (156 lb)   01/29/24 71.4 kg (157 lb 6.4 oz)     Alert NAD    Data reviewed:  I reviewed recent labs and imaging, my comments:  Na 140, Cr 0.69  LFTS nl  Plt 270  Hgb 11.5     database reviewed: y      Impression & Recommendations:  67 yo with H&N CA undergoing definitive chemoradiotherapy; 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  Discussed the natural hx of this and recovery from from it    I am not sure we'll need to play much of a role however I'm happy to see him back anytime we can be of help.    Thank you for involving us in the patient's care.   Sekou Headley MD / Palliative Medicine / Text me via [email or web address]  This note may have been composed with voice recognition software and there may be mistranscriptions.    Video-Visit Details  Video Start Time: 4:15 PM  Video End Time:4:23 PM  Originating Location (pt. Location): Home  Distant Location (provider location):  " Off-site  Platform used for Video Visit: [employer]

## 2024-01-30 NOTE — PROGRESS NOTES
CLINICAL NUTRITION SERVICES - REASSESSMENT NOTE   EVALUATION OF PREVIOUS PLAN OF CARE:   Time Spent: 15 minutes  Visit Type: return OTV  Pt accompanied by: his wife, Ondina  Referring Physician: Wilfred  C10.9 (ICD-10-CM) - Squamous cell carcinoma of oropharynx (H)   E11.9 (ICD-10-CM) - Type 2 diabetes mellitus without complications (H)      Chemotherapy: Started 10/16 - Taxol  Radiation: started today, 12/19  PEG tube: placed 1/23/24  Monitoring from previous assessment:   -Food/Fluid intake - bites of seafood salad, mac and cheese, smoothie and sips of water  -EN intake/tolerance- Enteral Nutrition   Formula: Osmolite 1.5 felicia  Volume: 6 cartons/day (1422 mL, 1086 ml free water) - he has been taking 3 cartons TID in 60-90 minutes.  He reports some nausea but manageable.   Provisions:  2130kcal (28kcal/kg), 90 g protein (1.2g/kg), 288g CHO, 0g fiber  Water flushes: 40mL 4 times a day (before and after feedings)  Dosing wt: 76kg  -Weight trends - 158 lbs today in radiation clinic  Wt Readings from Last 9 Encounters:   01/29/24 71.4 kg (157 lb 6.4 oz)   01/29/24 71 kg (156 lb 9.6 oz)   01/23/24 72.8 kg (160 lb 8 oz)   01/23/24 72.6 kg (160 lb)   01/22/24 74.9 kg (165 lb 3.2 oz)   01/16/24 72.6 kg (160 lb)   01/09/24 77.6 kg (171 lb)   01/09/24 77.9 kg (171 lb 12.8 oz)   01/02/24 77.6 kg (171 lb)     Previous Goals:   EN to meet 100% estimated nutrition needs via PEG tube (6 cartons/day) - goal met  Fluids goals - 6-8 cups water/electrolytes via PO and PEG tube as able - goal not met  Evaluation: Not met   Previous Nutrition Diagnosis:   Inadequate protein-energy intake related to dysphagia, odynophagia as evidenced by 11 lb (7%) wt loss x past 2 weeks, PO intake <25%    Evaluation: Improving with no further weight loss  NEW FINDINGS:   No new findings   CURRENT NUTRITION DIAGNOSIS   Inadequate oral intake related to dysphagia, odynophagia as evidenced by pt dependent on EN via PEG tube to meet 100% est nutrition needs    INTERVENTIONS   EN Composition, EN Schedule, and Feeding Tube Flush - reviewed nutrition support via PEG tube.  Reviewed volume goals, MOA, water flushes etc. Encouraged to increase fliud intake to at least 6 cups per day. Suggested combination of water and electrolyte with chemo, via PO and PEG  Goals   EN to meet 100% estimated nutrition needs via PEG tube (6 cartons/day)  Fluids goals - 6-8 cups water/electrolytes via PO and PEG tube as able     Follow up/Monitoring: One week follow up  Enteral Nutrition intake and Weight    Meka Lee RD, , LD  [facility]  [phone]

## 2024-01-30 NOTE — NURSING NOTE
Is the patient currently in the state of [place]? YES    Visit mode:VIDEO    If the visit is dropped, the patient can be reconnected by: VIDEO VISIT: Send to e-mail at: [email or web address]    Will anyone else be joining the visit? NO  (If patient encounters technical issues they should call [phone]    How would you like to obtain your AVS? MyChart    Are changes needed to the allergy or medication list? Pt stated no changes to allergies and Pt stated no med changes    Reason for visit: RECHECK    Gregoria FRANK

## 2024-01-30 NOTE — LETTER
2024         RE: Ko Thakkar  [address]  [address]  [place] [address]        Dear Colleague,    Thank you for referring your patient, Ko Thakkar, to the [facility] RADIATION ONCOLOGY. Please see a copy of my visit note below.    [facility] PHYSICIANS  SPECIALIZING IN BREAKTHROUGHS  Radiation Oncology    On Treatment Visit Note      Ko Thakkar      Date: 2024   MRN: [id number]   : 1955  Diagnosis: SCC of Supraglottis        ID:  Mr. Thakkar is a 68 year old male [id number] oropharyngeal SCC, p16 positive. Tumor involved the right tonsil, posterior pharyngeal wall, and right AE fold, hypopharynx, with known right cord paralysis, with bilateral adenopathy including right RP node, right level IV, and questionable right parotid node (which was bx proven SCC). There is no evidence of any distant disease. He has evidence of swallow dysfunction with dysphagia and aspiration, and voice dysfunction with dysphonia and right cord paralysis.      Given extent of disease as well as potential delays in starting treatment, induction chemotherapy was administered under care of Dr. Louise.  PET CT scan after 2 cycles of carboplatin paclitaxel demonstrates a good partial response without any distant metastatic disease.       Reason for Visit:  On Radiation Treatment Visit       Treatment Summary to Date    Current Dose: 5600/7000 cGy Fractions:       Chemotherapy  Chemo concurrent with radx?: Yes  Oncologist: Dr Louise  Drug Name/Frequency 1: Taxol/carboplatin    Subjective:  Oral cavity/oropharyngeal pain much improved with the use of Magic mouthwash, currently on gabapentin 1200 mg 3 times daily with improvement in pain.  Weight is now stable, although he has lost 10 lbs since start of RT.  He is using his feeding tube about 1/2 of the time.     Pain Control: Gabapentin 1200 mg 3 times daily  Fluid Intake: Adequate  Nutrition:  PO and feeding tube  (Adequate)  Rinses: 15 times  Skin care: Aquaphor twice daily      Nursing ROS:   Nutrition Alteration  Diet Type: Patient's Preference  Nutrition Note: PEG 6 cans a day  Skin  Skin Reaction: 1 - Faint erythema or dry desquamation  Skin Intervention: Aquaphor     ENT and Mouth Exam  Mucositis - Current: 1 - Generalized erythema  ENT/Mouth Note: S&S 15-20  Cardiovascular  Respiratory effort: 1 - Normal - without distress  Gastrointestinal  Nausea: 0 - None     Psychosocial  Mood - Anxiety: 0 - Normal  Pain Assessment  0-10 Pain Scale: 2  Pain Treatment: Gabapentin 1200 mg TID      Objective:   /89   Pulse 74   Wt 70.8 kg (156 lb)   BMI 25.18 kg/m    Gen: Appears well, in no acute distress  HN/Skin: mild erythema of the neck, no skin breakdown, OC/OP mucositis   CV/Resp: rrr, breathing comfortably on room air  Neuro: CN 2-12 grossly intact, UE/LE full strength     Labs:  CBC RESULTS:   Recent Labs   Lab Test 01/29/24  1237   WBC 3.3*   RBC 3.91*   HGB 11.5*   HCT 34.6*   MCV 89   MCH 29.4   MCHC 33.2   RDW 14.9        ELECTROLYTES:  Recent Labs   Lab Test 01/29/24  1237      POTASSIUM 4.0   CHLORIDE 104   OLE 9.6   CO2 26   BUN 21.6   CR 0.69   *       Assessment:    Tolerating radiation therapy well.  All questions and concerns addressed.      Plan:   Continue current therapy.    Continue gabapentin, rinses, nutrition, hydration, skin care      Mosaiq chart and setup information reviewed  Ports checked and MVCT/IGRT images checked    Medication Review  Med list reviewed with patient?: Yes  Med list printed and given: Offered and declined         Juwan Cordova MD  Radiation Oncology   [facility]  Clinic: [phone]

## 2024-01-30 NOTE — LETTER
"1/30/2024       RE: Ko Thakkar  [address]  [address]  [place] [address]     Dear Colleague,    Thank you for [facility] your patient, Ko [facility]akkar[facility]. Please see a copy of my visit note below.    Palliative Care Outpatient Clinic      Patient ID:  Medical - He has DM2 and stage IRON SCC of the supraglottis dx 8/2023. 10-11/2023 carbo paclitaxel, then concurrent RT started late 12/2023, curative intent.  +G tube.     Social - Lives with wife Aretha.  [employer] member.     Care Planning -      Opioid Safety -   Hx of tobacco, AUD and polySUD (\"anything you could get on the street\") including opioids.  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     History:  History gathered today from: patient, family/loved ones, medical chart    Pain is modest overall.   On 1200 mg tid gabapentin; makes him sleepy  Remains on ibuprofen  Painful to eat; taking orals though; also tube fed  Difficult to talk sometimes  But overall feels his pain is adequately controlled  RT done 2/8    PE: Ht 1.676 m (5' 6\")   Wt 71.7 kg (158 lb)   BMI 25.50 kg/m     Wt Readings from Last 3 Encounters:   01/30/24 71.7 kg (158 lb)   01/30/24 70.8 kg (156 lb)   01/29/24 71.4 kg (157 lb 6.4 oz)     Alert NAD    Data reviewed:  I reviewed recent labs and imaging, my comments:  Na 140, Cr 0.69  LFTS nl  Plt 270  Hgb 11.5     database reviewed: y      Impression & Recommendations:  69 yo with H&N CA undergoing definitive chemoradiotherapy; 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  Discussed the natural hx of this and recovery from from it    I am not sure we'll need to play much of a role however I'm happy to see him back anytime we can be of help.    Thank you for involving us in the patient's care. "   Sekou Headley MD / Palliative Medicine / Text me via [employer]  This note may have been composed with voice recognition software and there may be mistranscriptions.        Again, thank you for allowing me to participate in the care of your patient.      Sincerely,    Sekou Headley MD

## 2024-01-30 NOTE — PROGRESS NOTES
[facility] PHYSICIANS  SPECIALIZING IN BREAKTHROUGHS  Radiation Oncology    On Treatment Visit Note      Ko Thakkar      Date: 2024   MRN: [id number]   : 1955  Diagnosis: SCC of Supraglottis        ID:  Mr. Thakkar is a 68 year old male [id number] oropharyngeal SCC, p16 positive. Tumor involved the right tonsil, posterior pharyngeal wall, and right AE fold, hypopharynx, with known right cord paralysis, with bilateral adenopathy including right RP node, right level IV, and questionable right parotid node (which was bx proven SCC). There is no evidence of any distant disease. He has evidence of swallow dysfunction with dysphagia and aspiration, and voice dysfunction with dysphonia and right cord paralysis.      Given extent of disease as well as potential delays in starting treatment, induction chemotherapy was administered under care of Dr. Louise.  PET CT scan after 2 cycles of carboplatin paclitaxel demonstrates a good partial response without any distant metastatic disease.       Reason for Visit:  On Radiation Treatment Visit       Treatment Summary to Date    Current Dose: 5600/7000 cGy Fractions:       Chemotherapy  Chemo concurrent with radx?: Yes  Oncologist: Dr Louise  Drug Name/Frequency 1: Taxol/carboplatin    Subjective:  Oral cavity/oropharyngeal pain much improved with the use of Magic mouthwash, currently on gabapentin 1200 mg 3 times daily with improvement in pain.  Weight is now stable, although he has lost 10 lbs since start of RT.  He is using his feeding tube about 1/2 of the time.     Pain Control: Gabapentin 1200 mg 3 times daily  Fluid Intake: Adequate  Nutrition:  PO and feeding tube (Adequate)  Rinses: 15 times  Skin care: Aquaphor twice daily      Nursing ROS:   Nutrition Alteration  Diet Type: Patient's Preference  Nutrition Note: PEG 6 cans a day  Skin  Skin Reaction: 1 - Faint erythema or dry desquamation  Skin Intervention: Aquaphor     ENT and  Mouth Exam  Mucositis - Current: 1 - Generalized erythema  ENT/Mouth Note: S&S 15-20  Cardiovascular  Respiratory effort: 1 - Normal - without distress  Gastrointestinal  Nausea: 0 - None     Psychosocial  Mood - Anxiety: 0 - Normal  Pain Assessment  0-10 Pain Scale: 2  Pain Treatment: Gabapentin 1200 mg TID      Objective:   /89   Pulse 74   Wt 70.8 kg (156 lb)   BMI 25.18 kg/m    Gen: Appears well, in no acute distress  HN/Skin: mild erythema of the neck, no skin breakdown, OC/OP mucositis   CV/Resp: rrr, breathing comfortably on room air  Neuro: CN 2-12 grossly intact, UE/LE full strength     Labs:  CBC RESULTS:   Recent Labs   Lab Test 01/29/24  1237   WBC 3.3*   RBC 3.91*   HGB 11.5*   HCT 34.6*   MCV 89   MCH 29.4   MCHC 33.2   RDW 14.9        ELECTROLYTES:  Recent Labs   Lab Test 01/29/24  1237      POTASSIUM 4.0   CHLORIDE 104   OLE 9.6   CO2 26   BUN 21.6   CR 0.69   *       Assessment:    Tolerating radiation therapy well.  All questions and concerns addressed.      Plan:   Continue current therapy.    Continue gabapentin, rinses, nutrition, hydration, skin care      Mosaiq chart and setup information reviewed  Ports checked and MVCT/IGRT images checked    Medication Review  Med list reviewed with patient?: Yes  Med list printed and given: Offered and declined         Juwan Cordova MD  Radiation Oncology   [facility]  Clinic: [phone]

## 2024-01-31 ENCOUNTER — APPOINTMENT (OUTPATIENT)
Dept: RADIATION ONCOLOGY | Facility: CLINIC | Age: 69
End: 2024-01-31
Attending: SURGERY
Payer: COMMERCIAL

## 2024-01-31 PROCEDURE — 77014 PR CT GUIDE FOR PLACEMENT RADIATION THERAPY FIELDS: CPT | Mod: 26 | Performed by: SURGERY

## 2024-01-31 PROCEDURE — 77386 HC IMRT TREATMENT DELIVERY, COMPLEX: CPT | Performed by: SURGERY

## 2024-02-01 ENCOUNTER — APPOINTMENT (OUTPATIENT)
Dept: RADIATION ONCOLOGY | Facility: CLINIC | Age: 69
End: 2024-02-01
Attending: SURGERY
Payer: COMMERCIAL

## 2024-02-01 PROCEDURE — 77014 PR CT GUIDE FOR PLACEMENT RADIATION THERAPY FIELDS: CPT | Mod: 26 | Performed by: SURGERY

## 2024-02-01 PROCEDURE — 77336 RADIATION PHYSICS CONSULT: CPT | Performed by: SURGERY

## 2024-02-01 PROCEDURE — 77386 HC IMRT TREATMENT DELIVERY, COMPLEX: CPT | Performed by: SURGERY

## 2024-02-02 ENCOUNTER — APPOINTMENT (OUTPATIENT)
Dept: RADIATION ONCOLOGY | Facility: CLINIC | Age: 69
End: 2024-02-02
Attending: SURGERY
Payer: COMMERCIAL

## 2024-02-02 PROCEDURE — 77014 PR CT GUIDE FOR PLACEMENT RADIATION THERAPY FIELDS: CPT | Mod: 26 | Performed by: SURGERY

## 2024-02-02 PROCEDURE — 77386 HC IMRT TREATMENT DELIVERY, COMPLEX: CPT | Performed by: SURGERY

## 2024-02-04 ENCOUNTER — NURSE TRIAGE (OUTPATIENT)
Dept: NURSING | Facility: CLINIC | Age: 69
End: 2024-02-04
Payer: COMMERCIAL

## 2024-02-05 ENCOUNTER — APPOINTMENT (OUTPATIENT)
Dept: LAB | Facility: CLINIC | Age: 69
End: 2024-02-05
Attending: INTERNAL MEDICINE
Payer: COMMERCIAL

## 2024-02-05 ENCOUNTER — THERAPY VISIT (OUTPATIENT)
Dept: SPEECH THERAPY | Facility: CLINIC | Age: 69
End: 2024-02-05
Payer: COMMERCIAL

## 2024-02-05 ENCOUNTER — APPOINTMENT (OUTPATIENT)
Dept: RADIATION ONCOLOGY | Facility: CLINIC | Age: 69
End: 2024-02-05
Attending: SURGERY
Payer: COMMERCIAL

## 2024-02-05 ENCOUNTER — ONCOLOGY VISIT (OUTPATIENT)
Dept: ONCOLOGY | Facility: CLINIC | Age: 69
End: 2024-02-05
Attending: INTERNAL MEDICINE
Payer: COMMERCIAL

## 2024-02-05 ENCOUNTER — TELEPHONE (OUTPATIENT)
Dept: INTERVENTIONAL RADIOLOGY/VASCULAR | Facility: CLINIC | Age: 69
End: 2024-02-05

## 2024-02-05 VITALS
HEIGHT: 66 IN | HEART RATE: 88 BPM | SYSTOLIC BLOOD PRESSURE: 133 MMHG | WEIGHT: 153.6 LBS | TEMPERATURE: 98.3 F | OXYGEN SATURATION: 98 % | RESPIRATION RATE: 18 BRPM | BODY MASS INDEX: 24.68 KG/M2 | DIASTOLIC BLOOD PRESSURE: 68 MMHG

## 2024-02-05 DIAGNOSIS — E87.6 HYPOKALEMIA: ICD-10-CM

## 2024-02-05 DIAGNOSIS — C32.1 SCC (SQUAMOUS CELL CARCINOMA) OF SUPRAGLOTTIS (H): Primary | ICD-10-CM

## 2024-02-05 DIAGNOSIS — Z13.29 SCREENING FOR HYPOTHYROIDISM: ICD-10-CM

## 2024-02-05 DIAGNOSIS — R13.13 PHARYNGEAL DYSPHAGIA: ICD-10-CM

## 2024-02-05 DIAGNOSIS — I65.21 CAROTID ARTERY STENOSIS, ASYMPTOMATIC, RIGHT: ICD-10-CM

## 2024-02-05 DIAGNOSIS — R13.12 DYSPHAGIA, OROPHARYNGEAL PHASE: Primary | ICD-10-CM

## 2024-02-05 DIAGNOSIS — E43 SEVERE PROTEIN-CALORIE MALNUTRITION (H): ICD-10-CM

## 2024-02-05 DIAGNOSIS — C10.9 SQUAMOUS CELL CARCINOMA OF OROPHARYNX (H): ICD-10-CM

## 2024-02-05 LAB
ALBUMIN SERPL BCG-MCNC: 3.6 G/DL (ref 3.5–5.2)
ALP SERPL-CCNC: 102 U/L (ref 40–150)
ALT SERPL W P-5'-P-CCNC: 19 U/L (ref 0–70)
ANION GAP SERPL CALCULATED.3IONS-SCNC: 9 MMOL/L (ref 7–15)
AST SERPL W P-5'-P-CCNC: 18 U/L (ref 0–45)
BASOPHILS # BLD AUTO: 0 10E3/UL (ref 0–0.2)
BASOPHILS NFR BLD AUTO: 1 %
BILIRUB SERPL-MCNC: 0.3 MG/DL
BUN SERPL-MCNC: 27.7 MG/DL (ref 8–23)
CALCIUM SERPL-MCNC: 9 MG/DL (ref 8.8–10.2)
CHLORIDE SERPL-SCNC: 98 MMOL/L (ref 98–107)
CREAT SERPL-MCNC: 0.68 MG/DL (ref 0.67–1.17)
DEPRECATED HCO3 PLAS-SCNC: 27 MMOL/L (ref 22–29)
EGFRCR SERPLBLD CKD-EPI 2021: >90 ML/MIN/1.73M2
EOSINOPHIL # BLD AUTO: 0 10E3/UL (ref 0–0.7)
EOSINOPHIL NFR BLD AUTO: 1 %
ERYTHROCYTE [DISTWIDTH] IN BLOOD BY AUTOMATED COUNT: 15.4 % (ref 10–15)
GLUCOSE SERPL-MCNC: 407 MG/DL (ref 70–99)
HCT VFR BLD AUTO: 30.7 % (ref 40–53)
HGB BLD-MCNC: 10.6 G/DL (ref 13.3–17.7)
IMM GRANULOCYTES # BLD: 0 10E3/UL
IMM GRANULOCYTES NFR BLD: 0 %
LYMPHOCYTES # BLD AUTO: 0.4 10E3/UL (ref 0.8–5.3)
LYMPHOCYTES NFR BLD AUTO: 13 %
MCH RBC QN AUTO: 30.5 PG (ref 26.5–33)
MCHC RBC AUTO-ENTMCNC: 34.5 G/DL (ref 31.5–36.5)
MCV RBC AUTO: 88 FL (ref 78–100)
MONOCYTES # BLD AUTO: 0.6 10E3/UL (ref 0–1.3)
MONOCYTES NFR BLD AUTO: 19 %
NEUTROPHILS # BLD AUTO: 1.9 10E3/UL (ref 1.6–8.3)
NEUTROPHILS NFR BLD AUTO: 66 %
NRBC # BLD AUTO: 0 10E3/UL
NRBC BLD AUTO-RTO: 0 /100
PLATELET # BLD AUTO: 378 10E3/UL (ref 150–450)
POTASSIUM SERPL-SCNC: 4.2 MMOL/L (ref 3.4–5.3)
PROT SERPL-MCNC: 6.9 G/DL (ref 6.4–8.3)
RBC # BLD AUTO: 3.48 10E6/UL (ref 4.4–5.9)
SODIUM SERPL-SCNC: 134 MMOL/L (ref 135–145)
WBC # BLD AUTO: 2.9 10E3/UL (ref 4–11)

## 2024-02-05 PROCEDURE — 77386 HC IMRT TREATMENT DELIVERY, COMPLEX: CPT | Performed by: SURGERY

## 2024-02-05 PROCEDURE — G0463 HOSPITAL OUTPT CLINIC VISIT: HCPCS | Mod: 25 | Performed by: NURSE PRACTITIONER

## 2024-02-05 PROCEDURE — 258N000003 HC RX IP 258 OP 636: Performed by: NURSE PRACTITIONER

## 2024-02-05 PROCEDURE — 36415 COLL VENOUS BLD VENIPUNCTURE: CPT | Performed by: INTERNAL MEDICINE

## 2024-02-05 PROCEDURE — 99215 OFFICE O/P EST HI 40 MIN: CPT | Performed by: NURSE PRACTITIONER

## 2024-02-05 PROCEDURE — 92526 ORAL FUNCTION THERAPY: CPT | Mod: GN | Performed by: SPEECH-LANGUAGE PATHOLOGIST

## 2024-02-05 PROCEDURE — 96374 THER/PROPH/DIAG INJ IV PUSH: CPT

## 2024-02-05 PROCEDURE — 85025 COMPLETE CBC W/AUTO DIFF WBC: CPT | Performed by: INTERNAL MEDICINE

## 2024-02-05 PROCEDURE — 96361 HYDRATE IV INFUSION ADD-ON: CPT

## 2024-02-05 PROCEDURE — 82040 ASSAY OF SERUM ALBUMIN: CPT | Performed by: INTERNAL MEDICINE

## 2024-02-05 PROCEDURE — 250N000011 HC RX IP 250 OP 636: Performed by: NURSE PRACTITIONER

## 2024-02-05 RX ORDER — MEPERIDINE HYDROCHLORIDE 25 MG/ML
25 INJECTION INTRAMUSCULAR; INTRAVENOUS; SUBCUTANEOUS EVERY 30 MIN PRN
Status: CANCELLED | OUTPATIENT
Start: 2024-02-05

## 2024-02-05 RX ORDER — ALBUTEROL SULFATE 90 UG/1
1-2 AEROSOL, METERED RESPIRATORY (INHALATION)
Start: 2024-02-05

## 2024-02-05 RX ORDER — HEPARIN SODIUM (PORCINE) LOCK FLUSH IV SOLN 100 UNIT/ML 100 UNIT/ML
5 SOLUTION INTRAVENOUS
OUTPATIENT
Start: 2024-02-05

## 2024-02-05 RX ORDER — METHYLPREDNISOLONE SODIUM SUCCINATE 125 MG/2ML
125 INJECTION, POWDER, LYOPHILIZED, FOR SOLUTION INTRAMUSCULAR; INTRAVENOUS
Status: CANCELLED
Start: 2024-02-05

## 2024-02-05 RX ORDER — DIPHENHYDRAMINE HYDROCHLORIDE 50 MG/ML
50 INJECTION INTRAMUSCULAR; INTRAVENOUS
Status: CANCELLED
Start: 2024-02-05

## 2024-02-05 RX ORDER — HEPARIN SODIUM,PORCINE 10 UNIT/ML
5-20 VIAL (ML) INTRAVENOUS DAILY PRN
Status: CANCELLED | OUTPATIENT
Start: 2024-02-05

## 2024-02-05 RX ORDER — ALBUTEROL SULFATE 0.83 MG/ML
2.5 SOLUTION RESPIRATORY (INHALATION)
Status: CANCELLED | OUTPATIENT
Start: 2024-02-05

## 2024-02-05 RX ORDER — METHYLPREDNISOLONE SODIUM SUCCINATE 125 MG/2ML
125 INJECTION, POWDER, LYOPHILIZED, FOR SOLUTION INTRAMUSCULAR; INTRAVENOUS
Start: 2024-02-05

## 2024-02-05 RX ORDER — DULAGLUTIDE 1.5 MG/.5ML
INJECTION, SOLUTION SUBCUTANEOUS
COMMUNITY
Start: 2024-01-31

## 2024-02-05 RX ORDER — EPINEPHRINE 1 MG/ML
0.3 INJECTION, SOLUTION, CONCENTRATE INTRAVENOUS EVERY 5 MIN PRN
OUTPATIENT
Start: 2024-02-05

## 2024-02-05 RX ORDER — HEPARIN SODIUM,PORCINE 10 UNIT/ML
5-20 VIAL (ML) INTRAVENOUS DAILY PRN
OUTPATIENT
Start: 2024-02-05

## 2024-02-05 RX ORDER — MEPERIDINE HYDROCHLORIDE 25 MG/ML
25 INJECTION INTRAMUSCULAR; INTRAVENOUS; SUBCUTANEOUS EVERY 30 MIN PRN
OUTPATIENT
Start: 2024-02-05

## 2024-02-05 RX ORDER — ALBUTEROL SULFATE 0.83 MG/ML
2.5 SOLUTION RESPIRATORY (INHALATION)
OUTPATIENT
Start: 2024-02-05

## 2024-02-05 RX ORDER — ONDANSETRON 2 MG/ML
8 INJECTION INTRAMUSCULAR; INTRAVENOUS EVERY 6 HOURS PRN
Status: DISCONTINUED | OUTPATIENT
Start: 2024-02-05 | End: 2024-02-05 | Stop reason: HOSPADM

## 2024-02-05 RX ORDER — EPINEPHRINE 1 MG/ML
0.3 INJECTION, SOLUTION INTRAMUSCULAR; SUBCUTANEOUS EVERY 5 MIN PRN
Status: CANCELLED | OUTPATIENT
Start: 2024-02-05

## 2024-02-05 RX ORDER — DIPHENHYDRAMINE HYDROCHLORIDE 50 MG/ML
50 INJECTION INTRAMUSCULAR; INTRAVENOUS
Start: 2024-02-05

## 2024-02-05 RX ORDER — HEPARIN SODIUM (PORCINE) LOCK FLUSH IV SOLN 100 UNIT/ML 100 UNIT/ML
5 SOLUTION INTRAVENOUS
Status: CANCELLED | OUTPATIENT
Start: 2024-02-05

## 2024-02-05 RX ORDER — ALBUTEROL SULFATE 90 UG/1
1-2 AEROSOL, METERED RESPIRATORY (INHALATION)
Status: CANCELLED
Start: 2024-02-05

## 2024-02-05 RX ADMIN — SODIUM CHLORIDE 1000 ML: 9 INJECTION, SOLUTION INTRAVENOUS at 14:26

## 2024-02-05 RX ADMIN — ONDANSETRON 8 MG: 2 INJECTION INTRAMUSCULAR; INTRAVENOUS at 14:46

## 2024-02-05 ASSESSMENT — PAIN SCALES - GENERAL: PAINLEVEL: MODERATE PAIN (4)

## 2024-02-05 NOTE — PROGRESS NOTES
[facility] CANCER [facility]    PATIENT NAME: Ko Thakkar  MRN # [id number]   DATE OF VISIT: February 5, 2024 YOB: 1955     Otolaryngology: Dr. Denia Hardin  Radiation Oncology: Dr. Juwan Cordova   PCP: Dr. Alton Mota; [facility] in [place]     CANCER TYPE: SCC supraglottis  STAGE: [id number] (IRON)  ECOG PS: 1    SUMMARY  5/16/23 EGD for dysphagia. Gastritis and gastric diverticulum. Bx showed H. Pylori, treated. No atrophic gastritis or dysplasia  7/11/23 Swallow study at . Poor epiglottic inversion, penetration, aspiration  7/28/23 CT neck. 2.2 x 1.4 x 2.3 cm supraglottic mass, concern for paraglottic involvement, bilateral IB, IIA, III, IV adenopathy, 70% ICA stenosis   8/21/23 US carotid. 70-99% stenosis R ICA. <50% stenosis L ICA  8/29/23 FNA L level 2 node (IR). Path: SCC  9/6/23 PET/CT. FDG avid mass (SUV 13.5) right tonsil/lateral pharyngeal wall/glossotonsillar sulcus with slight extension to the R tongue base, right AE fold, right epiglottis, right piriform sinus, right posterolateral pharyngeal wall, posterior pharyngeal wall, no thyroid cartilage erosion, right retropharyngeal node, deep lobe parotid FDG avid mass, bilateral cervical nodes (right level II-IV, left level II-III), no distant disease, L mandibular canine with periapical abscess. R temporal lobe change suggestive of prior infarct. 5 mm LLL nodule.  10/10/23 Video swallow.   10/16~11/13/23 C1-2 carboplatin paclitaxel. Given due to logistical issues with pre-chemoradiation evaluation that would have delayed start of any treatment substantially. Paclitaxel dose reduced to 140 mg/m2 C2 due to neuropathy  10p/23/23 CTA. 70% narrowing R carotid carlos/bifurcation similar to 7/28/23 CT. Laterally projecting disc osteophyte complex resulting in mod-severe L vertebral artery narrowing at C3-4 level.  10/23/23 Brain MRI. No mets. Bilateral frontal and temporal lobe encephalomalacia, R > L. Mild volume loss.    11/28/23 PET/CT. Good CT.       SUBJECTIVE  Mr. Thakkar (Ace) is seen today for follow up. Has completed 6 carbo/taxol infusions, concurrent with XRT. Finishes XRT Thurs.  -More difficulty with oral intake--stuff doesn't taste good, thick secretions, mild pain  -getting in 6 cartons of feeds daily, 3 cartons twice daily  -weight fluctuating at home per their scale  -felt really cold yesterday; shivering. Was afebrile.  -Some nausea related to clearing out thick secretions. Takes antiemetics occasionally  -Using magic mouthwash, biotene lozenges and salt/soda rinses  -not taking all of his meds as usual due to difficulty swallowing    PAST MEDICAL HISTORY  SCC as above  GERD  H/o food impaction in 2008 and 2014 (steak) related to Schatzki ring on EGD 10/13/14  DM2. Last A1c about 7.6 sometime in summer 2023 and Nov 2023  Dyslipidemia  Possible prior L temporal CVA  HTN  H.o spinal meningitis as a baby -   R arm surgery  Bone graft to forehead  Ex lap   Tinnitus secondary to treatment for meningitis or from menigitis - bilateral   Hearing loss -- audiogram 10/2/2023  Numbness in the toes - mostly big toes   Crushing pills   No muscle aches or pains     CURRENT OUTPATIENT MEDICATIONS  Current Outpatient Medications   Medication    albuterol (PROAIR HFA/PROVENTIL HFA/VENTOLIN HFA) 108 (90 Base) MCG/ACT inhaler    amLODIPine (NORVASC) 5 MG tablet    aspirin (ASA) 81 MG chewable tablet    atorvastatin (LIPITOR) 80 MG tablet    diphenhydrAMINE (BENADRYL) 12.5 MG/5ML liquid    empagliflozin (JARDIANCE) 25 MG TABS tablet    gabapentin (NEURONTIN) 300 MG capsule    hydrochlorothiazide (HYDRODIURIL) 25 MG tablet    ibuprofen (ADVIL/MOTRIN) 200 MG tablet    insulin lispro protamine-insulin lispro (HUMALOG MIX 75/25 KWIKPEN) (75-25) 100 UNIT/ML pen    insulin NPH-Regular 70/30 (HUMULIN 70/30;NOVOLIN 70/30) (70-30) 100 UNIT/ML vial    liraglutide (VICTOZA) 18 MG/3ML solution    lisinopril (ZESTRIL) 40 MG tablet    magic  "mouthwash (ENTER INGREDIENTS IN COMMENTS) suspension    metFORMIN (GLUCOPHAGE) 500 MG tablet    pantoprazole (PROTONIX) 20 MG EC tablet    potassium chloride (KLOR-CON) 20 MEQ packet    therapeutic multivitamin (THERAGRAN) tablet    TRUEPLUS 5-BEVEL PEN NEEDLES 32G X 4 MM miscellaneous     No current facility-administered medications for this visit.     ALLERGIES  No Known Allergies     PHYSICAL EXAM  /68   Pulse 88   Temp 98.3  F (36.8  C) (Oral)   Resp 18   Ht 1.676 m (5' 5.98\")   Wt 69.7 kg (153 lb 9.6 oz)   SpO2 98%   BMI 24.80 kg/m      General: Well-appearing male, NAD.   Eyes: EOMI, PERRL. No scleral icterus.  ENT: Oral mucosa moist. Diffused mucositis to posterior tongue and OP with erythema. [place], no hearing aids.   Cardiovascular: RRR, no m/g/r. No peripheral edema .  Respiratory: CTA bilaterally. No wheezes or crackles.  Neurologic: Grossly nonfocal. Hoarse voice.  Skin: No rashes, petechiae, or bruising noted on exposed skin.      LABORATORY AND IMAGING STUDIES  Most Recent 3 CBC's:  Recent Labs   Lab Test 01/29/24  1237 01/22/24  1402 01/09/24  0801   WBC 3.3* 4.7 3.4*   HGB 11.5* 10.3* 11.2*   MCV 89 87 86    169 212   ANEUTAUTO 2.4 3.6 2.0    Most Recent 3 BMP's:  Recent Labs   Lab Test 01/29/24  1237 01/23/24  0749 01/22/24  1402 01/09/24  0801    139 134* 139   POTASSIUM 4.0 4.2 4.2 3.6   CHLORIDE 104 104 100 104   CO2 26 23 25 25   BUN 21.6 25.9* 13.5 25.2*   CR 0.69 0.70 0.74 0.70   ANIONGAP 10 12 9 10   OLE 9.6 9.9 9.0 9.2   * 162* 307* 157*   PROTTOTAL 7.4  --  6.7 6.6   ALBUMIN 4.0  --  3.6 3.8    Most Recent 2 LFT's:  Recent Labs   Lab Test 01/29/24  1237 01/22/24  1402   AST 16 16   ALT 21 12   ALKPHOS 123 120   BILITOTAL 0.3 0.4    Most Recent TSH and T4:  Recent Labs   Lab Test 11/29/23  1501   TSH 3.46     Phos/Mag:  Lab Results   Component Value Date    PHOS 3.0 11/29/2023    MAG 1.7 11/29/2023    MAG 2.3 09/14/2023      I reviewed the above labs " today.      ASSESSMENT AND PLAN  SCC supraglottis, [id number] (IRON): Declined surgery. Planned chemoradiation but with all of the logistical issues and work needed prior to starting, also due to the large size of the primary tumor, started carboplatin + paclitaxel first. PET/CT confirmed good CO after 2 cycles. Plan remained to transition to chemoradiation--with carbo/paclitaxel. Not a candidate for cisplatin due to severe hearing loss. Tolerated chemotherapy well, in light of worsening radiation toxicity in the last week discussed deferring 7th dose of chemo which they agree with.   -RTC with Dr. Louise on Thurs as scheduled. Will request follow up with me 2/21, adjust as indicated. Has routine PCP follow up 2/15.    Mucositis, odynophagia: Increasing as expected, but overall pain is still mild. Continue s/s rinses, Biotene, Magic Mouthwash. Gabapentin management per rad onc.     Dysphagia, aspiration, weight loss: Jessica Ramos visit 9/13/23, video 10/10, known aspiration at baseline although swallowing a little better with CO. Gtube 10/13--fell out 12/25; replaced 1/23.   -has follow up with Meka GRIFFIN tomorrow, weight down a bit so may need to increase feeds. Currently doing feeds BID. Discussed ways to add calories.     Dental: Had extractions as planned prior to starting chemo, still needs bone shaved down some, which was delayed to start chemo.  Ace has been told by his dentist the process is going to be postponed until he recovers from cancer treatment. Should wait many months following completion of chemoradiation before denture fitting.     Neuropathy: Worse after C1 chemo, no worse after C2. Remained stable with weekly carboplatin paclitaxel. B12 10/6/23 was ok. TSH has been ok.     Ibuprofen use, arthritis: Reports decreased use. Was most recently using for cancer-related pain which has improved with response to treatment. Again discouraged use in extremely high doses due to significant risk of bleeding.  Continues to use approximately 1000 mg/day but not daily, more intermittently.     R KAILASH: Met with Dr. Tapia, vascular surgery fellow 8/21. Recommended treating the cancer first, asa 81 and high dose atorva, follow up 6 months with repeat US. PET/CT showed possible old R temporal infarct. Exhibited TIA symptoms in early October with 4 hours episode of confusion. MR brain and CTA head 10/23 w/o acute findings.   --has follow up scheduled 2/19  --discussed continuing aspirin and statin; crush and sprinkle on applesauce or put thru PEG    Liver issue: Says he was to have some sort of imaging at [facility] to look at his liver that had to be canceled due to the appts he needed for the cancer. Will try to figure out what that was about - not actively discussed again today. Might be related to his mom having cirrhosis without hepatitis or ETOH hx. Did not discuss this today.    40 minutes spent on the date of the encounter doing chart review, review of test results, interpretation of tests, patient visit, documentation, and discussion with other provider(s)     Olga Baker, CNP

## 2024-02-05 NOTE — TELEPHONE ENCOUNTER
Call received from patient spouse with concerns that only 1 T-tack has fallen out and one still remains.  Gastrostomy Tube placed 1/23/2024.    IR Provider consulted and order placed for gastrostomy tube check, for T-tack removal.

## 2024-02-05 NOTE — TELEPHONE ENCOUNTER
"Caller:   Ondina, spouse    Situation:   Feels \"super cold\"  Is shaking even under 2 blanket  No fever    Chemo - last monday    Has trouble swallowing    Has nasal congestion    Background:  Has an appointment tomorrow      Assessment:  Needs to be evaluated        Recommendation:  Disposition: go to ED    Reviewed care advise with caller.   Informed to call back w/ any questions or new concerns.    Ondina verbalized understanding of care advice but may wait for appointment tomorrow        Lilli Chavira RN, BSN  Triage Nurse Advisor      Reason for Disposition   [1] Neutropenia known or suspected (e.g., recent cancer chemotherapy) AND [2] signs or symptoms of suspected infection are present    Additional Information   Negative: Shock suspected (e.g., cold/pale/clammy skin, too weak to stand, low BP, rapid pulse)   Negative: Difficult to awaken or acting confused (e.g., disoriented, slurred speech)   Negative: Bluish (or gray) lips or face now   Negative: New-onset rash with many purple (or blood-colored) spots or dots   Negative: Sounds like a life-threatening emergency to the triager   Negative: Fever > 103 F (39.4 C)   Negative: [1] Neutropenia known or suspected (e.g., recent cancer chemotherapy) AND [2] fever > 100.4 F (38.0 C)    Protocols used: Cancer - Fever-A-    "

## 2024-02-05 NOTE — NURSING NOTE
Chief Complaint   Patient presents with    Blood Draw     Vitals, blood drawn and PIV placed by LPN. Pt checked into appt.      VIOLETTA Melendez LPN

## 2024-02-05 NOTE — PROGRESS NOTES
Infusion Nursing Note:  Ko Thakkar presents today for IVF + Zofran.    Patient seen by provider today: Yes: Olga Baker NP   present during visit today: Not Applicable.    Note: Pt presents to infusion feeling ok - he is pleased to be done with chemo and has three days of radiation left. He offers no new concerns following his provider appointment today.    Per secure chat with Olga Baker NP @ [phone]:  - no chemo today  - ok to give 1L NS from therapy plan and 8mg Zofran IV    Intravenous Access:  Peripheral IV placed.    Treatment Conditions:  Lab Results   Component Value Date    HGB 10.6 (L) 02/05/2024    WBC 2.9 (L) 02/05/2024    ANEUTAUTO 1.9 02/05/2024     02/05/2024        Lab Results   Component Value Date     (L) 02/05/2024    POTASSIUM 4.2 02/05/2024    MAG 1.7 11/29/2023    CR 0.68 02/05/2024    OLE 9.0 02/05/2024    BILITOTAL 0.3 02/05/2024    ALBUMIN 3.6 02/05/2024    ALT 19 02/05/2024    AST 18 02/05/2024       Post Infusion Assessment:  Patient tolerated infusion without incident.  Blood return noted pre and post infusion.  Site patent and intact, free from redness, edema or discomfort.  No evidence of extravasations.  Access discontinued per protocol.       Discharge Plan:   Patient declined prescription refills.  Discharge instructions reviewed with: Patient and Family.  Patient and/or family verbalized understanding of discharge instructions and all questions answered.  AVS to patient via [employer]. Patient will return 2/8/24 (Dr. Louise follow up) for next appointment.   Patient discharged in stable condition accompanied by: wife.  Departure Mode: Ambulatory.      Vesna Powell RN

## 2024-02-05 NOTE — NURSING NOTE
"Oncology Rooming Note    February 5, 2024 1:47 PM   Ko Thakkar is a 68 year old male who presents for:    Chief Complaint   Patient presents with    Blood Draw     Vitals, blood drawn and PIV placed by LPN. Pt checked into appt.     Oncology Clinic Visit     [facility] RETURN - SQUAMOUS CELL CARCINOMA OF OROPHARYNX     Initial Vitals: /68   Pulse 88   Temp 98.3  F (36.8  C) (Oral)   Resp 18   Ht 1.676 m (5' 5.98\")   Wt 69.7 kg (153 lb 9.6 oz)   SpO2 98%   BMI 24.80 kg/m   Estimated body mass index is 24.8 kg/m  as calculated from the following:    Height as of this encounter: 1.676 m (5' 5.98\").    Weight as of this encounter: 69.7 kg (153 lb 9.6 oz). Body surface area is 1.8 meters squared.  Moderate Pain (4) Comment: Data Unavailable   No LMP for male patient.  Allergies reviewed: Yes  Medications reviewed: Yes    Medications: Medication refills not needed today.  Pharmacy name entered into EPIC:    [facility] - [place], [place] - [address] [facility] - [place], [place] - [address]    Frailty Screening:   Is the patient here for a new oncology consult visit in cancer care? 2. No      Clinical concerns: Patient has not taken any medication since Friday due to his throat hurting/burning besides his insulin.   Olga Baker was notified.      Jorge Bahena LPN              "

## 2024-02-05 NOTE — LETTER
2/5/2024         RE: Ko Thakkar  [address]  [address]  [place] [address]        Dear Colleague,    Thank you for referring your patient, Ko Thakkar, to the [facility] CANCER CLINIC. Please see a copy of my visit note below.       [facility] CANCER [facility]    PATIENT NAME: Ko Thakkar  MRN # [id number]   DATE OF VISIT: February 5, 2024 YOB: 1955     Otolaryngology: Dr. Denia Hardin  Radiation Oncology: Dr. Juwan Cordova   PCP: Dr. Alton Mota; [facility][note 3] in [place]     CANCER TYPE: SCC supraglottis  STAGE: [id number] (IRON)  ECOG PS: 1    SUMMARY  5/16/23 EGD for dysphagia. Gastritis and gastric diverticulum. Bx showed H. Pylori, treated. No atrophic gastritis or dysplasia  7/11/23 Swallow study at [facility]. Poor epiglottic inversion, penetration, aspiration  7/28/23 CT neck. 2.2 x 1.4 x 2.3 cm supraglottic mass, concern for paraglottic involvement, bilateral IB, IIA, III, IV adenopathy, 70% ICA stenosis   8/21/23 US carotid. 70-99% stenosis R ICA. <50% stenosis L ICA  8/29/23 FNA L level 2 node (IR). Path: SCC  9/6/23 PET/CT. FDG avid mass (SUV 13.5) right tonsil/lateral pharyngeal wall/glossotonsillar sulcus with slight extension to the R tongue base, right AE fold, right epiglottis, right piriform sinus, right posterolateral pharyngeal wall, posterior pharyngeal wall, no thyroid cartilage erosion, right retropharyngeal node, deep lobe parotid FDG avid mass, bilateral cervical nodes (right level II-IV, left level II-III), no distant disease, L mandibular canine with periapical abscess. R temporal lobe change suggestive of prior infarct. 5 mm LLL nodule.  10/10/23 Video swallow.   10/16~11/13/23 C1-2 carboplatin paclitaxel. Given due to logistical issues with pre-chemoradiation evaluation that would have delayed start of any treatment substantially. Paclitaxel dose reduced to 140 mg/m2 C2 due to neuropathy  10p/23/23 CTA. 70% narrowing R carotid  carlos/bifurcation similar to 7/28/23 CT. Laterally projecting disc osteophyte complex resulting in mod-severe L vertebral artery narrowing at C3-4 level.  10/23/23 Brain MRI. No mets. Bilateral frontal and temporal lobe encephalomalacia, R > L. Mild volume loss.   11/28/23 PET/CT. [place].       SUBJECTIVE  Mr. Thakkar (Ace) is seen today for follow up. Has completed 6 carbo/taxol infusions, concurrent with XRT. Finishes XRT Thurs.  -More difficulty with oral intake--stuff doesn't taste good, thick secretions, mild pain  -getting in 6 cartons of feeds daily, 3 cartons twice daily  -weight fluctuating at home per their scale  -felt really cold yesterday; shivering. Was afebrile.  -Some nausea related to clearing out thick secretions. Takes antiemetics occasionally  -Using magic mouthwash, biotene lozenges and salt/soda rinses  -not taking all of his meds as usual due to difficulty swallowing    PAST MEDICAL HISTORY  SCC as above  GERD  H/o food impaction in 2008 and 2014 (steak) related to Schatzki ring on EGD 10/13/14  DM2. Last A1c about 7.6 sometime in summer 2023 and Nov 2023  Dyslipidemia  Possible prior L temporal CVA  HTN  H.o spinal meningitis as a baby -   R arm surgery  Bone graft to forehead  Ex lap   Tinnitus secondary to treatment for meningitis or from menigitis - bilateral   Hearing loss -- audiogram 10/2/2023  Numbness in the toes - mostly big toes   Crushing pills   No muscle aches or pains     CURRENT OUTPATIENT MEDICATIONS  Current Outpatient Medications   Medication    albuterol (PROAIR HFA/PROVENTIL HFA/VENTOLIN HFA) 108 (90 Base) MCG/ACT inhaler    amLODIPine (NORVASC) 5 MG tablet    aspirin (ASA) 81 MG chewable tablet    atorvastatin (LIPITOR) 80 MG tablet    diphenhydrAMINE (BENADRYL) 12.5 MG/5ML liquid    empagliflozin (JARDIANCE) 25 MG TABS tablet    gabapentin (NEURONTIN) 300 MG capsule    hydrochlorothiazide (HYDRODIURIL) 25 MG tablet    ibuprofen (ADVIL/MOTRIN) 200 MG tablet    insulin  "lispro protamine-insulin lispro (HUMALOG MIX 75/25 KWIKPEN) (75-25) 100 UNIT/ML pen    insulin NPH-Regular 70/30 (HUMULIN 70/30;NOVOLIN 70/30) (70-30) 100 UNIT/ML vial    liraglutide (VICTOZA) 18 MG/3ML solution    lisinopril (ZESTRIL) 40 MG tablet    magic mouthwash (ENTER INGREDIENTS IN COMMENTS) suspension    metFORMIN (GLUCOPHAGE) 500 MG tablet    pantoprazole (PROTONIX) 20 MG EC tablet    potassium chloride (KLOR-CON) 20 MEQ packet    therapeutic multivitamin (THERAGRAN) tablet    TRUEPLUS 5-BEVEL PEN NEEDLES 32G X 4 MM miscellaneous     No current facility-administered medications for this visit.     ALLERGIES  No Known Allergies     PHYSICAL EXAM  /68   Pulse 88   Temp 98.3  F (36.8  C) (Oral)   Resp 18   Ht 1.676 m (5' 5.98\")   Wt 69.7 kg (153 lb 9.6 oz)   SpO2 98%   BMI 24.80 kg/m      General: Well-appearing male, NAD.   Eyes: EOMI, PERRL. No scleral icterus.  ENT: Oral mucosa moist. Diffused mucositis to posterior tongue and OP with erythema. [place], no hearing aids.   Cardiovascular: RRR, no m/g/r. No peripheral edema .  Respiratory: CTA bilaterally. No wheezes or crackles.  Neurologic: Grossly nonfocal. Hoarse voice.  Skin: No rashes, petechiae, or bruising noted on exposed skin.      LABORATORY AND IMAGING STUDIES  Most Recent 3 CBC's:  Recent Labs   Lab Test 01/29/24  1237 01/22/24  1402 01/09/24  0801   WBC 3.3* 4.7 3.4*   HGB 11.5* 10.3* 11.2*   MCV 89 87 86    169 212   ANEUTAUTO 2.4 3.6 2.0    Most Recent 3 BMP's:  Recent Labs   Lab Test 01/29/24  1237 01/23/24  0749 01/22/24  1402 01/09/24  0801    139 134* 139   POTASSIUM 4.0 4.2 4.2 3.6   CHLORIDE 104 104 100 104   CO2 26 23 25 25   BUN 21.6 25.9* 13.5 25.2*   CR 0.69 0.70 0.74 0.70   ANIONGAP 10 12 9 10   OLE 9.6 9.9 9.0 9.2   * 162* 307* 157*   PROTTOTAL 7.4  --  6.7 6.6   ALBUMIN 4.0  --  3.6 3.8    Most Recent 2 LFT's:  Recent Labs   Lab Test 01/29/24  1237 01/22/24  1402   AST 16 16   ALT 21 12   ALKPHOS 123 " 120   BILITOTAL 0.3 0.4    Most Recent TSH and T4:  Recent Labs   Lab Test 11/29/23  1501   TSH 3.46     Phos/Mag:  Lab Results   Component Value Date    PHOS 3.0 11/29/2023    MAG 1.7 11/29/2023    MAG 2.3 09/14/2023      I reviewed the above labs today.      ASSESSMENT AND PLAN  SCC supraglottis, [id number] (IRON): Declined surgery. Planned chemoradiation but with all of the logistical issues and work needed prior to starting, also due to the large size of the primary tumor, started carboplatin + paclitaxel first. PET/CT confirmed good PA after 2 cycles. Plan remained to transition to chemoradiation--with carbo/paclitaxel. Not a candidate for cisplatin due to severe hearing loss. Tolerated chemotherapy well, in light of worsening radiation toxicity in the last week discussed deferring 7th dose of chemo which they agree with.   -RTC with Dr. Louise on Thurs as scheduled. Will request follow up with me 2/21, adjust as indicated. Has routine PCP follow up 2/15.    Mucositis, odynophagia: Increasing as expected, but overall pain is still mild. Continue s/s rinses, Biotene, Magic Mouthwash. Gabapentin management per rad onc.     Dysphagia, aspiration, weight loss: Jessica Ramos visit 9/13/23, video 10/10, known aspiration at baseline although swallowing a little better with PA. Gtube 10/13--fell out 12/25; replaced 1/23.   -has follow up with Meka GRIFFIN tomorrow, weight down a bit so may need to increase feeds. Currently doing feeds BID. Discussed ways to add calories.     Dental: Had extractions as planned prior to starting chemo, still needs bone shaved down some, which was delayed to start chemo.  Ace has been told by his dentist the process is going to be postponed until he recovers from cancer treatment. Should wait many months following completion of chemoradiation before denture fitting.     Neuropathy: Worse after C1 chemo, no worse after C2. Remained stable with weekly carboplatin paclitaxel. B12 10/6/23 was ok.  TSH has been ok.     Ibuprofen use, arthritis: Reports decreased use. Was most recently using for cancer-related pain which has improved with response to treatment. Again discouraged use in extremely high doses due to significant risk of bleeding. Continues to use approximately 1000 mg/day but not daily, more intermittently.     R KAILASH: Met with Dr. Tapia, vascular surgery fellow 8/21. Recommended treating the cancer first, asa 81 and high dose atorva, follow up 6 months with repeat US. PET/CT showed possible old R temporal infarct. Exhibited TIA symptoms in early October with 4 hours episode of confusion. MR brain and CTA head 10/23 w/o acute findings.   --has follow up scheduled 2/19  --discussed continuing aspirin and statin; crush and sprinkle on applesauce or put thru PEG    Liver issue: Says he was to have some sort of imaging at [facility] to look at his liver that had to be canceled due to the appts he needed for the cancer. Will try to figure out what that was about - not actively discussed again today. Might be related to his mom having cirrhosis without hepatitis or ETOH hx. Did not discuss this today.    40 minutes spent on the date of the encounter doing chart review, review of test results, interpretation of tests, patient visit, documentation, and discussion with other provider(s)     Olga Baker, CNP

## 2024-02-05 NOTE — PATIENT INSTRUCTIONS
[facility] Triage and after hours / weekends / holidays:  [phone]    Please call the triage or after hours line if you experience a temperature greater than or equal to 100.4, shaking chills, have uncontrolled nausea, vomiting and/or diarrhea, dizziness, shortness of breath, chest pain, bleeding, unexplained bruising, or if you have any other new/concerning symptoms, questions or concerns.      If you are having any concerning symptoms or wish to speak to a provider before your next infusion visit, please call triage to notify them so we can adequately serve you.     If you need a refill on a narcotic prescription or other medication, please call before your infusion appointment.                February 2024 Sunday Monday Tuesday Wednesday Thursday Friday Saturday                       1    TREATMENT  11:45 AM   (45 min.)   [facility] [place] [address] Radiation Oncology 2    TREATMENT  11:45 AM   (45 min.)   [facility] [place] [address] Radiation Oncology 3       4     5    TREATMENT  11:45 AM   (45 min.)   [facility] [place] [address] Radiation Oncology    LAB PERIPHERAL   1:15 PM   (15 min.)    [facility] DRAW   [facility]    RETURN ACTIVE TREATMENT   1:30 PM   (45 min.)   Olga Baker CNP   [facility]    ONC INFUSION 2 HR (120 MIN)   2:30 PM   (120 min.)    ONC INFUSION NURSE   [facility]    SWALLOW TREATMENT   3:00 PM   (45 min.)   Rola Marcano SLP   [facility] [address] NUTRITION VISIT  11:30 AM   (30 min.)   Meka Lowery RD   [facility] Radiation Oncology    TREATMENT  11:45 AM   (45 min.)   P RAD ONC VARIAN1   [facility] Radiation Oncology    OTV  12:00 PM   (15 min.)   Juwan Cordova MD   [facility] Radiation Oncology 7    TREATMENT  11:45 AM   (45  min.)   [facility] RAD ONC VARIAN1   [facility] Radiation Oncology 8    TREATMENT   7:45 AM   (45 min.)   [facility]C VARIAN1   [facility] Radiation Oncology    RETURN CCSL  11:00 AM   (30 min.)   Xochilt Louise MD   [facility] 9     10       11     12     13     14     15     16     17       18     19    US CAROTID BILATERAL  10:45 AM   (30 min.)   [facility] VC US 3   [facility] [place]    RETURN VASCULAR PATIENT  11:15 AM   (30 min.)   [facility] VASC FELLOW CLINIC 2   [facility]ascular [place] [address]     21     22     23     24       25     26     27     28     29                           March 2024 Sunday Monday Tuesday Wednesday Thursday Friday Saturday                            1     2       3     4     5     6     7     8     9       10     11     12     13     14     15     16       17     18     19     20    RETURN  11:25 AM   (20 min.)   Denia Hardin MD   [facility] [place] 21     22     23       24     25     26     27     28     29     30       31                                                   Lab Results:  Recent Results (from the past 12 hour(s))   Comprehensive metabolic panel    Collection Time: 02/05/24  1:27 PM   Result Value Ref Range    Sodium 134 (L) 135 - 145 mmol/L    Potassium 4.2 3.4 - 5.3 mmol/L    Carbon Dioxide (CO2) 27 22 - 29 mmol/L    Anion Gap 9 7 - 15 mmol/L    Urea Nitrogen 27.7 (H) 8.0 - 23.0 mg/dL    Creatinine 0.68 0.67 - 1.17 mg/dL    GFR Estimate >90 >60 mL/min/1.73m2    Calcium 9.0 8.8 - 10.2 mg/dL    Chloride 98 98 - 107 mmol/L    Glucose 407 (H) 70 - 99 mg/dL    Alkaline Phosphatase 102 40 - 150 U/L    AST 18 0 - 45 U/L    ALT 19 0 - 70 U/L    Protein Total 6.9 6.4 - 8.3 g/dL    Albumin 3.6 3.5 - 5.2 g/dL    Bilirubin Total 0.3 <=1.2 mg/dL   CBC with platelets and differential    Collection Time: 02/05/24  1:27 PM   Result Value Ref Range     WBC Count 2.9 (L) 4.0 - 11.0 10e3/uL    RBC Count 3.48 (L) 4.40 - 5.90 10e6/uL    Hemoglobin 10.6 (L) 13.3 - 17.7 g/dL    Hematocrit 30.7 (L) 40.0 - 53.0 %    MCV 88 78 - 100 fL    MCH 30.5 26.5 - 33.0 pg    MCHC 34.5 31.5 - 36.5 g/dL    RDW 15.4 (H) 10.0 - 15.0 %    Platelet Count 378 150 - 450 10e3/uL    % Neutrophils 66 %    % Lymphocytes 13 %    % Monocytes 19 %    % Eosinophils 1 %    % Basophils 1 %    % Immature Granulocytes 0 %    NRBCs per 100 WBC 0 <1 /100    Absolute Neutrophils 1.9 1.6 - 8.3 10e3/uL    Absolute Lymphocytes 0.4 (L) 0.8 - 5.3 10e3/uL    Absolute Monocytes 0.6 0.0 - 1.3 10e3/uL    Absolute Eosinophils 0.0 0.0 - 0.7 10e3/uL    Absolute Basophils 0.0 0.0 - 0.2 10e3/uL    Absolute Immature Granulocytes 0.0 <=0.4 10e3/uL    Absolute NRBCs 0.0 10e3/uL

## 2024-02-06 ENCOUNTER — ALLIED HEALTH/NURSE VISIT (OUTPATIENT)
Dept: RADIATION ONCOLOGY | Facility: CLINIC | Age: 69
End: 2024-02-06
Attending: SURGERY
Payer: COMMERCIAL

## 2024-02-06 VITALS
BODY MASS INDEX: 25.84 KG/M2 | SYSTOLIC BLOOD PRESSURE: 126 MMHG | DIASTOLIC BLOOD PRESSURE: 70 MMHG | WEIGHT: 160 LBS | HEART RATE: 74 BPM

## 2024-02-06 DIAGNOSIS — C32.1 SCC (SQUAMOUS CELL CARCINOMA) OF SUPRAGLOTTIS (H): Primary | ICD-10-CM

## 2024-02-06 PROCEDURE — 97803 MED NUTRITION INDIV SUBSEQ: CPT | Mod: XU | Performed by: DIETITIAN, REGISTERED

## 2024-02-06 PROCEDURE — 77386 HC IMRT TREATMENT DELIVERY, COMPLEX: CPT | Performed by: SURGERY

## 2024-02-06 RX ORDER — SILVER SULFADIAZINE 10 MG/G
CREAM TOPICAL DAILY
Qty: 50 G | Refills: 3 | Status: SHIPPED | OUTPATIENT
Start: 2024-02-06

## 2024-02-06 NOTE — PROGRESS NOTES
CLINICAL NUTRITION SERVICES - REASSESSMENT NOTE   EVALUATION OF PREVIOUS PLAN OF CARE:   Time Spent: 15 minutes  Visit Type: return OTV  Pt accompanied by: his wife, Ondina  Referring Physician: Wilfred  C10.9 (ICD-10-CM) - Squamous cell carcinoma of oropharynx (H)   E11.9 (ICD-10-CM) - Type 2 diabetes mellitus without complications (H)      Chemotherapy: Started 10/16 - Taxol  Radiation: started today, 12/19  PEG tube: placed 1/23/24    Monitoring from previous assessment:   -Food/Fluid intake - NPO due to odynophagia    -EN intake/tolerance- Enteral Nutrition   Enteral Nutrition   Formula: Osmolite 1.5 felicia  Volume: 3-6 cartons/day (3 cartons once a day or BID 'if he has time and is not too tired'  Provisions:  ~1000-2130kcal, 45-90 g protein, 144-288 g CHO, 0g fiber  Dosing wt: 76kg  Ace has been getting ~1/2 his EN goal due to time, fatigue and fullness.  He understands the importance of nutrition to help fatigue and will strive to reach his goal. He reports good tolerance other than feeling full with 3 cartons administered at a time.   -Weight trends - stable, up 2 lb x past one week  Wt Readings from Last 8 Encounters:   02/06/24 72.6 kg (160 lb)   02/05/24 69.7 kg (153 lb 9.6 oz)   01/30/24 71.7 kg (158 lb)   01/30/24 70.8 kg (156 lb)   01/29/24 71.4 kg (157 lb 6.4 oz)   01/29/24 71 kg (156 lb 9.6 oz)   01/23/24 72.8 kg (160 lb 8 oz)   01/23/24 72.6 kg (160 lb)     Previous Goals:   EN to meet 100% estimated nutrition needs via PEG tube (6 cartons/day)  Fluids goals - 6-8 cups water/electrolytes via PO and PEG tube as able  Evaluation: Not met   Previous Nutrition Diagnosis:   Inadequate oral intake related to dysphagia, odynophagia as evidenced by pt dependent on EN via PEG tube to meet 100% est nutrition needs    Evaluation: No change   NEW FINDINGS:   No new findings   CURRENT NUTRITION DIAGNOSIS   Inadequate enteral nutrition infusion related to timing, early satiety, fatigue as evidenced by pt consuming ~50%  of his nutrition needs several times a week   INTERVENTIONS   Recommendations / Nutrition Prescription   Try taking 2 cartons TID versus 3 cartons at once to prevent fullness  Increase fluids to 6+ cups water/electrolyte fluids per day  Implementation  EN Composition, EN Schedule, and Feeding Tube Flush - reviewed nutrition goals in detail for supporting healing post radiation.  Encouraged to strive for 6 cartons of formula/day.  Reviewed MOA, encouraged to reduce volume to improve tolerance.  Reviewed hydration. Encouraged to aim for at least 6 cups water/electrolyte fluids per day. Discussed that he can add electrolyte fluids to feeding tube and to flush with 30mL water before and after for tube patency.   Goals   1.EN to meet 100% estimated nutrition needs via PEG tube (6 cartons/day)  2.Fluids goals - 6  cups water/electrolytes PEG tube as able    Follow up/Monitoring: one month follow up  Enteral Nutrition intake and Weight    Meka Lee RD, , LD  [facility]  [phone]

## 2024-02-06 NOTE — LETTER
2024         RE: Ko Thakkar  [address]  [address]  [place] [address]        Dear Colleague,    Thank you for referring your patient, Ko Thakkar, to the [facility] RADIATION ONCOLOGY. Please see a copy of my visit note below.    [facility]  SPECIALIZING IN BREAKTHROUGHS  Radiation Oncology    On Treatment Visit Note      Ko Thakkar      Date: 2024   MRN: [id number]   : 1955  Diagnosis: SCC of Supraglottis        ID:  Mr. Thakkar is a 68 year old male [id number] oropharyngeal SCC, p16 positive. Tumor involved the right tonsil, posterior pharyngeal wall, and right AE fold, hypopharynx, with known right cord paralysis, with bilateral adenopathy including right RP node, right level IV, and questionable right parotid node (which was bx proven SCC). There is no evidence of any distant disease. He has evidence of swallow dysfunction with dysphagia and aspiration, and voice dysfunction with dysphonia and right cord paralysis.      Given extent of disease as well as potential delays in starting treatment, induction chemotherapy was administered under care of Dr. Louise.  PET CT scan after 2 cycles of carboplatin paclitaxel demonstrates a good partial response without any distant metastatic disease.       Reason for Visit:  On Radiation Treatment Visit       Treatment Summary to Date    Current Dose: 6600/7000 cGy Fractions: 33/35      Chemotherapy  Chemo concurrent with radx?: Yes  Oncologist: Dr Louise  Drug Name/Frequency 1: Taxol/carboplatin    Subjective:  Using feeding tube, only having 3 cans per day, weight stable, fatigued. Pain in OC/OP. Neck starting to breakdown bilaterally.     Pain Control: Gabapentin 1200 mg 3 times daily  Fluid Intake: Adequate  Nutrition:  PO and feeding tube (Adequate)  Rinses: 15 times  Skin care: Aquaphor twice daily      Nursing ROS:   Nutrition Alteration  Diet Type: Patient's Preference  Nutrition  Note: PEG 3 cans a day  Skin  Skin Reaction: 2 - Moderate to brisk erythema, patchy moist desquamation, mostly confined to skin folds and creases, moderate edema  Skin Intervention: Aquaphor  Skin Note: Silvadene     ENT and Mouth Exam  Mucositis - Current: 1 - Generalized erythema  ENT/Mouth Note: S&S 15-20  Cardiovascular  Respiratory effort: 1 - Normal - without distress  Gastrointestinal  Nausea: 0 - None     Psychosocial  Mood - Anxiety: 0 - Normal  Pain Assessment  0-10 Pain Scale: 5  Pain Treatment: Gabapentin 1200 mg TID      Objective:   /70   Pulse 74   Wt 72.6 kg (160 lb)   BMI 25.84 kg/m    Gen: Appears well, in no acute distress  HN/Skin: erythema of the neck, b/l skin breakdown, OC/OP mucositis, feeding tube in place   CV/Resp: rrr, breathing comfortably on room air  Neuro: CN 2-12 grossly intact, UE/LE full strength     Labs:    Lab Results   Component Value Date    WBC 2.9 (L) 02/05/2024    HGB 10.6 (L) 02/05/2024    HCT 30.7 (L) 02/05/2024    MCV 88 02/05/2024     02/05/2024     Lab Results   Component Value Date    CR 0.68 02/05/2024     Lab Results   Component Value Date     (L) 02/05/2024    POTASSIUM 4.2 02/05/2024    CHLORIDE 98 02/05/2024    CO2 27 02/05/2024     (H) 02/05/2024     Lab Results   Component Value Date    AST 18 02/05/2024    ALT 19 02/05/2024    ALKPHOS 102 02/05/2024    BILITOTAL 0.3 02/05/2024        Assessment:    Tolerating radiation therapy well.  All questions and concerns addressed.      Plan:   Continue current therapy.    Continue gabapentin, rinses, nutrition, hydration, skin care  Rx sent for silvadene   Follow up with radiation oncology in 6-8 weeks  Follow up with Dr. Hardin in 6 weeks  He will see Dr. Louise 2/8/24      Mosaiq chart and setup information reviewed  Ports checked and MVCT/IGRT images checked    Medication Review  Med list reviewed with patient?: Yes  Med list printed and given: Offered and declined         Juwan Cordova  MD  Radiation Oncology   [facility]: [phone]

## 2024-02-06 NOTE — PROGRESS NOTES
[facility] PHYSICIANS  SPECIALIZING IN BREAKTHROUGHS  Radiation Oncology    On Treatment Visit Note      Ko Thakkar      Date: 2024   MRN: [id number]   : 1955  Diagnosis: SCC of Supraglottis        ID:  Mr. Thakkar is a 68 year old male [id number] oropharyngeal SCC, p16 positive. Tumor involved the right tonsil, posterior pharyngeal wall, and right AE fold, hypopharynx, with known right cord paralysis, with bilateral adenopathy including right RP node, right level IV, and questionable right parotid node (which was bx proven SCC). There is no evidence of any distant disease. He has evidence of swallow dysfunction with dysphagia and aspiration, and voice dysfunction with dysphonia and right cord paralysis.      Given extent of disease as well as potential delays in starting treatment, induction chemotherapy was administered under care of Dr. Louise.  PET CT scan after 2 cycles of carboplatin paclitaxel demonstrates a good partial response without any distant metastatic disease.       Reason for Visit:  On Radiation Treatment Visit       Treatment Summary to Date    Current Dose: 6600/7000 cGy Fractions: 33/35      Chemotherapy  Chemo concurrent with radx?: Yes  Oncologist: Dr Louise  Drug Name/Frequency 1: Taxol/carboplatin    Subjective:  Using feeding tube, only having 3 cans per day, weight stable, fatigued. Pain in OC/OP. Neck starting to breakdown bilaterally.     Pain Control: Gabapentin 1200 mg 3 times daily  Fluid Intake: Adequate  Nutrition:  PO and feeding tube (Adequate)  Rinses: 15 times  Skin care: Aquaphor twice daily      Nursing ROS:   Nutrition Alteration  Diet Type: Patient's Preference  Nutrition Note: PEG 3 cans a day  Skin  Skin Reaction: 2 - Moderate to brisk erythema, patchy moist desquamation, mostly confined to skin folds and creases, moderate edema  Skin Intervention: Aquaphor  Skin Note: Silvadene     ENT and Mouth Exam  Mucositis - Current: 1 -  Generalized erythema  ENT/Mouth Note: S&S 15-20  Cardiovascular  Respiratory effort: 1 - Normal - without distress  Gastrointestinal  Nausea: 0 - None     Psychosocial  Mood - Anxiety: 0 - Normal  Pain Assessment  0-10 Pain Scale: 5  Pain Treatment: Gabapentin 1200 mg TID      Objective:   /70   Pulse 74   Wt 72.6 kg (160 lb)   BMI 25.84 kg/m    Gen: Appears well, in no acute distress  HN/Skin: erythema of the neck, b/l skin breakdown, OC/OP mucositis, feeding tube in place   CV/Resp: rrr, breathing comfortably on room air  Neuro: CN 2-12 grossly intact, UE/LE full strength     Labs:    Lab Results   Component Value Date    WBC 2.9 (L) 02/05/2024    HGB 10.6 (L) 02/05/2024    HCT 30.7 (L) 02/05/2024    MCV 88 02/05/2024     02/05/2024     Lab Results   Component Value Date    CR 0.68 02/05/2024     Lab Results   Component Value Date     (L) 02/05/2024    POTASSIUM 4.2 02/05/2024    CHLORIDE 98 02/05/2024    CO2 27 02/05/2024     (H) 02/05/2024     Lab Results   Component Value Date    AST 18 02/05/2024    ALT 19 02/05/2024    ALKPHOS 102 02/05/2024    BILITOTAL 0.3 02/05/2024        Assessment:    Tolerating radiation therapy well.  All questions and concerns addressed.      Plan:   Continue current therapy.    Continue gabapentin, rinses, nutrition, hydration, skin care  Rx sent for silvadene   Follow up with radiation oncology in 6-8 weeks  Follow up with Dr. Hardin in 6 weeks  He will see Dr. Louise 2/8/24      Mosaiq chart and setup information reviewed  Ports checked and MVCT/IGRT images checked    Medication Review  Med list reviewed with patient?: Yes  Med list printed and given: Offered and declined         Juwan Cordova MD  Radiation Oncology   [facility]  Clinic: [phone]

## 2024-02-07 ENCOUNTER — PATIENT OUTREACH (OUTPATIENT)
Dept: CARE COORDINATION | Facility: CLINIC | Age: 69
End: 2024-02-07
Payer: COMMERCIAL

## 2024-02-07 ENCOUNTER — APPOINTMENT (OUTPATIENT)
Dept: RADIATION ONCOLOGY | Facility: CLINIC | Age: 69
End: 2024-02-07
Attending: SURGERY
Payer: COMMERCIAL

## 2024-02-07 PROCEDURE — 77014 PR CT GUIDE FOR PLACEMENT RADIATION THERAPY FIELDS: CPT | Mod: 26 | Performed by: SURGERY

## 2024-02-07 PROCEDURE — 77386 HC IMRT TREATMENT DELIVERY, COMPLEX: CPT | Performed by: SURGERY

## 2024-02-07 NOTE — PROGRESS NOTES
Social Work - Distress Screen Intervention  [facility]    Identified Concern and Score from Distress Screenin. How concerned are you about your ability to eat? 5     2. How concerned are you about unintended weight loss or your current weight? 5     3. How concerned are you about feeling depressed or very sad?  2     4. How concerned are you about feeling anxious or very scared?  0     5. Do you struggle with the loss of meaning and aleena in your life?  Not at all     6. How concerned are you about work and home life issues that may be affected by your cancer?  0     7. How concerned are you about knowing what resources are available to help you?  5     8. Do you currently have what you would describe as Gnosticist or spiritual struggles? Not at all     9. If you want to be contacted by one of our professionals, I can send a message to them right now.  Oncology Social Worker       Date of Distress Screen:  24  Data: At time of last visit, patient scored positive on distress screening.  outreached to patient today to follow up on elevated distress and introduce psychosocial services and support.  Intervention/Education provided:  contacted patient by phone to discuss distress screening results. Provided  contact information and requested return call from pt.    Follow-up Required:  to remain available for support and await return call from patient.    William Goetz, Casual  for Tariq Murray   [facility]  [phone]

## 2024-02-08 ENCOUNTER — DOCUMENTATION ONLY (OUTPATIENT)
Dept: RADIATION ONCOLOGY | Facility: CLINIC | Age: 69
End: 2024-02-08

## 2024-02-08 ENCOUNTER — ANCILLARY PROCEDURE (OUTPATIENT)
Dept: INTERVENTIONAL RADIOLOGY/VASCULAR | Facility: CLINIC | Age: 69
End: 2024-02-08
Attending: NURSE PRACTITIONER
Payer: COMMERCIAL

## 2024-02-08 ENCOUNTER — APPOINTMENT (OUTPATIENT)
Dept: RADIATION ONCOLOGY | Facility: CLINIC | Age: 69
End: 2024-02-08
Attending: SURGERY
Payer: COMMERCIAL

## 2024-02-08 ENCOUNTER — ONCOLOGY VISIT (OUTPATIENT)
Dept: ONCOLOGY | Facility: CLINIC | Age: 69
End: 2024-02-08
Attending: INTERNAL MEDICINE
Payer: COMMERCIAL

## 2024-02-08 VITALS
OXYGEN SATURATION: 99 % | HEART RATE: 83 BPM | TEMPERATURE: 99.1 F | SYSTOLIC BLOOD PRESSURE: 129 MMHG | BODY MASS INDEX: 25.22 KG/M2 | DIASTOLIC BLOOD PRESSURE: 60 MMHG | WEIGHT: 156.2 LBS | RESPIRATION RATE: 16 BRPM

## 2024-02-08 DIAGNOSIS — C32.1 SCC (SQUAMOUS CELL CARCINOMA) OF SUPRAGLOTTIS (H): Primary | ICD-10-CM

## 2024-02-08 DIAGNOSIS — Z13.29 SCREENING FOR HYPOTHYROIDISM: ICD-10-CM

## 2024-02-08 DIAGNOSIS — C10.9 SQUAMOUS CELL CARCINOMA OF OROPHARYNX (H): ICD-10-CM

## 2024-02-08 DIAGNOSIS — C32.1 SCC (SQUAMOUS CELL CARCINOMA) OF SUPRAGLOTTIS (H): ICD-10-CM

## 2024-02-08 PROCEDURE — 77336 RADIATION PHYSICS CONSULT: CPT | Performed by: SURGERY

## 2024-02-08 PROCEDURE — 77014 PR CT GUIDE FOR PLACEMENT RADIATION THERAPY FIELDS: CPT | Mod: 26 | Performed by: RADIOLOGY

## 2024-02-08 PROCEDURE — G0463 HOSPITAL OUTPT CLINIC VISIT: HCPCS | Performed by: INTERNAL MEDICINE

## 2024-02-08 PROCEDURE — 77386 HC IMRT TREATMENT DELIVERY, COMPLEX: CPT | Performed by: SURGERY

## 2024-02-08 PROCEDURE — 77427 RADIATION TX MANAGEMENT X5: CPT | Performed by: SURGERY

## 2024-02-08 PROCEDURE — 99214 OFFICE O/P EST MOD 30 MIN: CPT | Performed by: INTERNAL MEDICINE

## 2024-02-08 PROCEDURE — 99212 OFFICE O/P EST SF 10 MIN: CPT | Performed by: PHYSICIAN ASSISTANT

## 2024-02-08 RX ORDER — GABAPENTIN 250 MG/5ML
1200 SOLUTION ORAL 3 TIMES DAILY
Qty: 470 ML | Refills: 3 | Status: SHIPPED | OUTPATIENT
Start: 2024-02-08

## 2024-02-08 ASSESSMENT — PAIN SCALES - GENERAL: PAINLEVEL: SEVERE PAIN (6)

## 2024-02-08 NOTE — NURSING NOTE
"Oncology Rooming Note    February 8, 2024 11:10 AM   Ko Thakkar is a 68 year old male who presents for:    Chief Complaint   Patient presents with    Oncology Clinic Visit     Squamous cell carcinoma of oropharynx     Initial Vitals: /60 (BP Location: Right arm, Patient Position: Sitting, Cuff Size: Adult Regular)   Pulse 83   Temp 99.1  F (37.3  C) (Oral)   Resp 16   Wt 70.9 kg (156 lb 3.2 oz)   SpO2 99%   BMI 25.22 kg/m   Estimated body mass index is 25.22 kg/m  as calculated from the following:    Height as of 2/5/24: 1.676 m (5' 5.98\").    Weight as of this encounter: 70.9 kg (156 lb 3.2 oz). Body surface area is 1.82 meters squared.  Severe Pain (6) Comment: Data Unavailable   No LMP for male patient.  Allergies reviewed: Yes  Medications reviewed: Yes    Medications: Medication refills not needed today.  Pharmacy name entered into EPIC:    [facility] - [place], [place] - [address] [facility] [place], [place] - [address]    Frailty Screening:   Is the patient here for a new oncology consult visit in cancer care? 2. No      Clinical concerns: Pain in throat/neck and left ear.      Mikki Brown, EMT  2/8/2024              "

## 2024-02-08 NOTE — LETTER
2/8/2024         RE: Ko Thakkar  [address]  [address]  [place] [address]        Dear Colleague,    Thank you for referring your patient, Ko Thakkar, to the [facility]. Please see a copy of my visit note below.       [facility] CANCER [facility]    PATIENT NAME: Ko Thakkar  MRN # [id number]   DATE OF VISIT: February 8, 2024  YOB: 1955     Otolaryngology: Dr. Denia Hardin  Radiation Oncology: Dr. Juwan Cordova   PCP: Dr. Alton Mota; [facility] in [place]     CANCER TYPE: SCC supraglottis  STAGE: [id number] (IRON)  ECOG PS: 1    PD-L1:  NGS:     SUMMARY  5/16/23 EGD for dysphagia. Gastritis and gastric diverticulum. Bx showed H. Pylori, treated. No atrophic gastritis or dysplasia  7/11/23 Swallow study at . Poor epiglottic inversion, penetration, aspiration  7/28/23 CT neck. 2.2 x 1.4 x 2.3 cm supraglottic mass, concern for paraglottic involvement, bilateral IB, IIA, III, IV adenopathy, 70% ICA stenosis   8/21/23 US carotid. 70-99% stenosis R ICA. <50% stenosis L ICA  8/29/23 FNA L level 2 node (IR). Path: SCC  9/6/23 PET/CT. FDG avid mass (SUV 13.5) right tonsil/lateral pharyngeal wall/glossotonsillar sulcus with slight extension to the R tongue base, right AE fold, right epiglottis, right piriform sinus, right posterolateral pharyngeal wall, posterior pharyngeal wall, no thyroid cartilage erosion, right retropharyngeal node, deep lobe parotid FDG avid mass, bilateral cervical nodes (right level II-IV, left level II-III), no distant disease, L mandibular canine with periapical abscess. R temporal lobe change suggestive of prior infarct. 5 mm LLL nodule.  10/10/23 Video swallow.   10/13/23 Gtube. Fell out 12/25. Replaced 1/23  10/16~11/13/23 C1-2 carboplatin paclitaxel. Given due to logistical issues with pre-chemoradiation evaluation that would have delayed start of any treatment substantially. Paclitaxel dose reduced to 140 mg/m2 C2  due to neuropathy  10/23/23 CTA. 70% narrowing R carotid carlos/bifurcation similar to 7/28/23 CT. Laterally projecting disc osteophyte complex resulting in mod-severe L vertebral artery narrowing at C3-4 level.  10/23/23 Brain MRI. No mets. Bilateral frontal and temporal lobe encephalomalacia, R > L. Mild volume loss.   11/28/23 PET/CT. Good [place].   12/19/23~2/8/24  Chemoradiation with weekly carboplatin paclitaxel. 7000 cGy (Dr. Juwan Cordova). Not a candidate for cisplatin.    ASSESSMENT AND PLAN  SCC supraglottis, [id number] (IRON): Declined surgery. 2 cycles of induction due to logistical issues, etc., with good [place]. Finishing chemoradiation today. Discussed anticipated recovery. I will see him in 3 months after the PET/CT with labs, which will be coordinated through Dr. Hardin. Labs again in 6 weeks to ensure CBC, etc., are recovering as they should be but I'm not that concerned. I don't think he needs to be seen in oncology clinic next week.     Pulmonary nodules: Will be re-evaluated on 3 month post treatment PET/CT.     Mucositis, odynophagia: Manageable. No changes made today. Not eating or drinking anything.     Dysphagia, aspiration, malnutrition: Encouraged swallowing exercises, etc., as soon as he's able. Hopefully next week. Continue TF    Neuropathy: Worse after C1 chemo, no worse after C2, will monitor closely with weekly carboplatin paclitaxel. B12 10/6/23 was ok. TSH has been ok. I don't think we need to check SPEP/UPEP, etc., right now. No changes during chemo    Radiation dermatitis: Continue aquaphor. Got silver sulfadiazine    Ibuprofen use, arthritis: Despite emphasizing the significant danger of taking the high doses of ibuprofen he was taking, he continued to take about 1000 mg per day intermittently throughout treatment.    R KAILASH: Met with Dr. Tapia, vascular surgery fellow 8/21. Recommended treating the cancer first, asa 81 and high dose atorva, follow up 6 months with repeat US. PET/CT showed possible  old R temporal infarct. Has follow up 2/19    H/o food impaction: lower esophagus, monitor    30 minutes spent by me on the date of the encounter doing chart review, history and exam, documentation and further activities per the note     Xochilt Louise MD  Associate Professor of Medicine  Hematology, Oncology and Transplantation      SUBJECTIVE  Mr. Thakkar (Ace) returns on the last day of chemoradiation.   Happy to be done  Not eating/drinking anything - too painful  Gtube working fine  Going to IR today to have the other T tack removed  Some constipation. Manageable  Secretions - lots of work but manageable   No change in neuropathy  Tired but ok    PAST MEDICAL HISTORY  SCC as above  GERD  H/o food impaction in 2008 and 2014 (steak) related to Schatzki ring on EGD 10/13/14  DM2. Last A1c about 7.6 sometime in summer 2023 and Nov 2023  Dyslipidemia  Possible prior L temporal CVA  HTN  H.o spinal meningitis as a baby -   R arm surgery  Bone graft to forehead  Ex lap   Tinnitus secondary to treatment for meningitis or from menigitis - bilateral   Hearing loss -- audiogram 10/2/2023  Numbness in the toes - mostly big toes   Crushing pills   No muscle aches or pains     CURRENT OUTPATIENT MEDICATIONS  Reviewed    ALLERGIES  No Known Allergies     PHYSICAL EXAM  /60 (BP Location: Right arm, Patient Position: Sitting, Cuff Size: Adult Regular)   Pulse 83   Temp 99.1  F (37.3  C) (Oral)   Resp 16   Wt 70.9 kg (156 lb 3.2 oz)   SpO2 99%   BMI 25.22 kg/m    GEN: NAD  HEENT: EOMI, no icterus, injection or pallor. Oropharynx is clear.  NECK: radiation dermatitis bilaterally   ABDOMEN: Gtube site looks fine  EXT: warm, well perfused, no edema  NEURO: alert    LABORATORY AND IMAGING STUDIES    Labs since last visit with me were independently reviewed and interpreted by me    Imaging was personally reviewed and interpreted by me         Xochilt Louise MD

## 2024-02-08 NOTE — PROGRESS NOTES
Interventional Radiology Brief Post Procedure Note    Procedure: T-tack removal.    Proceduralist: Kranthi June PA-C    Assistant: JIMBO Hawley    Time Out: Prior to the start of the procedure and with procedural staff participation, I verbally confirmed the patient s identity using two indicators, relevant allergies, that the procedure was appropriate and matched the consent or emergent situation, and that the correct equipment/implants were available. Immediately prior to starting the procedure I conducted the Time Out with the procedural staff and re-confirmed the patient s name, procedure, and site/side. (The Joint Commission universal protocol was followed.)  Yes    Medications   Medication Event Details Admin User Admin Time       Sedation:  None.    Findings: Residual T-tack (one) removed without difficulty.    Estimated Blood Loss: None    Fluoroscopy Time:  minute(s)    SPECIMENS: None    Complications: 1. None     Condition: Stable    Plan: Follow up per primary team. Resume prior use and cares. Return to IR for tube exchange as scheduled.    Comments: See dictated procedure note for full details.    Approximately 5 minutes of direct face to face time occurred with the patient during this encounter.    Brad Monroy PA-C

## 2024-02-08 NOTE — PROGRESS NOTES
[facility] CANCER CLINIC    PATIENT NAME: Ko Thakkar  MRN # [id number]   DATE OF VISIT: February 8, 2024  YOB: 1955     Otolaryngology: Dr. Denia Hardin  Radiation Oncology: Dr. Juwan Cordova   PCP: Dr. Alton Mota; [facility] in [place]     CANCER TYPE: SCC supraglottis  STAGE: [id number] (IRON)  ECOG PS: 1    PD-L1:  NGS:     SUMMARY  5/16/23 EGD for dysphagia. Gastritis and gastric diverticulum. Bx showed H. Pylori, treated. No atrophic gastritis or dysplasia  7/11/23 Swallow study at [facility]. Poor epiglottic inversion, penetration, aspiration  7/28/23 CT neck. 2.2 x 1.4 x 2.3 cm supraglottic mass, concern for paraglottic involvement, bilateral IB, IIA, III, IV adenopathy, 70% ICA stenosis   8/21/23 US carotid. 70-99% stenosis R ICA. <50% stenosis L ICA  8/29/23 FNA L level 2 node (IR). Path: SCC  9/6/23 PET/CT. FDG avid mass (SUV 13.5) right tonsil/lateral pharyngeal wall/glossotonsillar sulcus with slight extension to the R tongue base, right AE fold, right epiglottis, right piriform sinus, right posterolateral pharyngeal wall, posterior pharyngeal wall, no thyroid cartilage erosion, right retropharyngeal node, deep lobe parotid FDG avid mass, bilateral cervical nodes (right level II-IV, left level II-III), no distant disease, L mandibular canine with periapical abscess. R temporal lobe change suggestive of prior infarct. 5 mm LLL nodule.  10/10/23 Video swallow.   10/13/23 Gtube. Fell out 12/25. Replaced 1/23  10/16~11/13/23 C1-2 carboplatin paclitaxel. Given due to logistical issues with pre-chemoradiation evaluation that would have delayed start of any treatment substantially. Paclitaxel dose reduced to 140 mg/m2 C2 due to neuropathy  10/23/23 CTA. 70% narrowing R carotid carlos/bifurcation similar to 7/28/23 CT. Laterally projecting disc osteophyte complex resulting in mod-severe L vertebral artery narrowing at C3-4 level.  10/23/23 Brain MRI. No mets. Bilateral frontal and  temporal lobe encephalomalacia, R > L. Mild volume loss.   11/28/23 PET/CT. Good [place].   12/19/23~2/8/24  Chemoradiation with weekly carboplatin paclitaxel. 7000 cGy (Dr. Juwan Cordova). Not a candidate for cisplatin.    ASSESSMENT AND PLAN  SCC supraglottis, [id number] (IRON): Declined surgery. 2 cycles of induction due to logistical issues, etc., with good [place]. Finishing chemoradiation today. Discussed anticipated recovery. I will see him in 3 months after the PET/CT with labs, which will be coordinated through Dr. Hardin. Labs again in 6 weeks to ensure CBC, etc., are recovering as they should be but I'm not that concerned. I don't think he needs to be seen in oncology clinic next week.     Pulmonary nodules: Will be re-evaluated on 3 month post treatment PET/CT.     Mucositis, odynophagia: Manageable. No changes made today. Not eating or drinking anything.     Dysphagia, aspiration, malnutrition: Encouraged swallowing exercises, etc., as soon as he's able. Hopefully next week. Continue TF    Neuropathy: Worse after C1 chemo, no worse after C2, will monitor closely with weekly carboplatin paclitaxel. B12 10/6/23 was ok. TSH has been ok. I don't think we need to check SPEP/UPEP, etc., right now. No changes during chemo    Radiation dermatitis: Continue aquaphor. Got silver sulfadiazine    Ibuprofen use, arthritis: Despite emphasizing the significant danger of taking the high doses of ibuprofen he was taking, he continued to take about 1000 mg per day intermittently throughout treatment.    R KAILASH: Met with Dr. Tapia, vascular surgery fellow 8/21. Recommended treating the cancer first, asa 81 and high dose atorva, follow up 6 months with repeat US. PET/CT showed possible old R temporal infarct. Has follow up 2/19    H/o food impaction: lower esophagus, monitor    30 minutes spent by me on the date of the encounter doing chart review, history and exam, documentation and further activities per the note     Xochilt Louise,  MD  Associate Professor of Medicine  Hematology, Oncology and Transplantation      SUBJECTIVE  Mr. Thakkar (Ace) returns on the last day of chemoradiation.   Happy to be done  Not eating/drinking anything - too painful  Gtube working fine  Going to IR today to have the other T tack removed  Some constipation. Manageable  Secretions - lots of work but manageable   No change in neuropathy  Tired but ok    PAST MEDICAL HISTORY  SCC as above  GERD  H/o food impaction in 2008 and 2014 (steak) related to Schatzki ring on EGD 10/13/14  DM2. Last A1c about 7.6 sometime in summer 2023 and Nov 2023  Dyslipidemia  Possible prior L temporal CVA  HTN  H.o spinal meningitis as a baby -   R arm surgery  Bone graft to forehead  Ex lap   Tinnitus secondary to treatment for meningitis or from menigitis - bilateral   Hearing loss -- audiogram 10/2/2023  Numbness in the toes - mostly big toes   Crushing pills   No muscle aches or pains     CURRENT OUTPATIENT MEDICATIONS  Reviewed    ALLERGIES  No Known Allergies     PHYSICAL EXAM  /60 (BP Location: Right arm, Patient Position: Sitting, Cuff Size: Adult Regular)   Pulse 83   Temp 99.1  F (37.3  C) (Oral)   Resp 16   Wt 70.9 kg (156 lb 3.2 oz)   SpO2 99%   BMI 25.22 kg/m    GEN: NAD  HEENT: EOMI, no icterus, injection or pallor. Oropharynx is clear.  NECK: radiation dermatitis bilaterally   ABDOMEN: Gtube site looks fine  EXT: warm, well perfused, no edema  NEURO: alert    LABORATORY AND IMAGING STUDIES    Labs since last visit with me were independently reviewed and interpreted by me    Imaging was personally reviewed and interpreted by me

## 2024-02-28 ENCOUNTER — VIRTUAL VISIT (OUTPATIENT)
Dept: SPEECH THERAPY | Facility: CLINIC | Age: 69
End: 2024-02-28
Payer: COMMERCIAL

## 2024-02-28 DIAGNOSIS — R13.12 DYSPHAGIA, OROPHARYNGEAL PHASE: Primary | ICD-10-CM

## 2024-02-28 DIAGNOSIS — R13.13 PHARYNGEAL DYSPHAGIA: ICD-10-CM

## 2024-02-28 DIAGNOSIS — C10.9 SQUAMOUS CELL CARCINOMA OF OROPHARYNX (H): ICD-10-CM

## 2024-02-28 PROCEDURE — 92526 ORAL FUNCTION THERAPY: CPT | Mod: GN | Performed by: SPEECH-LANGUAGE PATHOLOGIST

## 2024-03-02 ENCOUNTER — HEALTH MAINTENANCE LETTER (OUTPATIENT)
Age: 69
End: 2024-03-02

## 2024-03-06 ENCOUNTER — TELEPHONE (OUTPATIENT)
Dept: RADIATION ONCOLOGY | Facility: CLINIC | Age: 69
End: 2024-03-06
Payer: COMMERCIAL

## 2024-03-06 NOTE — TELEPHONE ENCOUNTER
Oncology Nutrition Services:    Called Rosalio Nj's wife today per request of RNCC as she has a question prior to upcoming follow up with RD on 3/11.     He continues reliant on his FT for 100% of his nutrition.  Ondina has been giving him one muscle milk or protein shake in addition to his TF formula, Osmolite 1.5 felicia 6 per day to help him gain weight.  He has been fluctuating 1-2 lbs.    She wasn't aware that the muscle milk has less calories and will start giving him 7 carton of tube feeding formula (Osmolite) per day versus 6 + one lower felicia protein shake.     Ace has been able to swallow since today only.   He had sips of muscle milk, which took him 5 times to swallow w/o coughing or gagging.     She inquires about Mom's meals as she has these on hold since he has been using his feeding tube. She was told by Mom's meals rep that they can provide pureed foods and foods through the feeding tube as well.  She inquires about whether or not this would be a good option for his feeding tube.     Current weight on home scale:   Wt Readings from Last 10 Encounters:   02/08/24 70.9 kg (156 lb 3.2 oz)   02/06/24 72.6 kg (160 lb)   02/05/24 69.7 kg (153 lb 9.6 oz)   01/30/24 71.7 kg (158 lb)   01/30/24 70.8 kg (156 lb)   01/29/24 71.4 kg (157 lb 6.4 oz)   01/29/24 71 kg (156 lb 9.6 oz)   01/23/24 72.8 kg (160 lb 8 oz)   01/23/24 72.6 kg (160 lb)   01/22/24 74.9 kg (165 lb 3.2 oz)     Interventions:  - Discussed that the goal for Ace would be to start weaning off tube feeding in the next 1-2 weeks and that it might be better to stay on the same formula regimen for now as it has been working well for him.   --Reviewed pureed foods, Mom's meals as a great option as he starts to transition from tube feeding back to oral intake. Discussed that pureed food blends for tube feeding tend to be lower in calories than standard formulas.   --Encouraged to use 7 cartons of Osmolite 1.5 (355 felicia per carton) versus one muscle milk (150  felicia per carton) to help increase calories for desired wt gain.     Follow-up scheduled for 3/11.         Meka Lee RD, , LD  [facility]  [phone]

## 2024-03-11 ENCOUNTER — VIRTUAL VISIT (OUTPATIENT)
Dept: ONCOLOGY | Facility: CLINIC | Age: 69
End: 2024-03-11
Attending: DIETITIAN, REGISTERED
Payer: COMMERCIAL

## 2024-03-11 DIAGNOSIS — C32.1 SCC (SQUAMOUS CELL CARCINOMA) OF SUPRAGLOTTIS (H): Primary | ICD-10-CM

## 2024-03-11 PROCEDURE — 97803 MED NUTRITION INDIV SUBSEQ: CPT | Mod: GT,95 | Performed by: DIETITIAN, REGISTERED

## 2024-03-11 NOTE — PROGRESS NOTES
Video-Visit Details     Type of service:  Video Visit     Video Start Time (time video started): 10:42am     Video End Time (time video stopped): 10:58am    Originating Location (pt. Location): Home     Distant Location (provider location):  [facility]      Mode of Communication:  Video Conference via [facility] NUTRITION SERVICES - REASSESSMENT NOTE   EVALUATION OF PREVIOUS PLAN OF CARE:   Time Spent: 16 minutes  Visit Type: return OTV  Pt accompanied by: his wife, Ondina  Referring Physician: Wilfred  C10.9 (ICD-10-CM) - Squamous cell carcinoma of oropharynx (H)   E11.9 (ICD-10-CM) - Type 2 diabetes mellitus without complications (H)      Chemotherapy: Started 10/16 - Taxol - completed  Radiation: completed  PEG tube: placed 1/23/24    Monitoring from previous assessment:   -Food/Fluid intake - He has been drinking a little water the past couple of days, ~2 oz per day.  He tells me that it feels like something is scarping on the back of his throat. This has been his biggest barrier to eating.     -EN intake/tolerance- Ace has increased his formula intake from 6 cartons/day to 7 cartons/day per Writer recs from 3/8.    With this, he has been able to start gaining weight.     Formula: Osmolite 1.5 felicia  Volume: 7 cartons/day (1660 mL, 1267 ml free water)  Provisions:  2485kcal (35kcal/kg), 105 g protein (1.5g/kg), 336g CHO, 0g fiber    -Weight trends - reported home scale wt trends:   3/8 152.8  3/9 153.6  3/10 156 lb  3/11 155 lb (had large BM)  Wt Readings from Last 10 Encounters:   02/08/24 70.9 kg (156 lb 3.2 oz)   02/06/24 72.6 kg (160 lb)   02/05/24 69.7 kg (153 lb 9.6 oz)   01/30/24 71.7 kg (158 lb)   01/30/24 70.8 kg (156 lb)   01/29/24 71.4 kg (157 lb 6.4 oz)   01/29/24 71 kg (156 lb 9.6 oz)   01/23/24 72.8 kg (160 lb 8 oz)   01/23/24 72.6 kg (160 lb)   01/22/24 74.9 kg (165 lb 3.2 oz)     Previous Goals:   EN to meet 100% estimated nutrition needs via PEG tube (6  cartons/day)  Fluids goals - 6-8 cups water/electrolytes via PO and PEG tube as able  Evaluation: Not met   Previous Nutrition Diagnosis:   Inadequate enteral nutrition infusion related to timing, early satiety, fatigue as evidenced by pt consuming ~50% of his nutrition needs several times a week   Evaluation: improved/comleted  NEW FINDINGS:   No new findings   CURRENT NUTRITION DIAGNOSIS   Inadequate oral intake related to odynophagia and dysphagia as evidenced by pt reliant on EN via PEG tube to meet 100% estimated nutrition needs.   INTERVENTIONS   EN Composition, EN Schedule, and Feeding Tube Flush - reviewed nutrition goals in detail for supporting healing post radiation.  Encouraged to continue taking 7 cartons of formula/day for repletion.   Reviewed hydration. Encouraged to aim for at least 6 cups water/electrolyte fluids per day.   Goals   1.EN to meet 100% estimated nutrition needs via PEG tube (7 cartons/day)  2.Fluids goals - 6  cups water/electrolytes PEG tube as able    Follow up/Monitoring: one month follow up for potential wean of TF  Enteral Nutrition intake and Weight    Meka Lee RD, , LD  [facility]  [phone]

## 2024-03-11 NOTE — LETTER
3/11/2024         RE: Ko Thakkar  [address]  [address]  [place] [address]        Dear Colleague,    Thank you for referring your patient, Ko Thakkar, to the [facility]. Please see a copy of my visit note below.    Video-Visit Details     Type of service:  Video Visit     Video Start Time (time video started): 10:42am     Video End Time (time video stopped): 10:58am    Originating Location (pt. Location): Home     Distant Location (provider location):  [facility] NUTRITION SERVICES      Mode of Communication:  Video Conference via [facility] NUTRITION SERVICES - REASSESSMENT NOTE   EVALUATION OF PREVIOUS PLAN OF CARE:   Time Spent: 16 minutes  Visit Type: return OTV  Pt accompanied by: his wife, Ondina  Referring Physician: Wilfred  C10.9 (ICD-10-CM) - Squamous cell carcinoma of oropharynx (H)   E11.9 (ICD-10-CM) - Type 2 diabetes mellitus without complications (H)      Chemotherapy: Started 10/16 - Taxol - completed  Radiation: completed  PEG tube: placed 1/23/24    Monitoring from previous assessment:   -Food/Fluid intake - He has been drinking a little water the past couple of days, ~2 oz per day.  He tells me that it feels like something is scarping on the back of his throat. This has been his biggest barrier to eating.     -EN intake/tolerance- Ace has increased his formula intake from 6 cartons/day to 7 cartons/day per Writer recs from 3/8.    With this, he has been able to start gaining weight.     Formula: Osmolite 1.5 felicia  Volume: 7 cartons/day (1660 mL, 1267 ml free water)  Provisions:  2485kcal (35kcal/kg), 105 g protein (1.5g/kg), 336g CHO, 0g fiber    -Weight trends - reported home scale wt trends:   3/8 152.8  3/9 153.6  3/10 156 lb  3/11 155 lb (had large BM)  Wt Readings from Last 10 Encounters:   02/08/24 70.9 kg (156 lb 3.2 oz)   02/06/24 72.6 kg (160 lb)   02/05/24 69.7 kg (153 lb 9.6 oz)   01/30/24 71.7 kg (158 lb)    01/30/24 70.8 kg (156 lb)   01/29/24 71.4 kg (157 lb 6.4 oz)   01/29/24 71 kg (156 lb 9.6 oz)   01/23/24 72.8 kg (160 lb 8 oz)   01/23/24 72.6 kg (160 lb)   01/22/24 74.9 kg (165 lb 3.2 oz)     Previous Goals:   EN to meet 100% estimated nutrition needs via PEG tube (6 cartons/day)  Fluids goals - 6-8 cups water/electrolytes via PO and PEG tube as able  Evaluation: Not met   Previous Nutrition Diagnosis:   Inadequate enteral nutrition infusion related to timing, early satiety, fatigue as evidenced by pt consuming ~50% of his nutrition needs several times a week   Evaluation: improved/comleted  NEW FINDINGS:   No new findings   CURRENT NUTRITION DIAGNOSIS   Inadequate oral intake related to odynophagia and dysphagia as evidenced by pt reliant on EN via PEG tube to meet 100% estimated nutrition needs.   INTERVENTIONS   EN Composition, EN Schedule, and Feeding Tube Flush - reviewed nutrition goals in detail for supporting healing post radiation.  Encouraged to continue taking 7 cartons of formula/day for repletion.   Reviewed hydration. Encouraged to aim for at least 6 cups water/electrolyte fluids per day.   Goals   1.EN to meet 100% estimated nutrition needs via PEG tube (7 cartons/day)  2.Fluids goals - 6  cups water/electrolytes PEG tube as able    Follow up/Monitoring: one month follow up for potential wean of TF  Enteral Nutrition intake and Weight    Meka Lee RD, , LD  [facility]  [phone]

## 2024-03-12 NOTE — PROGRESS NOTES
[facility]        Patient is here for a 6 month follow up  to discuss Carotid stenosis. Needs R CEA. Squamous cell carcinoma of oropharynx     Ended cancer treatment 1 month ago. Has appointment tomorrow and Wednesday. Currently has feeding tube.     Pt is currently taking Aspirin and Statin.    The provider has been notified that the patient has no concerns.     Questions patient would like addressed today are: N/A.    Refills are needed: No    Has homecare services and agency name:  No

## 2024-03-12 NOTE — PATIENT INSTRUCTIONS
Linda Connell,    Thank you for entrusting your care with us today. After your visit today with MD Tj Leonard this is the plan that was discussed at your appointment.    You will follow-up in 1 month to discuss TCAR    I am including additional information on these things and our contact information if you have any questions or concerns.   Please do not hesitate to reach out to us if you felt we did not answer your questions or you are unsure of the treatment plan after your visit today. Our number is [phone].Thank you for trusting us with your care.         Again thank you for your time.

## 2024-03-17 NOTE — PROGRESS NOTES
Dear Dr. Vicente:    I had the pleasure of seeing Ko Thakkar in follow-up today at the [facility].     History of Present Illness:   Ko Thakkar is a 67 year old man with a T4N2c SCC of the oropharynx.    He was seen by [facility] on 4/3/2023 at the request of his PCP for dysphagia to solid foods. They discussed EGD vs swallow study.     He had an EGD on 5/16/2023 with GI and found to be H pylori positive, started on medication.     He saw SLP on 7/11/2023 for a video swallow study at [facility]. He was noted to have poor epiglottic inversion and was recommended for further evaluation. He did have penetration and aspiration on the swallow study.    He had a CT neck on 7/28/2023 which showed a supraglottic mass measuring 2.2 x 1.4 x 2.3 cm with concern for paraglottic involvement, no cartilage erosion, enlarged bilateral level IB, IIA, III, and right level IV nodes per radiology. He was also noted to have 70% ICA stenosis with concerns for high risk plaque.     He was seen as a new patient in clinic on 8/9/2023 and was noted on exam  to have extensive tumor involving the oropharynx, supraglottis, hypopharynx, with vocal cord paralysis. He elected for an FNA of the neck with IR which was performed on 8/29/2023. Final pathology was consistent with SCC (not enough cells for p16 status). He had a PET scan on 9/6/2023 which showed FDG avid mass of the right tonsil/lateral pharyngeal wall/glossotonsillar sulcus/tongue base, right AE fold, right epiglottis, right pyriform sinus, right posterolateral pharyngeal wall, posterior pharyngeal wall, no thyroid cartilage erosion, right retropharyngeal node, deep lobe parotid FDG avid mass, bilateral cervical nodes (right level II-IV, left level II-III), no distant disease. His case was reviewed at tumor board with recommendation for chemoradiation.      Interval history:  He comes in today for follow-up. He was  seen in clinic in September 2023. He had PEG placed on 10/13/2023 which fell out 12/25/2023 and replaced 1/23/2024. He was started on induction chemotherapy with carbo/taxol with 2 cycles due to radiation/medical oncology preference, performed 10/16/2023 to 11/13/2023. He had post-induction PET scan on 11/28/2023 which showed partial response with residual disease present. He had definitive chemoradiation with Dr Louise and Dr Cordova from 12/19/2023 to 2/8/2024. He received 7000 cGy and 6 cycles of weekly carbo/taxol. He says he is feeling ok. He is gaining some weight. He has throat pain. He has pain with swallowing. He is taking nothing by mouth because he can't swallow. He feels like the pain is prohibitive for swallowing. He says swallowing things with sugar or artifical flavor it burns. He has not tried taking pain medication and then eating. He is taking tylenol/ibuprofen and magic mouthrinse. He is on the gabapentin once daily. He does not want to do narcotics. He sometimes cannot swallow his secretions. He is doing 7 cans of tube feeds per day. His energy is low. He is using biotene mouthrinse. He is doing salt/soda rinses daily. He is moving to [place] in June.     He is accompanied by his significant other.      MEDICATIONS:     Current Outpatient Medications   Medication Sig Dispense Refill    albuterol (PROAIR HFA/PROVENTIL HFA/VENTOLIN HFA) 108 (90 Base) MCG/ACT inhaler       amLODIPine (NORVASC) 5 MG tablet Take 5 mg by mouth daily      aspirin (ASA) 81 MG chewable tablet Take 1 tablet (81 mg) by mouth daily 90 tablet 3    atorvastatin (LIPITOR) 80 MG tablet Take 1 tablet (80 mg) by mouth daily 90 tablet 3    diphenhydrAMINE (BENADRYL) 12.5 MG/5ML liquid Taken prior to chemo      empagliflozin (JARDIANCE) 25 MG TABS tablet Take 25 mg by mouth daily      gabapentin (NEURONTIN) 250 MG/5ML solution Take 24 mLs (1,200 mg) by mouth 3 times daily 470 mL 3    hydrochlorothiazide (HYDRODIURIL) 25 MG tablet  Take 25 mg by mouth daily      ibuprofen (ADVIL/MOTRIN) 200 MG tablet Take 1,400 mg by mouth every 4 hours as needed for pain      insulin NPH-Regular 70/30 (HUMULIN 70/30;NOVOLIN 70/30) (70-30) 100 UNIT/ML vial 50 units in the morning and 30 units in the evening      liraglutide (VICTOZA) 18 MG/3ML solution Inject Subcutaneous daily      lisinopril (ZESTRIL) 40 MG tablet Take 40 mg by mouth daily      magic mouthwash (ENTER INGREDIENTS IN COMMENTS) suspension Swish, gargle, and spit one to two teaspoonfuls every six hours as needed. May be swallowed if esophageal involvement. 240 mL 3    metFORMIN (GLUCOPHAGE) 500 MG tablet [METFORMIN (GLUCOPHAGE) 500 MG TABLET] Take 500 mg by mouth 2 (two) times a day with meals.      pantoprazole (PROTONIX) 20 MG EC tablet Take 2 tablets (40 mg) by mouth daily 30 tablet 2    silver sulfADIAZINE (SILVADENE) 1 % external cream Apply topically daily 50 g 3    therapeutic multivitamin (THERAGRAN) tablet Take 1 tablet by mouth daily      TRUEPLUS 5-BEVEL PEN NEEDLES 32G X 4 MM miscellaneous       TRULICITY 1.5 MG/0.5ML pen       gabapentin (NEURONTIN) 300 MG capsule Take 4 capsules (1,200 mg) by mouth 3 times daily (Patient not taking: Reported on 2/5/2024) 720 capsule 1    insulin lispro protamine-insulin lispro (HUMALOG MIX 75/25 KWIKPEN) (75-25) 100 UNIT/ML pen  (Patient not taking: Reported on 2/5/2024)      potassium chloride (KLOR-CON) 20 MEQ packet Take 20 mEq by mouth 2 times daily (Patient not taking: Reported on 2/5/2024) 60 each 3       ALLERGIES:  No Known Allergies    HABITS/SOCIAL HISTORY:   History of alcohol sober for 24 years. Former drug abuse. Previously smoked about 23 years ago, 4 ppd previously, 8 years old started. Intermittent chewing tobacco use in the past.   Works as a .   Lives with wife.   Worked as .    Social History     Socioeconomic History    Marital status:      Spouse name: Not on file    Number of children: Not on file    Years  of education: Not on file    Highest education level: Not on file   Occupational History    Not on file   Tobacco Use    Smoking status: Former     Types: Cigarettes     Quit date: 10/12/1999     Years since quittin.4     Passive exposure: Past    Smokeless tobacco: Never   Substance and Sexual Activity    Alcohol use: No    Drug use: No    Sexual activity: Not on file   Other Topics Concern    Not on file   Social History Narrative    Not on file     Social Determinants of Health     Financial Resource Strain: Not on file   Food Insecurity: Not on file   Transportation Needs: Not on file   Physical Activity: Not on file   Stress: Not on file   Social Connections: Not on file   Interpersonal Safety: Not on file   Housing Stability: Not on file       PAST MEDICAL HISTORY:   Past Medical History:   Diagnosis Date    Cancer (H) 2024    throat    Diabetes (H)     Hypertension         PAST SURGICAL HISTORY:   Past Surgical History:   Procedure Laterality Date    ESOPHAGOSCOPY, GASTROSCOPY, DUODENOSCOPY (EGD), COMBINED N/A 10/12/2014    Procedure: ESOPHAGOGASTRODUODENOSCOPY;  Surgeon: Jh Ventura MD;  Location: [facility];  Service:     IR FINE NEEDLE ASPIRATION W ULTRASOUND  2023    IR FINE NEEDLE ASPIRATION W ULTRASOUND  10/13/2023    IR FOLLOW UP VISIT OUTPATIENT  2024    IR GASTROSTOMY TUBE PERCUTANEOUS PLCMNT  10/13/2023    IR GASTROSTOMY TUBE PERCUTANEOUS PLCMNT  2024    LAPAROTOMY EXPLORATORY Left     spleen       FAMILY HISTORY:  No family history on file.    REVIEW OF SYSTEMS:  12 point ROS was negative other than the symptoms noted above in the HPI.  Patient Supplied Answers to Review of Systems      2023     9:07 AM    ENT ROS   Constitutional Problems with sleep   Neurology Dizzy spells    Headache   Psychology Frequently feeling anxious   Ears, Nose, Throat Ringing/noise in ears    Sore throat    Trouble swallowing    Hoarseness   Cardiopulmonary Cough         PHYSICAL  "EXAMINATION:   BP (!) 161/70   Pulse 96   Ht 1.682 m (5' 6.22\")   Wt 73.5 kg (162 lb 1.6 oz)   SpO2 98%   BMI 25.99 kg/m     Appearance:   normal; NAD, age-appropriate appearance, well-developed, normal habitus   Communication:   normal; communicates verbally, normal voice quality   Head/Face:   inspection -  Normal; no scars or visible lesions   Salivary glands -  Normal size, no tenderness, swelling, or palpable masses   Facial strength -  Normal and symmetric   Skin:  normal, no rash   Ears:  auricle (AD) -  normal  EAC (AD) -  normal  TM (AD) -  Normal, no effusion  auricle (AS) -  normal  EAC (AS) -  normal  TM (AS) -  effusion  Normal clinical speech reception   Nose:  Ext. inspection -  Normal  Internal Inspection -  Normal mucosa, septum, and turbinates   Nasopharynx:  Normal mucosa, no masses   Oral Cavity:  lips -  Normal mucosa, oral competence, and stoma size   edentulous, healthy gingival mucosa   Hard palate, buccal, floor of mouth mucosa normal   Tongue - normal movement, no lesions   Oropharynx:  Radiation changes  Mucositis on right oropharynx  Thick pooled secretions  No obvious tumor   Hypopharynx:  Radiation changes to mucosa  Unable to visualize pyriform sinuses due to edema   Larynx:  Radiation edema to the epiglottis, AE folds, arytenoids  Thick secretions  Mucositis on epiglottis  Bilaterally mobile vocal cords   Neck: No palpable masses  lymphedema   Lymphatic:  no abnormal nodes   Cardiovascular:  warm, pink, well-perfused extremities without swelling, tenderness, or edema   Respiratory:  Normal respiratory effort, no stridor   Neuro/Psych.:  mood/affect -  normal  mental status -  normal         PROCEDURES:   Flexible fiberoptic laryngoscopy: Scope exam was indicated due to oropharyngeal cancer. Verbal consent was obtained. The nasal cavity was prepped with an aerosolized solution of topical anesthetic and vasoconstrictive agent. The scope was passed through the anterior nasal cavity " and advanced. Inspection of the nasopharynx revealed no gross abnormality. There are radiation changes to the mucosa throughout, thick secretions. There is mucositis present on the right lateral oropharynx and epiglottis. There is radiation edema of the epiglottis, AE folds, arytenoids. Inspection of the larynx revealed bilaterally mobile vocal cords. Pyriform sinuses unable to be visualized due to edema. The airway is patent. Procedure tolerated well with no immediate complications noted.    RESULTS REVIEWED:   I reviewed note from oncology, PET report    Care discussed with SLP      IMPRESSION AND PLAN:   Ko Thakkar is a 67 year old man with a T4N2c SCC of the oropharynx with sameer metastasis. He had induction chemotherapy followed by definitive chemoradiation completed 2/8/2024.    He is recovering from his treatment. He is taking nothing by mouth, restricted due to pain. He was strongly advised that he needs to work on PO intake. He is certainly not appropriate for PO intake. We discussed options for pain management. He is refusing narcotics. He was advised to try taking tylenol or ibuprofen before trying to eat to help with his pain. We discussed that he has residual mucositis including in his oropharynx and supraglottis which is causing his pain. He was advised to do diligent salt/soda rinses multiple times per day.     He was seen by SLP today. They will do follow-up visits to try to make progress with his PO.    Lymphedema referral will be placed after PET scan.     His PET scan will be due around 5/2/2024.    He will need his TSH checked about 3 months posttreatment.    I will see him back on 5/6/2024 with PET scan and labs.    He is planning on moving to [place] this summer. Advised that once he moves he needs to establish care with a H&N surgeon, likely would be through [facility]. Once he has found a team to see he can sign a release of information and records can be released from our team to his new  care team to ensure records available.     Thank you very much for the opportunity to participate in the care of your patient.      Denia Hardin MD  Otolaryngology- Head & Neck Surgery      This note was dictated with voice recognition software and then edited. Please excuse any unintentional errors.         CC:  Wojciech Vicente MD  [facility]   [address] [id number]  [facility] [address]      Alton Mota MD  [facility]   [address]  [facility] [address]      Xochilt Louise MD  Department of Medical Oncology  [facility]      Juwan Cordova MD  Department of Radiation Oncology  [facility]

## 2024-03-18 ENCOUNTER — OFFICE VISIT (OUTPATIENT)
Dept: VASCULAR SURGERY | Facility: CLINIC | Age: 69
End: 2024-03-18
Attending: SURGERY
Payer: COMMERCIAL

## 2024-03-18 ENCOUNTER — ANCILLARY PROCEDURE (OUTPATIENT)
Dept: VASCULAR ULTRASOUND | Facility: CLINIC | Age: 69
End: 2024-03-18
Attending: SURGERY
Payer: COMMERCIAL

## 2024-03-18 VITALS — DIASTOLIC BLOOD PRESSURE: 73 MMHG | SYSTOLIC BLOOD PRESSURE: 148 MMHG | HEART RATE: 100 BPM | OXYGEN SATURATION: 98 %

## 2024-03-18 DIAGNOSIS — I65.21 STENOSIS OF RIGHT CAROTID ARTERY: ICD-10-CM

## 2024-03-18 DIAGNOSIS — I65.21 OCCLUSION OF RIGHT CAROTID ARTERY: ICD-10-CM

## 2024-03-18 DIAGNOSIS — I65.21 OCCLUSION OF RIGHT CAROTID ARTERY: Primary | ICD-10-CM

## 2024-03-18 PROCEDURE — 93880 EXTRACRANIAL BILAT STUDY: CPT

## 2024-03-18 PROCEDURE — 99213 OFFICE O/P EST LOW 20 MIN: CPT

## 2024-03-18 PROCEDURE — 93880 EXTRACRANIAL BILAT STUDY: CPT | Mod: 26 | Performed by: SURGERY

## 2024-03-18 PROCEDURE — G0463 HOSPITAL OUTPT CLINIC VISIT: HCPCS | Mod: 25

## 2024-03-18 ASSESSMENT — PAIN SCALES - GENERAL: PAINLEVEL: EXTREME PAIN (8)

## 2024-03-18 NOTE — PROGRESS NOTES
Vascular Surgery Clinic Followup Note    LOCATION:  [facility]    Ko Thakkar  Medical Record #:  [id number]  YOB: 1955  Age:  68 year old     Date of Service: 3/18/2024         Assessment and Plan:    68 year old male with h/o  seen today for hypertension, type 2 diabetes, oropharyngeal squamous cell carcinoma and recently completed chemoradiation therapy, seen for follow-up of asymptomatic high-grade right ICA stenosis. Remains without stroke or TIA symptoms. We discussed that carotid endarterectomy would not be recommended due to his history of radiation therapy and risk of nerve injury, however we may consider transcarotid artery revascularization (TCAR) if his life expectancy is at least 5 years, which is the minimum life expectancy required to benefit from carotid revascularization in the setting of asymptomatic disease.     We will plan to see him back in vascular surgery clinic in one month after he addresses the life expectancy question with his cancer team at his upcoming appointment this week.    He will continue on aspirin and high-dose statin therapy and will follow-up in vascular surgery clinic in one month to discuss further.    Patient was discussed with staff, Dr. Leonard.    Layo Diaz MD           Interval History:   Ko Thakkar is a 68 year old male with a history of high-grade right carotid stenosis and oropharyngeal squamous cell carcinoma. He remains asymptomatic from the right ICA stenosis and has now completed chemoradiation therapy. He has appointments with his radiation oncologist and his ENT physician Dr. Hardin this week. Denies any amaurosis fugax, unilateral weakness, numbness, facial droop, or word finding difficulty.          Medications:     Current Outpatient Medications:     albuterol (PROAIR HFA/PROVENTIL HFA/VENTOLIN HFA) 108 (90 Base) MCG/ACT inhaler, , Disp: , Rfl:     amLODIPine (NORVASC) 5 MG tablet, Take 5 mg by  mouth daily, Disp: , Rfl:     aspirin (ASA) 81 MG chewable tablet, Take 1 tablet (81 mg) by mouth daily, Disp: 90 tablet, Rfl: 3    atorvastatin (LIPITOR) 80 MG tablet, Take 1 tablet (80 mg) by mouth daily, Disp: 90 tablet, Rfl: 3    diphenhydrAMINE (BENADRYL) 12.5 MG/5ML liquid, Taken prior to chemo, Disp: , Rfl:     empagliflozin (JARDIANCE) 25 MG TABS tablet, Take 25 mg by mouth daily, Disp: , Rfl:     gabapentin (NEURONTIN) 250 MG/5ML solution, Take 24 mLs (1,200 mg) by mouth 3 times daily, Disp: 470 mL, Rfl: 3    hydrochlorothiazide (HYDRODIURIL) 25 MG tablet, Take 25 mg by mouth daily, Disp: , Rfl:     ibuprofen (ADVIL/MOTRIN) 200 MG tablet, Take 1,400 mg by mouth every 4 hours as needed for pain, Disp: , Rfl:     insulin NPH-Regular 70/30 (HUMULIN 70/30;NOVOLIN 70/30) (70-30) 100 UNIT/ML vial, 50 units in the morning and 30 units in the evening, Disp: , Rfl:     liraglutide (VICTOZA) 18 MG/3ML solution, Inject Subcutaneous daily, Disp: , Rfl:     lisinopril (ZESTRIL) 40 MG tablet, Take 40 mg by mouth daily, Disp: , Rfl:     magic mouthwash (ENTER INGREDIENTS IN COMMENTS) suspension, Swish, gargle, and spit one to two teaspoonfuls every six hours as needed. May be swallowed if esophageal involvement., Disp: 240 mL, Rfl: 3    metFORMIN (GLUCOPHAGE) 500 MG tablet, [METFORMIN (GLUCOPHAGE) 500 MG TABLET] Take 500 mg by mouth 2 (two) times a day with meals., Disp: , Rfl:     pantoprazole (PROTONIX) 20 MG EC tablet, Take 2 tablets (40 mg) by mouth daily, Disp: 30 tablet, Rfl: 2    silver sulfADIAZINE (SILVADENE) 1 % external cream, Apply topically daily, Disp: 50 g, Rfl: 3    therapeutic multivitamin (THERAGRAN) tablet, Take 1 tablet by mouth daily, Disp: , Rfl:     TRUEPLUS 5-BEVEL PEN NEEDLES 32G X 4 MM miscellaneous, , Disp: , Rfl:     TRULICITY 1.5 MG/0.5ML pen, , Disp: , Rfl:     gabapentin (NEURONTIN) 300 MG capsule, Take 4 capsules (1,200 mg) by mouth 3 times daily (Patient not taking: Reported on 2/5/2024),  Disp: 720 capsule, Rfl: 1    insulin lispro protamine-insulin lispro (HUMALOG MIX 75/25 KWIKPEN) (75-25) 100 UNIT/ML pen, , Disp: , Rfl:     potassium chloride (KLOR-CON) 20 MEQ packet, Take 20 mEq by mouth 2 times daily (Patient not taking: Reported on 2/5/2024), Disp: 60 each, Rfl: 3         Physical Exam:   BP (!) 148/73   Pulse 100   SpO2 98%   Wt Readings from Last 1 Encounters:   02/08/24 70.9 kg (156 lb 3.2 oz)     There is no height or weight on file to calculate BMI.    EXAM:  Awake, alert, appropriate  NLB on RA  Bilateral neck radiation skin changes  No carotid bruits  Face symmetric, tongue midline, speech fluent, normal and symmetric strength and sensation to light touch          Data:   Imaging:  Carotid duplex ultrasound today demonstrates right ICA  cm/s, EDV 93 cm/s, ICA/CCA ratio of 2.97, largely unchanged from carotid duplex in August 2023  Left ICA  cm/s, EDV 35 cm/s, ICA/CCA ratio 0.91    CTA Head and neck shows 85% stenosis of R ICA by my measurements using nascet criteria. Approx 5 cm length of cervical right CCA.

## 2024-03-19 ENCOUNTER — OFFICE VISIT (OUTPATIENT)
Dept: RADIATION ONCOLOGY | Facility: CLINIC | Age: 69
End: 2024-03-19
Attending: SURGERY
Payer: COMMERCIAL

## 2024-03-19 VITALS
BODY MASS INDEX: 25.03 KG/M2 | HEART RATE: 78 BPM | DIASTOLIC BLOOD PRESSURE: 71 MMHG | WEIGHT: 155 LBS | SYSTOLIC BLOOD PRESSURE: 119 MMHG

## 2024-03-19 DIAGNOSIS — C32.1 SCC (SQUAMOUS CELL CARCINOMA) OF SUPRAGLOTTIS (H): Primary | ICD-10-CM

## 2024-03-19 DIAGNOSIS — C10.9 SQUAMOUS CELL CARCINOMA OF OROPHARYNX (H): ICD-10-CM

## 2024-03-19 PROCEDURE — G0463 HOSPITAL OUTPT CLINIC VISIT: HCPCS | Performed by: SURGERY

## 2024-03-19 PROCEDURE — 99024 POSTOP FOLLOW-UP VISIT: CPT | Performed by: SURGERY

## 2024-03-19 NOTE — PROGRESS NOTES
Department of Radiation Oncology  [facility]  [facility]  [id number] [address] [place] [id number] [phone]       Radiation Oncology Follow-up Visit  Mar 19, 2024      Ko Thakkar  MRN: [id number]   : 1955     DIAGNOSIS / ID:  Mr. Thakkar is a 68 year old male [id number] oropharyngeal SCC, p16 positive. Tumor involved the right tonsil, posterior pharyngeal wall, and right AE fold, hypopharynx, with known right cord paralysis, with bilateral adenopathy including right RP node, right level IV, and questionable right parotid node (which was bx proven SCC). There is no evidence of any distant disease. He has evidence of swallow dysfunction with dysphagia and aspiration, and voice dysfunction with dysphonia and right cord paralysis.      Given extent of disease as well as potential delays in starting treatment, induction chemotherapy was administered under care of Dr. Louise.  PET CT scan after 2 cycles of carboplatin paclitaxel demonstrates a good partial response without any distant metastatic disease.     INTENT OF RADIOTHERAPY: Definitive      CONCURRENT SYSTEMIC THERAPY: Yes, carbo/taxol (Dr. Louise)         SITE OF TREATMENT: head and neck, bilateral necks      DATES  OF TREATMENT: 23-24     TOTAL DOSE OF TREATMENT / FRACTIONS: 70Gy/35fx     INTERVAL SINCE COMPLETION OF RADIATION THERAPY: 6-8 week visit    SUBJECTIVE:   Patient is still healing slowly from chemotherapy and radiation.  Towards the end of radiation, he became primarily a feeding tube dependent and is now having 6 cartons per day, and his weight has improved since completion of radiation and is now stabilized, still lower than pretreatment weight.  He still is attempting to swallow with liquids and softer foods, but has difficulty due to signs of aspiration as well as right-sided throat pain, currently a 2 out of 10, but can get up to a 5 out of  10.  He states taste has returned, xerostomia is not a big problem for him.  For pain control, he is only using 300 mg of gabapentin at night primarily for sleep.  He does not wish to use any oxycodone or other narcotics.  He does not wish to increase his dose of gabapentin or narcotic use due to prior history as well as wanting to restart his job as a .  He is taking dual action ibuprofen/acetaminophen in the morning with relief in his symptoms, not currently using any Magic mouthwash, minimally dealing rinses.    PHYSICAL EXAM:  /71   Pulse 78   Wt 70.3 kg (155 lb)   BMI 25.03 kg/m    Gen: Alert, in NAD  Eyes: PERRL, EOMI, sclera anicteric  HENT     Head: NC/AT     Ears: No external auricular lesions     Nose/sinus: No rhinorrhea or epistaxis     Oral Cavity/Oropharynx: MMM, oral cavity and oropharyngeal mucositis has nearly resolved  Neck: Supple, full ROM, no LAD, thickening of bilateral neck, areas of hyper and hypopigmentation bilaterally, skin is intact  Pulm: No wheezing, stridor or respiratory distress  CV: Well-perfused, no cyanosis, no pedal edema  Abdominal: Soft, nontender, nondistended, no hepatomegaly  Back: No step-offs or pain to palpation along the thoracolumbar spine, no CVA tenderness  Rectal/: Deferred  Musculoskeletal: Normal bulk and tone   Skin: Normal color and turgor  Neurologic: A/Ox3, CN II-XII intact  Psychiatric: Appropriate mood and affect    LABS AND IMAGING:  Reviewed.    IMPRESSION: Overall, he still healing from a radiation toxicity perspective.  He is still feeding tube dependent, and has inability to tolerate oral intake due to signs of aspiration as well as pain.  We extensively discussed that he will see speech pathology and Dr. Hardin tomorrow which will be helpful in maintaining a safe and effective swallow.  I did discuss pain control with additional measures including gabapentin and oxycodone, however, patient is hesitant about using this.  We did  discuss spacing out his dual action medication over time as well as use of Magic mouthwash and restarting salt and soda rinses.    PLAN:   Follow up with radiation oncology 3 months post completion of radiation after PET CT scan  2.   Follow up with [facility] surgical team (Dr. Hardin) on 3/20/2024, imaging per [facility] team (3 months)  3.   Follow up with medical oncology (Dr. Louise) on May 2024    Juwan Cordova M.D.  Department of Radiation Oncology  [facility]

## 2024-03-19 NOTE — PROGRESS NOTES
FOLLOW-UP VISIT    Patient Name: Ko Thakkar      : 1955     Age: 68 year old        ______________________________________________________________________________     Chief Complaint   Patient presents with    Cancer     Follow up:SCC of Supraglottic:H&N 7000 cGy completed 24     /71   Pulse 78   Wt 70.3 kg (155 lb)   BMI 25.03 kg/m         Pain  Current history of pain associated with this visit:   Intensity: 210  Current: aching and throbbing  Location: Swallowing  Treatment: Gabapentin 300 mg at night and Ibupropen    Labs  Other Labs: No    Imaging  None      Dental:   Most Recent Dental Visit: No    Speech/Swallowing:   Most Recent evaluation or testing: Next wek  Swallowing Restrictions: Clear liquids only    Trismus/Jaw Exercises: Yes    Nutrition:  PEG Intake 7# cans per day, type of solution Osmolite  Weight:   Wt Readings from Last 3 Encounters:   24 70.3 kg (155 lb)   24 70.9 kg (156 lb 3.2 oz)   24 72.6 kg (160 lb)         Oral Symptoms:   Xerostomia:1- Symptomatic without significant dietary alteration; unstimulated saliva flow >0.2 ml/min  Dysphagia: 3- Severely altered eating/swallowing; tube feeding or TPN or hospitalization indicated  Mucositis Oral Symptoms: 0-None  Mucositis: 0- None  Esophagitis:0- None    Other Appointments:     MD Name: Dr Hardin Appointment Date: 24   MD Name: Appointment Date:   MD Name: Appointment Date:   Other Appointment Notes:     Residual Radiation side effect: Dysphagia, PEG dependent. Dry mouth, dry mouth, fatigue             Attending Attestation (For Attendings USE Only)...

## 2024-03-19 NOTE — PROGRESS NOTES
Radiotherapy Treatment Summary              PATIENT: Ko Thakkar  MEDICAL RECORD NO: [id number]   : 1955    DIAGNOSIS / ID:  Mr. Thakkar is a 68 year old male [id number] oropharyngeal SCC, p16 positive. Tumor involved the right tonsil, posterior pharyngeal wall, and right AE fold, hypopharynx, with known right cord paralysis, with bilateral adenopathy including right RP node, right level IV, and questionable right parotid node (which was bx proven SCC). There is no evidence of any distant disease. He has evidence of swallow dysfunction with dysphagia and aspiration, and voice dysfunction with dysphonia and right cord paralysis.      Given extent of disease as well as potential delays in starting treatment, induction chemotherapy was administered under care of Dr. Louise.  PET CT scan after 2 cycles of carboplatin paclitaxel demonstrates a good partial response without any distant metastatic disease.        INTENT OF RADIOTHERAPY: Definitive     CONCURRENT SYSTEMIC THERAPY: Yes, carbo/taxol (Dr. Louise)        SITE OF TREATMENT: head and neck, bilateral necks     DATES  OF TREATMENT: 23-24    TOTAL DOSE OF TREATMENT / FRACTIONS: 70Gy/35fx                                          FOLLOW UP PLAN:  Follow up with Radiation Oncology in 6 weeks  Follow up with Dr. Hardin in 6 weeks, imaging to be ordered by [facility] surgery team  Discussed skin care, oral hygiene, and pain medication taper  Follow up with Dr. Louise 24      Patient Care Team:  Alton Mota MD as PCP - General (Family Medicine)  Denia Hardin MD as Assigned Surgical Provider  Tj Leonard MD as Assigned Heart and Vascular Provider  Juwan Cordova MD as MD (Radiation Oncology)  Diana Brown AuD as Audiologist (Audiology)  Olga Miranda RN as Specialty Care Coordinator (Hematology & Oncology)  Xochilt Louise MD as MD (Hematology & Oncology)  Xochitl Mckeon AuD as Audiologist (Audiology)  Juwan Cordova,  MD as Assigned Cancer Care Provider  Sekou Headley MD as Assigned Palliative Care Provider       Juwan Cordova M.D.  Department of Radiation Oncology  [facility]

## 2024-03-19 NOTE — LETTER
3/19/2024         RE: Ko Thakkar  [address]  [address]  [place] [address]        Dear Colleague,    Thank you for referring your patient, Ko Thakkar, to the [facility] RADIATION ONCOLOGY. Please see a copy of my visit note below.    FOLLOW-UP VISIT    Patient Name: Ko Thakkar      : 1955     Age: 68 year old        ______________________________________________________________________________     Chief Complaint   Patient presents with     Cancer     Follow up:SCC of Supraglottic:H&N 7000 cGy completed 24     /71   Pulse 78   Wt 70.3 kg (155 lb)   BMI 25.03 kg/m         Pain  Current history of pain associated with this visit:   Intensity: 2/10  Current: aching and throbbing  Location: Swallowing  Treatment: Gabapentin 300 mg at night and Ibupropen    Labs  Other Labs: No    Imaging  None      Dental:   Most Recent Dental Visit: No    Speech/Swallowing:   Most Recent evaluation or testing: Next wek  Swallowing Restrictions: Clear liquids only    Trismus/Jaw Exercises: Yes    Nutrition:  PEG Intake 7# cans per day, type of solution Osmolite  Weight:   Wt Readings from Last 3 Encounters:   24 70.3 kg (155 lb)   24 70.9 kg (156 lb 3.2 oz)   24 72.6 kg (160 lb)         Oral Symptoms:   Xerostomia:1- Symptomatic without significant dietary alteration; unstimulated saliva flow >0.2 ml/min  Dysphagia: 3- Severely altered eating/swallowing; tube feeding or TPN or hospitalization indicated  Mucositis Oral Symptoms: 0-None  Mucositis: 0- None  Esophagitis:0- None    Other Appointments:     MD Name: Dr Hardin Appointment Date: 24   MD Name: Appointment Date:   MD Name: Appointment Date:   Other Appointment Notes:     Residual Radiation side effect: Dysphagia, PEG dependent. Dry mouth, dry mouth, fatigue                   Department of Radiation Oncology  [facility]    Health: Cancer Center  [facility]  Physicians  [address] [place] [address] [phone]       Radiation Oncology Follow-up Visit  Mar 19, 2024      Ko Thakkar  MRN: [id number]   : 1955     DIAGNOSIS / ID:  Mr. Thakkar is a 68 year old male [id number] oropharyngeal SCC, p16 positive. Tumor involved the right tonsil, posterior pharyngeal wall, and right AE fold, hypopharynx, with known right cord paralysis, with bilateral adenopathy including right RP node, right level IV, and questionable right parotid node (which was bx proven SCC). There is no evidence of any distant disease. He has evidence of swallow dysfunction with dysphagia and aspiration, and voice dysfunction with dysphonia and right cord paralysis.      Given extent of disease as well as potential delays in starting treatment, induction chemotherapy was administered under care of Dr. Louise.  PET CT scan after 2 cycles of carboplatin paclitaxel demonstrates a good partial response without any distant metastatic disease.     INTENT OF RADIOTHERAPY: Definitive      CONCURRENT SYSTEMIC THERAPY: Yes, carbo/taxol (Dr. Louise)         SITE OF TREATMENT: head and neck, bilateral necks      DATES  OF TREATMENT: 23-24     TOTAL DOSE OF TREATMENT / FRACTIONS: 70Gy/35fx     INTERVAL SINCE COMPLETION OF RADIATION THERAPY: 6-8 week visit    SUBJECTIVE:   Patient is still healing slowly from chemotherapy and radiation.  Towards the end of radiation, he became primarily a feeding tube dependent and is now having 6 cartons per day, and his weight has improved since completion of radiation and is now stabilized, still lower than pretreatment weight.  He still is attempting to swallow with liquids and softer foods, but has difficulty due to signs of aspiration as well as right-sided throat pain, currently a 2 out of 10, but can get up to a 5 out of 10.  He states taste has returned, xerostomia is not a big problem for him.  For pain control, he is only using 300  mg of gabapentin at night primarily for sleep.  He does not wish to use any oxycodone or other narcotics.  He does not wish to increase his dose of gabapentin or narcotic use due to prior history as well as wanting to restart his job as a .  He is taking dual action ibuprofen/acetaminophen in the morning with relief in his symptoms, not currently using any Magic mouthwash, minimally dealing rinses.    PHYSICAL EXAM:  /71   Pulse 78   Wt 70.3 kg (155 lb)   BMI 25.03 kg/m    Gen: Alert, in NAD  Eyes: PERRL, EOMI, sclera anicteric  HENT     Head: NC/AT     Ears: No external auricular lesions     Nose/sinus: No rhinorrhea or epistaxis     Oral Cavity/Oropharynx: MMM, oral cavity and oropharyngeal mucositis has nearly resolved  Neck: Supple, full ROM, no LAD, thickening of bilateral neck, areas of hyper and hypopigmentation bilaterally, skin is intact  Pulm: No wheezing, stridor or respiratory distress  CV: Well-perfused, no cyanosis, no pedal edema  Abdominal: Soft, nontender, nondistended, no hepatomegaly  Back: No step-offs or pain to palpation along the thoracolumbar spine, no CVA tenderness  Rectal/: Deferred  Musculoskeletal: Normal bulk and tone   Skin: Normal color and turgor  Neurologic: A/Ox3, CN II-XII intact  Psychiatric: Appropriate mood and affect    LABS AND IMAGING:  Reviewed.    IMPRESSION: Overall, he still healing from a radiation toxicity perspective.  He is still feeding tube dependent, and has inability to tolerate oral intake due to signs of aspiration as well as pain.  We extensively discussed that he will see speech pathology and Dr. Hardin tomorrow which will be helpful in maintaining a safe and effective swallow.  I did discuss pain control with additional measures including gabapentin and oxycodone, however, patient is hesitant about using this.  We did discuss spacing out his dual action medication over time as well as use of Magic mouthwash and restarting salt and soda  rinses.    PLAN:   Follow up with radiation oncology 3 months post completion of radiation after PET CT scan  2.   Follow up with [facility] surgical team (Dr. Hardin) on 3/20/2024, imaging per [facility] team (3 months)  3.   Follow up with medical oncology (Dr. Louise) on May 2024    Juwan Cordova M.D.  Department of Radiation Oncology  [facility]

## 2024-03-20 ENCOUNTER — APPOINTMENT (OUTPATIENT)
Dept: LAB | Facility: CLINIC | Age: 69
End: 2024-03-20
Payer: COMMERCIAL

## 2024-03-20 ENCOUNTER — THERAPY VISIT (OUTPATIENT)
Dept: SPEECH THERAPY | Facility: CLINIC | Age: 69
End: 2024-03-20
Payer: COMMERCIAL

## 2024-03-20 ENCOUNTER — OFFICE VISIT (OUTPATIENT)
Dept: OTOLARYNGOLOGY | Facility: CLINIC | Age: 69
End: 2024-03-20
Payer: COMMERCIAL

## 2024-03-20 VITALS
DIASTOLIC BLOOD PRESSURE: 70 MMHG | SYSTOLIC BLOOD PRESSURE: 161 MMHG | WEIGHT: 162.1 LBS | HEIGHT: 66 IN | OXYGEN SATURATION: 98 % | BODY MASS INDEX: 26.05 KG/M2 | HEART RATE: 96 BPM

## 2024-03-20 DIAGNOSIS — E03.4 HYPOTHYROIDISM DUE TO ACQUIRED ATROPHY OF THYROID: ICD-10-CM

## 2024-03-20 DIAGNOSIS — R13.12 DYSPHAGIA, OROPHARYNGEAL PHASE: ICD-10-CM

## 2024-03-20 DIAGNOSIS — C10.2 MALIGNANT NEOPLASM OF LATERAL WALL OF OROPHARYNX (H): ICD-10-CM

## 2024-03-20 DIAGNOSIS — C10.9 SQUAMOUS CELL CARCINOMA OF OROPHARYNX (H): Primary | ICD-10-CM

## 2024-03-20 DIAGNOSIS — C32.1 SCC (SQUAMOUS CELL CARCINOMA) OF SUPRAGLOTTIS (H): Primary | ICD-10-CM

## 2024-03-20 LAB
ALBUMIN SERPL BCG-MCNC: 4 G/DL (ref 3.5–5.2)
ALP SERPL-CCNC: 107 U/L (ref 40–150)
ALT SERPL W P-5'-P-CCNC: 19 U/L (ref 0–70)
ANION GAP SERPL CALCULATED.3IONS-SCNC: 9 MMOL/L (ref 7–15)
AST SERPL W P-5'-P-CCNC: 18 U/L (ref 0–45)
BASOPHILS # BLD AUTO: 0 10E3/UL (ref 0–0.2)
BASOPHILS NFR BLD AUTO: 1 %
BILIRUB SERPL-MCNC: 0.2 MG/DL
BUN SERPL-MCNC: 27.7 MG/DL (ref 8–23)
CALCIUM SERPL-MCNC: 9.5 MG/DL (ref 8.8–10.2)
CHLORIDE SERPL-SCNC: 100 MMOL/L (ref 98–107)
CREAT SERPL-MCNC: 0.7 MG/DL (ref 0.67–1.17)
DEPRECATED HCO3 PLAS-SCNC: 30 MMOL/L (ref 22–29)
EGFRCR SERPLBLD CKD-EPI 2021: >90 ML/MIN/1.73M2
EOSINOPHIL # BLD AUTO: 0.4 10E3/UL (ref 0–0.7)
EOSINOPHIL NFR BLD AUTO: 6 %
ERYTHROCYTE [DISTWIDTH] IN BLOOD BY AUTOMATED COUNT: 14.6 % (ref 10–15)
GLUCOSE SERPL-MCNC: 217 MG/DL (ref 70–99)
HCT VFR BLD AUTO: 37.7 % (ref 40–53)
HGB BLD-MCNC: 12 G/DL (ref 13.3–17.7)
IMM GRANULOCYTES # BLD: 0 10E3/UL
IMM GRANULOCYTES NFR BLD: 0 %
LYMPHOCYTES # BLD AUTO: 0.8 10E3/UL (ref 0.8–5.3)
LYMPHOCYTES NFR BLD AUTO: 12 %
MCH RBC QN AUTO: 29.7 PG (ref 26.5–33)
MCHC RBC AUTO-ENTMCNC: 31.8 G/DL (ref 31.5–36.5)
MCV RBC AUTO: 93 FL (ref 78–100)
MONOCYTES # BLD AUTO: 0.8 10E3/UL (ref 0–1.3)
MONOCYTES NFR BLD AUTO: 12 %
NEUTROPHILS # BLD AUTO: 4.8 10E3/UL (ref 1.6–8.3)
NEUTROPHILS NFR BLD AUTO: 69 %
NRBC # BLD AUTO: 0 10E3/UL
NRBC BLD AUTO-RTO: 0 /100
PLATELET # BLD AUTO: 294 10E3/UL (ref 150–450)
POTASSIUM SERPL-SCNC: 4.2 MMOL/L (ref 3.4–5.3)
PROT SERPL-MCNC: 7.1 G/DL (ref 6.4–8.3)
RBC # BLD AUTO: 4.04 10E6/UL (ref 4.4–5.9)
SODIUM SERPL-SCNC: 139 MMOL/L (ref 135–145)
WBC # BLD AUTO: 6.8 10E3/UL (ref 4–11)

## 2024-03-20 PROCEDURE — 31575 DIAGNOSTIC LARYNGOSCOPY: CPT | Performed by: OTOLARYNGOLOGY

## 2024-03-20 PROCEDURE — 99214 OFFICE O/P EST MOD 30 MIN: CPT | Mod: 25 | Performed by: OTOLARYNGOLOGY

## 2024-03-20 PROCEDURE — 85025 COMPLETE CBC W/AUTO DIFF WBC: CPT | Performed by: NURSE PRACTITIONER

## 2024-03-20 PROCEDURE — 92526 ORAL FUNCTION THERAPY: CPT | Mod: GN | Performed by: SPEECH-LANGUAGE PATHOLOGIST

## 2024-03-20 PROCEDURE — 36415 COLL VENOUS BLD VENIPUNCTURE: CPT | Performed by: NURSE PRACTITIONER

## 2024-03-20 PROCEDURE — 80053 COMPREHEN METABOLIC PANEL: CPT | Performed by: NURSE PRACTITIONER

## 2024-03-20 ASSESSMENT — PAIN SCALES - GENERAL: PAINLEVEL: MODERATE PAIN (4)

## 2024-03-20 NOTE — PATIENT INSTRUCTIONS
Follow-up on 5/6/2024 at 12 pm with PET scan and labs. If no PET scan on 5/6 then schedule PET few days prior and schedule follow-up visit as listed.

## 2024-03-20 NOTE — LETTER
3/20/2024       RE: Ko Thakkar  [address]  [address]  [place] [address]     Dear Colleague,    Thank you for referring your patient, Ko Thakkar, to the [facility] E[facility]SE AND THROAT [facility]IN[facility] [place] [facility]. Please see a copy of my visit note below.    Dear Dr. Vicente:    I had the pleasure of seeing Ko Thakkar in follow-up today at the [facility].     History of Present Illness:   Ko Thakkar is a 67 year old man with a T4N2c SCC of the oropharynx.    He was seen by [facility] on 4/3/2023 at the request of his PCP for dysphagia to solid foods. They discussed EGD vs swallow study.     He had an EGD on 5/16/2023 with GI and found to be H pylori positive, started on medication.     He saw SLP on 7/11/2023 for a video swallow study at [facility]. He was noted to have poor epiglottic inversion and was recommended for further evaluation. He did have penetration and aspiration on the swallow study.    He had a CT neck on 7/28/2023 which showed a supraglottic mass measuring 2.2 x 1.4 x 2.3 cm with concern for paraglottic involvement, no cartilage erosion, enlarged bilateral level IB, IIA, III, and right level IV nodes per radiology. He was also noted to have 70% ICA stenosis with concerns for high risk plaque.     He was seen as a new patient in clinic on 8/9/2023 and was noted on exam  to have extensive tumor involving the oropharynx, supraglottis, hypopharynx, with vocal cord paralysis. He elected for an FNA of the neck with IR which was performed on 8/29/2023. Final pathology was consistent with SCC (not enough cells for p16 status). He had a PET scan on 9/6/2023 which showed FDG avid mass of the right tonsil/lateral pharyngeal wall/glossotonsillar sulcus/tongue base, right AE fold, right epiglottis, right pyriform sinus, right posterolateral pharyngeal wall, posterior  pharyngeal wall, no thyroid cartilage erosion, right retropharyngeal node, deep lobe parotid FDG avid mass, bilateral cervical nodes (right level II-IV, left level II-III), no distant disease. His case was reviewed at tumor board with recommendation for chemoradiation.      Interval history:  He comes in today for follow-up. He was seen in clinic in September 2023. He had PEG placed on 10/13/2023 which fell out 12/25/2023 and replaced 1/23/2024. He was started on induction chemotherapy with carbo/taxol with 2 cycles due to radiation/medical oncology preference, performed 10/16/2023 to 11/13/2023. He had post-induction PET scan on 11/28/2023 which showed partial response with residual disease present. He had definitive chemoradiation with Dr Louise and Dr Cordova from 12/19/2023 to 2/8/2024. He received 7000 cGy and 6 cycles of weekly carbo/taxol. He says he is feeling ok. He is gaining some weight. He has throat pain. He has pain with swallowing. He is taking nothing by mouth because he can't swallow. He feels like the pain is prohibitive for swallowing. He says swallowing things with sugar or artifical flavor it burns. He has not tried taking pain medication and then eating. He is taking tylenol/ibuprofen and magic mouthrinse. He is on the gabapentin once daily. He does not want to do narcotics. He sometimes cannot swallow his secretions. He is doing 7 cans of tube feeds per day. His energy is low. He is using biotene mouthrinse. He is doing salt/soda rinses daily. He is moving to [place] in June.     He is accompanied by his significant other.      MEDICATIONS:     Current Outpatient Medications   Medication Sig Dispense Refill     albuterol (PROAIR HFA/PROVENTIL HFA/VENTOLIN HFA) 108 (90 Base) MCG/ACT inhaler        amLODIPine (NORVASC) 5 MG tablet Take 5 mg by mouth daily       aspirin (ASA) 81 MG chewable tablet Take 1 tablet (81 mg) by mouth daily 90 tablet 3     atorvastatin (LIPITOR) 80 MG tablet Take 1  tablet (80 mg) by mouth daily 90 tablet 3     diphenhydrAMINE (BENADRYL) 12.5 MG/5ML liquid Taken prior to chemo       empagliflozin (JARDIANCE) 25 MG TABS tablet Take 25 mg by mouth daily       gabapentin (NEURONTIN) 250 MG/5ML solution Take 24 mLs (1,200 mg) by mouth 3 times daily 470 mL 3     hydrochlorothiazide (HYDRODIURIL) 25 MG tablet Take 25 mg by mouth daily       ibuprofen (ADVIL/MOTRIN) 200 MG tablet Take 1,400 mg by mouth every 4 hours as needed for pain       insulin NPH-Regular 70/30 (HUMULIN 70/30;NOVOLIN 70/30) (70-30) 100 UNIT/ML vial 50 units in the morning and 30 units in the evening       liraglutide (VICTOZA) 18 MG/3ML solution Inject Subcutaneous daily       lisinopril (ZESTRIL) 40 MG tablet Take 40 mg by mouth daily       magic mouthwash (ENTER INGREDIENTS IN COMMENTS) suspension Swish, gargle, and spit one to two teaspoonfuls every six hours as needed. May be swallowed if esophageal involvement. 240 mL 3     metFORMIN (GLUCOPHAGE) 500 MG tablet [METFORMIN (GLUCOPHAGE) 500 MG TABLET] Take 500 mg by mouth 2 (two) times a day with meals.       pantoprazole (PROTONIX) 20 MG EC tablet Take 2 tablets (40 mg) by mouth daily 30 tablet 2     silver sulfADIAZINE (SILVADENE) 1 % external cream Apply topically daily 50 g 3     therapeutic multivitamin (THERAGRAN) tablet Take 1 tablet by mouth daily       TRUEPLUS 5-BEVEL PEN NEEDLES 32G X 4 MM miscellaneous        TRULICITY 1.5 MG/0.5ML pen        gabapentin (NEURONTIN) 300 MG capsule Take 4 capsules (1,200 mg) by mouth 3 times daily (Patient not taking: Reported on 2/5/2024) 720 capsule 1     insulin lispro protamine-insulin lispro (HUMALOG MIX 75/25 KWIKPEN) (75-25) 100 UNIT/ML pen  (Patient not taking: Reported on 2/5/2024)       potassium chloride (KLOR-CON) 20 MEQ packet Take 20 mEq by mouth 2 times daily (Patient not taking: Reported on 2/5/2024) 60 each 3       ALLERGIES:  No Known Allergies    HABITS/SOCIAL HISTORY:   History of alcohol sober for  24 years. Former drug abuse. Previously smoked about 23 years ago, 4 ppd previously, 8 years old started. Intermittent chewing tobacco use in the past.   Works as a .   Lives with wife.   Worked as .    Social History     Socioeconomic History     Marital status:      Spouse name: Not on file     Number of children: Not on file     Years of education: Not on file     Highest education level: Not on file   Occupational History     Not on file   Tobacco Use     Smoking status: Former     Types: Cigarettes     Quit date: 10/12/1999     Years since quittin.4     Passive exposure: Past     Smokeless tobacco: Never   Substance and Sexual Activity     Alcohol use: No     Drug use: No     Sexual activity: Not on file   Other Topics Concern     Not on file   Social History Narrative     Not on file     Social Determinants of Health     Financial Resource Strain: Not on file   Food Insecurity: Not on file   Transportation Needs: Not on file   Physical Activity: Not on file   Stress: Not on file   Social Connections: Not on file   Interpersonal Safety: Not on file   Housing Stability: Not on file       PAST MEDICAL HISTORY:   Past Medical History:   Diagnosis Date     Cancer (H) 2024    throat     Diabetes (H)      Hypertension         PAST SURGICAL HISTORY:   Past Surgical History:   Procedure Laterality Date     ESOPHAGOSCOPY, GASTROSCOPY, DUODENOSCOPY (EGD), COMBINED N/A 10/12/2014    Procedure: ESOPHAGOGASTRODUODENOSCOPY;  Surgeon: Jh Ventura MD;  Location: [facility];  Service:      IR FINE NEEDLE ASPIRATION W ULTRASOUND  2023     IR FINE NEEDLE ASPIRATION W ULTRASOUND  10/13/2023     IR FOLLOW UP VISIT OUTPATIENT  2024     IR GASTROSTOMY TUBE PERCUTANEOUS PLCMNT  10/13/2023     IR GASTROSTOMY TUBE PERCUTANEOUS PLCMNT  2024     LAPAROTOMY EXPLORATORY Left     spleen       FAMILY HISTORY:  No family history on file.    REVIEW OF SYSTEMS:  12 point ROS was negative  "other than the symptoms noted above in the HPI.  Patient Supplied Answers to Review of Systems      8/9/2023     9:07 AM   [facility] ROS   Constitutional Problems with sleep   Neurology Dizzy spells    Headache   Psychology Frequently feeling anxious   Ears, Nose, Throat Ringing/noise in ears    Sore throat    Trouble swallowing    Hoarseness   Cardiopulmonary Cough         PHYSICAL EXAMINATION:   BP (!) 161/70   Pulse 96   Ht 1.682 m (5' 6.22\")   Wt 73.5 kg (162 lb 1.6 oz)   SpO2 98%   BMI 25.99 kg/m     Appearance:   normal; NAD, age-appropriate appearance, well-developed, normal habitus   Communication:   normal; communicates verbally, normal voice quality   Head/Face:   inspection -  Normal; no scars or visible lesions   Salivary glands -  Normal size, no tenderness, swelling, or palpable masses   Facial strength -  Normal and symmetric   Skin:  normal, no rash   Ears:  auricle (AD) -  normal  EAC (AD) -  normal  TM (AD) -  Normal, no effusion  auricle (AS) -  normal  EAC (AS) -  normal  TM (AS) -  effusion  Normal clinical speech reception   Nose:  Ext. inspection -  Normal  Internal Inspection -  Normal mucosa, septum, and turbinates   Nasopharynx:  Normal mucosa, no masses   Oral Cavity:  lips -  Normal mucosa, oral competence, and stoma size   edentulous, healthy gingival mucosa   Hard palate, buccal, floor of mouth mucosa normal   Tongue - normal movement, no lesions   Oropharynx:  Radiation changes  Mucositis on right oropharynx  Thick pooled secretions  No obvious tumor   Hypopharynx:  Radiation changes to mucosa  Unable to visualize pyriform sinuses due to edema   Larynx:  Radiation edema to the epiglottis, AE folds, arytenoids  Thick secretions  Mucositis on epiglottis  Bilaterally mobile vocal cords   Neck: No palpable masses  lymphedema   Lymphatic:  no abnormal nodes   Cardiovascular:  warm, pink, well-perfused extremities without swelling, tenderness, or edema   Respiratory:  Normal respiratory " effort, no stridor   Neuro/Psych.:  mood/affect -  normal  mental status -  normal         PROCEDURES:   Flexible fiberoptic laryngoscopy: Scope exam was indicated due to oropharyngeal cancer. Verbal consent was obtained. The nasal cavity was prepped with an aerosolized solution of topical anesthetic and vasoconstrictive agent. The scope was passed through the anterior nasal cavity and advanced. Inspection of the nasopharynx revealed no gross abnormality. There are radiation changes to the mucosa throughout, thick secretions. There is mucositis present on the right lateral oropharynx and epiglottis. There is radiation edema of the epiglottis, AE folds, arytenoids. Inspection of the larynx revealed bilaterally mobile vocal cords. Pyriform sinuses unable to be visualized due to edema. The airway is patent. Procedure tolerated well with no immediate complications noted.    RESULTS REVIEWED:   I reviewed note from oncology, PET report    Care discussed with SLP      IMPRESSION AND PLAN:   Ko Thakkar is a 67 year old man with a T4N2c SCC of the oropharynx with sameer metastasis. He had induction chemotherapy followed by definitive chemoradiation completed 2/8/2024.    He is recovering from his treatment. He is taking nothing by mouth, restricted due to pain. He was strongly advised that he needs to work on PO intake. He is certainly not appropriate for PO intake. We discussed options for pain management. He is refusing narcotics. He was advised to try taking tylenol or ibuprofen before trying to eat to help with his pain. We discussed that he has residual mucositis including in his oropharynx and supraglottis which is causing his pain. He was advised to do diligent salt/soda rinses multiple times per day.     He was seen by SLP today. They will do follow-up visits to try to make progress with his PO.    Lymphedema referral will be placed after PET scan.     His PET scan will be due around 5/2/2024.    He will need  his TSH checked about 3 months posttreatment.    I will see him back on 5/6/2024 with PET scan and labs.    He is planning on moving to [place] this summer. Advised that once he moves he needs to establish care with a H&N surgeon, likely would be through [facility]. Once he has found a team to see he can sign a release of information and records can be released from our team to his new care team to ensure records available.     Thank you very much for the opportunity to participate in the care of your patient.      Denia Hardin MD  Otolaryngology- Head & Neck Surgery      This note was dictated with voice recognition software and then edited. Please excuse any unintentional errors.         CC:  Wojciech Vicente MD  [facility]   [address] [address]  [facility] [place] [address]      Alton Mota MD  [facility]   [address]  [facility] [place] [address]      Xochilt Louise MD  Department of Medical Oncology  [facility]      Juwan Cordova MD  Department of Radiation Oncology  [facility]

## 2024-03-25 ENCOUNTER — TELEPHONE (OUTPATIENT)
Dept: OTOLARYNGOLOGY | Facility: CLINIC | Age: 69
End: 2024-03-25
Payer: COMMERCIAL

## 2024-03-25 NOTE — TELEPHONE ENCOUNTER
[facility] Call Center    Phone Message    May a detailed message be left on voicemail: yes     Reason for Call: Other: Pt wife Ondina called to see if appt for 3/28 can be changed to virtual.  Please call her back @ [phone].  [facility] location thanks     Action Taken: Other: ENT    Travel Screening: Not Applicable

## 2024-03-25 NOTE — TELEPHONE ENCOUNTER
Called patient's wife and confirmed that appointment with SLP can be changed to virtual. Wife verbalized understanding.    Hallie CORONAN, RN

## 2024-03-28 ENCOUNTER — TELEPHONE (OUTPATIENT)
Dept: SPEECH THERAPY | Facility: CLINIC | Age: 69
End: 2024-03-28

## 2024-03-28 ENCOUNTER — VIRTUAL VISIT (OUTPATIENT)
Dept: SPEECH THERAPY | Facility: CLINIC | Age: 69
End: 2024-03-28
Payer: COMMERCIAL

## 2024-03-28 DIAGNOSIS — C32.1 SCC (SQUAMOUS CELL CARCINOMA) OF SUPRAGLOTTIS (H): Primary | ICD-10-CM

## 2024-03-28 DIAGNOSIS — R13.12 DYSPHAGIA, OROPHARYNGEAL PHASE: ICD-10-CM

## 2024-03-28 DIAGNOSIS — C10.9 SQUAMOUS CELL CARCINOMA OF OROPHARYNX (H): ICD-10-CM

## 2024-03-28 PROCEDURE — 92526 ORAL FUNCTION THERAPY: CPT | Mod: GN | Performed by: SPEECH-LANGUAGE PATHOLOGIST

## 2024-04-11 ENCOUNTER — VIRTUAL VISIT (OUTPATIENT)
Dept: SPEECH THERAPY | Facility: CLINIC | Age: 69
End: 2024-04-11
Payer: COMMERCIAL

## 2024-04-11 DIAGNOSIS — R13.12 DYSPHAGIA, OROPHARYNGEAL PHASE: ICD-10-CM

## 2024-04-11 DIAGNOSIS — C32.1 SCC (SQUAMOUS CELL CARCINOMA) OF SUPRAGLOTTIS (H): Primary | ICD-10-CM

## 2024-04-11 PROCEDURE — 92526 ORAL FUNCTION THERAPY: CPT | Mod: GN | Performed by: SPEECH-LANGUAGE PATHOLOGIST

## 2024-04-11 NOTE — PROGRESS NOTES
[facility]                                                                                   OUTPATIENT SPEECH LANGUAGE PATHOLOGY    PLAN OF TREATMENT FOR OUTPATIENT REHABILITATION   Patient's Last Name, First Name, Ko Almeida YOB: 1955   Provider's Name   [facility]   Medical Record No.  [id number]     Onset Date: 10/10/23 Start of Care Date: 10/10/23     Medical Diagnosis:  Squamous cell carcinoma of the supraglottis      SLP Treatment Diagnosis: moderate oropharyngeal dysphagia  Plan of Treatment  Frequency/Duration: 2-4x/month  / 18 months     Certification date from 04/08/24   To 07/05/24          See note for plan of treatment details and functional goals     Rola Marcano, SLP                         I CERTIFY THE NEED FOR THESE SERVICES FURNISHED UNDER        THIS PLAN OF TREATMENT AND WHILE UNDER MY CARE     (Physician attestation of this document indicates review and certification of the therapy plan).              Referring Provider:  Dr. Denia Hardin    Initial Assessment  See Epic Evaluation- 10/10/23            PLAN  Continue therapy per current plan of care.    Beginning/End Dates of Progress Note Reporting Period:   10/10/23  to 04/11/2024    Referring Provider:  Referred Self     04/11/24 0800   Appointment Info   Treating Provider Rola Marcano MS, CCC-SLP   Medical Diagnosis Squamous cell carcinoma of the supraglottis   SLP Tx Diagnosis moderate oropharyngeal dysphagia   Quick Adds Certification;Virtual Visit   Virtual Visit   Time of service begin: 0758   Time of service end: 0822   Provider Location (Distant Site) Office   Patient Location (Originating Site) Home/other residence   Virtual Visit Rationale The virtual visit will provide the care the patient needs. We reviewed the patient's chart, and PTRx prescription to determine the following telemedicine visit is appropriate and effective for  the patient's care.   Progress Note/Certification   Start Of Care Date 10/10/23   Onset Of Illness/injury Or Date Of Surgery 10/10/23   Therapy Frequency 2-4x/month   Predicted Duration 18 months   Certification date from 04/08/24   Certification date to 07/05/24       Present No   Subjective Report   Subjective Report Patient completed induction chemotherapy for his laryngeal cancer. He completed chemoradiation from 12/19/23 to 2/8/24.  PEG put in 1/23/24. Pt was seen today for virtual follow up visit. Reports improvement.  He is accompanied by his wife.   SLP Goals   SLP Goals 1;2   SLP Goal 1   Goal Identifier PO   Goal Description Pt will tolerate least restricitive diet while adhering to safe swallow precautions and without pulmonary compromise across 6 months time.   Rationale To maximize safety, ease and/or independence of oral intake   Goal Progress Goal extended. Pt demonstrates continued challenges with po intake including pain. He feels burning when eating certain foods such as pureed squash and pureed fruit. He was able to eat some pureed meat from baby food jars but notes they have very little flavor. It doesn't cause burning. He continues to use his PEG tube for all of his nutritional support. He did have some success with swallowing water without coughing the other day. He feels this is progress. He is hoping to try some raviolis today. He tried some spaghetti-Os and they burned a lot even with sour cream added to them. Recommended rinsing the tomato sauce off using a siv before trying to eat them next time. He reports he may try this. Encouraged him to try swallowing some food every day to continue to progress his swallowing. He reports swallowing is a 3/10 pain level.   Target Date 07/05/24   SLP Goal 2   Goal Identifier Exercise   Goal Description Pt will improve and/or maintain ROM and strength of BOT, jaw and pharynx by completing 10 repetitions of 5/5 exercises 3 times  "daily with minimal written or verbal cues.   Rationale To maximize safety, ease and/or independence of oral intake   Goal Progress Goal extended. Pt reports he is doing his exercises and they \"are going good\". Encouraged compeltion 3-5 times per day since he isn't eating much to keep working on improving his swallow strength and minimize long term impact of radiation fibrosis of the swallow musculature. He verbalized understanding. He will try to increase completion.   Target Date 07/05/24   Treatment Interventions (SLP)   Treatment Interventions Treatment Swallow/Oral dysfunction   Treatment Swallow/Oral dysfunction   Treatment of Swallowing Dysfunction &/or Oral Function for Feeding Minutes (47566) 24 Minutes   Skilled Intervention Provided written and verbal information on diet modifications.;Educated patient on swallowing strategies.;Educated patient on risks of aspiration;Other  (Retrained exercises)   Patient Response/Progress Patient and spouse demonstrated and communicated understanding of all material provided.   Education   Learner/Method Patient;Significant Other;No Barriers to Learning   Plan   Plan for next session follow up in conjunction with ENT clinic visit.   Total Session Time   Total Treatment Time (sum of timed and untimed services) 24       "

## 2024-04-15 ENCOUNTER — VIRTUAL VISIT (OUTPATIENT)
Dept: ONCOLOGY | Facility: CLINIC | Age: 69
End: 2024-04-15
Attending: DIETITIAN, REGISTERED
Payer: COMMERCIAL

## 2024-04-15 DIAGNOSIS — C32.1 SCC (SQUAMOUS CELL CARCINOMA) OF SUPRAGLOTTIS (H): Primary | ICD-10-CM

## 2024-04-15 PROCEDURE — 97803 MED NUTRITION INDIV SUBSEQ: CPT | Mod: GT,95 | Performed by: DIETITIAN, REGISTERED

## 2024-04-15 NOTE — PROGRESS NOTES
Video-Visit Details     Type of service:  Video Visit     Video Start Time (time video started): 9:47am     Video End Time (time video stopped): 10:08am    Originating Location (pt. Location): Home     Distant Location (provider location):  [facility] NUTRITION SERVICES      Mode of Communication:  Video Conference via [employer]  [facility] NUTRITION SERVICES - REASSESSMENT NOTE   EVALUATION OF PREVIOUS PLAN OF CARE:   Time Spent: 15 minutes  Visit Type: return OTV  Pt accompanied by: his wife, Ondina  Referring Physician: Wilfred  C10.9 (ICD-10-CM) - Squamous cell carcinoma of oropharynx (H)   E11.9 (ICD-10-CM) - Type 2 diabetes mellitus without complications (H)      Chemotherapy: Started 10/16 - Taxol - completed  Radiation: completed  PEG tube: placed 1/23/24    Monitoring from previous assessment:   -Food/Fluid intake - He has been drinking a little water the past couple of days, ~2 oz per day.  He tells me that it feels like something is scarping on the back of his throat. This has been his biggest barrier to eating.     He admits to having a hard time swallowing still. Feeling like foods aren't going down.  Tried spaghettios the other day. Was able t get the sauce down but not the pasta, gets caught.  Chicken and stars, broth goes down but not stars.    Dealig with dry mouth  Has drank coffee and works just fine   He does okay with pudding and applesauce.     -EN intake/tolerance- Ace continues to take 7 cartons of formula per day plus one Muscle milk shake in his feeding tube.   With this, he has been able to start gaining weight.     Formula: Osmolite 1.5 felicia  Volume: 7 cartons/day (1660 mL, 1267 ml free water)  Provisions:  2485kcal (35kcal/kg), 105 g protein (1.5g/kg), 336g CHO, 0g fiber    EN regimen:  Breakfast - 3 cartons w/ Muscle milk  Lunch - 2 cartons  Dinner - 2 cartons     -Weight trends - stable, slow increase  Wt Readings from Last 10 Encounters:   03/20/24 73.5 kg (162 lb 1.6 oz)    03/19/24 70.3 kg (155 lb)   02/08/24 70.9 kg (156 lb 3.2 oz)   02/06/24 72.6 kg (160 lb)   02/05/24 69.7 kg (153 lb 9.6 oz)   01/30/24 71.7 kg (158 lb)   01/30/24 70.8 kg (156 lb)   01/29/24 71.4 kg (157 lb 6.4 oz)   01/29/24 71 kg (156 lb 9.6 oz)   01/23/24 72.8 kg (160 lb 8 oz)     Previous Goals:   EN to meet 100% estimated nutrition needs via PEG tube (7 cartons/day)  Fluids goals - 6-8 cups water/electrolytes via PO and PEG tube as able  Evaluation: met   Previous Nutrition Diagnosis:   Inadequate oral intake related to odynophagia and dysphagia as evidenced by pt reliant on EN via PEG tube to meet 100% estimated nutrition needs.   Evaluation: no change  NEW FINDINGS:   No new findings   CURRENT NUTRITION DIAGNOSIS   Inadequate oral intake related to odynophagia and dysphagia as evidenced by pt reliant on EN via PEG tube to meet 100% estimated nutrition needs.   INTERVENTIONS   EN Composition, EN Schedule, and Feeding Tube Flush -  Encouraged to continue taking 7 cartons of formula/day for repletion.   Reviewed oral intake. Encouraged ONS and pureed foods as his throat pain has improved.  Suggested making smoothies with ONS, pureed foods such as cream based soups (without chunks), mashed potatoes, yogurt, puddings etc. Reviewed calorie goals for repletion of >2500 per day.   Discussed weaning of TF as PO intake improves and weight trends upward.   Goals   1.EN to meet 100% estimated nutrition needs via PEG tube (7 cartons/day)  2.Weight gain towards 175 lbs    Follow up/Monitoring: one month follow up for potential wean of TF  Enteral Nutrition intake and Weight    Meka Lee RD, , LD  [facility]  [phone]

## 2024-04-15 NOTE — LETTER
4/15/2024         RE: Ko Thakkar  [address]  [address]  [place] [address]        Dear Colleague,    Thank you for referring your patient, Ko Thakkar, to the [facility]. Please see a copy of my visit note below.    Video-Visit Details     Type of service:  Video Visit     Video Start Time (time video started): 9:47am     Video End Time (time video stopped): 10:08am    Originating Location (pt. Location): Home     Distant Location (provider location):  [facility]      Mode of Communication:  Video Conference via [facility] - REASSESSMENT NOTE   EVALUATION OF PREVIOUS PLAN OF CARE:   Time Spent: 15 minutes  Visit Type: return OTV  Pt accompanied by: his wife, Ondina  Referring Physician: Wilfred  C10.9 (ICD-10-CM) - Squamous cell carcinoma of oropharynx (H)   E11.9 (ICD-10-CM) - Type 2 diabetes mellitus without complications (H)      Chemotherapy: Started 10/16 - Taxol - completed  Radiation: completed  PEG tube: placed 1/23/24    Monitoring from previous assessment:   -Food/Fluid intake - He has been drinking a little water the past couple of days, ~2 oz per day.  He tells me that it feels like something is scarping on the back of his throat. This has been his biggest barrier to eating.     He admits to having a hard time swallowing still. Feeling like foods aren't going down.  Tried spaghettios the other day. Was able t get the sauce down but not the pasta, gets caught.  Chicken and stars, broth goes down but not stars.    Dealig with dry mouth  Has drank coffee and works just fine   He does okay with pudding and applesauce.     -EN intake/tolerance- Ace continues to take 7 cartons of formula per day plus one Muscle milk shake in his feeding tube.   With this, he has been able to start gaining weight.     Formula: Osmolite 1.5 felicia  Volume: 7 cartons/day (1660 mL, 1267 ml free water)  Provisions:  2485kcal  (35kcal/kg), 105 g protein (1.5g/kg), 336g CHO, 0g fiber    EN regimen:  Breakfast - 3 cartons w/ Muscle milk  Lunch - 2 cartons  Dinner - 2 cartons     -Weight trends - stable, slow increase  Wt Readings from Last 10 Encounters:   03/20/24 73.5 kg (162 lb 1.6 oz)   03/19/24 70.3 kg (155 lb)   02/08/24 70.9 kg (156 lb 3.2 oz)   02/06/24 72.6 kg (160 lb)   02/05/24 69.7 kg (153 lb 9.6 oz)   01/30/24 71.7 kg (158 lb)   01/30/24 70.8 kg (156 lb)   01/29/24 71.4 kg (157 lb 6.4 oz)   01/29/24 71 kg (156 lb 9.6 oz)   01/23/24 72.8 kg (160 lb 8 oz)     Previous Goals:   EN to meet 100% estimated nutrition needs via PEG tube (7 cartons/day)  Fluids goals - 6-8 cups water/electrolytes via PO and PEG tube as able  Evaluation: met   Previous Nutrition Diagnosis:   Inadequate oral intake related to odynophagia and dysphagia as evidenced by pt reliant on EN via PEG tube to meet 100% estimated nutrition needs.   Evaluation: no change  NEW FINDINGS:   No new findings   CURRENT NUTRITION DIAGNOSIS   Inadequate oral intake related to odynophagia and dysphagia as evidenced by pt reliant on EN via PEG tube to meet 100% estimated nutrition needs.   INTERVENTIONS   EN Composition, EN Schedule, and Feeding Tube Flush -  Encouraged to continue taking 7 cartons of formula/day for repletion.   Reviewed oral intake. Encouraged ONS and pureed foods as his throat pain has improved.  Suggested making smoothies with ONS, pureed foods such as cream based soups (without chunks), mashed potatoes, yogurt, puddings etc. Reviewed calorie goals for repletion of >2500 per day.   Discussed weaning of TF as PO intake improves and weight trends upward.   Goals   1.EN to meet 100% estimated nutrition needs via PEG tube (7 cartons/day)  2.Weight gain towards 175 lbs    Follow up/Monitoring: one month follow up for potential wean of TF  Enteral Nutrition intake and Weight    Meka Lee RD, , LD  [facility]  [phone]

## 2024-05-02 ENCOUNTER — HOSPITAL ENCOUNTER (OUTPATIENT)
Dept: PET IMAGING | Facility: CLINIC | Age: 69
Discharge: HOME | End: 2024-05-02
Attending: OTOLARYNGOLOGY
Payer: COMMERCIAL

## 2024-05-02 DIAGNOSIS — C10.2 MALIGNANT NEOPLASM OF LATERAL WALL OF OROPHARYNX (H): ICD-10-CM

## 2024-05-02 DIAGNOSIS — C10.9 SQUAMOUS CELL CARCINOMA OF OROPHARYNX (H): ICD-10-CM

## 2024-05-02 PROCEDURE — A9552 F18 FDG: HCPCS | Performed by: OTOLARYNGOLOGY

## 2024-05-02 PROCEDURE — 74177 CT ABD & PELVIS W/CONTRAST: CPT | Mod: 26 | Performed by: RADIOLOGY

## 2024-05-02 PROCEDURE — 78815 PET IMAGE W/CT SKULL-THIGH: CPT | Mod: PS

## 2024-05-02 PROCEDURE — 250N000011 HC RX IP 250 OP 636: Performed by: OTOLARYNGOLOGY

## 2024-05-02 PROCEDURE — 343N000001 HC RX 343: Performed by: OTOLARYNGOLOGY

## 2024-05-02 PROCEDURE — 70491 CT SOFT TISSUE NECK W/DYE: CPT

## 2024-05-02 PROCEDURE — 70491 CT SOFT TISSUE NECK W/DYE: CPT | Mod: 26 | Performed by: RADIOLOGY

## 2024-05-02 PROCEDURE — 71260 CT THORAX DX C+: CPT

## 2024-05-02 PROCEDURE — 71260 CT THORAX DX C+: CPT | Mod: 26 | Performed by: RADIOLOGY

## 2024-05-02 PROCEDURE — 78815 PET IMAGE W/CT SKULL-THIGH: CPT | Mod: 26 | Performed by: RADIOLOGY

## 2024-05-02 RX ORDER — IOPAMIDOL 755 MG/ML
45-135 INJECTION, SOLUTION INTRAVASCULAR ONCE
Status: DISCONTINUED | OUTPATIENT
Start: 2024-05-02 | End: 2024-05-02 | Stop reason: DRUGHIGH

## 2024-05-02 RX ORDER — FLUDEOXYGLUCOSE F 18 200 MCI/ML
10-18 INJECTION, SOLUTION INTRAVENOUS ONCE
Status: COMPLETED | OUTPATIENT
Start: 2024-05-02 | End: 2024-05-02

## 2024-05-02 RX ORDER — IOPAMIDOL 755 MG/ML
45-135 INJECTION, SOLUTION INTRAVASCULAR ONCE
Status: COMPLETED | OUTPATIENT
Start: 2024-05-02 | End: 2024-05-02

## 2024-05-02 RX ADMIN — IOPAMIDOL 98 ML: 755 INJECTION, SOLUTION INTRAVENOUS at 10:44

## 2024-05-02 RX ADMIN — FLUDEOXYGLUCOSE F 18 10.08 MILLICURIE: 200 INJECTION, SOLUTION INTRAVENOUS at 09:49

## 2024-05-03 NOTE — PROGRESS NOTES
Dear Dr. Vicente:    I had the pleasure of seeing Ko Thakkar in follow-up today at the [facility].     History of Present Illness:   Ko Thakkar is a 68 year old man with a T4N2c SCC of the oropharynx.    He was seen by [facility] on 4/3/2023 at the request of his PCP for dysphagia to solid foods. They discussed EGD vs swallow study.     He had an EGD on 5/16/2023 with GI and found to be H pylori positive, started on medication.     He saw SLP on 7/11/2023 for a video swallow study at [facility]. He was noted to have poor epiglottic inversion and was recommended for further evaluation. He did have penetration and aspiration on the swallow study.    He had a CT neck on 7/28/2023 which showed a supraglottic mass measuring 2.2 x 1.4 x 2.3 cm with concern for paraglottic involvement, no cartilage erosion, enlarged bilateral level IB, IIA, III, and right level IV nodes per radiology. He was also noted to have 70% ICA stenosis with concerns for high risk plaque.     He was seen as a new patient in clinic on 8/9/2023 and was noted on exam  to have extensive tumor involving the oropharynx, supraglottis, hypopharynx, with vocal cord paralysis. He elected for an FNA of the neck with IR which was performed on 8/29/2023. Final pathology was consistent with SCC (not enough cells for p16 status). He had a PET scan on 9/6/2023 which showed FDG avid mass of the right tonsil/lateral pharyngeal wall/glossotonsillar sulcus/tongue base, right AE fold, right epiglottis, right pyriform sinus, right posterolateral pharyngeal wall, posterior pharyngeal wall, no thyroid cartilage erosion, right retropharyngeal node, deep lobe parotid FDG avid mass, bilateral cervical nodes (right level II-IV, left level II-III), no distant disease. His case was reviewed at tumor board with recommendation for chemoradiation. He had PEG placed on 10/13/2023 which fell out 12/25/2023 and  replaced 1/23/2024. He was started on induction chemotherapy with carbo/taxol with 2 cycles due to radiation/medical oncology preference, performed 10/16/2023 to 11/13/2023. He had post-induction PET scan on 11/28/2023 which showed partial response with residual disease present. He had definitive chemoradiation with Dr Louise and Dr Cordova from 12/19/2023 to 2/8/2024. He received 7000 cGy and 6 cycles of weekly carbo/taxol.       Interval history:  He comes in today for follow-up. He was seen in clinic in March 2024. He comes in with PET scan and labs. He says his plan now is to go to [place]. He says the [facility] Frank is based in [place]. He says he will be 1.5 hrs from [facility], 1.5 hrs from [facility]. He says he is moving ideally middle of July. He says the eating is not going as well as he hoped. He has problems with clearing his swallow. He says whatever he swallows he washes down. He has intermittent aspiration but feels it is improved. He says he is eating more than last visit. He had meatballs recently. He is doing 4-5 cans of tube feeds. His weight is up 150 lbs. He has no sense of oral calorie intake. He is wanting his new teeth. He is seeing Meka next week. He is doing puree foods, getting Mom[employer] meals delivery too. He has no pain with swallowing. He has pain with dry mouth. He says the throat pain he had previously is resolved. He is not carrying a spit jaw anymore. He is having issue with reflux lately, taking tagemet. His energy is a little better, but not normal yet. He is doing swallow exercises intermittently when he remembers. He is exercising. He is back to work.     He is accompanied by his significant other.      MEDICATIONS:     Current Outpatient Medications   Medication Sig Dispense Refill    albuterol (PROAIR HFA/PROVENTIL HFA/VENTOLIN HFA) 108 (90 Base) MCG/ACT inhaler       amLODIPine (NORVASC) 5 MG tablet Take 5 mg by mouth daily      aspirin (ASA) 81 MG chewable tablet Take 1 tablet (81 mg)  by mouth daily 90 tablet 3    atorvastatin (LIPITOR) 80 MG tablet Take 1 tablet (80 mg) by mouth daily 90 tablet 3    diphenhydrAMINE (BENADRYL) 12.5 MG/5ML liquid Taken prior to chemo      empagliflozin (JARDIANCE) 25 MG TABS tablet Take 25 mg by mouth daily      gabapentin (NEURONTIN) 250 MG/5ML solution Take 24 mLs (1,200 mg) by mouth 3 times daily 470 mL 3    hydrochlorothiazide (HYDRODIURIL) 25 MG tablet Take 25 mg by mouth daily      ibuprofen (ADVIL/MOTRIN) 200 MG tablet Take 1,400 mg by mouth every 4 hours as needed for pain      insulin NPH-Regular 70/30 (HUMULIN 70/30;NOVOLIN 70/30) (70-30) 100 UNIT/ML vial 50 units in the morning and 30 units in the evening      liraglutide (VICTOZA) 18 MG/3ML solution Inject Subcutaneous daily      lisinopril (ZESTRIL) 40 MG tablet Take 40 mg by mouth daily      magic mouthwash (ENTER INGREDIENTS IN COMMENTS) suspension Swish, gargle, and spit one to two teaspoonfuls every six hours as needed. May be swallowed if esophageal involvement. 240 mL 3    metFORMIN (GLUCOPHAGE) 500 MG tablet [METFORMIN (GLUCOPHAGE) 500 MG TABLET] Take 500 mg by mouth 2 (two) times a day with meals.      nystatin (MYCOSTATIN) 465861 UNIT/ML suspension Take 1 teaspoonful by mouth 4 times daily 60 mL 0    pantoprazole (PROTONIX) 20 MG EC tablet Take 2 tablets (40 mg) by mouth daily 30 tablet 2    silver sulfADIAZINE (SILVADENE) 1 % external cream Apply topically daily 50 g 3    therapeutic multivitamin (THERAGRAN) tablet Take 1 tablet by mouth daily      TRUEPLUS 5-BEVEL PEN NEEDLES 32G X 4 MM miscellaneous       TRULICITY 1.5 MG/0.5ML pen       gabapentin (NEURONTIN) 300 MG capsule Take 4 capsules (1,200 mg) by mouth 3 times daily (Patient not taking: Reported on 2/5/2024) 720 capsule 1    insulin lispro protamine-insulin lispro (HUMALOG MIX 75/25 KWIKPEN) (75-25) 100 UNIT/ML pen  (Patient not taking: Reported on 2/5/2024)      potassium chloride (KLOR-CON) 20 MEQ packet Take 20 mEq by mouth 2  times daily (Patient not taking: Reported on 2024) 60 each 3       ALLERGIES:  No Known Allergies    HABITS/SOCIAL HISTORY:   History of alcohol sober for 24 years. Former drug abuse. Previously smoked about 23 years ago, 4 ppd previously, 8 years old started. Intermittent chewing tobacco use in the past.   Works as a .   Lives with wife.   Worked as .    Social History     Socioeconomic History    Marital status:      Spouse name: Not on file    Number of children: Not on file    Years of education: Not on file    Highest education level: Not on file   Occupational History    Not on file   Tobacco Use    Smoking status: Former     Current packs/day: 0.00     Types: Cigarettes     Quit date: 10/12/1999     Years since quittin.5     Passive exposure: Past    Smokeless tobacco: Never   Substance and Sexual Activity    Alcohol use: No    Drug use: No    Sexual activity: Not on file   Other Topics Concern    Not on file   Social History Narrative    Not on file     Social Determinants of Health     Financial Resource Strain: Not on File (12/15/2023)    Received from [facility]     Financial Resource Strain     Financial Resource Strain: 0   Food Insecurity: Not on File (12/15/2023)    Received from [facility]     Food Insecurity     Food: 0   Transportation Needs: Not on File (12/15/2023)    Received from [facility]     Transportation Needs     Transportation: 0   Physical Activity: Not on File (12/15/2023)    Received from [facility]     Physical Activity     Physical Activity: 0   Stress: Not on File (12/15/2023)    Received from [facility]ULL[facility]     Stress     Stress: 0   Social Connections: Not on File (12/15/2023)    Received from [facility]     Social Connections     Social Connections and Isolation: 0   Interpersonal Safety: Not on file   Housing Stability: Not on File (12/15/2023)    Received from [facility]     Housing Stability     Housin       PAST MEDICAL  "HISTORY:   Past Medical History:   Diagnosis Date    Cancer (H) 01/23/2024    throat    Diabetes (H)     Hypertension         PAST SURGICAL HISTORY:   Past Surgical History:   Procedure Laterality Date    ESOPHAGOSCOPY, GASTROSCOPY, DUODENOSCOPY (EGD), COMBINED N/A 10/12/2014    Procedure: ESOPHAGOGASTRODUODENOSCOPY;  Surgeon: Jh Ventura MD;  Location: [facility];  Service:     IR FINE NEEDLE ASPIRATION W ULTRASOUND  8/29/2023    IR FINE NEEDLE ASPIRATION W ULTRASOUND  10/13/2023    IR FOLLOW UP VISIT OUTPATIENT  2/8/2024    IR GASTROSTOMY TUBE PERCUTANEOUS PLCMNT  10/13/2023    IR GASTROSTOMY TUBE PERCUTANEOUS PLCMNT  1/23/2024    LAPAROTOMY EXPLORATORY Left     spleen       FAMILY HISTORY:  No family history on file.    REVIEW OF SYSTEMS:  12 point ROS was negative other than the symptoms noted above in the HPI.  Patient Supplied Answers to Review of Systems      8/9/2023     9:07 AM    ENT ROS   Constitutional Problems with sleep   Neurology Dizzy spells    Headache   Psychology Frequently feeling anxious   Ears, Nose, Throat Ringing/noise in ears    Sore throat    Trouble swallowing    Hoarseness   Cardiopulmonary Cough         PHYSICAL EXAMINATION:   /74   Pulse 103   Ht 1.682 m (5' 6.22\")   Wt 68.1 kg (150 lb 1.6 oz)   SpO2 98%   BMI 24.07 kg/m     Appearance:   normal; NAD, age-appropriate appearance, well-developed, normal habitus   Communication:   normal; communicates verbally, normal voice quality   Head/Face:   inspection -  Normal; no scars or visible lesions   Salivary glands -  Normal size, no tenderness, swelling, or palpable masses   Facial strength -  Normal and symmetric   Skin:  normal, no rash   Ears:  auricle (AD) -  normal  EAC (AD) -  normal  TM (AD) -  Normal, no effusion  auricle (AS) -  normal  EAC (AS) -  normal  TM (AS) -  effusion  Normal clinical speech reception   Nose:  Ext. inspection -  Normal  Internal Inspection -  Normal mucosa, septum, and turbinates "   Nasopharynx:  Normal mucosa, no masses   Oral Cavity:  lips -  Normal mucosa, oral competence, and stoma size   edentulous, healthy gingival mucosa   Hard palate, buccal, floor of mouth mucosa normal   Tongue - normal movement, no lesions   Oropharynx:  Radiation changes  Thick pooled secretions - partially cleared on swallowing water  No obvious tumor   Hypopharynx:  Radiation changes to mucosa  Unable to visualize pyriform sinuses due to edema  Some thick secretions - partially clear on drinking water   Larynx:  Radiation edema to the epiglottis, AE folds, arytenoids  Bilaterally mobile vocal cords  Concern for thrush along the supraglottis   Neck: No palpable masses  Lymphedema mild   Lymphatic:  no abnormal nodes   Cardiovascular:  warm, pink, well-perfused extremities without swelling, tenderness, or edema   Respiratory:  Normal respiratory effort, no stridor   Neuro/Psych.:  mood/affect -  normal  mental status -  normal         PROCEDURES:   Flexible fiberoptic laryngoscopy: Scope exam was indicated due to oropharyngeal cancer. Verbal consent was obtained. The nasal cavity was prepped with an aerosolized solution of topical anesthetic and vasoconstrictive agent. The scope was passed through the anterior nasal cavity and advanced. Inspection of the nasopharynx revealed no gross abnormality. There are radiation changes to the mucosa throughout, thick secretions. There is radiation edema of the epiglottis, AE folds, arytenoids. Inspection of the larynx revealed bilaterally mobile vocal cords. Pyriform sinuses unable to be visualized due to edema. The airway is patent. Procedure tolerated well with no immediate complications noted.                  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 He had induction  chemotherapy followed by definitive chemoradiation completed 2/8/2024.    He is recovering from his treatment. He is working on his PO intake. He has a follow-up with dietician next week. I asked him to keep a food journal so that Meka can get a better sense of what he is eating and can better provide assistance especially with cutting his PEG feeds.     He does have some thrush present in the supraglottis, prescription provided.    He was seen by SLP today.    Lymphedema referral will be placed.     PET scan today. Pending final results repeat CT scans in 6 months.    TSH ordered, has labs at end of month with oncology.    He is planning on moving to [place] this summer. Advised that once he moves he needs to establish care with a H&N surgeon, likely would be through [facility] if in [place], [facility] if in [place], pending insurance coverage. Once he has found a team to see he can sign a release of information and records can be released from our team to his new care team to ensure records available.     Follow-up PRN given his planned move.    Thank you very much for the opportunity to participate in the care of your patient.      Denia Hardin MD  Otolaryngology- Head & Neck Surgery      This note was dictated with voice recognition software and then edited. Please excuse any unintentional errors.         CC:  Wojciech Vicente MD  [facility]   [address] [address]  [facility] [address]      Alton Mota MD  [facility]   [address]  [facility] [address]      Xochilt Louise MD  Department of Medical Oncology  [facility]      Juwan Cordova MD  Department of Radiation Oncology  [facility]

## 2024-05-06 ENCOUNTER — OFFICE VISIT (OUTPATIENT)
Dept: OTOLARYNGOLOGY | Facility: CLINIC | Age: 69
End: 2024-05-06
Payer: COMMERCIAL

## 2024-05-06 ENCOUNTER — THERAPY VISIT (OUTPATIENT)
Dept: SPEECH THERAPY | Facility: CLINIC | Age: 69
End: 2024-05-06
Payer: COMMERCIAL

## 2024-05-06 VITALS
HEIGHT: 66 IN | OXYGEN SATURATION: 98 % | SYSTOLIC BLOOD PRESSURE: 137 MMHG | HEART RATE: 103 BPM | WEIGHT: 150.1 LBS | DIASTOLIC BLOOD PRESSURE: 74 MMHG | BODY MASS INDEX: 24.12 KG/M2

## 2024-05-06 DIAGNOSIS — B37.0 THRUSH: ICD-10-CM

## 2024-05-06 DIAGNOSIS — C32.1 SCC (SQUAMOUS CELL CARCINOMA) OF SUPRAGLOTTIS (H): ICD-10-CM

## 2024-05-06 DIAGNOSIS — C77.0 SECONDARY MALIGNANCY OF LYMPH NODES OF HEAD, FACE AND NECK (H): ICD-10-CM

## 2024-05-06 DIAGNOSIS — C10.9 SQUAMOUS CELL CARCINOMA OF OROPHARYNX (H): Primary | ICD-10-CM

## 2024-05-06 DIAGNOSIS — R13.12 DYSPHAGIA, OROPHARYNGEAL PHASE: Primary | ICD-10-CM

## 2024-05-06 PROCEDURE — 31575 DIAGNOSTIC LARYNGOSCOPY: CPT | Performed by: OTOLARYNGOLOGY

## 2024-05-06 PROCEDURE — 99207 PR NO CHARGE LOS: CPT | Mod: 25 | Performed by: OTOLARYNGOLOGY

## 2024-05-06 PROCEDURE — 92526 ORAL FUNCTION THERAPY: CPT | Mod: GN | Performed by: SPEECH-LANGUAGE PATHOLOGIST

## 2024-05-06 RX ORDER — NYSTATIN 100000/ML
SUSPENSION, ORAL (FINAL DOSE FORM) ORAL
Qty: 60 ML | Refills: 0 | Status: SHIPPED | OUTPATIENT
Start: 2024-05-06 | End: 2024-05-13

## 2024-05-06 ASSESSMENT — PAIN SCALES - GENERAL: PAINLEVEL: NO PAIN (0)

## 2024-05-06 NOTE — LETTER
5/6/2024       RE: Ko Thakkar  [address]  [address]  [place] [address]     Dear Colleague,    Thank you for referring your patient, Ko Thakkar, to the [facility] EAR NOSE AND THROAT CLINIC [place] [facility]. Please see a copy of my visit note below.    Dear Dr. Vicente:    I had the pleasure of seeing Ko Thakkar in follow-up today at the [facility].     History of Present Illness:   Ko Thakkar is a 68 year old man with a T4N2c SCC of the oropharynx.    He was seen by [facility] on 4/3/2023 at the request of his PCP for dysphagia to solid foods. They discussed EGD vs swallow study.     He had an EGD on 5/16/2023 with GI and found to be H pylori positive, started on medication.     He saw SLP on 7/11/2023 for a video swallow study at [facility]. He was noted to have poor epiglottic inversion and was recommended for further evaluation. He did have penetration and aspiration on the swallow study.    He had a CT neck on 7/28/2023 which showed a supraglottic mass measuring 2.2 x 1.4 x 2.3 cm with concern for paraglottic involvement, no cartilage erosion, enlarged bilateral level IB, IIA, III, and right level IV nodes per radiology. He was also noted to have 70% ICA stenosis with concerns for high risk plaque.     He was seen as a new patient in clinic on 8/9/2023 and was noted on exam  to have extensive tumor involving the oropharynx, supraglottis, hypopharynx, with vocal cord paralysis. He elected for an FNA of the neck with IR which was performed on 8/29/2023. Final pathology was consistent with SCC (not enough cells for p16 status). He had a PET scan on 9/6/2023 which showed FDG avid mass of the right tonsil/lateral pharyngeal wall/glossotonsillar sulcus/tongue base, right AE fold, right epiglottis, right pyriform sinus, right posterolateral pharyngeal wall, posterior  pharyngeal wall, no thyroid cartilage erosion, right retropharyngeal node, deep lobe parotid FDG avid mass, bilateral cervical nodes (right level II-IV, left level II-III), no distant disease. His case was reviewed at tumor board with recommendation for chemoradiation. He had PEG placed on 10/13/2023 which fell out 12/25/2023 and replaced 1/23/2024. He was started on induction chemotherapy with carbo/taxol with 2 cycles due to radiation/medical oncology preference, performed 10/16/2023 to 11/13/2023. He had post-induction PET scan on 11/28/2023 which showed partial response with residual disease present. He had definitive chemoradiation with Dr Louise and Dr Cordova from 12/19/2023 to 2/8/2024. He received 7000 cGy and 6 cycles of weekly carbo/taxol.       Interval history:  He comes in today for follow-up. He was seen in clinic in March 2024. He comes in with PET scan and labs. He says his plan now is to go to [place]. He says the [facility] is based in [place]. He says he will be 1.5 hrs from [facility], 1.5 hrs from [facility]. He says he is moving ideally middle of July. He says the eating is not going as well as he hoped. He has problems with clearing his swallow. He says whatever he swallows he washes down. He has intermittent aspiration but feels it is improved. He says he is eating more than last visit. He had meatballs recently. He is doing 4-5 cans of tube feeds. His weight is up 150 lbs. He has no sense of oral calorie intake. He is wanting his new teeth. He is seeing Meka next week. He is doing puree foods, getting Mom[employer] meals delivery too. He has no pain with swallowing. He has pain with dry mouth. He says the throat pain he had previously is resolved. He is not carrying a spit jaw anymore. He is having issue with reflux lately, taking tagemet. His energy is a little better, but not normal yet. He is doing swallow exercises intermittently when he remembers. He is exercising. He is back to work.     He is  accompanied by his significant other.      MEDICATIONS:     Current Outpatient Medications   Medication Sig Dispense Refill     albuterol (PROAIR HFA/PROVENTIL HFA/VENTOLIN HFA) 108 (90 Base) MCG/ACT inhaler        amLODIPine (NORVASC) 5 MG tablet Take 5 mg by mouth daily       aspirin (ASA) 81 MG chewable tablet Take 1 tablet (81 mg) by mouth daily 90 tablet 3     atorvastatin (LIPITOR) 80 MG tablet Take 1 tablet (80 mg) by mouth daily 90 tablet 3     diphenhydrAMINE (BENADRYL) 12.5 MG/5ML liquid Taken prior to chemo       empagliflozin (JARDIANCE) 25 MG TABS tablet Take 25 mg by mouth daily       gabapentin (NEURONTIN) 250 MG/5ML solution Take 24 mLs (1,200 mg) by mouth 3 times daily 470 mL 3     hydrochlorothiazide (HYDRODIURIL) 25 MG tablet Take 25 mg by mouth daily       ibuprofen (ADVIL/MOTRIN) 200 MG tablet Take 1,400 mg by mouth every 4 hours as needed for pain       insulin NPH-Regular 70/30 (HUMULIN 70/30;NOVOLIN 70/30) (70-30) 100 UNIT/ML vial 50 units in the morning and 30 units in the evening       liraglutide (VICTOZA) 18 MG/3ML solution Inject Subcutaneous daily       lisinopril (ZESTRIL) 40 MG tablet Take 40 mg by mouth daily       magic mouthwash (ENTER INGREDIENTS IN COMMENTS) suspension Swish, gargle, and spit one to two teaspoonfuls every six hours as needed. May be swallowed if esophageal involvement. 240 mL 3     metFORMIN (GLUCOPHAGE) 500 MG tablet [METFORMIN (GLUCOPHAGE) 500 MG TABLET] Take 500 mg by mouth 2 (two) times a day with meals.       nystatin (MYCOSTATIN) 426830 UNIT/ML suspension Take 1 teaspoonful by mouth 4 times daily 60 mL 0     pantoprazole (PROTONIX) 20 MG EC tablet Take 2 tablets (40 mg) by mouth daily 30 tablet 2     silver sulfADIAZINE (SILVADENE) 1 % external cream Apply topically daily 50 g 3     therapeutic multivitamin (THERAGRAN) tablet Take 1 tablet by mouth daily       TRUEPLUS 5-BEVEL PEN NEEDLES 32G X 4 MM miscellaneous        TRULICITY 1.5 MG/0.5ML pen         gabapentin (NEURONTIN) 300 MG capsule Take 4 capsules (1,200 mg) by mouth 3 times daily (Patient not taking: Reported on 2024) 720 capsule 1     insulin lispro protamine-insulin lispro (HUMALOG MIX 75/25 KWIKPEN) (75-25) 100 UNIT/ML pen  (Patient not taking: Reported on 2024)       potassium chloride (KLOR-CON) 20 MEQ packet Take 20 mEq by mouth 2 times daily (Patient not taking: Reported on 2024) 60 each 3       ALLERGIES:  No Known Allergies    HABITS/SOCIAL HISTORY:   History of alcohol sober for 24 years. Former drug abuse. Previously smoked about 23 years ago, 4 ppd previously, 8 years old started. Intermittent chewing tobacco use in the past.   Works as a .   Lives with wife.   Worked as .    Social History     Socioeconomic History     Marital status:      Spouse name: Not on file     Number of children: Not on file     Years of education: Not on file     Highest education level: Not on file   Occupational History     Not on file   Tobacco Use     Smoking status: Former     Current packs/day: 0.00     Types: Cigarettes     Quit date: 10/12/1999     Years since quittin.5     Passive exposure: Past     Smokeless tobacco: Never   Substance and Sexual Activity     Alcohol use: No     Drug use: No     Sexual activity: Not on file   Other Topics Concern     Not on file   Social History Narrative     Not on file     Social Determinants of Health     Financial Resource Strain: Not on File (12/15/2023)    Received from [facility]     Financial Resource Strain      Financial Resource Strain: 0   Food Insecurity: Not on File (12/15/2023)    Received from [facility]ULL[facility]     Food Insecurity      Food: 0   Transportation Needs: Not on File (12/15/2023)    Received from [facility]ULL[facility]     Transportation Needs      Transportation: 0   Physical Activity: Not on File (12/15/2023)    Received from [facility]ULL[facility]     Physical Activity      Physical Activity: 0   Stress: Not on File  "(12/15/2023)    Received from [facility] MULLINS     Stress      Stress: 0   Social Connections: Not on File (12/15/2023)    Received from [facility]ULLINS     Social Connections      Social Connections and Isolation: 0   Interpersonal Safety: Not on file   Housing Stability: Not on File (12/15/2023)    Received from [facility]NA MULLINS     Housing Stability      Housin       PAST MEDICAL HISTORY:   Past Medical History:   Diagnosis Date     Cancer (H) 2024    throat     Diabetes (H)      Hypertension         PAST SURGICAL HISTORY:   Past Surgical History:   Procedure Laterality Date     ESOPHAGOSCOPY, GASTROSCOPY, DUODENOSCOPY (EGD), COMBINED N/A 10/12/2014    Procedure: ESOPHAGOGASTRODUODENOSCOPY;  Surgeon: Jh Ventura MD;  Location: [facility];  Service:      IR FINE NEEDLE ASPIRATION W ULTRASOUND  2023     IR FINE NEEDLE ASPIRATION W ULTRASOUND  10/13/2023     IR FOLLOW UP VISIT OUTPATIENT  2024     IR GASTROSTOMY TUBE PERCUTANEOUS PLCMNT  10/13/2023     IR GASTROSTOMY TUBE PERCUTANEOUS PLCMNT  2024     LAPAROTOMY EXPLORATORY Left     spleen       FAMILY HISTORY:  No family history on file.    REVIEW OF SYSTEMS:  12 point ROS was negative other than the symptoms noted above in the HPI.  Patient Supplied Answers to Review of Systems      2023     9:07 AM    ENT ROS   Constitutional Problems with sleep   Neurology Dizzy spells    Headache   Psychology Frequently feeling anxious   Ears, Nose, Throat Ringing/noise in ears    Sore throat    Trouble swallowing    Hoarseness   Cardiopulmonary Cough         PHYSICAL EXAMINATION:   /74   Pulse 103   Ht 1.682 m (5' 6.22\")   Wt 68.1 kg (150 lb 1.6 oz)   SpO2 98%   BMI 24.07 kg/m     Appearance:   normal; NAD, age-appropriate appearance, well-developed, normal habitus   Communication:   normal; communicates verbally, normal voice quality   Head/Face:   inspection -  Normal; no scars or visible lesions   Salivary glands -  Normal size, no " tenderness, swelling, or palpable masses   Facial strength -  Normal and symmetric   Skin:  normal, no rash   Ears:  auricle (AD) -  normal  EAC (AD) -  normal  TM (AD) -  Normal, no effusion  auricle (AS) -  normal  EAC (AS) -  normal  TM (AS) -  effusion  Normal clinical speech reception   Nose:  Ext. inspection -  Normal  Internal Inspection -  Normal mucosa, septum, and turbinates   Nasopharynx:  Normal mucosa, no masses   Oral Cavity:  lips -  Normal mucosa, oral competence, and stoma size   edentulous, healthy gingival mucosa   Hard palate, buccal, floor of mouth mucosa normal   Tongue - normal movement, no lesions   Oropharynx:  Radiation changes  Thick pooled secretions - partially cleared on swallowing water  No obvious tumor   Hypopharynx:  Radiation changes to mucosa  Unable to visualize pyriform sinuses due to edema  Some thick secretions - partially clear on drinking water   Larynx:  Radiation edema to the epiglottis, AE folds, arytenoids  Bilaterally mobile vocal cords  Concern for thrush along the supraglottis   Neck: No palpable masses  Lymphedema mild   Lymphatic:  no abnormal nodes   Cardiovascular:  warm, pink, well-perfused extremities without swelling, tenderness, or edema   Respiratory:  Normal respiratory effort, no stridor   Neuro/Psych.:  mood/affect -  normal  mental status -  normal         PROCEDURES:   Flexible fiberoptic laryngoscopy: Scope exam was indicated due to oropharyngeal cancer. Verbal consent was obtained. The nasal cavity was prepped with an aerosolized solution of topical anesthetic and vasoconstrictive agent. The scope was passed through the anterior nasal cavity and advanced. Inspection of the nasopharynx revealed no gross abnormality. There are radiation changes to the mucosa throughout, thick secretions. There is radiation edema of the epiglottis, AE folds, arytenoids. Inspection of the larynx revealed bilaterally mobile vocal cords. Pyriform sinuses unable to be  visualized due to edema. The airway is patent. Procedure tolerated well with no immediate complications noted.                  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 He had induction chemotherapy followed by definitive chemoradiation completed 2/8/2024.    He is recovering from his treatment. He is working on his PO intake. He has a follow-up with dietician next week. I asked him to keep a food journal so that Meka can get a better sense of what he is eating and can better provide assistance especially with cutting his PEG feeds.     He does have some thrush present in the supraglottis, prescription provided.    He was seen by SLP today.    Lymphedema referral will be placed.     PET scan today. Pending final results repeat CT scans in 6 months.    TSH ordered, has labs at end of month with oncology.    He is planning on moving to [place] this summer. Advised that once he moves he needs to establish care with a H&N surgeon, likely would be through [facility] if in [place], [facility] if in [place], pending insurance coverage. Once he has found a team to see he can sign a release of information and records can be released from our team to his new care team to ensure records available.     Follow-up PRN given his planned move.    Thank you very much for the opportunity to participate in the care of your patient.      Denia Hardin MD  Otolaryngology- Head & Neck Surgery      This note was dictated with voice recognition software and then edited. Please excuse any unintentional errors.         CC:  Wojciech Vicente MD  [facility]   [address] [id number]  [facility] [address]      Alton Mota MD  [facility]   [address]  [facility] [address]      Xochilt Louise MD  Department of Medical  Oncology  [facility]      Juwan Cordova MD  Department of Radiation Oncology  [facility]

## 2024-05-07 ENCOUNTER — OFFICE VISIT (OUTPATIENT)
Dept: RADIATION ONCOLOGY | Facility: CLINIC | Age: 69
End: 2024-05-07
Attending: SURGERY
Payer: COMMERCIAL

## 2024-05-07 DIAGNOSIS — C10.9 SQUAMOUS CELL CARCINOMA OF OROPHARYNX (H): Primary | ICD-10-CM

## 2024-05-07 DIAGNOSIS — C32.1 SCC (SQUAMOUS CELL CARCINOMA) OF SUPRAGLOTTIS (H): ICD-10-CM

## 2024-05-07 PROCEDURE — 99215 OFFICE O/P EST HI 40 MIN: CPT | Performed by: SURGERY

## 2024-05-07 PROCEDURE — G0463 HOSPITAL OUTPT CLINIC VISIT: HCPCS | Performed by: SURGERY

## 2024-05-07 NOTE — PROGRESS NOTES
DISCHARGE  Reason for Discharge: Patient moving states    Equipment Issued: none    Discharge Plan: Patient to continue home program.  Other services: recommend establishing care with SLP where they end up moving to continue working on diet advancement with eventual goal of PEG removal if appropriate.    Referring Provider:  Dr. Denia Hardin       05/06/24 1220   Appointment Info   Treating Provider Jessica Ramos MA, CCC-SLP   Medical Diagnosis Squamous cell carcinoma of the supraglottis   SLP Tx Diagnosis moderate oropharyngeal dysphagia   Quick Adds Certification   Progress Note/Certification   Start Of Care Date 10/10/23   Onset Of Illness/injury Or Date Of Surgery 10/10/23   Therapy Frequency 2-4x/month   Predicted Duration 18 months   Certification date from 04/08/24   Certification date to 07/05/24       Present No   Subjective Report   Subjective Report Patient completed induction chemotherapy for his laryngeal cancer. He completed chemoradiation from 12/19/23 to 2/8/24.  PEG put in 1/23/24. Pt seen today in conjunction with ENT clinic visit. Reports they are moving to [place] area within the next month or so. His wife accompanies him today.   SLP Goals   SLP Goals 1;2   SLP Goal 1   Goal Identifier PO   Goal Description Pt will tolerate least restricitive diet while adhering to safe swallow precautions and without pulmonary compromise across 6 months time.   Rationale To maximize safety, ease and/or independence of oral intake   Goal Progress Pt reports intermittent, but minimal PO intake. He is mostly PEG dependent. Reports he did have some meatballs the other day. He had to wash them down but states overall it felt okay. Discussed need to establish care with SLP whereever they are moving to keep working on diet advancement with eventual goal of PEG removal once appropriate. Sips of thin liquids today reveal no overt clinical s/sx of penetration/aspiration. Stressed importance  of really pushing PO Intake.   Target Date 07/05/24   SLP Goal 2   Goal Identifier Exercise   Goal Description Pt will improve and/or maintain ROM and strength of BOT, jaw and pharynx by completing 10 repetitions of 5/5 exercises 3 times daily with minimal written or verbal cues.   Rationale To maximize safety, ease and/or independence of oral intake   Goal Progress Provided new copy of handout. Trained pt and wife on rationale/importance of continuing home exercise program 2-3x/day for the first two years after XRT d/t risk of radiation induced fibrosis. Pt able to demonstrate exercises.   Target Date 07/05/24   Treatment Interventions (SLP)   Treatment Interventions Treatment Swallow/Oral dysfunction   Treatment Swallow/Oral dysfunction   Treatment of Swallowing Dysfunction &/or Oral Function for Feeding Minutes (50163) 15 Minutes   Skilled Intervention Provided written and verbal information on diet modifications.;Educated patient on swallowing strategies.;Educated patient on risks of aspiration;Other  (Retrained exercises)   Patient Response/Progress Patient and spouse demonstrated and communicated understanding of all material provided.   Education   Learner/Method Patient;Significant Other;No Barriers to Learning   Plan   Plan for next session Discharge from SLP services as patient is moving states

## 2024-05-07 NOTE — PROGRESS NOTES
FOLLOW-UP VISIT    Patient Name: Ko Thakkar      : 1955     Age: 68 year old        ______________________________________________________________________________     Chief Complaint   Patient presents with    Cancer     Follow up:SCC of Supraglottic:H&N 7000 cGy completed 24       Pain  Denies    Labs  Other Labs: No    Imaging  CT: 24      Dental:   Most Recent Dental Visit: No      Speech/Swallowing:   Most Recent evaluation or testin24  Swallowing Restrictions: soft foods    Trismus/Jaw Exercises: yes    Nutrition:  PEG Intake 4-7# cans per day, type of solution Iso source  Weight:   Wt Readings from Last 3 Encounters:   24 68.1 kg (150 lb 1.6 oz)   24 73.5 kg (162 lb 1.6 oz)   24 70.3 kg (155 lb)         Oral Symptoms:   Xerostomia:1- Symptomatic without significant dietary alteration; unstimulated saliva flow >0.2 ml/min  Dysphagia: 2- symptomatic and altered eating/swallowing  Mucositis Oral Symptoms: 0-None  Mucositis: 0- None  Esophagitis:0- None    Other Appointments:     MD Name: Dr Louise Appointment Date:    MD Name: Appointment Date:   MD Name: Appointment Date:   Other Appointment Notes:     Residual Radiation side effect: Dysphagia

## 2024-05-07 NOTE — LETTER
2024         RE: Ko Thakkar  [address]  [address]  [place] [address]        Dear Colleague,    Thank you for referring your patient, Ko Thakkar, to the [facility] RADIATION ONCOLOGY. Please see a copy of my visit note below.    FOLLOW-UP VISIT    Patient Name: Ko Thakkar      : 1955     Age: 68 year old        ______________________________________________________________________________     Chief Complaint   Patient presents with     Cancer     Follow up:SCC of Supraglottic:H&N 7000 cGy completed 24       Pain  Denies    Labs  Other Labs: No    Imaging  CT: 24      Dental:   Most Recent Dental Visit: No      Speech/Swallowing:   Most Recent evaluation or testin24  Swallowing Restrictions: soft foods    Trismus/Jaw Exercises: yes    Nutrition:  PEG Intake 4-7# cans per day, type of solution Iso source  Weight:   Wt Readings from Last 3 Encounters:   24 68.1 kg (150 lb 1.6 oz)   24 73.5 kg (162 lb 1.6 oz)   24 70.3 kg (155 lb)         Oral Symptoms:   Xerostomia:1- Symptomatic without significant dietary alteration; unstimulated saliva flow >0.2 ml/min  Dysphagia: 2- symptomatic and altered eating/swallowing  Mucositis Oral Symptoms: 0-None  Mucositis: 0- None  Esophagitis:0- None    Other Appointments:     MD Name: Dr Louise Appointment Date:    MD Name: Appointment Date:   MD Name: Appointment Date:   Other Appointment Notes:     Residual Radiation side effect: Dysphagia                    Department of Radiation Oncology  [facility]    Health: Cancer Center  [facility]  [address] [place] [id number]  [phone]       Radiation Oncology Follow-up Visit  May 7, 2024      Ko Thakkar  MRN: [id number]   : 1955     DIAGNOSIS / ID:  Mr. Thakkar is a 68 year old male [id number] oropharyngeal SCC, p16 positive. Tumor involved the right tonsil,  posterior pharyngeal wall, and right AE fold, hypopharynx, with known right cord paralysis, with bilateral adenopathy including right RP node, right level IV, and questionable right parotid node (which was bx proven SCC). There is no evidence of any distant disease. He has evidence of swallow dysfunction with dysphagia and aspiration, and voice dysfunction with dysphonia and right cord paralysis.      Given extent of disease as well as potential delays in starting treatment, induction chemotherapy was administered under care of Dr. Louise.  PET CT scan after 2 cycles of carboplatin paclitaxel demonstrates a good partial response without any distant metastatic disease.     INTENT OF RADIOTHERAPY: Definitive      CONCURRENT SYSTEMIC THERAPY: Yes, carbo/taxol (Dr. Louise)         SITE OF TREATMENT: head and neck, bilateral necks      DATES  OF TREATMENT: 12/19/23-2/8/24     TOTAL DOSE OF TREATMENT / FRACTIONS: 70Gy/35fx     INTERVAL SINCE COMPLETION OF RADIATION THERAPY: 3 months    SUBJECTIVE:   Patient is still slowly recovering from chemotherapy and radiation.  He states he is eating more than last visit, now tolerating more solid foods but mainly on a purée but is still having dysphagia, and 4 cans of tube feeds.  His weight is stable/slightly up.  He states his dry mouth has improved.  He states his taste has improved.  He is performing minimal rinses.    Recent PET CT scan was performed on 5/2/2024 which did not demonstrate any evidence of local regional or distant recurrence.    He is recently seen Dr. Hardin on 5/6/2024 with no evidence of recurrent disease.    PHYSICAL EXAM:  There were no vitals taken for this visit.  Gen: Alert, in NAD  Eyes: PERRL, EOMI, sclera anicteric  HENT     Head: NC/AT     Ears: No external auricular lesions     Nose/sinus: No rhinorrhea or epistaxis     Oral Cavity/Oropharynx: MMM, oral cavity and oropharyngeal mucositis resolved  Neck: Supple, full ROM, no LAD, thickening of  bilateral neck, areas of hyper and hypopigmentation bilaterally, skin is intact  Pulm: No wheezing, stridor or respiratory distress  CV: Well-perfused, no cyanosis, no pedal edema  Abdominal: Soft, nontender, nondistended, no hepatomegaly  Back: No step-offs or pain to palpation along the thoracolumbar spine, no CVA tenderness  Rectal/: Deferred  Musculoskeletal: Normal bulk and tone   Skin: Normal color and turgor  Neurologic: A/Ox3, CN II-XII intact  Psychiatric: Appropriate mood and affect    LABS AND IMAGING:  Per HPI, reviewed and in agreement     I have reviewed Dr. Hardin's notes     IMPRESSION: Post treatment PET/CT scan demonstrates complete response, with no evidence of locoregional recurrence. He continuing to heal from chemoRT.      PLAN:   I discussed I laci typically recommend follow up in 6 months with radiation oncology, however, he is re-locating to [place] In July 2024 and plans to establish care there.  He will see medical oncology at the end of the month (Dr. Louise)    Juwan Cordova M.D.  Department of Radiation Oncology  [facility]     A total of 40 minutes were spent on this visit, including preparation for this visit, face to face with patient, and medical decision making. Medical decision-making included consideration and possible diagnosis, management options, complex record review, review of diagnostic tests, consideration and discussion of significant complications based up medical history/comorbidities, and discussion with providers involved in care of the patient.       Again, thank you for allowing me to participate in the care of your patient.        Sincerely,        Juwan Cordova MD

## 2024-05-09 NOTE — PROGRESS NOTES
Department of Radiation Oncology  [facility]: Cancer Center  [facility]  [address] [address] [place] [id number] [phone]       Radiation Oncology Follow-up Visit  May 7, 2024      Ko Thakkar  MRN: [id number]   : 1955     DIAGNOSIS / ID:  Mr. Thakkar is a 68 year old male [id number] oropharyngeal SCC, p16 positive. Tumor involved the right tonsil, posterior pharyngeal wall, and right AE fold, hypopharynx, with known right cord paralysis, with bilateral adenopathy including right RP node, right level IV, and questionable right parotid node (which was bx proven SCC). There is no evidence of any distant disease. He has evidence of swallow dysfunction with dysphagia and aspiration, and voice dysfunction with dysphonia and right cord paralysis.      Given extent of disease as well as potential delays in starting treatment, induction chemotherapy was administered under care of Dr. Louise.  PET CT scan after 2 cycles of carboplatin paclitaxel demonstrates a good partial response without any distant metastatic disease.     INTENT OF RADIOTHERAPY: Definitive      CONCURRENT SYSTEMIC THERAPY: Yes, carbo/taxol (Dr. Louise)         SITE OF TREATMENT: head and neck, bilateral necks      DATES  OF TREATMENT: 23-24     TOTAL DOSE OF TREATMENT / FRACTIONS: 70Gy/35fx     INTERVAL SINCE COMPLETION OF RADIATION THERAPY: 3 months    SUBJECTIVE:   Patient is still slowly recovering from chemotherapy and radiation.  He states he is eating more than last visit, now tolerating more solid foods but mainly on a purée but is still having dysphagia, and 4 cans of tube feeds.  His weight is stable/slightly up.  He states his dry mouth has improved.  He states his taste has improved.  He is performing minimal rinses.    Recent PET CT scan was performed on 2024 which did not demonstrate any evidence of local regional or distant recurrence.    He is  recently seen Dr. Hardin on 5/6/2024 with no evidence of recurrent disease.    PHYSICAL EXAM:  There were no vitals taken for this visit.  Gen: Alert, in NAD  Eyes: PERRL, EOMI, sclera anicteric  HENT     Head: NC/AT     Ears: No external auricular lesions     Nose/sinus: No rhinorrhea or epistaxis     Oral Cavity/Oropharynx: MMM, oral cavity and oropharyngeal mucositis resolved  Neck: Supple, full ROM, no LAD, thickening of bilateral neck, areas of hyper and hypopigmentation bilaterally, skin is intact  Pulm: No wheezing, stridor or respiratory distress  CV: Well-perfused, no cyanosis, no pedal edema  Abdominal: Soft, nontender, nondistended, no hepatomegaly  Back: No step-offs or pain to palpation along the thoracolumbar spine, no CVA tenderness  Rectal/: Deferred  Musculoskeletal: Normal bulk and tone   Skin: Normal color and turgor  Neurologic: A/Ox3, CN II-XII intact  Psychiatric: Appropriate mood and affect    LABS AND IMAGING:  Per HPI, reviewed and in agreement     I have reviewed Dr. Hardin's notes     IMPRESSION: Post treatment PET/CT scan demonstrates complete response, with no evidence of locoregional recurrence. He continuing to heal from chemoRT.      PLAN:   I discussed I laci typically recommend follow up in 6 months with radiation oncology, however, he is re-locating to [place] In July 2024 and plans to establish care there.  He will see medical oncology at the end of the month (Dr. Louise)    Juwan Cordova M.D.  Department of Radiation Oncology  [facility]     A total of 40 minutes were spent on this visit, including preparation for this visit, face to face with patient, and medical decision making. Medical decision-making included consideration and possible diagnosis, management options, complex record review, review of diagnostic tests, consideration and discussion of significant complications based up medical history/comorbidities, and discussion with providers involved in care of  the patient.

## 2024-05-13 ENCOUNTER — VIRTUAL VISIT (OUTPATIENT)
Dept: ONCOLOGY | Facility: CLINIC | Age: 69
End: 2024-05-13
Attending: DIETITIAN, REGISTERED
Payer: COMMERCIAL

## 2024-05-13 DIAGNOSIS — C32.1 SCC (SQUAMOUS CELL CARCINOMA) OF SUPRAGLOTTIS (H): Primary | ICD-10-CM

## 2024-05-13 PROCEDURE — 97803 MED NUTRITION INDIV SUBSEQ: CPT | Mod: GT,95 | Performed by: DIETITIAN, REGISTERED

## 2024-05-13 NOTE — PROGRESS NOTES
Video-Visit Details     Type of service:  Video Visit     Video Start Time (time video started): 9:45am     Video End Time (time video stopped): 10:05am    Originating Location (pt. Location): Home     Distant Location (provider location):  [facility] NUTRITION SERVICES      Mode of Communication:  Video Conference via [facility] NUTRITION SERVICES - REASSESSMENT NOTE   EVALUATION OF PREVIOUS PLAN OF CARE:   Time Spent: 20 minutes  Visit Type: return video call  Pt accompanied by: his wife, Ondina  Referring Physician: Wilfred  C10.9 (ICD-10-CM) - Squamous cell carcinoma of oropharynx (H)   E11.9 (ICD-10-CM) - Type 2 diabetes mellitus without complications (H)      Chemotherapy: Taxol - completed  Radiation: completed  PEG tube: placed 1/23/24    Monitoring from previous assessment:   -Food/Fluid intake - Ace tells me that his intake continues to improve. He is starting to taste most foods.  Last night he had 1/2 portions of country fried steak, mashed potatoes with gravy, green beans and sweet tea.  He continues to focus on soft, moist foods and following up bites with fluids to help motility and chewing due to dysphagia and edentulous.   He thinks he's getting at least 1-2 quarts of water per day.  He has been drinking APURVA water energy drinks and Hydration packets in his water.   Hydrate packets     -EN intake/tolerance- Ace continues to take between 4 and 7 cartons of formula per day.  He is no longer taking Muscle Milk as his PO intake has improved.     Formula: Osmolite 1.5 felicia  Volume: 4-7 cartons/day (up to 1660 mL, 1267 ml free water)  Provisions:  up to 2485kcal (35kcal/kg), 105 g protein (1.5g/kg), 336g CHO, 0g fiber  -Weight trends - noted ~12 lb wt loss x past 6 weeks; acknowledges that he's lost weight. Recent bought a new scale and plans to weight routinely to help with accountability.   Wt Readings from Last 10 Encounters:   05/06/24 68.1 kg (150 lb 1.6 oz)   03/20/24 73.5 kg (162  lb 1.6 oz)   03/19/24 70.3 kg (155 lb)   02/08/24 70.9 kg (156 lb 3.2 oz)   02/06/24 72.6 kg (160 lb)   02/05/24 69.7 kg (153 lb 9.6 oz)   01/30/24 71.7 kg (158 lb)   01/30/24 70.8 kg (156 lb)   01/29/24 71.4 kg (157 lb 6.4 oz)   01/29/24 71 kg (156 lb 9.6 oz)     Previous Goals:   EN to meet 100% estimated nutrition needs via PEG tube (7 cartons/day)  Fluids goals - 6-8 cups water/electrolytes via PO and PEG tube as able  Evaluation: met   Previous Nutrition Diagnosis:   Inadequate oral intake related to odynophagia and dysphagia as evidenced by pt reliant on EN via PEG tube to meet 100% estimated nutrition needs.   Evaluation: no change  NEW FINDINGS:   No new findings   CURRENT NUTRITION DIAGNOSIS   Inadequate oral intake related to odynophagia and dysphagia as evidenced by pt reliant on EN via PEG tube to meet 100% estimated nutrition needs.   INTERVENTIONS   EN Composition, EN Schedule, and Feeding Tube Flush -  Encouraged to continue taking 7 cartons of formula/day for repletion.   Reviewed oral intake. Encouraged ONS and pureed foods as his throat pain has improved.  Suggested making smoothies with ONS, pureed foods such as cream based soups (without chunks), mashed potatoes, yogurt, puddings etc. Reviewed calorie goals for repletion of >2500 per day.   Discussed weaning of TF as PO intake improves and weight trends upward. Discussed protocol for removing feeding tube. Encouraged to weigh self routinely and work on increasing weight and weight stabilty for 4 week w/o using feeding tube in order to have FT removed.  Goals   1.Aim for a balance of >2500 calories, 100g protein per day for weight repletion.   2. Continue to wean TF towards <2-4 cartons/day  3.Weight gain towards 175-180 lbs per patient goal    Follow up/Monitoring: one month follow up for potential wean of TF  Enteral Nutrition intake and Weight    Meka Lee RD, , LD  [facility]  [phone]

## 2024-05-13 NOTE — LETTER
5/13/2024         RE: Ko Thakkar  [address]  [address]  [place] [address]        Dear Colleague,    Thank you for referring your patient, Ko Thakkar, to the [facility]. Please see a copy of my visit note below.    Video-Visit Details     Type of service:  Video Visit     Video Start Time (time video started): 9:45am     Video End Time (time video stopped): 10:05am    Originating Location (pt. Location): Home     Distant Location (provider location):  [facility] NUTRITION [facility]      Mode of Communication:  Video Conference via [facility] NUTRITION SERVICES - REASSESSMENT NOTE   EVALUATION OF PREVIOUS PLAN OF CARE:   Time Spent: 20 minutes  Visit Type: return video call  Pt accompanied by: his wife, Ondina  Referring Physician: Wilfred  C10.9 (ICD-10-CM) - Squamous cell carcinoma of oropharynx (H)   E11.9 (ICD-10-CM) - Type 2 diabetes mellitus without complications (H)      Chemotherapy: Taxol - completed  Radiation: completed  PEG tube: placed 1/23/24    Monitoring from previous assessment:   -Food/Fluid intake - Ace tells me that his intake continues to improve. He is starting to taste most foods.  Last night he had 1/2 portions of country fried steak, mashed potatoes with gravy, green beans and sweet tea.  He continues to focus on soft, moist foods and following up bites with fluids to help motility and chewing due to dysphagia and edentulous.   He thinks he's getting at least 1-2 quarts of water per day.  He has been drinking APURVA water energy drinks and Hydration packets in his water.   Hydrate packets     -EN intake/tolerance- Ace continues to take between 4 and 7 cartons of formula per day.  He is no longer taking Muscle Milk as his PO intake has improved.     Formula: Osmolite 1.5 felicia  Volume: 4-7 cartons/day (up to 1660 mL, 1267 ml free water)  Provisions:  up to 2485kcal (35kcal/kg), 105 g protein (1.5g/kg), 336g CHO, 0g  fiber  -Weight trends - noted ~12 lb wt loss x past 6 weeks; acknowledges that he's lost weight. Recent bought a new scale and plans to weight routinely to help with accountability.   Wt Readings from Last 10 Encounters:   05/06/24 68.1 kg (150 lb 1.6 oz)   03/20/24 73.5 kg (162 lb 1.6 oz)   03/19/24 70.3 kg (155 lb)   02/08/24 70.9 kg (156 lb 3.2 oz)   02/06/24 72.6 kg (160 lb)   02/05/24 69.7 kg (153 lb 9.6 oz)   01/30/24 71.7 kg (158 lb)   01/30/24 70.8 kg (156 lb)   01/29/24 71.4 kg (157 lb 6.4 oz)   01/29/24 71 kg (156 lb 9.6 oz)     Previous Goals:   EN to meet 100% estimated nutrition needs via PEG tube (7 cartons/day)  Fluids goals - 6-8 cups water/electrolytes via PO and PEG tube as able  Evaluation: met   Previous Nutrition Diagnosis:   Inadequate oral intake related to odynophagia and dysphagia as evidenced by pt reliant on EN via PEG tube to meet 100% estimated nutrition needs.   Evaluation: no change  NEW FINDINGS:   No new findings   CURRENT NUTRITION DIAGNOSIS   Inadequate oral intake related to odynophagia and dysphagia as evidenced by pt reliant on EN via PEG tube to meet 100% estimated nutrition needs.   INTERVENTIONS   EN Composition, EN Schedule, and Feeding Tube Flush -  Encouraged to continue taking 7 cartons of formula/day for repletion.   Reviewed oral intake. Encouraged ONS and pureed foods as his throat pain has improved.  Suggested making smoothies with ONS, pureed foods such as cream based soups (without chunks), mashed potatoes, yogurt, puddings etc. Reviewed calorie goals for repletion of >2500 per day.   Discussed weaning of TF as PO intake improves and weight trends upward. Discussed protocol for removing feeding tube. Encouraged to weigh self routinely and work on increasing weight and weight stabilty for 4 week w/o using feeding tube in order to have FT removed.  Goals   1.Aim for a balance of >2500 calories, 100g protein per day for weight repletion.   2. Continue to wean TF  towards <2-4 cartons/day  3.Weight gain towards 175-180 lbs per patient goal    Follow up/Monitoring: one month follow up for potential wean of TF  Enteral Nutrition intake and Weight    Meka Lee RD, , LD  [facility]  [phone]

## 2024-06-12 ENCOUNTER — APPOINTMENT (OUTPATIENT)
Dept: LAB | Facility: CLINIC | Age: 69
End: 2024-06-12
Attending: INTERNAL MEDICINE
Payer: COMMERCIAL

## 2024-06-12 ENCOUNTER — ONCOLOGY VISIT (OUTPATIENT)
Dept: ONCOLOGY | Facility: CLINIC | Age: 69
End: 2024-06-12
Attending: INTERNAL MEDICINE
Payer: COMMERCIAL

## 2024-06-12 VITALS
BODY MASS INDEX: 23.5 KG/M2 | DIASTOLIC BLOOD PRESSURE: 74 MMHG | HEART RATE: 90 BPM | TEMPERATURE: 98.6 F | WEIGHT: 146.6 LBS | SYSTOLIC BLOOD PRESSURE: 145 MMHG | RESPIRATION RATE: 16 BRPM | OXYGEN SATURATION: 97 %

## 2024-06-12 DIAGNOSIS — Z13.29 SCREENING FOR HYPOTHYROIDISM: ICD-10-CM

## 2024-06-12 DIAGNOSIS — C32.1 SCC (SQUAMOUS CELL CARCINOMA) OF SUPRAGLOTTIS (H): ICD-10-CM

## 2024-06-12 DIAGNOSIS — E03.4 HYPOTHYROIDISM DUE TO ACQUIRED ATROPHY OF THYROID: Primary | ICD-10-CM

## 2024-06-12 DIAGNOSIS — I89.0 LYMPHEDEMA: ICD-10-CM

## 2024-06-12 LAB
ALBUMIN SERPL BCG-MCNC: 4.1 G/DL (ref 3.5–5.2)
ALP SERPL-CCNC: 87 U/L (ref 40–150)
ALT SERPL W P-5'-P-CCNC: 18 U/L (ref 0–70)
ANION GAP SERPL CALCULATED.3IONS-SCNC: 9 MMOL/L (ref 7–15)
AST SERPL W P-5'-P-CCNC: 21 U/L (ref 0–45)
BASOPHILS # BLD AUTO: 0 10E3/UL (ref 0–0.2)
BASOPHILS NFR BLD AUTO: 1 %
BILIRUB SERPL-MCNC: 0.2 MG/DL
BUN SERPL-MCNC: 19.8 MG/DL (ref 8–23)
CALCIUM SERPL-MCNC: 9.5 MG/DL (ref 8.8–10.2)
CHLORIDE SERPL-SCNC: 100 MMOL/L (ref 98–107)
CREAT SERPL-MCNC: 0.78 MG/DL (ref 0.67–1.17)
DEPRECATED HCO3 PLAS-SCNC: 28 MMOL/L (ref 22–29)
EGFRCR SERPLBLD CKD-EPI 2021: >90 ML/MIN/1.73M2
EOSINOPHIL # BLD AUTO: 0.2 10E3/UL (ref 0–0.7)
EOSINOPHIL NFR BLD AUTO: 3 %
ERYTHROCYTE [DISTWIDTH] IN BLOOD BY AUTOMATED COUNT: 13.9 % (ref 10–15)
GLUCOSE SERPL-MCNC: 302 MG/DL (ref 70–99)
HCT VFR BLD AUTO: 37.8 % (ref 40–53)
HGB BLD-MCNC: 12.3 G/DL (ref 13.3–17.7)
IMM GRANULOCYTES # BLD: 0 10E3/UL
IMM GRANULOCYTES NFR BLD: 0 %
LYMPHOCYTES # BLD AUTO: 0.9 10E3/UL (ref 0.8–5.3)
LYMPHOCYTES NFR BLD AUTO: 16 %
MCH RBC QN AUTO: 28.7 PG (ref 26.5–33)
MCHC RBC AUTO-ENTMCNC: 32.5 G/DL (ref 31.5–36.5)
MCV RBC AUTO: 88 FL (ref 78–100)
MONOCYTES # BLD AUTO: 0.6 10E3/UL (ref 0–1.3)
MONOCYTES NFR BLD AUTO: 11 %
NEUTROPHILS # BLD AUTO: 4 10E3/UL (ref 1.6–8.3)
NEUTROPHILS NFR BLD AUTO: 69 %
NRBC # BLD AUTO: 0 10E3/UL
NRBC BLD AUTO-RTO: 0 /100
PLATELET # BLD AUTO: 256 10E3/UL (ref 150–450)
POTASSIUM SERPL-SCNC: 4.1 MMOL/L (ref 3.4–5.3)
PROT SERPL-MCNC: 6.8 G/DL (ref 6.4–8.3)
RBC # BLD AUTO: 4.29 10E6/UL (ref 4.4–5.9)
SODIUM SERPL-SCNC: 137 MMOL/L (ref 135–145)
T4 FREE SERPL-MCNC: 0.88 NG/DL (ref 0.9–1.7)
TSH SERPL DL<=0.005 MIU/L-ACNC: 2.86 UIU/ML (ref 0.3–4.2)
WBC # BLD AUTO: 5.7 10E3/UL (ref 4–11)

## 2024-06-12 PROCEDURE — 84439 ASSAY OF FREE THYROXINE: CPT | Performed by: INTERNAL MEDICINE

## 2024-06-12 PROCEDURE — 85025 COMPLETE CBC W/AUTO DIFF WBC: CPT | Performed by: INTERNAL MEDICINE

## 2024-06-12 PROCEDURE — G0463 HOSPITAL OUTPT CLINIC VISIT: HCPCS | Performed by: INTERNAL MEDICINE

## 2024-06-12 PROCEDURE — 80053 COMPREHEN METABOLIC PANEL: CPT | Performed by: INTERNAL MEDICINE

## 2024-06-12 PROCEDURE — G2211 COMPLEX E/M VISIT ADD ON: HCPCS | Performed by: INTERNAL MEDICINE

## 2024-06-12 PROCEDURE — 36415 COLL VENOUS BLD VENIPUNCTURE: CPT | Performed by: INTERNAL MEDICINE

## 2024-06-12 PROCEDURE — 84443 ASSAY THYROID STIM HORMONE: CPT | Performed by: INTERNAL MEDICINE

## 2024-06-12 PROCEDURE — 99215 OFFICE O/P EST HI 40 MIN: CPT | Performed by: INTERNAL MEDICINE

## 2024-06-12 RX ORDER — LEVOTHYROXINE SODIUM 50 UG/1
50 TABLET ORAL DAILY
Qty: 30 TABLET | Refills: 11 | Status: SHIPPED | OUTPATIENT
Start: 2024-06-12

## 2024-06-12 ASSESSMENT — PAIN SCALES - GENERAL: PAINLEVEL: NO PAIN (0)

## 2024-06-12 NOTE — NURSING NOTE
"Oncology Rooming Note    June 12, 2024 4:04 PM   Ko Thakkar is a 68 year old male who presents for:    Chief Complaint   Patient presents with    Blood Draw     Vitals, blood draw,  by MA. Pt checked into appt.    Oncology Clinic Visit     Squamous cell carcinoma of supraglottis      Initial Vitals: BP (!) 145/74   Pulse 90   Temp 98.6  F (37  C) (Oral)   Resp 16   Wt 66.5 kg (146 lb 9.6 oz)   SpO2 97%   BMI 23.50 kg/m   Estimated body mass index is 23.5 kg/m  as calculated from the following:    Height as of 5/6/24: 1.682 m (5' 6.22\").    Weight as of this encounter: 66.5 kg (146 lb 9.6 oz). Body surface area is 1.76 meters squared.  No Pain (0) Comment: Data Unavailable   No LMP for male patient.  Allergies reviewed: Yes  Medications reviewed: Yes    Medications: Medication refills not needed today.  Pharmacy name entered into EPIC:    [facility] - [place], [place] - [address] [facility] - [place], [place] - [address] [id number] - [place], [place] [facility] [address] [facility] [address] [id number] - [place], [place] - [address] [facility] [address] [facility]    Frailty Screening:   Is the patient here for a new oncology consult visit in cancer care? 2. No      Clinical concerns:        Rayna Sandhu              "

## 2024-06-12 NOTE — LETTER
6/12/2024      Ko Thakkar  [address]  [address]  [place] [address]      Dear Colleague,    Thank you for referring your patient, Ko Thakkar, to the [facility]. Please see a copy of my visit note below.        [facility]  [address] [address]  Phone: [phone]  Fax: [phone]    PATIENT NAME: Ko Thakkar  MRN # [id number]   DATE OF VISIT: June 12, 2024  YOB: 1955     Otolaryngology: Dr. Denia Hardin  Radiation Oncology: Dr. Juwan Cordova   PCP: Dr. Alton Mota; [facility] in [place]     CANCER TYPE: SCC supraglottis  STAGE: [id number] (IRON)  ECOG PS: 1    PD-L1:  NGS:     SUMMARY  5/16/23 EGD for dysphagia. Gastritis and gastric diverticulum. Bx showed H. Pylori, treated. No atrophic gastritis or dysplasia  7/11/23 Swallow study at . Poor epiglottic inversion, penetration, aspiration  7/28/23 CT neck. 2.2 x 1.4 x 2.3 cm supraglottic mass, concern for paraglottic involvement, bilateral IB, IIA, III, IV adenopathy, 70% ICA stenosis   8/21/23 US carotid. 70-99% stenosis R ICA. <50% stenosis L ICA  8/29/23 FNA L level 2 node (IR). Path: SCC  9/6/23 PET/CT. FDG avid mass (SUV 13.5) right tonsil/lateral pharyngeal wall/glossotonsillar sulcus with slight extension to the R tongue base, right AE fold, right epiglottis, right piriform sinus, right posterolateral pharyngeal wall, posterior pharyngeal wall, no thyroid cartilage erosion, right retropharyngeal node, deep lobe parotid FDG avid mass, bilateral cervical nodes (right level II-IV, left level II-III), no distant disease, L mandibular canine with periapical abscess. R temporal lobe change suggestive of prior infarct. 5 mm LLL nodule.  10/10/23 Video swallow.   10/13/23 Gtube. Fell out 12/25. Replaced 1/23  10/16~11/13/23 C1-2 carboplatin paclitaxel. Given due to logistical issues with pre-chemoradiation evaluation that  would have delayed start of any treatment substantially. Paclitaxel dose reduced to 140 mg/m2 C2 due to neuropathy  10/23/23 CTA. 70% narrowing R carotid carlos/bifurcation similar to 7/28/23 CT. Laterally projecting disc osteophyte complex resulting in mod-severe L vertebral artery narrowing at C3-4 level.  10/23/23 Brain MRI. No mets. Bilateral frontal and temporal lobe encephalomalacia, R > L. Mild volume loss.   11/28/23 PET/CT. Good IL.   12/19/23~2/8/24  Chemoradiation with weekly carboplatin paclitaxel. 7000 cGy (Dr. Juwan Cordova). Not a candidate for cisplatin.  5/2/24 PET/CT. YAMILE    ASSESSMENT AND PLAN  SCC supraglottis, [id number] (IRON): Declined surgery. 2 cycles of induction due to logistical issues, etc., with good IL. Finished chemoradiation 4 months ago. PET/CT last month YAMILE, Dr. Hardin's exam ok. Continue follow up with her. I will see him in about 6 months after the next scan and discharge from my clinic in the absence of new issues. Survivorship visit - discussed, schedule next visit. Not moving to [place]. Sent message to Dr. Hardin.     Lymphedema: PT referral. Discussed critical importance of therapy for the long term     Acquired hypothyoridism: Levothyroxine 50 mcg daily, recheck at next [facility] visit in Aug     Pulmonary nodules: MELCHOR warrants monitoring but stable since late Nov. The others not worrisome as below.     Hearing loss: Can re-evaluate hearing further away from radiation.     Dysphagia, aspiration, malnutrition: Encouraged swallowing exercises, etc., as soon as he's able. Hoping to get Gtube out, has appt with Meka next week to assess intake. I looked at his Gtube site - he has some granulation tissue around that looks fine, reassured them    Neuropathy: No changes during chemo.     R KAILASH: Met with Dr. Tapia, vascular surgery fellow 8/21. Recommended treating the cancer first, asa 81 and high dose atorva, follow up 6 months with repeat US. PET/CT showed possible old R temporal infarct. Was  supposed to have follow up 2/21, not done, has appt 6/24.     H/o food impaction: lower esophagus, monitor    Needs letter to recommend avoiding travel to [place] for court appearance, etc. I think it's reasonable to ask that it be done virtually if possible as he's still got a lot on his plate, albeit recovering acceptably well.     The longitudinal plan of care for the condition(s) below were addressed during this visit. Due to the added complexity in care, I will continue to support Ace in the subsequent management of this condition(s) and with the ongoing continuity of care of this condition(s): SCC      40 minutes spent by me on the date of the encounter doing chart review, review of outside records, review of test results, interpretation of tests, patient visit, documentation, orders, discussion with family.     Xochilt Louise MD  Associate Professor of Medicine  Hematology, Oncology and Transplantation      SUBJECTIVE  Mr. Thakkar (Ace) returns for routine follow up, about 3 months after completing chemoradiation  Not moving to [place] this summer  Worried about change around [employer]  Works fine  Eating   Hearing unchanged - but worse according to Ondina.   Breathing ok   No new issues except has been grumpier     PAST MEDICAL HISTORY  SCC as above  GERD  H/o food impaction in 2008 and 2014 (steak) related to Schatzki ring on EGD 10/13/14  DM2. Last A1c about 7.6 sometime in summer 2023 and Nov 2023  Dyslipidemia  Possible prior L temporal CVA  HTN  H.o spinal meningitis as a baby -   R arm surgery  Bone graft to forehead  Ex lap   Tinnitus secondary to treatment for meningitis or from menigitis - bilateral   Hearing loss -- audiogram 10/2/2023  Numbness in the toes - mostly big toes   Crushing pills   No muscle aches or pains     CURRENT OUTPATIENT MEDICATIONS  Reviewed    ALLERGIES  No Known Allergies     PHYSICAL EXAM  BP (!) 145/74   Pulse 90   Temp 98.6  F (37  C) (Oral)   Resp 16   Wt 66.5 kg (146 lb  9.6 oz)   SpO2 97%   BMI 23.50 kg/m    GEN: NAD  HEENT: EOMI, no icterus, injection or pallor  EXT: no edema  NEURO: alert    LABORATORY AND IMAGING STUDIES    Labs today were independently reviewed and interpreted by me  CBC pd acceptable  CMP acceptable except glucose 302 - high  TFTs as above. FT4 marginally low     PET Oncology (Eyes to Thighs)  Narrative: Combined Report of: PET and CT on 5/2/2024 11:37 AM:    1. PET of the neck, chest, abdomen, and pelvis.  2. PET CT Fusion for Attenuation Correction and Anatomical  Localization.  3. Diagnostic CT of the neck, chest, abdomen and pelvis with  intravenous contrast obtained for diagnostic interpretation.  4. 3D MIP and PET-CT fused images were processed on an independent  workstation and archived to PACS and reviewed by a radiologist.    Technique:    1. PET: The patient received 10.0 mCi of F-18-FDG. The serum glucose  was 185 mg/dL prior to administration. Body weight was 73.5 kg. Images  were evaluated in the axial, sagittal, and coronal planes as well as  the rotational whole body MIP. Images were acquired from cranial  vertex to thighs.    UPTAKE WAS MEASURED AT 60 MINUTES.     2. CT: Volumetric acquisition for clinical interpretation of the  chest, abdomen, and pelvis acquired at 3 mm sections. The chest,  abdomen, and pelvis were evaluated at 5 mm sections in bone, soft  tissue, and lung windows. High resolution images of the neck were  obtained with multiple oblique projection reformats.     Contrast and Medications:  IV contrast: 99 mL of Isovue 370 intravenously.  PO contrast: None.  Additional Medications: None.    3. 3D MIP and PET-CT fused images were processed on an independent  workstation and archived to PACS and reviewed by a radiologist.    INDICATION: history of hypopharyngeal cancer s/p induction  chemotherapy and chemoradiation, eval for residual disease; Squamous  cell carcinoma of oropharynx (H); Malignant neoplasm of lateral wall  of  oropharynx (H).    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 Post induction PET scan  demonstrated residual disease. Restaging exam.    COMPARISON: PET/CT 11/28/2023.    FINDINGS:     BACKGROUND: Liver SUV max = 3.5, Aorta Blood SUV max = 3.1.     HEAD/NECK:    Index tumor: Complete resolution of right-sided residual  hypermetabolic uptake.   Postradiation changes including supraglottic laryngeal submucosal  thickening, epiglottic thickening.    Rest of the pharyngeal mucosal spaces: Nasopharynx and palatine  tonsils are within normal limits. Tongue base is normal. Oral tongue  and buccal mucosa of the oral cavity is normal. Oropharynx within  normal limits.    Sinonasal region: Paranasal sinuses are clear. No mass within the  nasal cavity.    Lymph nodes: No FDG avid lymph nodes. Further decreased size of the  residual right level 3 node.     Salivary glands: Post radiation changes including atrophy of the  submandibular and parotid glands. No FDG avid lesions within the  parotid glands.     Thyroid gland: The thyroid gland is within normal limits.     Vascular structures: Concentric narrowing of the right proximal  cervical internal carotid artery distal to bifurcation (series 1,  image 79) with the regaining of normal caliber superior to this  segment. The major vasculature of the neck are otherwise patent.    Brain: Redemonstration of photopenia and encephalomalacia of  lateral  aspect of the right temporal lobe.    CHEST:    Lymph nodes: No FDG avid or abnormally enlarged lymph nodes.    Lungs:     New scattered areas of groundglass opacities in the right lower lobe  and right upper lobe. The most significant one is seen in the right  upper lobe peripheral subpleural location with associated FDG uptake.  SUV max 4.3.    Slight increase in hypermetabolic uptake in the left posterior  lower  lobe nodule with SUV max of 2.1, previously 1.6. It measures 0.8 cm,  previously 0.7 cm. Series 10, image 57.    Left apical lung fibrotic scarring with mild FDG uptake remains  unchanged since prior scans.    Calcified granuloma right middle lobe.    Emphysematous changes in both lungs.  The central tracheobronchial tree is clear.  No pleural effusion or  pneumothorax.     Heart and great vessels: Heart size is within normal limits. No  pericardial effusion. The thoracic aorta and main pulmonary artery are  within normal limits. Diffuse wall thickening and hypermetabolic  uptake within the esophagus is new.     ABDOMEN AND PELVIS:    Liver: No FDG avid lesion. No intrahepatic or extrahepatic biliary  ductal dilatation. Gallbladder is within normal limits.     Pancreas: The pancreas is within normal limits. No pancreatic ductal  dilatation.     Spleen: No FDG avid lesion.    Adrenal glands: No FDG avid foci.    Kidneys: No FDG avid lesion. No hydronephrosis. There is thickening of  the urinary bladder wall unchanged. This may be seen with cystitis.  Prostate gland appears unremarkable.    Gastrointestinal system: Normal caliber of the small and large bowel.  Gastrostomy tube is seen in place.    Lymph nodes: No FDG avid or abnormally enlarged lymph nodes.    Vascular structures: Normal caliber of the abdominal aorta.  Atherosclerotic plaque seen within the abdominal aorta with mild  luminal narrowing proximal to the aortic bifurcation.    No free air, free fluid, or fluid collection.     EXTREMITIES:     No abnormal FDG uptake in the visualized extremities.    BONES AND SOFT TISSUES:   Multilevel degenerative changes are seen within the spine. There is  photopenia in the upper cervical vertebrae likely due to recent  radiation therapy.  No abnormal FDG uptake in the skeleton. No abnormal lytic or blastic  osseous lesions.     SPINAL CANAL:     No evident canal compromise. No abnormal FDG avid  lesion.  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 Primary: NI-RADS 1} Expected post-treatment changes in the neck  without evidence of recurrent disease at the primary site.  Neck: NI-RADS 1}  No abnormal lymph node.     2. New scattered groundglass opacities in lungs with increased FDG  uptake associating the largest focus in the right upper lobe. Findings  are likely infectious/inflammatory.    3. Grossly similar size of the left lower lobe subpleural solid nodule  with slightly increased uptake. Continued attention on follow-up is  recommended.     4. New diffuse esophageal mucosal enhancement, wall thickening and  associated FDG uptake. Findings can be seen with esophagitis.     NI-RADS CECT Surveillance Legend:    Primary  1: No evidence of recurrence: routine surveillance  2: Low suspicion    a) Superficial abnormality (skin, mucosal surface): direct visual  inspection    b) Ill-defined deep abnormality: short interval follow-up* or PET  3: High suspicion (new or enlarging discrete nodule/mass): biopsy  4: Definitive recurrence (path proven or clinical progression): no  biopsy needed    Nodes  1: No evidence of recurrence: routine surveillance  2: Low suspicion (ill-defined): short interval follow-up or PET  3: High suspicion (new or enlarging lymph node): biopsy if clinically  needed  4: Definitive recurrence (path proven or clinical progression): no  biopsy needed    *short interval follow-up: 3 months at our institution    FOLLOW-UP APPOINTMENT: 5/6/2024.    I have personally reviewed the examination and initial interpretation  and I agree with the findings.    MARI WARREN MD         SYSTEM ID:  [id number]  CT Soft Tissue Neck w Contrast  Narrative: Combined Report of: PET and CT on 5/2/2024 11:37 AM:    1. PET of the neck, chest, abdomen, and pelvis.  2. PET CT Fusion for Attenuation Correction and  Anatomical  Localization.  3. Diagnostic CT of the neck, chest, abdomen and pelvis with  intravenous contrast obtained for diagnostic interpretation.  4. 3D MIP and PET-CT fused images were processed on an independent  workstation and archived to PACS and reviewed by a radiologist.    Technique:    1. PET: The patient received 10.0 mCi of F-18-FDG. The serum glucose  was 185 mg/dL prior to administration. Body weight was 73.5 kg. Images  were evaluated in the axial, sagittal, and coronal planes as well as  the rotational whole body MIP. Images were acquired from cranial  vertex to thighs.    UPTAKE WAS MEASURED AT 60 MINUTES.     2. CT: Volumetric acquisition for clinical interpretation of the  chest, abdomen, and pelvis acquired at 3 mm sections. The chest,  abdomen, and pelvis were evaluated at 5 mm sections in bone, soft  tissue, and lung windows. High resolution images of the neck were  obtained with multiple oblique projection reformats.     Contrast and Medications:  IV contrast: 99 mL of Isovue 370 intravenously.  PO contrast: None.  Additional Medications: None.    3. 3D MIP and PET-CT fused images were processed on an independent  workstation and archived to PACS and reviewed by a radiologist.    INDICATION: history of hypopharyngeal cancer s/p induction  chemotherapy and chemoradiation, eval for residual disease; Squamous  cell carcinoma of oropharynx (H); Malignant neoplasm of lateral wall  of oropharynx (H).    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 Post induction PET scan  demonstrated residual disease. Restaging exam.    COMPARISON: PET/CT 11/28/2023.    FINDINGS:     BACKGROUND: Liver SUV max = 3.5, Aorta Blood SUV max = 3.1.     HEAD/NECK:    Index tumor: Complete resolution of right-sided residual  hypermetabolic uptake.   Postradiation changes including supraglottic  laryngeal submucosal  thickening, epiglottic thickening.    Rest of the pharyngeal mucosal spaces: Nasopharynx and palatine  tonsils are within normal limits. Tongue base is normal. Oral tongue  and buccal mucosa of the oral cavity is normal. Oropharynx within  normal limits.    Sinonasal region: Paranasal sinuses are clear. No mass within the  nasal cavity.    Lymph nodes: No FDG avid lymph nodes. Further decreased size of the  residual right level 3 node.     Salivary glands: Post radiation changes including atrophy of the  submandibular and parotid glands. No FDG avid lesions within the  parotid glands.     Thyroid gland: The thyroid gland is within normal limits.     Vascular structures: Concentric narrowing of the right proximal  cervical internal carotid artery distal to bifurcation (series 1,  image 79) with the regaining of normal caliber superior to this  segment. The major vasculature of the neck are otherwise patent.    Brain: Redemonstration of photopenia and encephalomalacia of  lateral  aspect of the right temporal lobe.    CHEST:    Lymph nodes: No FDG avid or abnormally enlarged lymph nodes.    Lungs:     New scattered areas of groundglass opacities in the right lower lobe  and right upper lobe. The most significant one is seen in the right  upper lobe peripheral subpleural location with associated FDG uptake.  SUV max 4.3.    Slight increase in hypermetabolic uptake in the left posterior lower  lobe nodule with SUV max of 2.1, previously 1.6. It measures 0.8 cm,  previously 0.7 cm. Series 10, image 57.    Left apical lung fibrotic scarring with mild FDG uptake remains  unchanged since prior scans.    Calcified granuloma right middle lobe.    Emphysematous changes in both lungs.  The central tracheobronchial tree is clear.  No pleural effusion or  pneumothorax.     Heart and great vessels: Heart size is within normal limits. No  pericardial effusion. The thoracic aorta and main pulmonary artery  are  within normal limits. Diffuse wall thickening and hypermetabolic  uptake within the esophagus is new.     ABDOMEN AND PELVIS:    Liver: No FDG avid lesion. No intrahepatic or extrahepatic biliary  ductal dilatation. Gallbladder is within normal limits.     Pancreas: The pancreas is within normal limits. No pancreatic ductal  dilatation.     Spleen: No FDG avid lesion.    Adrenal glands: No FDG avid foci.    Kidneys: No FDG avid lesion. No hydronephrosis. There is thickening of  the urinary bladder wall unchanged. This may be seen with cystitis.  Prostate gland appears unremarkable.    Gastrointestinal system: Normal caliber of the small and large bowel.  Gastrostomy tube is seen in place.    Lymph nodes: No FDG avid or abnormally enlarged lymph nodes.    Vascular structures: Normal caliber of the abdominal aorta.  Atherosclerotic plaque seen within the abdominal aorta with mild  luminal narrowing proximal to the aortic bifurcation.    No free air, free fluid, or fluid collection.     EXTREMITIES:     No abnormal FDG uptake in the visualized extremities.    BONES AND SOFT TISSUES:   Multilevel degenerative changes are seen within the spine. There is  photopenia in the upper cervical vertebrae likely due to recent  radiation therapy.  No abnormal FDG uptake in the skeleton. No abnormal lytic or blastic  osseous lesions.     SPINAL CANAL:     No evident canal compromise. No abnormal FDG avid lesion.  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 Primary: NI-RADS 1} Expected post-treatment changes in the neck  without evidence of recurrent disease at the primary site.  Neck: NI-RADS 1}  No abnormal lymph node.     2. New scattered groundglass opacities in lungs with increased FDG  uptake associating the largest focus in the right upper lobe. Findings  are likely infectious/inflammatory.    3. Grossly similar size of the left  lower lobe subpleural solid nodule  with slightly increased uptake. Continued attention on follow-up is  recommended.     4. New diffuse esophageal mucosal enhancement, wall thickening and  associated FDG uptake. Findings can be seen with esophagitis.     NI-RADS CECT Surveillance Legend:    Primary  1: No evidence of recurrence: routine surveillance  2: Low suspicion    a) Superficial abnormality (skin, mucosal surface): direct visual  inspection    b) Ill-defined deep abnormality: short interval follow-up* or PET  3: High suspicion (new or enlarging discrete nodule/mass): biopsy  4: Definitive recurrence (path proven or clinical progression): no  biopsy needed    Nodes  1: No evidence of recurrence: routine surveillance  2: Low suspicion (ill-defined): short interval follow-up or PET  3: High suspicion (new or enlarging lymph node): biopsy if clinically  needed  4: Definitive recurrence (path proven or clinical progression): no  biopsy needed    *short interval follow-up: 3 months at our institution    FOLLOW-UP APPOINTMENT: 5/6/2024.    I have personally reviewed the examination and initial interpretation  and I agree with the findings.    MARI WARREN MD         SYSTEM ID:  [id number]  CT Chest/Abdomen/Pelvis w Contrast  Narrative: Combined Report of: PET and CT on 5/2/2024 11:37 AM:    1. PET of the neck, chest, abdomen, and pelvis.  2. PET CT Fusion for Attenuation Correction and Anatomical  Localization.  3. Diagnostic CT of the neck, chest, abdomen and pelvis with  intravenous contrast obtained for diagnostic interpretation.  4. 3D MIP and PET-CT fused images were processed on an independent  workstation and archived to PACS and reviewed by a radiologist.    Technique:    1. PET: The patient received 10.0 mCi of F-18-FDG. The serum glucose  was 185 mg/dL prior to administration. Body weight was 73.5 kg. Images  were evaluated in the axial, sagittal, and coronal planes as well as  the rotational whole body MIP.  Images were acquired from cranial  vertex to thighs.    UPTAKE WAS MEASURED AT 60 MINUTES.     2. CT: Volumetric acquisition for clinical interpretation of the  chest, abdomen, and pelvis acquired at 3 mm sections. The chest,  abdomen, and pelvis were evaluated at 5 mm sections in bone, soft  tissue, and lung windows. High resolution images of the neck were  obtained with multiple oblique projection reformats.     Contrast and Medications:  IV contrast: 99 mL of Isovue 370 intravenously.  PO contrast: None.  Additional Medications: None.    3. 3D MIP and PET-CT fused images were processed on an independent  workstation and archived to PACS and reviewed by a radiologist.    INDICATION: history of hypopharyngeal cancer s/p induction  chemotherapy and chemoradiation, eval for residual disease; Squamous  cell carcinoma of oropharynx (H); Malignant neoplasm of lateral wall  of oropharynx (H).    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 Post induction PET scan  demonstrated residual disease. Restaging exam.    COMPARISON: PET/CT 11/28/2023.    FINDINGS:     BACKGROUND: Liver SUV max = 3.5, Aorta Blood SUV max = 3.1.     HEAD/NECK:    Index tumor: Complete resolution of right-sided residual  hypermetabolic uptake.   Postradiation changes including supraglottic laryngeal submucosal  thickening, epiglottic thickening.    Rest of the pharyngeal mucosal spaces: Nasopharynx and palatine  tonsils are within normal limits. Tongue base is normal. Oral tongue  and buccal mucosa of the oral cavity is normal. Oropharynx within  normal limits.    Sinonasal region: Paranasal sinuses are clear. No mass within the  nasal cavity.    Lymph nodes: No FDG avid lymph nodes. Further decreased size of the  residual right level 3 node.     Salivary glands: Post radiation changes including atrophy of  the  submandibular and parotid glands. No FDG avid lesions within the  parotid glands.     Thyroid gland: The thyroid gland is within normal limits.     Vascular structures: Concentric narrowing of the right proximal  cervical internal carotid artery distal to bifurcation (series 1,  image 79) with the regaining of normal caliber superior to this  segment. The major vasculature of the neck are otherwise patent.    Brain: Redemonstration of photopenia and encephalomalacia of  lateral  aspect of the right temporal lobe.    CHEST:    Lymph nodes: No FDG avid or abnormally enlarged lymph nodes.    Lungs:     New scattered areas of groundglass opacities in the right lower lobe  and right upper lobe. The most significant one is seen in the right  upper lobe peripheral subpleural location with associated FDG uptake.  SUV max 4.3.    Slight increase in hypermetabolic uptake in the left posterior lower  lobe nodule with SUV max of 2.1, previously 1.6. It measures 0.8 cm,  previously 0.7 cm. Series 10, image 57.    Left apical lung fibrotic scarring with mild FDG uptake remains  unchanged since prior scans.    Calcified granuloma right middle lobe.    Emphysematous changes in both lungs.  The central tracheobronchial tree is clear.  No pleural effusion or  pneumothorax.     Heart and great vessels: Heart size is within normal limits. No  pericardial effusion. The thoracic aorta and main pulmonary artery are  within normal limits. Diffuse wall thickening and hypermetabolic  uptake within the esophagus is new.     ABDOMEN AND PELVIS:    Liver: No FDG avid lesion. No intrahepatic or extrahepatic biliary  ductal dilatation. Gallbladder is within normal limits.     Pancreas: The pancreas is within normal limits. No pancreatic ductal  dilatation.     Spleen: No FDG avid lesion.    Adrenal glands: No FDG avid foci.    Kidneys: No FDG avid lesion. No hydronephrosis. There is thickening of  the urinary bladder wall unchanged. This may  be seen with cystitis.  Prostate gland appears unremarkable.    Gastrointestinal system: Normal caliber of the small and large bowel.  Gastrostomy tube is seen in place.    Lymph nodes: No FDG avid or abnormally enlarged lymph nodes.    Vascular structures: Normal caliber of the abdominal aorta.  Atherosclerotic plaque seen within the abdominal aorta with mild  luminal narrowing proximal to the aortic bifurcation.    No free air, free fluid, or fluid collection.     EXTREMITIES:     No abnormal FDG uptake in the visualized extremities.    BONES AND SOFT TISSUES:   Multilevel degenerative changes are seen within the spine. There is  photopenia in the upper cervical vertebrae likely due to recent  radiation therapy.  No abnormal FDG uptake in the skeleton. No abnormal lytic or blastic  osseous lesions.     SPINAL CANAL:     No evident canal compromise. No abnormal FDG avid lesion.  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 Primary: NI-RADS 1} Expected post-treatment changes in the neck  without evidence of recurrent disease at the primary site.  Neck: NI-RADS 1}  No abnormal lymph node.     2. New scattered groundglass opacities in lungs with increased FDG  uptake associating the largest focus in the right upper lobe. Findings  are likely infectious/inflammatory.    3. Grossly similar size of the left lower lobe subpleural solid nodule  with slightly increased uptake. Continued attention on follow-up is  recommended.     4. New diffuse esophageal mucosal enhancement, wall thickening and  associated FDG uptake. Findings can be seen with esophagitis.     NI-RADS CECT Surveillance Legend:    Primary  1: No evidence of recurrence: routine surveillance  2: Low suspicion    a) Superficial abnormality (skin, mucosal surface): direct visual  inspection    b) Ill-defined deep abnormality: short interval follow-up* or PET  3: High  suspicion (new or enlarging discrete nodule/mass): biopsy  4: Definitive recurrence (path proven or clinical progression): no  biopsy needed    Nodes  1: No evidence of recurrence: routine surveillance  2: Low suspicion (ill-defined): short interval follow-up or PET  3: High suspicion (new or enlarging lymph node): biopsy if clinically  needed  4: Definitive recurrence (path proven or clinical progression): no  biopsy needed    *short interval follow-up: 3 months at our institution    FOLLOW-UP APPOINTMENT: 5/6/2024.    I have personally reviewed the examination and initial interpretation  and I agree with the findings.    MARI WARREN MD         SYSTEM ID:  [id number]     Lungs look acceptable   Spiculated MELCHOR nodule - need to keep an eye on that one but unchanged compared to 11/28  R GGO - doesn't look worrisome for cancer  R pleural based and L pleural based - unchanged                Xochilt Louise MD

## 2024-06-12 NOTE — PROGRESS NOTES
[facility]  [address] [address]  Phone: [phone]  Fax: [phone]    PATIENT NAME: Ko Thakkar  MRN # [id number]   DATE OF VISIT: June 12, 2024  YOB: 1955     Otolaryngology: Dr. Denia Hardin  Radiation Oncology: Dr. Juwan Cordova   PCP: Dr. Alton Mota; [facility]     CANCER TYPE: SCC supraglottis  STAGE: [id number] (IRON)  ECOG PS: 1    PD-L1:  NGS:     SUMMARY  5/16/23 EGD for dysphagia. Gastritis and gastric diverticulum. Bx showed H. Pylori, treated. No atrophic gastritis or dysplasia  7/11/23 Swallow study at . Poor epiglottic inversion, penetration, aspiration  7/28/23 CT neck. 2.2 x 1.4 x 2.3 cm supraglottic mass, concern for paraglottic involvement, bilateral IB, IIA, III, IV adenopathy, 70% ICA stenosis   8/21/23 US carotid. 70-99% stenosis R ICA. <50% stenosis L ICA  8/29/23 FNA L level 2 node (IR). Path: SCC  9/6/23 PET/CT. FDG avid mass (SUV 13.5) right tonsil/lateral pharyngeal wall/glossotonsillar sulcus with slight extension to the R tongue base, right AE fold, right epiglottis, right piriform sinus, right posterolateral pharyngeal wall, posterior pharyngeal wall, no thyroid cartilage erosion, right retropharyngeal node, deep lobe parotid FDG avid mass, bilateral cervical nodes (right level II-IV, left level II-III), no distant disease, L mandibular canine with periapical abscess. R temporal lobe change suggestive of prior infarct. 5 mm LLL nodule.  10/10/23 Video swallow.   10/13/23 Gtube. Fell out 12/25. Replaced 1/23  10/16~11/13/23 C1-2 carboplatin paclitaxel. Given due to logistical issues with pre-chemoradiation evaluation that would have delayed start of any treatment substantially. Paclitaxel dose reduced to 140 mg/m2 C2 due to neuropathy  10/23/23 CTA. 70% narrowing R carotid carlos/bifurcation similar to 7/28/23 CT. Laterally projecting disc osteophyte complex resulting in  mod-severe L vertebral artery narrowing at C3-4 level.  10/23/23 Brain MRI. No mets. Bilateral frontal and temporal lobe encephalomalacia, R > L. Mild volume loss.   11/28/23 PET/CT. Good MN.   12/19/23~2/8/24  Chemoradiation with weekly carboplatin paclitaxel. 7000 cGy (Dr. Juwan Cordova). Not a candidate for cisplatin.  5/2/24 PET/CT. YAMILE    ASSESSMENT AND PLAN  SCC supraglottis, [id number] (IRON): Declined surgery. 2 cycles of induction due to logistical issues, etc., with good MN. Finished chemoradiation 4 months ago. PET/CT last month YAMILE, Dr. Hardin's exam ok. Continue follow up with her. I will see him in about 6 months after the next scan and discharge from my clinic in the absence of new issues. Survivorship visit - discussed, schedule next visit. Not moving to [place]. Sent message to Dr. Hardin.     Lymphedema: PT referral. Discussed critical importance of therapy for the long term     Acquired hypothyoridism: Levothyroxine 50 mcg daily, recheck at next [facility] visit in Aug     Pulmonary nodules: MELCHOR warrants monitoring but stable since late Nov. The others not worrisome as below.     Hearing loss: Can re-evaluate hearing further away from radiation.     Dysphagia, aspiration, malnutrition: Encouraged swallowing exercises, etc., as soon as he's able. Hoping to get Gtube out, has appt with Meka next week to assess intake. I looked at his Gtube site - he has some granulation tissue around that looks fine, reassured them    Neuropathy: No changes during chemo.     R KAILASH: Met with Dr. Tapia, vascular surgery fellow 8/21. Recommended treating the cancer first, asa 81 and high dose atorva, follow up 6 months with repeat US. PET/CT showed possible old R temporal infarct. Was supposed to have follow up 2/21, not done, has appt 6/24.     H/o food impaction: lower esophagus, monitor    Needs letter to recommend avoiding travel to [place] for court appearance, etc. I think it's reasonable to ask that it be done virtually if  possible as he's still got a lot on his plate, albeit recovering acceptably well.     The longitudinal plan of care for the condition(s) below were addressed during this visit. Due to the added complexity in care, I will continue to support Ace in the subsequent management of this condition(s) and with the ongoing continuity of care of this condition(s): SCC      40 minutes spent by me on the date of the encounter doing chart review, review of outside records, review of test results, interpretation of tests, patient visit, documentation, orders, discussion with family.     Xochilt Louise MD  Associate Professor of Medicine  Hematology, Oncology and Transplantation      SUBJECTIVE  Mr. Thakkar (Ace) returns for routine follow up, about 3 months after completing chemoradiation  Not moving to [place] this summer  Worried about change around [employer]  Works fine  Eating   Hearing unchanged - but worse according to Ondina.   Breathing ok   No new issues except has been grumpier     PAST MEDICAL HISTORY  SCC as above  GERD  H/o food impaction in 2008 and 2014 (steak) related to Schatzki ring on EGD 10/13/14  DM2. Last A1c about 7.6 sometime in summer 2023 and Nov 2023  Dyslipidemia  Possible prior L temporal CVA  HTN  H.o spinal meningitis as a baby -   R arm surgery  Bone graft to forehead  Ex lap   Tinnitus secondary to treatment for meningitis or from menigitis - bilateral   Hearing loss -- audiogram 10/2/2023  Numbness in the toes - mostly big toes   Crushing pills   No muscle aches or pains     CURRENT OUTPATIENT MEDICATIONS  Reviewed    ALLERGIES  No Known Allergies     PHYSICAL EXAM  BP (!) 145/74   Pulse 90   Temp 98.6  F (37  C) (Oral)   Resp 16   Wt 66.5 kg (146 lb 9.6 oz)   SpO2 97%   BMI 23.50 kg/m    GEN: NAD  HEENT: EOMI, no icterus, injection or pallor  EXT: no edema  NEURO: alert    LABORATORY AND IMAGING STUDIES    Labs today were independently reviewed and interpreted by me  CBC pd acceptable  CMP  acceptable except glucose 302 - high  TFTs as above. FT4 marginally low     PET Oncology (Eyes to Thighs)  Narrative: Combined Report of: PET and CT on 5/2/2024 11:37 AM:    1. PET of the neck, chest, abdomen, and pelvis.  2. PET CT Fusion for Attenuation Correction and Anatomical  Localization.  3. Diagnostic CT of the neck, chest, abdomen and pelvis with  intravenous contrast obtained for diagnostic interpretation.  4. 3D MIP and PET-CT fused images were processed on an independent  workstation and archived to PACS and reviewed by a radiologist.    Technique:    1. PET: The patient received 10.0 mCi of F-18-FDG. The serum glucose  was 185 mg/dL prior to administration. Body weight was 73.5 kg. Images  were evaluated in the axial, sagittal, and coronal planes as well as  the rotational whole body MIP. Images were acquired from cranial  vertex to thighs.    UPTAKE WAS MEASURED AT 60 MINUTES.     2. CT: Volumetric acquisition for clinical interpretation of the  chest, abdomen, and pelvis acquired at 3 mm sections. The chest,  abdomen, and pelvis were evaluated at 5 mm sections in bone, soft  tissue, and lung windows. High resolution images of the neck were  obtained with multiple oblique projection reformats.     Contrast and Medications:  IV contrast: 99 mL of Isovue 370 intravenously.  PO contrast: None.  Additional Medications: None.    3. 3D MIP and PET-CT fused images were processed on an independent  workstation and archived to PACS and reviewed by a radiologist.    INDICATION: history of hypopharyngeal cancer s/p induction  chemotherapy and chemoradiation, eval for residual disease; Squamous  cell carcinoma of oropharynx (H); Malignant neoplasm of lateral wall  of oropharynx (H).    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 Post induction PET scan  demonstrated residual disease.  Restaging exam.    COMPARISON: PET/CT 11/28/2023.    FINDINGS:     BACKGROUND: Liver SUV max = 3.5, Aorta Blood SUV max = 3.1.     HEAD/NECK:    Index tumor: Complete resolution of right-sided residual  hypermetabolic uptake.   Postradiation changes including supraglottic laryngeal submucosal  thickening, epiglottic thickening.    Rest of the pharyngeal mucosal spaces: Nasopharynx and palatine  tonsils are within normal limits. Tongue base is normal. Oral tongue  and buccal mucosa of the oral cavity is normal. Oropharynx within  normal limits.    Sinonasal region: Paranasal sinuses are clear. No mass within the  nasal cavity.    Lymph nodes: No FDG avid lymph nodes. Further decreased size of the  residual right level 3 node.     Salivary glands: Post radiation changes including atrophy of the  submandibular and parotid glands. No FDG avid lesions within the  parotid glands.     Thyroid gland: The thyroid gland is within normal limits.     Vascular structures: Concentric narrowing of the right proximal  cervical internal carotid artery distal to bifurcation (series 1,  image 79) with the regaining of normal caliber superior to this  segment. The major vasculature of the neck are otherwise patent.    Brain: Redemonstration of photopenia and encephalomalacia of  lateral  aspect of the right temporal lobe.    CHEST:    Lymph nodes: No FDG avid or abnormally enlarged lymph nodes.    Lungs:     New scattered areas of groundglass opacities in the right lower lobe  and right upper lobe. The most significant one is seen in the right  upper lobe peripheral subpleural location with associated FDG uptake.  SUV max 4.3.    Slight increase in hypermetabolic uptake in the left posterior lower  lobe nodule with SUV max of 2.1, previously 1.6. It measures 0.8 cm,  previously 0.7 cm. Series 10, image 57.    Left apical lung fibrotic scarring with mild FDG uptake remains  unchanged since prior scans.    Calcified granuloma right  middle lobe.    Emphysematous changes in both lungs.  The central tracheobronchial tree is clear.  No pleural effusion or  pneumothorax.     Heart and great vessels: Heart size is within normal limits. No  pericardial effusion. The thoracic aorta and main pulmonary artery are  within normal limits. Diffuse wall thickening and hypermetabolic  uptake within the esophagus is new.     ABDOMEN AND PELVIS:    Liver: No FDG avid lesion. No intrahepatic or extrahepatic biliary  ductal dilatation. Gallbladder is within normal limits.     Pancreas: The pancreas is within normal limits. No pancreatic ductal  dilatation.     Spleen: No FDG avid lesion.    Adrenal glands: No FDG avid foci.    Kidneys: No FDG avid lesion. No hydronephrosis. There is thickening of  the urinary bladder wall unchanged. This may be seen with cystitis.  Prostate gland appears unremarkable.    Gastrointestinal system: Normal caliber of the small and large bowel.  Gastrostomy tube is seen in place.    Lymph nodes: No FDG avid or abnormally enlarged lymph nodes.    Vascular structures: Normal caliber of the abdominal aorta.  Atherosclerotic plaque seen within the abdominal aorta with mild  luminal narrowing proximal to the aortic bifurcation.    No free air, free fluid, or fluid collection.     EXTREMITIES:     No abnormal FDG uptake in the visualized extremities.    BONES AND SOFT TISSUES:   Multilevel degenerative changes are seen within the spine. There is  photopenia in the upper cervical vertebrae likely due to recent  radiation therapy.  No abnormal FDG uptake in the skeleton. No abnormal lytic or blastic  osseous lesions.     SPINAL CANAL:     No evident canal compromise. No abnormal FDG avid lesion.  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 Primary: NI-RADS 1} Expected post-treatment changes in the neck  without evidence of recurrent disease at the  primary site.  Neck: NI-RADS 1}  No abnormal lymph node.     2. New scattered groundglass opacities in lungs with increased FDG  uptake associating the largest focus in the right upper lobe. Findings  are likely infectious/inflammatory.    3. Grossly similar size of the left lower lobe subpleural solid nodule  with slightly increased uptake. Continued attention on follow-up is  recommended.     4. New diffuse esophageal mucosal enhancement, wall thickening and  associated FDG uptake. Findings can be seen with esophagitis.     NI-RADS CECT Surveillance Legend:    Primary  1: No evidence of recurrence: routine surveillance  2: Low suspicion    a) Superficial abnormality (skin, mucosal surface): direct visual  inspection    b) Ill-defined deep abnormality: short interval follow-up* or PET  3: High suspicion (new or enlarging discrete nodule/mass): biopsy  4: Definitive recurrence (path proven or clinical progression): no  biopsy needed    Nodes  1: No evidence of recurrence: routine surveillance  2: Low suspicion (ill-defined): short interval follow-up or PET  3: High suspicion (new or enlarging lymph node): biopsy if clinically  needed  4: Definitive recurrence (path proven or clinical progression): no  biopsy needed    *short interval follow-up: 3 months at our institution    FOLLOW-UP APPOINTMENT: 5/6/2024.    I have personally reviewed the examination and initial interpretation  and I agree with the findings.    MARI WARREN MD         SYSTEM ID:  [id number]  CT Soft Tissue Neck w Contrast  Narrative: Combined Report of: PET and CT on 5/2/2024 11:37 AM:    1. PET of the neck, chest, abdomen, and pelvis.  2. PET CT Fusion for Attenuation Correction and Anatomical  Localization.  3. Diagnostic CT of the neck, chest, abdomen and pelvis with  intravenous contrast obtained for diagnostic interpretation.  4. 3D MIP and PET-CT fused images were processed on an independent  workstation and archived to PACS and reviewed by a  radiologist.    Technique:    1. PET: The patient received 10.0 mCi of F-18-FDG. The serum glucose  was 185 mg/dL prior to administration. Body weight was 73.5 kg. Images  were evaluated in the axial, sagittal, and coronal planes as well as  the rotational whole body MIP. Images were acquired from cranial  vertex to thighs.    UPTAKE WAS MEASURED AT 60 MINUTES.     2. CT: Volumetric acquisition for clinical interpretation of the  chest, abdomen, and pelvis acquired at 3 mm sections. The chest,  abdomen, and pelvis were evaluated at 5 mm sections in bone, soft  tissue, and lung windows. High resolution images of the neck were  obtained with multiple oblique projection reformats.     Contrast and Medications:  IV contrast: 99 mL of Isovue 370 intravenously.  PO contrast: None.  Additional Medications: None.    3. 3D MIP and PET-CT fused images were processed on an independent  workstation and archived to PACS and reviewed by a radiologist.    INDICATION: history of hypopharyngeal cancer s/p induction  chemotherapy and chemoradiation, eval for residual disease; Squamous  cell carcinoma of oropharynx (H); Malignant neoplasm of lateral wall  of oropharynx (H).    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 Post induction PET scan  demonstrated residual disease. Restaging exam.    COMPARISON: PET/CT 11/28/2023.    FINDINGS:     BACKGROUND: Liver SUV max = 3.5, Aorta Blood SUV max = 3.1.     HEAD/NECK:    Index tumor: Complete resolution of right-sided residual  hypermetabolic uptake.   Postradiation changes including supraglottic laryngeal submucosal  thickening, epiglottic thickening.    Rest of the pharyngeal mucosal spaces: Nasopharynx and palatine  tonsils are within normal limits. Tongue base is normal. Oral tongue  and buccal mucosa of the oral cavity is normal. Oropharynx within  normal  limits.    Sinonasal region: Paranasal sinuses are clear. No mass within the  nasal cavity.    Lymph nodes: No FDG avid lymph nodes. Further decreased size of the  residual right level 3 node.     Salivary glands: Post radiation changes including atrophy of the  submandibular and parotid glands. No FDG avid lesions within the  parotid glands.     Thyroid gland: The thyroid gland is within normal limits.     Vascular structures: Concentric narrowing of the right proximal  cervical internal carotid artery distal to bifurcation (series 1,  image 79) with the regaining of normal caliber superior to this  segment. The major vasculature of the neck are otherwise patent.    Brain: Redemonstration of photopenia and encephalomalacia of  lateral  aspect of the right temporal lobe.    CHEST:    Lymph nodes: No FDG avid or abnormally enlarged lymph nodes.    Lungs:     New scattered areas of groundglass opacities in the right lower lobe  and right upper lobe. The most significant one is seen in the right  upper lobe peripheral subpleural location with associated FDG uptake.  SUV max 4.3.    Slight increase in hypermetabolic uptake in the left posterior lower  lobe nodule with SUV max of 2.1, previously 1.6. It measures 0.8 cm,  previously 0.7 cm. Series 10, image 57.    Left apical lung fibrotic scarring with mild FDG uptake remains  unchanged since prior scans.    Calcified granuloma right middle lobe.    Emphysematous changes in both lungs.  The central tracheobronchial tree is clear.  No pleural effusion or  pneumothorax.     Heart and great vessels: Heart size is within normal limits. No  pericardial effusion. The thoracic aorta and main pulmonary artery are  within normal limits. Diffuse wall thickening and hypermetabolic  uptake within the esophagus is new.     ABDOMEN AND PELVIS:    Liver: No FDG avid lesion. No intrahepatic or extrahepatic biliary  ductal dilatation. Gallbladder is within normal limits.     Pancreas:  The pancreas is within normal limits. No pancreatic ductal  dilatation.     Spleen: No FDG avid lesion.    Adrenal glands: No FDG avid foci.    Kidneys: No FDG avid lesion. No hydronephrosis. There is thickening of  the urinary bladder wall unchanged. This may be seen with cystitis.  Prostate gland appears unremarkable.    Gastrointestinal system: Normal caliber of the small and large bowel.  Gastrostomy tube is seen in place.    Lymph nodes: No FDG avid or abnormally enlarged lymph nodes.    Vascular structures: Normal caliber of the abdominal aorta.  Atherosclerotic plaque seen within the abdominal aorta with mild  luminal narrowing proximal to the aortic bifurcation.    No free air, free fluid, or fluid collection.     EXTREMITIES:     No abnormal FDG uptake in the visualized extremities.    BONES AND SOFT TISSUES:   Multilevel degenerative changes are seen within the spine. There is  photopenia in the upper cervical vertebrae likely due to recent  radiation therapy.  No abnormal FDG uptake in the skeleton. No abnormal lytic or blastic  osseous lesions.     SPINAL CANAL:     No evident canal compromise. No abnormal FDG avid lesion.  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 Primary: NI-RADS 1} Expected post-treatment changes in the neck  without evidence of recurrent disease at the primary site.  Neck: NI-RADS 1}  No abnormal lymph node.     2. New scattered groundglass opacities in lungs with increased FDG  uptake associating the largest focus in the right upper lobe. Findings  are likely infectious/inflammatory.    3. Grossly similar size of the left lower lobe subpleural solid nodule  with slightly increased uptake. Continued attention on follow-up is  recommended.     4. New diffuse esophageal mucosal enhancement, wall thickening and  associated FDG uptake. Findings can be seen with esophagitis.     NI-RADS CECT  Surveillance Legend:    Primary  1: No evidence of recurrence: routine surveillance  2: Low suspicion    a) Superficial abnormality (skin, mucosal surface): direct visual  inspection    b) Ill-defined deep abnormality: short interval follow-up* or PET  3: High suspicion (new or enlarging discrete nodule/mass): biopsy  4: Definitive recurrence (path proven or clinical progression): no  biopsy needed    Nodes  1: No evidence of recurrence: routine surveillance  2: Low suspicion (ill-defined): short interval follow-up or PET  3: High suspicion (new or enlarging lymph node): biopsy if clinically  needed  4: Definitive recurrence (path proven or clinical progression): no  biopsy needed    *short interval follow-up: 3 months at our institution    FOLLOW-UP APPOINTMENT: 5/6/2024.    I have personally reviewed the examination and initial interpretation  and I agree with the findings.    MARI WARREN MD         SYSTEM ID:  [id number]  CT Chest/Abdomen/Pelvis w Contrast  Narrative: Combined Report of: PET and CT on 5/2/2024 11:37 AM:    1. PET of the neck, chest, abdomen, and pelvis.  2. PET CT Fusion for Attenuation Correction and Anatomical  Localization.  3. Diagnostic CT of the neck, chest, abdomen and pelvis with  intravenous contrast obtained for diagnostic interpretation.  4. 3D MIP and PET-CT fused images were processed on an independent  workstation and archived to PACS and reviewed by a radiologist.    Technique:    1. PET: The patient received 10.0 mCi of F-18-FDG. The serum glucose  was 185 mg/dL prior to administration. Body weight was 73.5 kg. Images  were evaluated in the axial, sagittal, and coronal planes as well as  the rotational whole body MIP. Images were acquired from cranial  vertex to thighs.    UPTAKE WAS MEASURED AT 60 MINUTES.     2. CT: Volumetric acquisition for clinical interpretation of the  chest, abdomen, and pelvis acquired at 3 mm sections. The chest,  abdomen, and pelvis were evaluated at 5  mm sections in bone, soft  tissue, and lung windows. High resolution images of the neck were  obtained with multiple oblique projection reformats.     Contrast and Medications:  IV contrast: 99 mL of Isovue 370 intravenously.  PO contrast: None.  Additional Medications: None.    3. 3D MIP and PET-CT fused images were processed on an independent  workstation and archived to PACS and reviewed by a radiologist.    INDICATION: history of hypopharyngeal cancer s/p induction  chemotherapy and chemoradiation, eval for residual disease; Squamous  cell carcinoma of oropharynx (H); Malignant neoplasm of lateral wall  of oropharynx (H).    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 Post induction PET scan  demonstrated residual disease. Restaging exam.    COMPARISON: PET/CT 11/28/2023.    FINDINGS:     BACKGROUND: Liver SUV max = 3.5, Aorta Blood SUV max = 3.1.     HEAD/NECK:    Index tumor: Complete resolution of right-sided residual  hypermetabolic uptake.   Postradiation changes including supraglottic laryngeal submucosal  thickening, epiglottic thickening.    Rest of the pharyngeal mucosal spaces: Nasopharynx and palatine  tonsils are within normal limits. Tongue base is normal. Oral tongue  and buccal mucosa of the oral cavity is normal. Oropharynx within  normal limits.    Sinonasal region: Paranasal sinuses are clear. No mass within the  nasal cavity.    Lymph nodes: No FDG avid lymph nodes. Further decreased size of the  residual right level 3 node.     Salivary glands: Post radiation changes including atrophy of the  submandibular and parotid glands. No FDG avid lesions within the  parotid glands.     Thyroid gland: The thyroid gland is within normal limits.     Vascular structures: Concentric narrowing of the right proximal  cervical internal carotid artery distal to bifurcation (series  1,  image 79) with the regaining of normal caliber superior to this  segment. The major vasculature of the neck are otherwise patent.    Brain: Redemonstration of photopenia and encephalomalacia of  lateral  aspect of the right temporal lobe.    CHEST:    Lymph nodes: No FDG avid or abnormally enlarged lymph nodes.    Lungs:     New scattered areas of groundglass opacities in the right lower lobe  and right upper lobe. The most significant one is seen in the right  upper lobe peripheral subpleural location with associated FDG uptake.  SUV max 4.3.    Slight increase in hypermetabolic uptake in the left posterior lower  lobe nodule with SUV max of 2.1, previously 1.6. It measures 0.8 cm,  previously 0.7 cm. Series 10, image 57.    Left apical lung fibrotic scarring with mild FDG uptake remains  unchanged since prior scans.    Calcified granuloma right middle lobe.    Emphysematous changes in both lungs.  The central tracheobronchial tree is clear.  No pleural effusion or  pneumothorax.     Heart and great vessels: Heart size is within normal limits. No  pericardial effusion. The thoracic aorta and main pulmonary artery are  within normal limits. Diffuse wall thickening and hypermetabolic  uptake within the esophagus is new.     ABDOMEN AND PELVIS:    Liver: No FDG avid lesion. No intrahepatic or extrahepatic biliary  ductal dilatation. Gallbladder is within normal limits.     Pancreas: The pancreas is within normal limits. No pancreatic ductal  dilatation.     Spleen: No FDG avid lesion.    Adrenal glands: No FDG avid foci.    Kidneys: No FDG avid lesion. No hydronephrosis. There is thickening of  the urinary bladder wall unchanged. This may be seen with cystitis.  Prostate gland appears unremarkable.    Gastrointestinal system: Normal caliber of the small and large bowel.  Gastrostomy tube is seen in place.    Lymph nodes: No FDG avid or abnormally enlarged lymph nodes.    Vascular structures: Normal caliber of the  abdominal aorta.  Atherosclerotic plaque seen within the abdominal aorta with mild  luminal narrowing proximal to the aortic bifurcation.    No free air, free fluid, or fluid collection.     EXTREMITIES:     No abnormal FDG uptake in the visualized extremities.    BONES AND SOFT TISSUES:   Multilevel degenerative changes are seen within the spine. There is  photopenia in the upper cervical vertebrae likely due to recent  radiation therapy.  No abnormal FDG uptake in the skeleton. No abnormal lytic or blastic  osseous lesions.     SPINAL CANAL:     No evident canal compromise. No abnormal FDG avid lesion.  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 Primary: NI-RADS 1} Expected post-treatment changes in the neck  without evidence of recurrent disease at the primary site.  Neck: NI-RADS 1}  No abnormal lymph node.     2. New scattered groundglass opacities in lungs with increased FDG  uptake associating the largest focus in the right upper lobe. Findings  are likely infectious/inflammatory.    3. Grossly similar size of the left lower lobe subpleural solid nodule  with slightly increased uptake. Continued attention on follow-up is  recommended.     4. New diffuse esophageal mucosal enhancement, wall thickening and  associated FDG uptake. Findings can be seen with esophagitis.     NI-RADS CECT Surveillance Legend:    Primary  1: No evidence of recurrence: routine surveillance  2: Low suspicion    a) Superficial abnormality (skin, mucosal surface): direct visual  inspection    b) Ill-defined deep abnormality: short interval follow-up* or PET  3: High suspicion (new or enlarging discrete nodule/mass): biopsy  4: Definitive recurrence (path proven or clinical progression): no  biopsy needed    Nodes  1: No evidence of recurrence: routine surveillance  2: Low suspicion (ill-defined): short interval follow-up or PET  3: High  suspicion (new or enlarging lymph node): biopsy if clinically  needed  4: Definitive recurrence (path proven or clinical progression): no  biopsy needed    *short interval follow-up: 3 months at our institution    FOLLOW-UP APPOINTMENT: 5/6/2024.    I have personally reviewed the examination and initial interpretation  and I agree with the findings.    MARI WARREN MD         SYSTEM ID:  [id number]     Lungs look acceptable   Spiculated MELCHOR nodule - need to keep an eye on that one but unchanged compared to 11/28  R GGO - doesn't look worrisome for cancer  R pleural based and L pleural based - unchanged

## 2024-06-12 NOTE — LETTER
June 12, 2024      RE: Ko Thakkar  [address]  [address]  [place] [address]     To Whom It May Concern,     I recommend that Mr. Thakkar be excused from traveling for the time being. He was recently treated for squamous cell carcinoma and finished intense treatment. He continues to be in a recovery period.     Please do not hesitate to contact me or the team here with questions. We can re-evaluate in 6 months after his next scan.      Sincerely,          Xochilt Louise MD

## 2024-06-12 NOTE — LETTER
6/12/2024       RE: Ko Thakkar  [address]  [address]  [place] [address]     Dear Colleague,    Thank you for referring your patient, Ko Thakkar, to the [facility] at [facility]. Please see a copy of my visit note below.        [facility]  [address] [address]  Phone: [phone]  Fax: [phone]    PATIENT NAME: Ko Thakkar  MRN # [id number]   DATE OF VISIT: June 12, 2024  YOB: 1955     Otolaryngology: Dr. Denia Hardin  Radiation Oncology: Dr. Juwan Cordova   PCP: Dr. Alton Mota; [facility] in [place]     CANCER TYPE: SCC supraglottis  STAGE: [id number] (IRON)  ECOG PS: 1    PD-L1:  NGS:     SUMMARY  5/16/23 EGD for dysphagia. Gastritis and gastric diverticulum. Bx showed H. Pylori, treated. No atrophic gastritis or dysplasia  7/11/23 Swallow study at [facility]. Poor epiglottic inversion, penetration, aspiration  7/28/23 CT neck. 2.2 x 1.4 x 2.3 cm supraglottic mass, concern for paraglottic involvement, bilateral IB, IIA, III, IV adenopathy, 70% ICA stenosis   8/21/23 US carotid. 70-99% stenosis R ICA. <50% stenosis L ICA  8/29/23 FNA L level 2 node (IR). Path: SCC  9/6/23 PET/CT. FDG avid mass (SUV 13.5) right tonsil/lateral pharyngeal wall/glossotonsillar sulcus with slight extension to the R tongue base, right AE fold, right epiglottis, right piriform sinus, right posterolateral pharyngeal wall, posterior pharyngeal wall, no thyroid cartilage erosion, right retropharyngeal node, deep lobe parotid FDG avid mass, bilateral cervical nodes (right level II-IV, left level II-III), no distant disease, L mandibular canine with periapical abscess. R temporal lobe change suggestive of prior infarct. 5 mm LLL nodule.  10/10/23 Video swallow.   10/13/23 Gtube. Fell out 12/25. Replaced 1/23  10/16~11/13/23 C1-2 carboplatin paclitaxel. Given due  to logistical issues with pre-chemoradiation evaluation that would have delayed start of any treatment substantially. Paclitaxel dose reduced to 140 mg/m2 C2 due to neuropathy  10/23/23 CTA. 70% narrowing R carotid carlos/bifurcation similar to 7/28/23 CT. Laterally projecting disc osteophyte complex resulting in mod-severe L vertebral artery narrowing at C3-4 level.  10/23/23 Brain MRI. No mets. Bilateral frontal and temporal lobe encephalomalacia, R > L. Mild volume loss.   11/28/23 PET/CT. Good MO.   12/19/23~2/8/24  Chemoradiation with weekly carboplatin paclitaxel. 7000 cGy (Dr. Juwan Cordova). Not a candidate for cisplatin.  5/2/24 PET/CT. YAMILE    ASSESSMENT AND PLAN  SCC supraglottis, [id number] (IRON): Declined surgery. 2 cycles of induction due to logistical issues, etc., with good MO. Finished chemoradiation 4 months ago. PET/CT last month YAMILE, Dr. Hardin's exam ok. Continue follow up with her. I will see him in about 6 months after the next scan and discharge from my clinic in the absence of new issues. Survivorship visit - discussed, schedule next visit. Not moving to [place]. Sent message to Dr. Hardin.     Lymphedema: PT referral. Discussed critical importance of therapy for the long term     Acquired hypothyoridism: Levothyroxine 50 mcg daily, recheck at next [facility] visit in Aug     Pulmonary nodules: MELCHOR warrants monitoring but stable since late Nov. The others not worrisome as below.     Hearing loss: Can re-evaluate hearing further away from radiation.     Dysphagia, aspiration, malnutrition: Encouraged swallowing exercises, etc., as soon as he's able. Hoping to get Gtube out, has appt with Meka next week to assess intake. I looked at his Gtube site - he has some granulation tissue around that looks fine, reassured them    Neuropathy: No changes during chemo.     R KAILASH: Met with Dr. Tapia, vascular surgery fellow 8/21. Recommended treating the cancer first, asa 81 and high dose atorva, follow up 6 months with  repeat US. PET/CT showed possible old R temporal infarct. Was supposed to have follow up 2/21, not done, has appt 6/24.     H/o food impaction: lower esophagus, monitor    Needs letter to recommend avoiding travel to [place] for court appearance, etc. I think it's reasonable to ask that it be done virtually if possible as he's still got a lot on his plate, albeit recovering acceptably well.     The longitudinal plan of care for the condition(s) below were addressed during this visit. Due to the added complexity in care, I will continue to support Ace in the subsequent management of this condition(s) and with the ongoing continuity of care of this condition(s): SCC      40 minutes spent by me on the date of the encounter doing chart review, review of outside records, review of test results, interpretation of tests, patient visit, documentation, orders, discussion with family.     Xochilt Louise MD  Associate Professor of Medicine  Hematology, Oncology and Transplantation      SUBJECTIVE  Mr. Thakkar (Ace) returns for routine follow up, about 3 months after completing chemoradiation  Not moving to [place] this summer  Worried about change around [employer]  Works fine  Eating   Hearing unchanged - but worse according to Ondina.   Breathing ok   No new issues except has been grumpier     PAST MEDICAL HISTORY  SCC as above  GERD  H/o food impaction in 2008 and 2014 (steak) related to Schatzki ring on EGD 10/13/14  DM2. Last A1c about 7.6 sometime in summer 2023 and Nov 2023  Dyslipidemia  Possible prior L temporal CVA  HTN  H.o spinal meningitis as a baby -   R arm surgery  Bone graft to forehead  Ex lap   Tinnitus secondary to treatment for meningitis or from menigitis - bilateral   Hearing loss -- audiogram 10/2/2023  Numbness in the toes - mostly big toes   Crushing pills   No muscle aches or pains     CURRENT OUTPATIENT MEDICATIONS  Reviewed    ALLERGIES  No Known Allergies     PHYSICAL EXAM  BP (!) 145/74   Pulse 90    Temp 98.6  F (37  C) (Oral)   Resp 16   Wt 66.5 kg (146 lb 9.6 oz)   SpO2 97%   BMI 23.50 kg/m    GEN: NAD  HEENT: EOMI, no icterus, injection or pallor  EXT: no edema  NEURO: alert    LABORATORY AND IMAGING STUDIES    Labs today were independently reviewed and interpreted by me  CBC pd acceptable  CMP acceptable except glucose 302 - high  TFTs as above. FT4 marginally low     PET Oncology (Eyes to Thighs)  Narrative: Combined Report of: PET and CT on 5/2/2024 11:37 AM:    1. PET of the neck, chest, abdomen, and pelvis.  2. PET CT Fusion for Attenuation Correction and Anatomical  Localization.  3. Diagnostic CT of the neck, chest, abdomen and pelvis with  intravenous contrast obtained for diagnostic interpretation.  4. 3D MIP and PET-CT fused images were processed on an independent  workstation and archived to PACS and reviewed by a radiologist.    Technique:    1. PET: The patient received 10.0 mCi of F-18-FDG. The serum glucose  was 185 mg/dL prior to administration. Body weight was 73.5 kg. Images  were evaluated in the axial, sagittal, and coronal planes as well as  the rotational whole body MIP. Images were acquired from cranial  vertex to thighs.    UPTAKE WAS MEASURED AT 60 MINUTES.     2. CT: Volumetric acquisition for clinical interpretation of the  chest, abdomen, and pelvis acquired at 3 mm sections. The chest,  abdomen, and pelvis were evaluated at 5 mm sections in bone, soft  tissue, and lung windows. High resolution images of the neck were  obtained with multiple oblique projection reformats.     Contrast and Medications:  IV contrast: 99 mL of Isovue 370 intravenously.  PO contrast: None.  Additional Medications: None.    3. 3D MIP and PET-CT fused images were processed on an independent  workstation and archived to PACS and reviewed by a radiologist.    INDICATION: history of hypopharyngeal cancer s/p induction  chemotherapy and chemoradiation, eval for residual disease; Squamous  cell carcinoma  of oropharynx (H); Malignant neoplasm of lateral wall  of oropharynx (H).    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 Post induction PET scan  demonstrated residual disease. Restaging exam.    COMPARISON: PET/CT 11/28/2023.    FINDINGS:     BACKGROUND: Liver SUV max = 3.5, Aorta Blood SUV max = 3.1.     HEAD/NECK:    Index tumor: Complete resolution of right-sided residual  hypermetabolic uptake.   Postradiation changes including supraglottic laryngeal submucosal  thickening, epiglottic thickening.    Rest of the pharyngeal mucosal spaces: Nasopharynx and palatine  tonsils are within normal limits. Tongue base is normal. Oral tongue  and buccal mucosa of the oral cavity is normal. Oropharynx within  normal limits.    Sinonasal region: Paranasal sinuses are clear. No mass within the  nasal cavity.    Lymph nodes: No FDG avid lymph nodes. Further decreased size of the  residual right level 3 node.     Salivary glands: Post radiation changes including atrophy of the  submandibular and parotid glands. No FDG avid lesions within the  parotid glands.     Thyroid gland: The thyroid gland is within normal limits.     Vascular structures: Concentric narrowing of the right proximal  cervical internal carotid artery distal to bifurcation (series 1,  image 79) with the regaining of normal caliber superior to this  segment. The major vasculature of the neck are otherwise patent.    Brain: Redemonstration of photopenia and encephalomalacia of  lateral  aspect of the right temporal lobe.    CHEST:    Lymph nodes: No FDG avid or abnormally enlarged lymph nodes.    Lungs:     New scattered areas of groundglass opacities in the right lower lobe  and right upper lobe. The most significant one is seen in the right  upper lobe peripheral subpleural location with associated FDG uptake.  SUV max 4.3.    Slight  increase in hypermetabolic uptake in the left posterior lower  lobe nodule with SUV max of 2.1, previously 1.6. It measures 0.8 cm,  previously 0.7 cm. Series 10, image 57.    Left apical lung fibrotic scarring with mild FDG uptake remains  unchanged since prior scans.    Calcified granuloma right middle lobe.    Emphysematous changes in both lungs.  The central tracheobronchial tree is clear.  No pleural effusion or  pneumothorax.     Heart and great vessels: Heart size is within normal limits. No  pericardial effusion. The thoracic aorta and main pulmonary artery are  within normal limits. Diffuse wall thickening and hypermetabolic  uptake within the esophagus is new.     ABDOMEN AND PELVIS:    Liver: No FDG avid lesion. No intrahepatic or extrahepatic biliary  ductal dilatation. Gallbladder is within normal limits.     Pancreas: The pancreas is within normal limits. No pancreatic ductal  dilatation.     Spleen: No FDG avid lesion.    Adrenal glands: No FDG avid foci.    Kidneys: No FDG avid lesion. No hydronephrosis. There is thickening of  the urinary bladder wall unchanged. This may be seen with cystitis.  Prostate gland appears unremarkable.    Gastrointestinal system: Normal caliber of the small and large bowel.  Gastrostomy tube is seen in place.    Lymph nodes: No FDG avid or abnormally enlarged lymph nodes.    Vascular structures: Normal caliber of the abdominal aorta.  Atherosclerotic plaque seen within the abdominal aorta with mild  luminal narrowing proximal to the aortic bifurcation.    No free air, free fluid, or fluid collection.     EXTREMITIES:     No abnormal FDG uptake in the visualized extremities.    BONES AND SOFT TISSUES:   Multilevel degenerative changes are seen within the spine. There is  photopenia in the upper cervical vertebrae likely due to recent  radiation therapy.  No abnormal FDG uptake in the skeleton. No abnormal lytic or blastic  osseous lesions.     SPINAL CANAL:     No evident  canal compromise. No abnormal FDG avid lesion.  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 Primary: NI-RADS 1} Expected post-treatment changes in the neck  without evidence of recurrent disease at the primary site.  Neck: NI-RADS 1}  No abnormal lymph node.     2. New scattered groundglass opacities in lungs with increased FDG  uptake associating the largest focus in the right upper lobe. Findings  are likely infectious/inflammatory.    3. Grossly similar size of the left lower lobe subpleural solid nodule  with slightly increased uptake. Continued attention on follow-up is  recommended.     4. New diffuse esophageal mucosal enhancement, wall thickening and  associated FDG uptake. Findings can be seen with esophagitis.     NI-RADS CECT Surveillance Legend:    Primary  1: No evidence of recurrence: routine surveillance  2: Low suspicion    a) Superficial abnormality (skin, mucosal surface): direct visual  inspection    b) Ill-defined deep abnormality: short interval follow-up* or PET  3: High suspicion (new or enlarging discrete nodule/mass): biopsy  4: Definitive recurrence (path proven or clinical progression): no  biopsy needed    Nodes  1: No evidence of recurrence: routine surveillance  2: Low suspicion (ill-defined): short interval follow-up or PET  3: High suspicion (new or enlarging lymph node): biopsy if clinically  needed  4: Definitive recurrence (path proven or clinical progression): no  biopsy needed    *short interval follow-up: 3 months at our institution    FOLLOW-UP APPOINTMENT: 5/6/2024.    I have personally reviewed the examination and initial interpretation  and I agree with the findings.    MARI WARREN MD         SYSTEM ID:  [id number]  CT Soft Tissue Neck w Contrast  Narrative: Combined Report of: PET and CT on 5/2/2024 11:37 AM:    1. PET of the neck, chest, abdomen, and pelvis.  2. PET CT Fusion for  Attenuation Correction and Anatomical  Localization.  3. Diagnostic CT of the neck, chest, abdomen and pelvis with  intravenous contrast obtained for diagnostic interpretation.  4. 3D MIP and PET-CT fused images were processed on an independent  workstation and archived to PACS and reviewed by a radiologist.    Technique:    1. PET: The patient received 10.0 mCi of F-18-FDG. The serum glucose  was 185 mg/dL prior to administration. Body weight was 73.5 kg. Images  were evaluated in the axial, sagittal, and coronal planes as well as  the rotational whole body MIP. Images were acquired from cranial  vertex to thighs.    UPTAKE WAS MEASURED AT 60 MINUTES.     2. CT: Volumetric acquisition for clinical interpretation of the  chest, abdomen, and pelvis acquired at 3 mm sections. The chest,  abdomen, and pelvis were evaluated at 5 mm sections in bone, soft  tissue, and lung windows. High resolution images of the neck were  obtained with multiple oblique projection reformats.     Contrast and Medications:  IV contrast: 99 mL of Isovue 370 intravenously.  PO contrast: None.  Additional Medications: None.    3. 3D MIP and PET-CT fused images were processed on an independent  workstation and archived to PACS and reviewed by a radiologist.    INDICATION: history of hypopharyngeal cancer s/p induction  chemotherapy and chemoradiation, eval for residual disease; Squamous  cell carcinoma of oropharynx (H); Malignant neoplasm of lateral wall  of oropharynx (H).    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 Post induction PET scan  demonstrated residual disease. Restaging exam.    COMPARISON: PET/CT 11/28/2023.    FINDINGS:     BACKGROUND: Liver SUV max = 3.5, Aorta Blood SUV max = 3.1.     HEAD/NECK:    Index tumor: Complete resolution of right-sided residual  hypermetabolic uptake.   Postradiation  changes including supraglottic laryngeal submucosal  thickening, epiglottic thickening.    Rest of the pharyngeal mucosal spaces: Nasopharynx and palatine  tonsils are within normal limits. Tongue base is normal. Oral tongue  and buccal mucosa of the oral cavity is normal. Oropharynx within  normal limits.    Sinonasal region: Paranasal sinuses are clear. No mass within the  nasal cavity.    Lymph nodes: No FDG avid lymph nodes. Further decreased size of the  residual right level 3 node.     Salivary glands: Post radiation changes including atrophy of the  submandibular and parotid glands. No FDG avid lesions within the  parotid glands.     Thyroid gland: The thyroid gland is within normal limits.     Vascular structures: Concentric narrowing of the right proximal  cervical internal carotid artery distal to bifurcation (series 1,  image 79) with the regaining of normal caliber superior to this  segment. The major vasculature of the neck are otherwise patent.    Brain: Redemonstration of photopenia and encephalomalacia of  lateral  aspect of the right temporal lobe.    CHEST:    Lymph nodes: No FDG avid or abnormally enlarged lymph nodes.    Lungs:     New scattered areas of groundglass opacities in the right lower lobe  and right upper lobe. The most significant one is seen in the right  upper lobe peripheral subpleural location with associated FDG uptake.  SUV max 4.3.    Slight increase in hypermetabolic uptake in the left posterior lower  lobe nodule with SUV max of 2.1, previously 1.6. It measures 0.8 cm,  previously 0.7 cm. Series 10, image 57.    Left apical lung fibrotic scarring with mild FDG uptake remains  unchanged since prior scans.    Calcified granuloma right middle lobe.    Emphysematous changes in both lungs.  The central tracheobronchial tree is clear.  No pleural effusion or  pneumothorax.     Heart and great vessels: Heart size is within normal limits. No  pericardial effusion. The thoracic aorta  and main pulmonary artery are  within normal limits. Diffuse wall thickening and hypermetabolic  uptake within the esophagus is new.     ABDOMEN AND PELVIS:    Liver: No FDG avid lesion. No intrahepatic or extrahepatic biliary  ductal dilatation. Gallbladder is within normal limits.     Pancreas: The pancreas is within normal limits. No pancreatic ductal  dilatation.     Spleen: No FDG avid lesion.    Adrenal glands: No FDG avid foci.    Kidneys: No FDG avid lesion. No hydronephrosis. There is thickening of  the urinary bladder wall unchanged. This may be seen with cystitis.  Prostate gland appears unremarkable.    Gastrointestinal system: Normal caliber of the small and large bowel.  Gastrostomy tube is seen in place.    Lymph nodes: No FDG avid or abnormally enlarged lymph nodes.    Vascular structures: Normal caliber of the abdominal aorta.  Atherosclerotic plaque seen within the abdominal aorta with mild  luminal narrowing proximal to the aortic bifurcation.    No free air, free fluid, or fluid collection.     EXTREMITIES:     No abnormal FDG uptake in the visualized extremities.    BONES AND SOFT TISSUES:   Multilevel degenerative changes are seen within the spine. There is  photopenia in the upper cervical vertebrae likely due to recent  radiation therapy.  No abnormal FDG uptake in the skeleton. No abnormal lytic or blastic  osseous lesions.     SPINAL CANAL:     No evident canal compromise. No abnormal FDG avid lesion.  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 Primary: NI-RADS 1} Expected post-treatment changes in the neck  without evidence of recurrent disease at the primary site.  Neck: NI-RADS 1}  No abnormal lymph node.     2. New scattered groundglass opacities in lungs with increased FDG  uptake associating the largest focus in the right upper lobe. Findings  are likely infectious/inflammatory.    3.  Grossly similar size of the left lower lobe subpleural solid nodule  with slightly increased uptake. Continued attention on follow-up is  recommended.     4. New diffuse esophageal mucosal enhancement, wall thickening and  associated FDG uptake. Findings can be seen with esophagitis.     NI-RADS CECT Surveillance Legend:    Primary  1: No evidence of recurrence: routine surveillance  2: Low suspicion    a) Superficial abnormality (skin, mucosal surface): direct visual  inspection    b) Ill-defined deep abnormality: short interval follow-up* or PET  3: High suspicion (new or enlarging discrete nodule/mass): biopsy  4: Definitive recurrence (path proven or clinical progression): no  biopsy needed    Nodes  1: No evidence of recurrence: routine surveillance  2: Low suspicion (ill-defined): short interval follow-up or PET  3: High suspicion (new or enlarging lymph node): biopsy if clinically  needed  4: Definitive recurrence (path proven or clinical progression): no  biopsy needed    *short interval follow-up: 3 months at our institution    FOLLOW-UP APPOINTMENT: 5/6/2024.    I have personally reviewed the examination and initial interpretation  and I agree with the findings.    MARI WARREN MD         SYSTEM ID:  [id number]  CT Chest/Abdomen/Pelvis w Contrast  Narrative: Combined Report of: PET and CT on 5/2/2024 11:37 AM:    1. PET of the neck, chest, abdomen, and pelvis.  2. PET CT Fusion for Attenuation Correction and Anatomical  Localization.  3. Diagnostic CT of the neck, chest, abdomen and pelvis with  intravenous contrast obtained for diagnostic interpretation.  4. 3D MIP and PET-CT fused images were processed on an independent  workstation and archived to PACS and reviewed by a radiologist.    Technique:    1. PET: The patient received 10.0 mCi of F-18-FDG. The serum glucose  was 185 mg/dL prior to administration. Body weight was 73.5 kg. Images  were evaluated in the axial, sagittal, and coronal planes as well  as  the rotational whole body MIP. Images were acquired from cranial  vertex to thighs.    UPTAKE WAS MEASURED AT 60 MINUTES.     2. CT: Volumetric acquisition for clinical interpretation of the  chest, abdomen, and pelvis acquired at 3 mm sections. The chest,  abdomen, and pelvis were evaluated at 5 mm sections in bone, soft  tissue, and lung windows. High resolution images of the neck were  obtained with multiple oblique projection reformats.     Contrast and Medications:  IV contrast: 99 mL of Isovue 370 intravenously.  PO contrast: None.  Additional Medications: None.    3. 3D MIP and PET-CT fused images were processed on an independent  workstation and archived to PACS and reviewed by a radiologist.    INDICATION: history of hypopharyngeal cancer s/p induction  chemotherapy and chemoradiation, eval for residual disease; Squamous  cell carcinoma of oropharynx (H); Malignant neoplasm of lateral wall  of oropharynx (H).    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 Post induction PET scan  demonstrated residual disease. Restaging exam.    COMPARISON: PET/CT 11/28/2023.    FINDINGS:     BACKGROUND: Liver SUV max = 3.5, Aorta Blood SUV max = 3.1.     HEAD/NECK:    Index tumor: Complete resolution of right-sided residual  hypermetabolic uptake.   Postradiation changes including supraglottic laryngeal submucosal  thickening, epiglottic thickening.    Rest of the pharyngeal mucosal spaces: Nasopharynx and palatine  tonsils are within normal limits. Tongue base is normal. Oral tongue  and buccal mucosa of the oral cavity is normal. Oropharynx within  normal limits.    Sinonasal region: Paranasal sinuses are clear. No mass within the  nasal cavity.    Lymph nodes: No FDG avid lymph nodes. Further decreased size of the  residual right level 3 node.     Salivary glands: Post radiation changes  including atrophy of the  submandibular and parotid glands. No FDG avid lesions within the  parotid glands.     Thyroid gland: The thyroid gland is within normal limits.     Vascular structures: Concentric narrowing of the right proximal  cervical internal carotid artery distal to bifurcation (series 1,  image 79) with the regaining of normal caliber superior to this  segment. The major vasculature of the neck are otherwise patent.    Brain: Redemonstration of photopenia and encephalomalacia of  lateral  aspect of the right temporal lobe.    CHEST:    Lymph nodes: No FDG avid or abnormally enlarged lymph nodes.    Lungs:     New scattered areas of groundglass opacities in the right lower lobe  and right upper lobe. The most significant one is seen in the right  upper lobe peripheral subpleural location with associated FDG uptake.  SUV max 4.3.    Slight increase in hypermetabolic uptake in the left posterior lower  lobe nodule with SUV max of 2.1, previously 1.6. It measures 0.8 cm,  previously 0.7 cm. Series 10, image 57.    Left apical lung fibrotic scarring with mild FDG uptake remains  unchanged since prior scans.    Calcified granuloma right middle lobe.    Emphysematous changes in both lungs.  The central tracheobronchial tree is clear.  No pleural effusion or  pneumothorax.     Heart and great vessels: Heart size is within normal limits. No  pericardial effusion. The thoracic aorta and main pulmonary artery are  within normal limits. Diffuse wall thickening and hypermetabolic  uptake within the esophagus is new.     ABDOMEN AND PELVIS:    Liver: No FDG avid lesion. No intrahepatic or extrahepatic biliary  ductal dilatation. Gallbladder is within normal limits.     Pancreas: The pancreas is within normal limits. No pancreatic ductal  dilatation.     Spleen: No FDG avid lesion.    Adrenal glands: No FDG avid foci.    Kidneys: No FDG avid lesion. No hydronephrosis. There is thickening of  the urinary bladder  wall unchanged. This may be seen with cystitis.  Prostate gland appears unremarkable.    Gastrointestinal system: Normal caliber of the small and large bowel.  Gastrostomy tube is seen in place.    Lymph nodes: No FDG avid or abnormally enlarged lymph nodes.    Vascular structures: Normal caliber of the abdominal aorta.  Atherosclerotic plaque seen within the abdominal aorta with mild  luminal narrowing proximal to the aortic bifurcation.    No free air, free fluid, or fluid collection.     EXTREMITIES:     No abnormal FDG uptake in the visualized extremities.    BONES AND SOFT TISSUES:   Multilevel degenerative changes are seen within the spine. There is  photopenia in the upper cervical vertebrae likely due to recent  radiation therapy.  No abnormal FDG uptake in the skeleton. No abnormal lytic or blastic  osseous lesions.     SPINAL CANAL:     No evident canal compromise. No abnormal FDG avid lesion.  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 Primary: NI-RADS 1} Expected post-treatment changes in the neck  without evidence of recurrent disease at the primary site.  Neck: NI-RADS 1}  No abnormal lymph node.     2. New scattered groundglass opacities in lungs with increased FDG  uptake associating the largest focus in the right upper lobe. Findings  are likely infectious/inflammatory.    3. Grossly similar size of the left lower lobe subpleural solid nodule  with slightly increased uptake. Continued attention on follow-up is  recommended.     4. New diffuse esophageal mucosal enhancement, wall thickening and  associated FDG uptake. Findings can be seen with esophagitis.     NI-RADS CECT Surveillance Legend:    Primary  1: No evidence of recurrence: routine surveillance  2: Low suspicion    a) Superficial abnormality (skin, mucosal surface): direct visual  inspection    b) Ill-defined deep abnormality: short interval  follow-up* or PET  3: High suspicion (new or enlarging discrete nodule/mass): biopsy  4: Definitive recurrence (path proven or clinical progression): no  biopsy needed    Nodes  1: No evidence of recurrence: routine surveillance  2: Low suspicion (ill-defined): short interval follow-up or PET  3: High suspicion (new or enlarging lymph node): biopsy if clinically  needed  4: Definitive recurrence (path proven or clinical progression): no  biopsy needed    *short interval follow-up: 3 months at our institution    FOLLOW-UP APPOINTMENT: 5/6/2024.    I have personally reviewed the examination and initial interpretation  and I agree with the findings.    MARI WARREN MD         SYSTEM ID:  [id number]     Lungs look acceptable   Spiculated MELCHOR nodule - need to keep an eye on that one but unchanged compared to 11/28  R GGO - doesn't look worrisome for cancer  R pleural based and L pleural based - unchanged              Again, thank you for allowing me to participate in the care of your patient.      Sincerely,    Xochilt Louise MD

## 2024-06-12 NOTE — NURSING NOTE
Chief Complaint   Patient presents with    Blood Draw     Vitals, blood draw,  by MA. Pt checked into appt.     Jinny Spicer MA

## 2024-06-19 ENCOUNTER — VIRTUAL VISIT (OUTPATIENT)
Dept: ONCOLOGY | Facility: CLINIC | Age: 69
End: 2024-06-19
Attending: DIETITIAN, REGISTERED
Payer: COMMERCIAL

## 2024-06-19 DIAGNOSIS — C32.1 SCC (SQUAMOUS CELL CARCINOMA) OF SUPRAGLOTTIS (H): Primary | ICD-10-CM

## 2024-06-19 PROCEDURE — 97803 MED NUTRITION INDIV SUBSEQ: CPT | Mod: GT,95 | Performed by: DIETITIAN, REGISTERED

## 2024-06-19 NOTE — PROGRESS NOTES
Video-Visit Details     Type of service:  Video Visit     Video Start Time (time video started): 2:50pm     Video End Time (time video stopped): switched to phone due to disconnection 3:20pm    Originating Location (pt. Location): Home     Distant Location (provider location):  [facility] NUTRITION SERVICES      Mode of Communication:  Video Conference via [facility] NUTRITION SERVICES - REASSESSMENT NOTE   EVALUATION OF PREVIOUS PLAN OF CARE:   Time Spent: 30 minutes  Visit Type: return video/phone call  Pt accompanied by: his wife, Ondina  Referring Physician: Wilfred  C10.9 (ICD-10-CM) - Squamous cell carcinoma of oropharynx (H)   E11.9 (ICD-10-CM) - Type 2 diabetes mellitus without complications (H)      Chemotherapy: Taxol - completed  Radiation: completed  PEG tube: placed 1/23/24    Monitoring from previous assessment:   -Food/Fluid intake - Ace tells me that his intake continues to improve, although he has not reduced his EN volume from 3-7 cartons/day.  He's been eating 'a bit less then 1/2 of his baseline' intake.   Food choices:  Biscuits and gravy, ham sandwich, almond milk  Country fried steak, home made smoothies, muscle milk, bulk powders  He thinks he's getting at least 1-2 quarts of water per day.    He has been drinking APURVA water energy drinks and Hydration packets in his water.   He drinks a muscle milk several times a week.   He also just got the Pro Performance Bulk 1340 powder from [facility] and has been taking 1/2 shake per day.    -EN intake/tolerance- Ace continues to take between 4 and 7 cartons of formula per day.   Formula: Osmolite 1.5 felicia  Volume: 4-7 cartons/day (up to 1660 mL, 1267 ml free water)  Provisions:  up to 2485kcal (35kcal/kg), 105 g protein (1.5g/kg), 336g CHO, 0g fiber  -Weight trends -down 4 lb x past 6 weeks   Wt Readings from Last 10 Encounters:   06/12/24 66.5 kg (146 lb 9.6 oz)   05/06/24 68.1 kg (150 lb 1.6 oz)   03/20/24 73.5 kg (162 lb 1.6 oz)    03/19/24 70.3 kg (155 lb)   02/08/24 70.9 kg (156 lb 3.2 oz)   02/06/24 72.6 kg (160 lb)   02/05/24 69.7 kg (153 lb 9.6 oz)   01/30/24 71.7 kg (158 lb)   01/30/24 70.8 kg (156 lb)   01/29/24 71.4 kg (157 lb 6.4 oz)     Previous Goals:   Aim for a balance of >2500 calories, 100g protein per day for weight repletion. - not met  Continue to wean TF towards <2-4 cartons/day - not met  Weight gain towards 175-180 lbs per patient goal - not met    Evaluation: not met   Previous Nutrition Diagnosis:   Inadequate oral intake related to odynophagia and dysphagia as evidenced by pt reliant on EN via PEG tube to meet 100% estimated nutrition needs.   Evaluation: improving  NEW FINDINGS:   No new findings   CURRENT NUTRITION DIAGNOSIS   Inadequate oral intake related to odynophagia and dysphagia as evidenced by pt reliant on EN via PEG tube to meet 50-75% estimated nutrition needs.   INTERVENTIONS   EN Composition, EN Schedule, and Feeding Tube Flush -  Encouraged to start reducing EN to help increase oral intake.   Suggested removing one carton each week and replacing calories/protein with PO.  Discussed weaning of TF as PO intake improves and weight trends upward. Discussed protocol for removing feeding tube. Encouraged to weigh self routinely and work on increasing weight and weight stabilty for 4 week w/o using feeding tube in order to have FT removed.  Reviewed oral intake. Encouraged ONS and pureed foods as his throat pain has improved.  Suggested making smoothies with ONS, pureed foods such as cream based soups (without chunks), mashed potatoes, yogurt, puddings etc. Reviewed calorie goals for repletion of >2500 per day.  Suggested trying [employer] smoothies. Encouraged to choose shakes with lower carbs and higher protein for diabetes.   Goals   1.Aim for a balance of >2500 calories, 100g protein per day for weight repletion.   2. Continue to wean TF towards <2-4 cartons/day  3.Weight gain towards >155  lbs    Follow up/Monitoring: one month follow up for potential wean of TF  Enteral Nutrition intake and Weight    Meka Lee RD, , LD  [facility]  [phone]

## 2024-06-19 NOTE — LETTER
6/19/2024      Ko Thakkar  [address]  [place] [address]      Dear Colleague,    Thank you for referring your patient, Ko Thakkar, to the [facility]. Please see a copy of my visit note below.    Video-Visit Details     Type of service:  Video Visit     Video Start Time (time video started): 2:50pm     Video End Time (time video stopped): switched to phone due to disconnection 3:20pm    Originating Location (pt. Location): Home     Distant Location (provider location):  [facility] NUTRITION SERVICES      Mode of Communication:  Video Conference via [facility] NUTRITION SERVICES - REASSESSMENT NOTE   EVALUATION OF PREVIOUS PLAN OF CARE:   Time Spent: 30 minutes  Visit Type: return video/phone call  Pt accompanied by: his wife, Ondina  Referring Physician: Wilfred  C10.9 (ICD-10-CM) - Squamous cell carcinoma of oropharynx (H)   E11.9 (ICD-10-CM) - Type 2 diabetes mellitus without complications (H)      Chemotherapy: Taxol - completed  Radiation: completed  PEG tube: placed 1/23/24    Monitoring from previous assessment:   -Food/Fluid intake - Ace tells me that his intake continues to improve, although he has not reduced his EN volume from 3-7 cartons/day.  He's been eating 'a bit less then 1/2 of his baseline' intake.   Food choices:  Biscuits and gravy, ham sandwich, almond milk  Country fried steak, home made smoothies, muscle milk, bulk powders  He thinks he's getting at least 1-2 quarts of water per day.    He has been drinking APURVA water energy drinks and Hydration packets in his water.   He drinks a muscle milk several times a week.   He also just got the Pro Performance Bulk 1340 powder from [facility] and has been taking 1/2 shake per day.    -EN intake/tolerance- Ace continues to take between 4 and 7 cartons of formula per day.   Formula: Osmolite 1.5 felicia  Volume: 4-7 cartons/day (up to 1660 mL, 1267 ml free water)  Provisions:  up to  2485kcal (35kcal/kg), 105 g protein (1.5g/kg), 336g CHO, 0g fiber  -Weight trends -down 4 lb x past 6 weeks   Wt Readings from Last 10 Encounters:   06/12/24 66.5 kg (146 lb 9.6 oz)   05/06/24 68.1 kg (150 lb 1.6 oz)   03/20/24 73.5 kg (162 lb 1.6 oz)   03/19/24 70.3 kg (155 lb)   02/08/24 70.9 kg (156 lb 3.2 oz)   02/06/24 72.6 kg (160 lb)   02/05/24 69.7 kg (153 lb 9.6 oz)   01/30/24 71.7 kg (158 lb)   01/30/24 70.8 kg (156 lb)   01/29/24 71.4 kg (157 lb 6.4 oz)     Previous Goals:   Aim for a balance of >2500 calories, 100g protein per day for weight repletion. - not met  Continue to wean TF towards <2-4 cartons/day - not met  Weight gain towards 175-180 lbs per patient goal - not met    Evaluation: not met   Previous Nutrition Diagnosis:   Inadequate oral intake related to odynophagia and dysphagia as evidenced by pt reliant on EN via PEG tube to meet 100% estimated nutrition needs.   Evaluation: improving  NEW FINDINGS:   No new findings   CURRENT NUTRITION DIAGNOSIS   Inadequate oral intake related to odynophagia and dysphagia as evidenced by pt reliant on EN via PEG tube to meet 50-75% estimated nutrition needs.   INTERVENTIONS   EN Composition, EN Schedule, and Feeding Tube Flush -  Encouraged to start reducing EN to help increase oral intake.   Suggested removing one carton each week and replacing calories/protein with PO.  Discussed weaning of TF as PO intake improves and weight trends upward. Discussed protocol for removing feeding tube. Encouraged to weigh self routinely and work on increasing weight and weight stabilty for 4 week w/o using feeding tube in order to have FT removed.  Reviewed oral intake. Encouraged ONS and pureed foods as his throat pain has improved.  Suggested making smoothies with ONS, pureed foods such as cream based soups (without chunks), mashed potatoes, yogurt, puddings etc. Reviewed calorie goals for repletion of >2500 per day.  Suggested trying [employer] smoothies.  Encouraged to choose shakes with lower carbs and higher protein for diabetes.   Goals   1.Aim for a balance of >2500 calories, 100g protein per day for weight repletion.   2. Continue to wean TF towards <2-4 cartons/day  3.Weight gain towards >155 lbs    Follow up/Monitoring: one month follow up for potential wean of TF  Enteral Nutrition intake and Weight    Meka Lee RD, , LD  [facility]  [phone]          Again, thank you for allowing me to participate in the care of your patient.        Sincerely,        Meka Lee RD

## 2024-06-24 ENCOUNTER — OFFICE VISIT (OUTPATIENT)
Dept: VASCULAR SURGERY | Facility: CLINIC | Age: 69
End: 2024-06-24
Attending: SURGERY
Payer: COMMERCIAL

## 2024-06-24 VITALS
OXYGEN SATURATION: 97 % | HEIGHT: 66 IN | WEIGHT: 140.4 LBS | RESPIRATION RATE: 12 BRPM | HEART RATE: 95 BPM | DIASTOLIC BLOOD PRESSURE: 68 MMHG | BODY MASS INDEX: 22.56 KG/M2 | SYSTOLIC BLOOD PRESSURE: 116 MMHG

## 2024-06-24 DIAGNOSIS — I65.21 OCCLUSION OF RIGHT CAROTID ARTERY: Primary | ICD-10-CM

## 2024-06-24 PROCEDURE — G0463 HOSPITAL OUTPT CLINIC VISIT: HCPCS | Performed by: SURGERY

## 2024-06-24 PROCEDURE — 99214 OFFICE O/P EST MOD 30 MIN: CPT | Performed by: SURGERY

## 2024-06-24 ASSESSMENT — PAIN SCALES - GENERAL: PAINLEVEL: MILD PAIN (3)

## 2024-06-24 NOTE — PROGRESS NOTES
Carotid Disease Education Note    The following information has been reviewed with the patient:    Warning signs of stroke  Calling 911 if having warning signs of stroke  Patient's risk factors for stroke:    high blood pressure     high cholesterol     diabetes     diet     physical inactivity  Medications: Aspirin and Statin  Carotid surgery instructions: N/A      Educational Barriers: No barriers  Learner's response to risk factors / lifestyle modification education: Taking steps

## 2024-06-24 NOTE — PROGRESS NOTES
"[facility]        Patient is here for a follow up  to discuss discuss TCAR    Pt is currently taking Aspirin and Statin.    /68   Pulse 95   Resp 12   Ht 5' 6.2\" (1.681 m)   Wt 140 lb 6.4 oz (63.7 kg)   SpO2 97%   BMI 22.52 kg/m      The provider has been notified that the patient has no concerns.     Questions patient would like addressed today are: N/A.    Refills are needed: No    Has homecare services and agency name:  No           "

## 2024-06-24 NOTE — PROGRESS NOTES
VASCULAR SURGERY PROGRESS NOTE   VASCULAR SURGEON: Tj Leonard MD, RPVI     LOCATION:  [facility]     Ko Thakkar   Medical Record #:  [id number]  YOB: 1955  Age:  68 year old     Date of Service: 6/24/2024    PRIMARY CARE PROVIDER: Alton Mota      Reason for visit: Follow-up    IMPRESSION: 68-year-old male with a history of type 2 diabetes, hypertension, oropharyngeal squamous cell carcinoma and recently completed chemotherapy and radiation who is seen for asymptomatic high-grade right ICA stenosis.  Patient has finished his chemoradiation and is currently still relatively weak from the treatment.  Patient would like to gain some weight and strain before considering an operation.    RECOMMENDATION/RISKS: Follow-up in 6 months with repeat carotid ultrasound and CTA of the neck.  Continue best medical management therapy.    HPI:  Ko Thakkar is a 68 year old male who was seen today for follow-up.  Patient is doing well  REVIEW OF SYSTEMS:    A 12 point ROS was reviewed and is negative      PHH:    Past Medical History:   Diagnosis Date    Cancer (H) 01/23/2024    throat    Diabetes (H)     Hypertension           Past Surgical History:   Procedure Laterality Date    ESOPHAGOSCOPY, GASTROSCOPY, DUODENOSCOPY (EGD), COMBINED N/A 10/12/2014    Procedure: ESOPHAGOGASTRODUODENOSCOPY;  Surgeon: Jh Ventura MD;  Location: [facility];  Service:     IR FINE NEEDLE ASPIRATION W ULTRASOUND  8/29/2023    IR FINE NEEDLE ASPIRATION W ULTRASOUND  10/13/2023    IR FOLLOW UP VISIT OUTPATIENT  2/8/2024    IR GASTROSTOMY TUBE PERCUTANEOUS PLCMNT  10/13/2023    IR GASTROSTOMY TUBE PERCUTANEOUS PLCMNT  1/23/2024    LAPAROTOMY EXPLORATORY Left     spleen       ALLERGIES:  Patient has no known allergies.    MEDS:    Current Outpatient Medications:     albuterol (PROAIR HFA/PROVENTIL HFA/VENTOLIN HFA) 108 (90 Base) MCG/ACT inhaler, , Disp: , Rfl:     amLODIPine  (NORVASC) 5 MG tablet, Take 5 mg by mouth daily, Disp: , Rfl:     aspirin (ASA) 81 MG chewable tablet, Take 1 tablet (81 mg) by mouth daily, Disp: 90 tablet, Rfl: 3    atorvastatin (LIPITOR) 80 MG tablet, Take 1 tablet (80 mg) by mouth daily, Disp: 90 tablet, Rfl: 3    diphenhydrAMINE (BENADRYL) 12.5 MG/5ML liquid, Taken prior to chemo, Disp: , Rfl:     empagliflozin (JARDIANCE) 25 MG TABS tablet, Take 25 mg by mouth daily, Disp: , Rfl:     gabapentin (NEURONTIN) 250 MG/5ML solution, Take 24 mLs (1,200 mg) by mouth 3 times daily, Disp: 470 mL, Rfl: 3    gabapentin (NEURONTIN) 300 MG capsule, Take 4 capsules (1,200 mg) by mouth 3 times daily, Disp: 720 capsule, Rfl: 1    hydrochlorothiazide (HYDRODIURIL) 25 MG tablet, Take 25 mg by mouth daily, Disp: , Rfl:     ibuprofen (ADVIL/MOTRIN) 200 MG tablet, Take 1,400 mg by mouth every 4 hours as needed for pain, Disp: , Rfl:     insulin lispro protamine-insulin lispro (HUMALOG MIX 75/25 KWIKPEN) (75-25) 100 UNIT/ML pen, , Disp: , Rfl:     insulin NPH-Regular 70/30 (HUMULIN 70/30;NOVOLIN 70/30) (70-30) 100 UNIT/ML vial, 50 units in the morning and 30 units in the evening, Disp: , Rfl:     levothyroxine (SYNTHROID/LEVOTHROID) 50 MCG tablet, Take 1 tablet (50 mcg) by mouth daily, Disp: 30 tablet, Rfl: 11    liraglutide (VICTOZA) 18 MG/3ML solution, Inject Subcutaneous daily, Disp: , Rfl:     lisinopril (ZESTRIL) 40 MG tablet, Take 40 mg by mouth daily, Disp: , Rfl:     magic mouthwash (ENTER INGREDIENTS IN COMMENTS) suspension, Swish, gargle, and spit one to two teaspoonfuls every six hours as needed. May be swallowed if esophageal involvement., Disp: 240 mL, Rfl: 3    metFORMIN (GLUCOPHAGE) 500 MG tablet, [METFORMIN (GLUCOPHAGE) 500 MG TABLET] Take 500 mg by mouth 2 (two) times a day with meals., Disp: , Rfl:     pantoprazole (PROTONIX) 20 MG EC tablet, Take 2 tablets (40 mg) by mouth daily, Disp: 30 tablet, Rfl: 2    potassium chloride (KLOR-CON) 20 MEQ packet, Take 20 mEq  "by mouth 2 times daily, Disp: 60 each, Rfl: 3    silver sulfADIAZINE (SILVADENE) 1 % external cream, Apply topically daily, Disp: 50 g, Rfl: 3    therapeutic multivitamin (THERAGRAN) tablet, Take 1 tablet by mouth daily, Disp: , Rfl:     TRUEPLUS 5-BEVEL PEN NEEDLES 32G X 4 MM miscellaneous, , Disp: , Rfl:     TRULICITY 1.5 MG/0.5ML pen, , Disp: , Rfl:     SOCIAL HABITS:    History   Smoking Status    Former    Types: Cigarettes   Smokeless Tobacco    Never     Social History    Substance and Sexual Activity      Alcohol use: No      History   Drug Use No       FAMILY HISTORY:  No family history on file.    PE:  /68   Pulse 95   Resp 12   Ht 1.681 m (5' 6.2\")   Wt 63.7 kg (140 lb 6.4 oz)   SpO2 97%   BMI 22.52 kg/m    Wt Readings from Last 1 Encounters:   06/24/24 63.7 kg (140 lb 6.4 oz)     Body mass index is 22.52 kg/m .    EXAM:  GENERAL: This is a well-developed 68 year old male who appears his stated age  EYES: Grossly normal.  MOUTH: Buccal mucosa normal   CARDIAC: Normal   CHEST/LUNG: Clear to auscultation bilaterally  GASTROINTESINAL soft nontender nondistended  MUSCULOSKELETAL: Grossly normal and both lower extremities are intact.  HEME/LYMPH: No lymphedema  NEUROLOGIC: Focally intact, Alert and oriented x 3.   PSYCH: appropriate affect            DIAGNOSTIC STUDIES:     Images:  No results found.        LABS:      Sodium   Date Value Ref Range Status   06/12/2024 137 135 - 145 mmol/L Final     Comment:     Reference intervals for this test were updated on 09/26/2023 to more accurately reflect our healthy population. There may be differences in the flagging of prior results with similar values performed with this method. Interpretation of those prior results can be made in the context of the updated reference intervals.    03/20/2024 139 135 - 145 mmol/L Final     Comment:     Reference intervals for this test were updated on 09/26/2023 to more accurately reflect our healthy population. There " may be differences in the flagging of prior results with similar values performed with this method. Interpretation of those prior results can be made in the context of the updated reference intervals.    02/05/2024 134 (L) 135 - 145 mmol/L Final     Comment:     Reference intervals for this test were updated on 09/26/2023 to more accurately reflect our healthy population. There may be differences in the flagging of prior results with similar values performed with this method. Interpretation of those prior results can be made in the context of the updated reference intervals.      Urea Nitrogen   Date Value Ref Range Status   06/12/2024 19.8 8.0 - 23.0 mg/dL Final   03/20/2024 27.7 (H) 8.0 - 23.0 mg/dL Final   02/05/2024 27.7 (H) 8.0 - 23.0 mg/dL Final     Hemoglobin   Date Value Ref Range Status   06/12/2024 12.3 (L) 13.3 - 17.7 g/dL Final   03/20/2024 12.0 (L) 13.3 - 17.7 g/dL Final   02/05/2024 10.6 (L) 13.3 - 17.7 g/dL Final     Platelet Count   Date Value Ref Range Status   06/12/2024 256 150 - 450 10e3/uL Final   03/20/2024 294 150 - 450 10e3/uL Final   02/05/2024 378 150 - 450 10e3/uL Final     INR   Date Value Ref Range Status   01/23/2024 1.05 0.85 - 1.15 Final   10/13/2023 1.03 0.85 - 1.15 Final   08/29/2023 0.98 0.85 - 1.15 Final       30 minutes spent on the day of encounter doing chart review, history and exam, documentation, and further activities as noted.         Tj Leonard MD, [facility]  VASCULAR SURGERY

## 2024-06-24 NOTE — PATIENT INSTRUCTIONS
"  Carotid Artery Disease      What is carotid artery disease?  A carotid artery on each side of the neck supplies blood to the brain. Carotid artery disease occurs when a substance called plaque builds up in either or both arteries. The buildup may narrow the artery and limit blood flow to the brain. If this plaque breaks open, it may form a blood clot. Or pieces of the plaque may break off. A piece of plaque or a blood clot could move to the brain and cause a stroke or transient ischemic attack (TIA).    The narrowing in an artery is called stenosis. The more narrow an artery becomes, the greater the risk of stroke or TIA.        What causes it?  Carotid artery disease is caused by a process called hardening of the arteries, or atherosclerosis. Plaque builds up inside the carotid arteries. Things that can lead to this buildup include:    Smoking.  High blood pressure.  High cholesterol.  Diabetes.  A family history of hardening of the arteries.    What are the symptoms?  Many people have no symptoms. In some people, a doctor can hear a sound in their neck called a bruit (pronounced \"broo-EE\"). This is a whooshing sound that happens when a carotid artery is narrowed.    For some people, a transient ischemic attack (TIA) or stroke is the first sign of the disease.    If you have any of these symptoms of a TIA or stroke, call 911 or other emergency services right away.    Sudden numbness, tingling, weakness, or loss of movement in your face, arm, or leg, especially on only one side of your body.  Sudden vision changes.  Sudden trouble speaking.  Sudden confusion or trouble understanding simple statements.  Sudden problems with walking or balance.  A sudden, severe headache that is different from past headaches.    How is it diagnosed?  An ultrasound test is used to diagnose carotid artery disease. This test uses sound waves to show how blood flows through your carotid arteries. You may have other tests such as a CT " angiogram or a magnetic resonance angiogram (MRA) to check your carotid arteries.    Screening tests for carotid artery disease are not recommended for people who do not have signs or symptoms of carotid artery disease.footnote1, footnote2 If you have risk factors, signs, or symptoms of carotid artery disease, your doctor may recommend an ultrasound test to check for it.footnote2, footnote3    Some companies sell ultrasound screening. But insurance doesn't pay for these tests because experts don't recommend them. And since your doctor didn't prescribe the tests, they aren't there to explain the results to you. It's a good idea to talk to your doctor before having one of these tests.    How is carotid artery disease treated?  The goal of treatment is to lower your risk of a stroke. Treatment depends on whether you have symptoms and how narrow your arteries are. You probably will take medicine. You also will be encouraged to have a heart-healthy lifestyle. Some people also have a procedure to lower their risk.    Medicines  You may take aspirin or another medicine to prevent blood clots. You will likely also take medicine to lower cholesterol. You may also take medicine to help manage blood pressure.    Heart-healthy lifestyle  A heart-healthy lifestyle can help lower your risk of stroke.    Don't smoke. Avoid secondhand smoke too.  Eat heart-healthy foods.  Be active. Ask your doctor what type of exercise is safe for you.  Stay at a healthy weight. Lose weight if you need to.  Manage other health problems, such as diabetes. If you think you may have a problem with alcohol or drug use, talk to your doctor.  Get vaccinated against COVID-19, the flu, and pneumonia.    Regular ultrasounds  Your doctor may recommend regular ultrasounds. This is to see if the narrowing in your arteries is getting worse.    Surgery or stenting  Surgery in the arteries is called carotid endarterectomy. The doctor makes a cut in the neck and  takes the plaque out of the artery.    Some people have a stent placed inside a carotid artery. A stent may be inserted during a catheter procedure. In this procedure, a doctor puts a thin tube, called a catheter, into a blood vessel in your groin or arm. The doctor threads the tube up to the carotid artery in the neck. The catheter is used to place the stent. In another type of procedure, a stent is placed in the artery through a cut in the neck.    Surgery and stenting may help lower your risk of a stroke. But they also have a risk of serious problems. You and your doctor can decide together if you want to have surgery or stenting.    Current as of: June 24, 2023  Author: [employer] StaffYou are leaving this website for information purposes only  Clinical Review BoardYou are leaving this website for information purposes only  All [employer] education is reviewed by a team that includes physicians, nurses, advanced practitioners, registered dieticians, and other healthcare professionals.      Please scan the QR codes to watch videos about Stroke symptoms and Risk factors for stroke. Links are provided if you are unable to use the code.          You can read more about the risk factors you can work on to prevent a stroke. Please scan the QR codes or links.    , , , , , , ,

## 2024-06-28 ENCOUNTER — TELEPHONE (OUTPATIENT)
Dept: OTOLARYNGOLOGY | Facility: CLINIC | Age: 69
End: 2024-06-28
Payer: COMMERCIAL

## 2024-06-28 NOTE — TELEPHONE ENCOUNTER
[facility] Call Center    Phone Message    May a detailed message be left on voicemail: yes     Reason for Call: Other: Pt wife is requesting to speak with providers care team. She states the outside of his head hurts. She wants to know if that is a side effect from Chemo and/or radiation or if he should be seen. Please call to discuss. Thanks     Action Taken: Message routed to:  [facility] ([facility] ENT     Travel Screening: Not Applicable     Date of Service:

## 2024-06-28 NOTE — TELEPHONE ENCOUNTER
Called patient's wife to discuss symptoms. Wife stated that patient is experiencing pain on the outside of his head, scalp and throat. It is a constant pain. Patient has been taking gabapentin and ibuprofen to help with the pain. Wife said patient has been experiencing this pain since finishing treatment.  Reviewed that there will be some pain after finishing treatment. Will review symptoms with Dr. Hardin and update wife on further recommendations.    Reviewed that patient should reach out if pain becomes worse or is not able to be controlled. Discussed that he can reach out to radiation team to follow up on gabapentin if he needs dose change. Verbalized understanding and will call with further questions or concerns.    Hallie Castillo BSN, RN

## 2024-07-17 ENCOUNTER — APPOINTMENT (OUTPATIENT)
Dept: LAB | Facility: CLINIC | Age: 69
End: 2024-07-17
Payer: COMMERCIAL

## 2024-07-17 ENCOUNTER — VIRTUAL VISIT (OUTPATIENT)
Dept: ONCOLOGY | Facility: CLINIC | Age: 69
End: 2024-07-17
Payer: COMMERCIAL

## 2024-07-17 DIAGNOSIS — C32.1 SCC (SQUAMOUS CELL CARCINOMA) OF SUPRAGLOTTIS (H): Primary | ICD-10-CM

## 2024-07-17 PROCEDURE — 97802 MEDICAL NUTRITION INDIV IN: CPT | Mod: GT,95 | Performed by: DIETITIAN, REGISTERED

## 2024-07-17 NOTE — PROGRESS NOTES
Video-Visit Details     Type of service:  Video Visit     Joined the call at 7/17/2024, 2:45:20 pm.  Left the call at 7/17/2024, 2:57:28 pm.  You were on the call for 12 minutes 7 seconds .    Originating Location (pt. 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            He has not tried any appetite stimulants in the past (as Rx) and is really pleased with how this OTC Supplement is working for him.  He ate more in the past 2 days than he has eaten PO in the past 4 months.   He continues to struggle with textures of foods and difficulty chewing due to lack of teeth.    He has been eating Moms meals and home made [employer] shakes.  He takes ~1/2 shake powder in a smoothie which is almost 700 calories and 25g protein.   His Moms meals consist of: Breakfast sandwich, Omelette and [place] toast   Strawberry weight tamela shake Pro Performance Bulk 1340 powder from [employer]    -EN intake/tolerance- Ace continues to take 3-5 cartons/day, which is down from 4-7 per day.   Formula: Osmolite 1.5 felicia  Volume: 5 cartons/day (1185mL, 905 ml free water)  Provisions:  1775kcal and 75g protein per day  He would like to continue using his FT until he is able to start gaining some weight.    -Weight trends -down 4 lb x past 6 weeks   Wt Readings from Last 10  Encounters:   06/24/24 63.7 kg (140 lb 6.4 oz)   06/12/24 66.5 kg (146 lb 9.6 oz)   05/06/24 68.1 kg (150 lb 1.6 oz)   03/20/24 73.5 kg (162 lb 1.6 oz)   03/19/24 70.3 kg (155 lb)   02/08/24 70.9 kg (156 lb 3.2 oz)   02/06/24 72.6 kg (160 lb)   02/05/24 69.7 kg (153 lb 9.6 oz)   01/30/24 71.7 kg (158 lb)   01/30/24 70.8 kg (156 lb)     Previous Goals:   1.Aim for a balance of >2500 calories, 100g protein per day for weight repletion.   2. Continue to wean TF towards <2-4 cartons/day  3.Weight gain towards >155 lbs    Evaluation: not met   Previous Nutrition Diagnosis:   Inadequate oral intake related to odynophagia and dysphagia as evidenced by pt reliant on EN via PEG tube to meet 100% estimated nutrition needs.   Evaluation: improving  NEW FINDINGS:   No new findings   CURRENT NUTRITION DIAGNOSIS   Inadequate oral intake related to odynophagia and dysphagia as evidenced by pt reliant on EN via PEG tube to meet 50-75% estimated nutrition needs.   INTERVENTIONS   EN Composition, EN Schedule, and Feeding Tube Flush -  Encouraged to start reducing EN to help increase oral intake.   Discussed weaning of TF as PO intake improves and weight trends upward. Discussed protocol for removing feeding tube. Encouraged to weigh self routinely and work on increasing weight and weight stabilty for 4 week w/o using feeding tube in order to have FT removed.  Reviewed oral intake. Encouraegd to continue using appetite stimulant as it has been working for him. Encouraged ONS and pureed foods as his throat pain has improved.  Suggested making smoothies with ONS, pureed foods such as cream based soups (without chunks), mashed potatoes, yogurt, puddings etc. Reviewed calorie goals for repletion of >2500 per day.  Suggested trying [employer] smoothies. Encouraged to choose shakes with lower carbs and higher protein for diabetes.   Goals   1.Aim for a balance of >2500 calories, 100g protein per day for weight repletion.   2. Continue  to wean TF towards <2-4 cartons/day  3.Weight gain towards >155 lbs    Follow up/Monitoring: one month follow up for potential wean of TF, 8/21  Enteral Nutrition intake and Weight    Meka Lee RD, , LD  [facility]  [phone]

## 2024-07-17 NOTE — LETTER
7/17/2024      Ko Thakkar  [address]  [place] [address]      Dear Colleague,    Thank you for referring your patient, Ko Thakkar, to the [facility]. Please see a copy of my visit note below.    Video-Visit Details     Type of service:  Video Visit     Joined the call at 7/17/2024, 2:45:20 pm.  Left the call at 7/17/2024, 2:57:28 pm.  You were on the call for 12 minutes 7 seconds .    Originating Location (pt. 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            He has not tried any appetite stimulants in the past (as Rx) and is really pleased with how this OTC Supplement is working for him.  He ate more in the past 2 days than he has eaten PO in the past 4 months.   He continues to struggle with textures of foods and difficulty chewing due to lack of teeth.    He has been eating Moms meals and home made [employer] shakes.  He takes ~1/2 shake powder in a smoothie which is almost 700 calories and 25g protein.   His Moms meals consist of: Breakfast sandwich, Omelette and [place] toast   Strawberry weight tamela shake Pro Performance Bulk 1340 powder from [employer]    -EN intake/tolerance- Ace continues to take 3-5 cartons/day, which is down from 4-7 per day.   Formula: Osmolite 1.5  felicia  Volume: 5 cartons/day (1185mL, 905 ml free water)  Provisions:  1775kcal and 75g protein per day  He would like to continue using his FT until he is able to start gaining some weight.    -Weight trends -down 4 lb x past 6 weeks   Wt Readings from Last 10 Encounters:   06/24/24 63.7 kg (140 lb 6.4 oz)   06/12/24 66.5 kg (146 lb 9.6 oz)   05/06/24 68.1 kg (150 lb 1.6 oz)   03/20/24 73.5 kg (162 lb 1.6 oz)   03/19/24 70.3 kg (155 lb)   02/08/24 70.9 kg (156 lb 3.2 oz)   02/06/24 72.6 kg (160 lb)   02/05/24 69.7 kg (153 lb 9.6 oz)   01/30/24 71.7 kg (158 lb)   01/30/24 70.8 kg (156 lb)     Previous Goals:   1.Aim for a balance of >2500 calories, 100g protein per day for weight repletion.   2. Continue to wean TF towards <2-4 cartons/day  3.Weight gain towards >155 lbs    Evaluation: not met   Previous Nutrition Diagnosis:   Inadequate oral intake related to odynophagia and dysphagia as evidenced by pt reliant on EN via PEG tube to meet 100% estimated nutrition needs.   Evaluation: improving  NEW FINDINGS:   No new findings   CURRENT NUTRITION DIAGNOSIS   Inadequate oral intake related to odynophagia and dysphagia as evidenced by pt reliant on EN via PEG tube to meet 50-75% estimated nutrition needs.   INTERVENTIONS   EN Composition, EN Schedule, and Feeding Tube Flush -  Encouraged to start reducing EN to help increase oral intake.   Discussed weaning of TF as PO intake improves and weight trends upward. Discussed protocol for removing feeding tube. Encouraged to weigh self routinely and work on increasing weight and weight stabilty for 4 week w/o using feeding tube in order to have FT removed.  Reviewed oral intake. Encouraegd to continue using appetite stimulant as it has been working for him. Encouraged ONS and pureed foods as his throat pain has improved.  Suggested making smoothies with ONS, pureed foods such as cream based soups (without chunks), mashed potatoes, yogurt, puddings etc. Reviewed calorie goals  for repletion of >2500 per day.  Suggested trying [employer] smoothies. Encouraged to choose shakes with lower carbs and higher protein for diabetes.   Goals   1.Aim for a balance of >2500 calories, 100g protein per day for weight repletion.   2. Continue to wean TF towards <2-4 cartons/day  3.Weight gain towards >155 lbs    Follow up/Monitoring: one month follow up for potential wean of TF, 8/21  Enteral Nutrition intake and Weight    Meka Lee RD, , LD  [facility]  [phone]          Again, thank you for allowing me to participate in the care of your patient.        Sincerely,        Meka Lee RD

## 2024-07-20 ENCOUNTER — HEALTH MAINTENANCE LETTER (OUTPATIENT)
Age: 69
End: 2024-07-20

## 2024-08-01 ENCOUNTER — THERAPY VISIT (OUTPATIENT)
Dept: PHYSICAL THERAPY | Facility: REHABILITATION | Age: 69
End: 2024-08-01
Attending: INTERNAL MEDICINE
Payer: COMMERCIAL

## 2024-08-01 DIAGNOSIS — I89.0 LYMPHEDEMA: ICD-10-CM

## 2024-08-01 DIAGNOSIS — C32.1 SCC (SQUAMOUS CELL CARCINOMA) OF SUPRAGLOTTIS (H): ICD-10-CM

## 2024-08-01 PROCEDURE — 97140 MANUAL THERAPY 1/> REGIONS: CPT | Mod: GP | Performed by: PHYSICAL THERAPIST

## 2024-08-01 PROCEDURE — 97161 PT EVAL LOW COMPLEX 20 MIN: CPT | Mod: GP | Performed by: PHYSICAL THERAPIST

## 2024-08-01 PROCEDURE — 97110 THERAPEUTIC EXERCISES: CPT | Mod: GP | Performed by: PHYSICAL THERAPIST
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
## 2024-08-05 NOTE — PROGRESS NOTES
Dear Dr. Vicente:    I had the pleasure of seeing Ko Thakkar in follow-up today at the [facility].     History of Present Illness:   Ko Thakkar is a 68 year old man with a T4N2c SCC of the oropharynx.    He was seen by [facility] on 4/3/2023 at the request of his PCP for dysphagia to solid foods. They discussed EGD vs swallow study.     He had an EGD on 5/16/2023 with GI and found to be H pylori positive, started on medication.     He saw SLP on 7/11/2023 for a video swallow study at [facility]. He was noted to have poor epiglottic inversion and was recommended for further evaluation. He did have penetration and aspiration on the swallow study.    He had a CT neck on 7/28/2023 which showed a supraglottic mass measuring 2.2 x 1.4 x 2.3 cm with concern for paraglottic involvement, no cartilage erosion, enlarged bilateral level IB, IIA, III, and right level IV nodes per radiology. He was also noted to have 70% ICA stenosis with concerns for high risk plaque.     He was seen as a new patient in clinic on 8/9/2023 and was noted on exam  to have extensive tumor involving the oropharynx, supraglottis, hypopharynx, with vocal cord paralysis. He elected for an FNA of the neck with IR which was performed on 8/29/2023. Final pathology was consistent with SCC (not enough cells for p16 status). He had a PET scan on 9/6/2023 which showed FDG avid mass of the right tonsil/lateral pharyngeal wall/glossotonsillar sulcus/tongue base, right AE fold, right epiglottis, right pyriform sinus, right posterolateral pharyngeal wall, posterior pharyngeal wall, no thyroid cartilage erosion, right retropharyngeal node, deep lobe parotid FDG avid mass, bilateral cervical nodes (right level II-IV, left level II-III), no distant disease. His case was reviewed at tumor board with recommendation for chemoradiation. He had PEG placed on 10/13/2023 which fell out 12/25/2023 and  replaced 1/23/2024. He was started on induction chemotherapy with carbo/taxol with 2 cycles due to radiation/medical oncology preference, performed 10/16/2023 to 11/13/2023. He had post-induction PET scan on 11/28/2023 which showed partial response with residual disease present. He had definitive chemoradiation with Dr Louise and Dr Cordova from 12/19/2023 to 2/8/2024. He received 7000 cGy and 6 cycles of weekly carbo/taxol.  He had PET scan in May 2024 which was negative.       Interval history:  He comes in today for follow-up. He was seen in clinic in May 2024. He saw radiation oncology in May 2024. He saw medical oncology in June 2024. He saw vascular surgery in June 2024 with plans for repeat imaging in 6 months. He continues to follow with dietician working on weaning tube feeds. He decided against moving to [place]. He says he is doing ok. He is eating. He is limited by chewing because of lack of teeth. He has to drink liquids after eating to make it go down. He says he has no issues with liquids. He says he coughs a lot but denied it being related to drinking. He is doing smoothies. He is trying to gain weight. His weight is overall stable, fluctuates by just a few lbs by 135. He does nutrition through PEG once per day. He was trying to get off the tube feeds. He says he is going to go back up on it again to 4 per day. He says he has pain in his throat all the time, unchanged. He says that he wants to be at his baseline weight, priority over getting off the tube feeds.    He is accompanied by his significant other.      MEDICATIONS:     Current Outpatient Medications   Medication Sig Dispense Refill    albuterol (PROAIR HFA/PROVENTIL HFA/VENTOLIN HFA) 108 (90 Base) MCG/ACT inhaler       amLODIPine (NORVASC) 5 MG tablet Take 5 mg by mouth daily      aspirin (ASA) 81 MG chewable tablet Take 1 tablet (81 mg) by mouth daily 90 tablet 3    atorvastatin (LIPITOR) 80 MG tablet Take 1 tablet (80 mg) by mouth daily 90  tablet 3    diphenhydrAMINE (BENADRYL) 12.5 MG/5ML liquid Taken prior to chemo      empagliflozin (JARDIANCE) 25 MG TABS tablet Take 25 mg by mouth daily      gabapentin (NEURONTIN) 250 MG/5ML solution Take 24 mLs (1,200 mg) by mouth 3 times daily 470 mL 3    gabapentin (NEURONTIN) 300 MG capsule Take 4 capsules (1,200 mg) by mouth 3 times daily 720 capsule 1    hydrochlorothiazide (HYDRODIURIL) 25 MG tablet Take 25 mg by mouth daily      ibuprofen (ADVIL/MOTRIN) 200 MG tablet Take 1,400 mg by mouth every 4 hours as needed for pain      insulin lispro protamine-insulin lispro (HUMALOG MIX 75/25 KWIKPEN) (75-25) 100 UNIT/ML pen       insulin NPH-Regular 70/30 (HUMULIN 70/30;NOVOLIN 70/30) (70-30) 100 UNIT/ML vial 50 units in the morning and 30 units in the evening      levothyroxine (SYNTHROID/LEVOTHROID) 50 MCG tablet Take 1 tablet (50 mcg) by mouth daily 30 tablet 11    liraglutide (VICTOZA) 18 MG/3ML solution Inject Subcutaneous daily      lisinopril (ZESTRIL) 40 MG tablet Take 40 mg by mouth daily      magic mouthwash (ENTER INGREDIENTS IN COMMENTS) suspension Swish, gargle, and spit one to two teaspoonfuls every six hours as needed. May be swallowed if esophageal involvement. 240 mL 3    metFORMIN (GLUCOPHAGE) 500 MG tablet [METFORMIN (GLUCOPHAGE) 500 MG TABLET] Take 500 mg by mouth 2 (two) times a day with meals.      nystatin (MYCOSTATIN) 915101 UNIT/ML suspension Take 1 teaspoonful by mouth 4 times daily 60 mL 0    pantoprazole (PROTONIX) 20 MG EC tablet Take 2 tablets (40 mg) by mouth daily 30 tablet 2    potassium chloride (KLOR-CON) 20 MEQ packet Take 20 mEq by mouth 2 times daily 60 each 3    silver sulfADIAZINE (SILVADENE) 1 % external cream Apply topically daily 50 g 3    therapeutic multivitamin (THERAGRAN) tablet Take 1 tablet by mouth daily      TRUEPLUS 5-BEVEL PEN NEEDLES 32G X 4 MM miscellaneous       TRULICITY 1.5 MG/0.5ML pen          ALLERGIES:  No Known Allergies    HABITS/SOCIAL HISTORY:    History of alcohol sober for 24 years. Former drug abuse. Previously smoked about 23 years ago, 4 ppd previously, 8 years old started. Intermittent chewing tobacco use in the past.   Works as a .   Lives with wife.   Worked as .    Social History     Socioeconomic History    Marital status:      Spouse name: Not on file    Number of children: Not on file    Years of education: Not on file    Highest education level: Not on file   Occupational History    Not on file   Tobacco Use    Smoking status: Former     Current packs/day: 0.00     Types: Cigarettes     Quit date: 10/12/1999     Years since quittin.8     Passive exposure: Past    Smokeless tobacco: Never   Substance and Sexual Activity    Alcohol use: No    Drug use: No    Sexual activity: Not on file   Other Topics Concern    Not on file   Social History Narrative    Not on file     Social Determinants of Health     Financial Resource Strain: Not on File (12/15/2023)    Received from [facility]    Financial Resource Strain     Financial Resource Strain: 0   Food Insecurity: Not on File (12/15/2023)    Received from [facility]    Food Insecurity     Food: 0   Transportation Needs: Not on File (12/15/2023)    Received from [facility]    Transportation Needs     Transportation: 0   Physical Activity: Not on File (12/15/2023)    Received from [facility]    Physical Activity     Physical Activity: 0   Stress: Not on File (12/15/2023)    Received from [facility]    Stress     Stress: 0   Social Connections: Not on File (12/15/2023)    Received from [facility]    Social Connections     Social Connections and Isolation: 0   Interpersonal Safety: Not on file   Housing Stability: Not on File (12/15/2023)    Received from [facility]    Housing Stability     Housin       PAST MEDICAL HISTORY:   Past Medical History:   Diagnosis Date    Cancer (H) 2024    throat    Diabetes (H)     Hypertension         PAST SURGICAL  "HISTORY:   Past Surgical History:   Procedure Laterality Date    ESOPHAGOSCOPY, GASTROSCOPY, DUODENOSCOPY (EGD), COMBINED N/A 10/12/2014    Procedure: ESOPHAGOGASTRODUODENOSCOPY;  Surgeon: Jh Ventura MD;  Location: [facility];  Service:     IR FINE NEEDLE ASPIRATION W ULTRASOUND  8/29/2023    IR FINE NEEDLE ASPIRATION W ULTRASOUND  10/13/2023    IR FOLLOW UP VISIT OUTPATIENT  2/8/2024    IR GASTROSTOMY TUBE PERCUTANEOUS PLCMNT  10/13/2023    IR GASTROSTOMY TUBE PERCUTANEOUS PLCMNT  1/23/2024    LAPAROTOMY EXPLORATORY Left     spleen       FAMILY HISTORY:  No family history on file.    REVIEW OF SYSTEMS:  12 point ROS was negative other than the symptoms noted above in the HPI.  Patient Supplied Answers to Review of Systems      8/7/2024     1:33 PM   [facility] E[facility] ROS   Constitutional Weight loss    Appetite change    Unexplained fatigue   Neurology Dizzy spells    Numbness    Unexplained weakness    Headache   Ears, Nose, Throat Ringing/noise in ears    Nasal congestion or drainage    Sore throat    Trouble swallowing    Hoarseness   Cardiopulmonary Cough    Breathing problems    Wheezing    Chest pain   Gastrointestinal/Genitourinary Diarrhea   Musculoskeletal Sore or stiff joints    Back pain    Neck pain         PHYSICAL EXAMINATION:   /82   Pulse 98   Ht 1.575 m (5' 2\")   Wt 63.4 kg (139 lb 12.8 oz)   SpO2 98%   BMI 25.57 kg/m     Appearance:   normal; NAD, age-appropriate appearance, well-developed, normal habitus   Communication:   normal; communicates verbally, normal voice quality   Head/Face:   inspection -  Normal; no scars or visible lesions   Salivary glands -  Normal size, no tenderness, swelling, or palpable masses   Facial strength -  Normal and symmetric   Skin:  normal, no rash   Ears:  auricle (AD) -  normal  EAC (AD) -  normal  TM (AD) -  Normal  auricle (AS) -  normal  EAC (AS) -  normal  TM (AS) -  normal  Normal clinical speech reception   Nose:  Ext. inspection -  " Normal  Internal Inspection -  Normal mucosa, septum, and turbinates   Nasopharynx:  Normal mucosa, no masses   Oral Cavity:  lips -  Normal mucosa, oral competence, and stoma size   edentulous, healthy gingival mucosa   Hard palate, buccal, floor of mouth mucosa normal   Tongue - normal movement, no lesions   Oropharynx:  Radiation changes  Thick pooled secretions  No obvious tumor  Concern for thrush on left side of oropharynx   Hypopharynx:  Radiation changes to mucosa  Unable to visualize pyriform sinuses due to edema  Some thick secretions, spills over to larynx    Larynx:  Radiation edema to the epiglottis, AE folds, arytenoids  Bilaterally mobile vocal cords  Concern for thrush along the supraglottis (left AE fold/arytenoid)  Some secretions present, no aspiration   Neck: No palpable masses  Lymphedema mild   Lymphatic:  no abnormal nodes   Cardiovascular:  warm, pink, well-perfused extremities without swelling, tenderness, or edema   Respiratory:  Normal respiratory effort, no stridor   Neuro/Psych.:  mood/affect -  normal  mental status -  normal         PROCEDURES:   Flexible fiberoptic laryngoscopy: Scope exam was indicated due to oropharyngeal cancer. Verbal consent was obtained. The nasal cavity was prepped with an aerosolized solution of topical anesthetic and vasoconstrictive agent. The scope was passed through the anterior nasal cavity and advanced. Inspection of the nasopharynx revealed no gross abnormality. There are radiation changes to the mucosa throughout, thick secretions. There is radiation edema of the epiglottis, AE folds, arytenoids. Inspection of the larynx revealed bilaterally mobile vocal cords. No obvious tumor. Concern for thrush on left oropharynx and left arytenoid/AE fold.  Pyriform sinuses unable to be visualized due to edema. The airway is patent. Procedure tolerated well with no immediate complications noted.              RESULTS REVIEWED:   I reviewed notes from radiation  oncology, vascular surgery and medical oncology     Care discussed with SLP    IMPRESSION AND PLAN:   Ko Thakkar is a 68 year old man with a T4N2c SCC of the oropharynx with sameer metastasis. He had induction chemotherapy followed by definitive chemoradiation completed 2/8/2024.    He has no signs of recurrent disease.    I am concerned again about thrush, nystatin prescribed.    He is taking PO but is preferentially using his PEG and increasing use. Attempted to discuss with him that often after radiation patients do not fully return to their pretreatment weight. Discussed that goal is to take all of his nutrition by mouth and should be weaning PEG. He has his personal plan that he wishes to follow with regards to his nutrition and PEG use.    He was seen by SLP today.    Lymphedema referral placed previously.     Repeat CT scans in 3 months.    He is on synthroid. Repeat TSH in 3 months.     Follow-up in 3 months with labs and scans.    Thank you very much for the opportunity to participate in the care of your patient.      Denia Hardin MD  Otolaryngology- Head & Neck Surgery      This note was dictated with voice recognition software and then edited. Please excuse any unintentional errors.         CC:  Wojciech Vicente MD  [facility]   [address] [id number]  [facility] [address]      Alton Mota MD  [facility]   [address]  [facility] [address]      Xcohilt Louise MD  Department of Medical Oncology  [facility]      Juwan Cordova MD  Department of Radiation Oncology  [facility]

## 2024-08-07 ENCOUNTER — OFFICE VISIT (OUTPATIENT)
Dept: OTOLARYNGOLOGY | Facility: CLINIC | Age: 69
End: 2024-08-07
Payer: COMMERCIAL

## 2024-08-07 ENCOUNTER — LAB (OUTPATIENT)
Dept: LAB | Facility: CLINIC | Age: 69
End: 2024-08-07
Attending: INTERNAL MEDICINE
Payer: COMMERCIAL

## 2024-08-07 ENCOUNTER — THERAPY VISIT (OUTPATIENT)
Dept: SPEECH THERAPY | Facility: CLINIC | Age: 69
End: 2024-08-07
Payer: COMMERCIAL

## 2024-08-07 VITALS
SYSTOLIC BLOOD PRESSURE: 129 MMHG | OXYGEN SATURATION: 98 % | HEART RATE: 98 BPM | WEIGHT: 139.8 LBS | BODY MASS INDEX: 25.73 KG/M2 | HEIGHT: 62 IN | DIASTOLIC BLOOD PRESSURE: 82 MMHG

## 2024-08-07 DIAGNOSIS — C32.1 SCC (SQUAMOUS CELL CARCINOMA) OF SUPRAGLOTTIS (H): ICD-10-CM

## 2024-08-07 DIAGNOSIS — E03.4 HYPOTHYROIDISM DUE TO ACQUIRED ATROPHY OF THYROID: ICD-10-CM

## 2024-08-07 DIAGNOSIS — B37.0 THRUSH: ICD-10-CM

## 2024-08-07 DIAGNOSIS — C10.9 SQUAMOUS CELL CARCINOMA OF OROPHARYNX (H): Primary | ICD-10-CM

## 2024-08-07 DIAGNOSIS — R13.12 DYSPHAGIA, OROPHARYNGEAL PHASE: Primary | ICD-10-CM

## 2024-08-07 DIAGNOSIS — C77.0 SECONDARY MALIGNANCY OF LYMPH NODES OF HEAD, FACE AND NECK (H): ICD-10-CM

## 2024-08-07 LAB
ALBUMIN SERPL BCG-MCNC: 4 G/DL (ref 3.5–5.2)
ALP SERPL-CCNC: 113 U/L (ref 40–150)
ALT SERPL W P-5'-P-CCNC: 15 U/L (ref 0–70)
ANION GAP SERPL CALCULATED.3IONS-SCNC: 10 MMOL/L (ref 7–15)
AST SERPL W P-5'-P-CCNC: 19 U/L (ref 0–45)
BASOPHILS # BLD AUTO: 0 10E3/UL (ref 0–0.2)
BASOPHILS NFR BLD AUTO: 0 %
BILIRUB SERPL-MCNC: 0.3 MG/DL
BUN SERPL-MCNC: 13.7 MG/DL (ref 8–23)
CALCIUM SERPL-MCNC: 9.5 MG/DL (ref 8.8–10.4)
CHLORIDE SERPL-SCNC: 100 MMOL/L (ref 98–107)
CREAT SERPL-MCNC: 0.76 MG/DL (ref 0.67–1.17)
EGFRCR SERPLBLD CKD-EPI 2021: >90 ML/MIN/1.73M2
EOSINOPHIL # BLD AUTO: 0.2 10E3/UL (ref 0–0.7)
EOSINOPHIL NFR BLD AUTO: 2 %
ERYTHROCYTE [DISTWIDTH] IN BLOOD BY AUTOMATED COUNT: 13.8 % (ref 10–15)
GLUCOSE SERPL-MCNC: 287 MG/DL (ref 70–99)
HCO3 SERPL-SCNC: 28 MMOL/L (ref 22–29)
HCT VFR BLD AUTO: 36.4 % (ref 40–53)
HGB BLD-MCNC: 12.1 G/DL (ref 13.3–17.7)
IMM GRANULOCYTES # BLD: 0 10E3/UL
IMM GRANULOCYTES NFR BLD: 0 %
LYMPHOCYTES # BLD AUTO: 0.8 10E3/UL (ref 0.8–5.3)
LYMPHOCYTES NFR BLD AUTO: 10 %
MCH RBC QN AUTO: 28.9 PG (ref 26.5–33)
MCHC RBC AUTO-ENTMCNC: 33.2 G/DL (ref 31.5–36.5)
MCV RBC AUTO: 87 FL (ref 78–100)
MONOCYTES # BLD AUTO: 0.8 10E3/UL (ref 0–1.3)
MONOCYTES NFR BLD AUTO: 11 %
NEUTROPHILS # BLD AUTO: 5.9 10E3/UL (ref 1.6–8.3)
NEUTROPHILS NFR BLD AUTO: 77 %
NRBC # BLD AUTO: 0 10E3/UL
NRBC BLD AUTO-RTO: 0 /100
PLATELET # BLD AUTO: 292 10E3/UL (ref 150–450)
POTASSIUM SERPL-SCNC: 4.4 MMOL/L (ref 3.4–5.3)
PROT SERPL-MCNC: 7.2 G/DL (ref 6.4–8.3)
RBC # BLD AUTO: 4.19 10E6/UL (ref 4.4–5.9)
SODIUM SERPL-SCNC: 138 MMOL/L (ref 135–145)
T4 FREE SERPL-MCNC: 1.04 NG/DL (ref 0.9–1.7)
TSH SERPL DL<=0.005 MIU/L-ACNC: 2.94 UIU/ML (ref 0.3–4.2)
WBC # BLD AUTO: 7.8 10E3/UL (ref 4–11)

## 2024-08-07 PROCEDURE — 36415 COLL VENOUS BLD VENIPUNCTURE: CPT

## 2024-08-07 PROCEDURE — 99214 OFFICE O/P EST MOD 30 MIN: CPT | Mod: 25 | Performed by: OTOLARYNGOLOGY

## 2024-08-07 PROCEDURE — 84443 ASSAY THYROID STIM HORMONE: CPT

## 2024-08-07 PROCEDURE — 84075 ASSAY ALKALINE PHOSPHATASE: CPT

## 2024-08-07 PROCEDURE — 92610 EVALUATE SWALLOWING FUNCTION: CPT | Mod: GN | Performed by: SPEECH-LANGUAGE PATHOLOGIST

## 2024-08-07 PROCEDURE — 84439 ASSAY OF FREE THYROXINE: CPT

## 2024-08-07 PROCEDURE — 85025 COMPLETE CBC W/AUTO DIFF WBC: CPT

## 2024-08-07 PROCEDURE — 31575 DIAGNOSTIC LARYNGOSCOPY: CPT | Performed by: OTOLARYNGOLOGY

## 2024-08-07 RX ORDER — NYSTATIN 100000/ML
SUSPENSION, ORAL (FINAL DOSE FORM) ORAL
Qty: 60 ML | Refills: 0 | Status: SHIPPED | OUTPATIENT
Start: 2024-08-07 | End: 2024-08-14

## 2024-08-07 ASSESSMENT — PAIN SCALES - GENERAL: PAINLEVEL: MODERATE PAIN (5)

## 2024-08-07 NOTE — LETTER
8/7/2024       RE: Ko Thakkar  [address]  [place] [address]     Dear Colleague,    Thank you for referring your patient, Ko Thakkar, to the [facility] EAR [facility]AT [facility]IN[facility] [place] at [facility]. Please see a copy of my visit note below.    Dear Dr. Vicente:    I had the pleasure of seeing Ko Thakkar in follow-up today at the [facility] Otolaryngology [facility].     History of Present Illness:   Ko Thakkar is a 68 year old man with a T4N2c SCC of the oropharynx.    He was seen by [facility] on 4/3/2023 at the request of his PCP for dysphagia to solid foods. They discussed EGD vs swallow study.     He had an EGD on 5/16/2023 with GI and found to be H pylori positive, started on medication.     He saw SLP on 7/11/2023 for a video swallow study at [facility]. He was noted to have poor epiglottic inversion and was recommended for further evaluation. He did have penetration and aspiration on the swallow study.    He had a CT neck on 7/28/2023 which showed a supraglottic mass measuring 2.2 x 1.4 x 2.3 cm with concern for paraglottic involvement, no cartilage erosion, enlarged bilateral level IB, IIA, III, and right level IV nodes per radiology. He was also noted to have 70% ICA stenosis with concerns for high risk plaque.     He was seen as a new patient in clinic on 8/9/2023 and was noted on exam  to have extensive tumor involving the oropharynx, supraglottis, hypopharynx, with vocal cord paralysis. He elected for an FNA of the neck with IR which was performed on 8/29/2023. Final pathology was consistent with SCC (not enough cells for p16 status). He had a PET scan on 9/6/2023 which showed FDG avid mass of the right tonsil/lateral pharyngeal wall/glossotonsillar sulcus/tongue base, right AE fold, right epiglottis, right pyriform sinus, right posterolateral pharyngeal wall, posterior  pharyngeal wall, no thyroid cartilage erosion, right retropharyngeal node, deep lobe parotid FDG avid mass, bilateral cervical nodes (right level II-IV, left level II-III), no distant disease. His case was reviewed at tumor board with recommendation for chemoradiation. He had PEG placed on 10/13/2023 which fell out 12/25/2023 and replaced 1/23/2024. He was started on induction chemotherapy with carbo/taxol with 2 cycles due to radiation/medical oncology preference, performed 10/16/2023 to 11/13/2023. He had post-induction PET scan on 11/28/2023 which showed partial response with residual disease present. He had definitive chemoradiation with Dr Louise and Dr Cordova from 12/19/2023 to 2/8/2024. He received 7000 cGy and 6 cycles of weekly carbo/taxol.  He had PET scan in May 2024 which was negative.       Interval history:  He comes in today for follow-up. He was seen in clinic in May 2024. He saw radiation oncology in May 2024. He saw medical oncology in June 2024. He saw vascular surgery in June 2024 with plans for repeat imaging in 6 months. He continues to follow with dietician working on weaning tube feeds. He decided against moving to [place]. He says he is doing ok. He is eating. He is limited by chewing because of lack of teeth. He has to drink liquids after eating to make it go down. He says he has no issues with liquids. He says he coughs a lot but denied it being related to drinking. He is doing smoothies. He is trying to gain weight. His weight is overall stable, fluctuates by just a few lbs by 135. He does nutrition through PEG once per day. He was trying to get off the tube feeds. He says he is going to go back up on it again to 4 per day. He says he has pain in his throat all the time, unchanged. He says that he wants to be at his baseline weight, priority over getting off the tube feeds.    He is accompanied by his significant other.      MEDICATIONS:     Current Outpatient Medications   Medication Sig  Dispense Refill     albuterol (PROAIR HFA/PROVENTIL HFA/VENTOLIN HFA) 108 (90 Base) MCG/ACT inhaler        amLODIPine (NORVASC) 5 MG tablet Take 5 mg by mouth daily       aspirin (ASA) 81 MG chewable tablet Take 1 tablet (81 mg) by mouth daily 90 tablet 3     atorvastatin (LIPITOR) 80 MG tablet Take 1 tablet (80 mg) by mouth daily 90 tablet 3     diphenhydrAMINE (BENADRYL) 12.5 MG/5ML liquid Taken prior to chemo       empagliflozin (JARDIANCE) 25 MG TABS tablet Take 25 mg by mouth daily       gabapentin (NEURONTIN) 250 MG/5ML solution Take 24 mLs (1,200 mg) by mouth 3 times daily 470 mL 3     gabapentin (NEURONTIN) 300 MG capsule Take 4 capsules (1,200 mg) by mouth 3 times daily 720 capsule 1     hydrochlorothiazide (HYDRODIURIL) 25 MG tablet Take 25 mg by mouth daily       ibuprofen (ADVIL/MOTRIN) 200 MG tablet Take 1,400 mg by mouth every 4 hours as needed for pain       insulin lispro protamine-insulin lispro (HUMALOG MIX 75/25 KWIKPEN) (75-25) 100 UNIT/ML pen        insulin NPH-Regular 70/30 (HUMULIN 70/30;NOVOLIN 70/30) (70-30) 100 UNIT/ML vial 50 units in the morning and 30 units in the evening       levothyroxine (SYNTHROID/LEVOTHROID) 50 MCG tablet Take 1 tablet (50 mcg) by mouth daily 30 tablet 11     liraglutide (VICTOZA) 18 MG/3ML solution Inject Subcutaneous daily       lisinopril (ZESTRIL) 40 MG tablet Take 40 mg by mouth daily       magic mouthwash (ENTER INGREDIENTS IN COMMENTS) suspension Swish, gargle, and spit one to two teaspoonfuls every six hours as needed. May be swallowed if esophageal involvement. 240 mL 3     metFORMIN (GLUCOPHAGE) 500 MG tablet [METFORMIN (GLUCOPHAGE) 500 MG TABLET] Take 500 mg by mouth 2 (two) times a day with meals.       nystatin (MYCOSTATIN) 615713 UNIT/ML suspension Take 1 teaspoonful by mouth 4 times daily 60 mL 0     pantoprazole (PROTONIX) 20 MG EC tablet Take 2 tablets (40 mg) by mouth daily 30 tablet 2     potassium chloride (KLOR-CON) 20 MEQ packet Take 20 mEq by  mouth 2 times daily 60 each 3     silver sulfADIAZINE (SILVADENE) 1 % external cream Apply topically daily 50 g 3     therapeutic multivitamin (THERAGRAN) tablet Take 1 tablet by mouth daily       TRUEPLUS 5-BEVEL PEN NEEDLES 32G X 4 MM miscellaneous        TRULICITY 1.5 MG/0.5ML pen          ALLERGIES:  No Known Allergies    HABITS/SOCIAL HISTORY:   History of alcohol sober for 24 years. Former drug abuse. Previously smoked about 23 years ago, 4 ppd previously, 8 years old started. Intermittent chewing tobacco use in the past.   Works as a .   Lives with wife.   Worked as .    Social History     Socioeconomic History     Marital status:      Spouse name: Not on file     Number of children: Not on file     Years of education: Not on file     Highest education level: Not on file   Occupational History     Not on file   Tobacco Use     Smoking status: Former     Current packs/day: 0.00     Types: Cigarettes     Quit date: 10/12/1999     Years since quittin.8     Passive exposure: Past     Smokeless tobacco: Never   Substance and Sexual Activity     Alcohol use: No     Drug use: No     Sexual activity: Not on file   Other Topics Concern     Not on file   Social History Narrative     Not on file     Social Determinants of Health     Financial Resource Strain: Not on File (12/15/2023)    Received from [facility]    Financial Resource Strain      Financial Resource Strain: 0   Food Insecurity: Not on File (12/15/2023)    Received from [facility]    Food Insecurity      Food: 0   Transportation Needs: Not on File (12/15/2023)    Received from [facility]    Transportation Needs      Transportation: 0   Physical Activity: Not on File (12/15/2023)    Received from [facility]ULL[facility]    Physical Activity      Physical Activity: 0   Stress: Not on File (12/15/2023)    Received from [facility]ULL[facility]    Stress      Stress: 0   Social Connections: Not on File (12/15/2023)    Received from [facility]ULL[facility]     "Social Connections      Social Connections and Isolation: 0   Interpersonal Safety: Not on file   Housing Stability: Not on File (12/15/2023)    Received from [facility]ULL[facility]    Housing Stability      Housin       PAST MEDICAL HISTORY:   Past Medical History:   Diagnosis Date     Cancer (H) 2024    throat     Diabetes (H)      Hypertension         PAST SURGICAL HISTORY:   Past Surgical History:   Procedure Laterality Date     ESOPHAGOSCOPY, GASTROSCOPY, DUODENOSCOPY (EGD), COMBINED N/A 10/12/2014    Procedure: ESOPHAGOGASTRODUODENOSCOPY;  Surgeon: Jh Ventura MD;  Location: [facility];  Service:      IR FINE NEEDLE ASPIRATION W ULTRASOUND  2023     IR FINE NEEDLE ASPIRATION W ULTRASOUND  10/13/2023     IR FOLLOW UP VISIT OUTPATIENT  2024     IR GASTROSTOMY TUBE PERCUTANEOUS PLCMNT  10/13/2023     IR GASTROSTOMY TUBE PERCUTANEOUS PLCMNT  2024     LAPAROTOMY EXPLORATORY Left     spleen       FAMILY HISTORY:  No family history on file.    REVIEW OF SYSTEMS:  12 point ROS was negative other than the symptoms noted above in the HPI.  Patient Supplied Answers to Review of Systems      2024     1:33 PM   [facility] ENT ROS   Constitutional Weight loss    Appetite change    Unexplained fatigue   Neurology Dizzy spells    Numbness    Unexplained weakness    Headache   Ears, Nose, Throat Ringing/noise in ears    Nasal congestion or drainage    Sore throat    Trouble swallowing    Hoarseness   Cardiopulmonary Cough    Breathing problems    Wheezing    Chest pain   Gastrointestinal/Genitourinary Diarrhea   Musculoskeletal Sore or stiff joints    Back pain    Neck pain         PHYSICAL EXAMINATION:   /82   Pulse 98   Ht 1.575 m (5' 2\")   Wt 63.4 kg (139 lb 12.8 oz)   SpO2 98%   BMI 25.57 kg/m     Appearance:   normal; NAD, age-appropriate appearance, well-developed, normal habitus   Communication:   normal; communicates verbally, normal voice quality   Head/Face:   inspection -  Normal; no " scars or visible lesions   Salivary glands -  Normal size, no tenderness, swelling, or palpable masses   Facial strength -  Normal and symmetric   Skin:  normal, no rash   Ears:  auricle (AD) -  normal  EAC (AD) -  normal  TM (AD) -  Normal  auricle (AS) -  normal  EAC (AS) -  normal  TM (AS) -  normal  Normal clinical speech reception   Nose:  Ext. inspection -  Normal  Internal Inspection -  Normal mucosa, septum, and turbinates   Nasopharynx:  Normal mucosa, no masses   Oral Cavity:  lips -  Normal mucosa, oral competence, and stoma size   edentulous, healthy gingival mucosa   Hard palate, buccal, floor of mouth mucosa normal   Tongue - normal movement, no lesions   Oropharynx:  Radiation changes  Thick pooled secretions  No obvious tumor  Concern for thrush on left side of oropharynx   Hypopharynx:  Radiation changes to mucosa  Unable to visualize pyriform sinuses due to edema  Some thick secretions, spills over to larynx    Larynx:  Radiation edema to the epiglottis, AE folds, arytenoids  Bilaterally mobile vocal cords  Concern for thrush along the supraglottis (left AE fold/arytenoid)  Some secretions present, no aspiration   Neck: No palpable masses  Lymphedema mild   Lymphatic:  no abnormal nodes   Cardiovascular:  warm, pink, well-perfused extremities without swelling, tenderness, or edema   Respiratory:  Normal respiratory effort, no stridor   Neuro/Psych.:  mood/affect -  normal  mental status -  normal         PROCEDURES:   Flexible fiberoptic laryngoscopy: Scope exam was indicated due to oropharyngeal cancer. Verbal consent was obtained. The nasal cavity was prepped with an aerosolized solution of topical anesthetic and vasoconstrictive agent. The scope was passed through the anterior nasal cavity and advanced. Inspection of the nasopharynx revealed no gross abnormality. There are radiation changes to the mucosa throughout, thick secretions. There is radiation edema of the epiglottis, AE folds,  arytenoids. Inspection of the larynx revealed bilaterally mobile vocal cords. No obvious tumor. Concern for thrush on left oropharynx and left arytenoid/AE fold.  Pyriform sinuses unable to be visualized due to edema. The airway is patent. Procedure tolerated well with no immediate complications noted.              RESULTS REVIEWED:   I reviewed notes from radiation oncology, vascular surgery and medical oncology     Care discussed with SLP    IMPRESSION AND PLAN:   Ko Thakkar is a 68 year old man with a T4N2c SCC of the oropharynx with sameer metastasis. He had induction chemotherapy followed by definitive chemoradiation completed 2/8/2024.    He has no signs of recurrent disease.    I am concerned again about thrush, nystatin prescribed.    He is taking PO but is preferentially using his PEG and increasing use. Attempted to discuss with him that often after radiation patients do not fully return to their pretreatment weight. Discussed that goal is to take all of his nutrition by mouth and should be weaning PEG. He has his personal plan that he wishes to follow with regards to his nutrition and PEG use.    He was seen by SLP today.    Lymphedema referral placed previously.     Repeat CT scans in 3 months.    He is on synthroid. Repeat TSH in 3 months.     Follow-up in 3 months with labs and scans.    Thank you very much for the opportunity to participate in the care of your patient.      Denia Hardin MD  Otolaryngology- Head & Neck Surgery      This note was dictated with voice recognition software and then edited. Please excuse any unintentional errors.         CC:  Wojciech Vicente MD  [facility]   [address] [address]  [facility] [address]      Alton Mota MD  [facility]   [address] [facility] [place] [address]      Xochilt Louise MD  Department of Medical Oncology  [facility]      Juwan Cordova MD  Department of Radiation Oncology  [facility]      Again, thank you for  allowing me to participate in the care of your patient.      Sincerely,    Denia Hardin MD

## 2024-08-08 NOTE — PROGRESS NOTES
SPEECH LANGUAGE PATHOLOGY EVALUATION       Fall Risk Screen:  Fall screen completed by: SLP  Have you fallen 2 or more times in the past year?: No  Have you fallen and had an injury in the past year?: No  Is patient a fall risk?: No    Subjective      Presenting condition or subjective complaint: Pt seen today in conjunction with ENT clinic visit. Pt was discharged from swallow therapy in May due to Pt moving; however, Pt did not move. He returns today with ongoing reports of dysphagia and using his PEG tube more. Patient history includes T4N2c SCC of the oropharynx.  Pt completed induction chemotherapy for his laryngeal cancer. He completed chemoradiation from 12/19/23 to 2/8/24. PEG put in 1/23/24. Pt accompanied by his wife today.   Date of onset: 10/10/23    Relevant medical history: Arthritis; Cancer; Diabetes   Dates & types of surgery:      Prior diagnostic imaging/testing results:       Prior therapy history for the same diagnosis, illness or injury: No      Living Environment  Social support: With a significant other or spouse   Help at home:    Equipment owned:       Employment:      Hobbies/Interests:      Patient goals for therapy:      Pain assessment: Pain denied     Objective     SWALLOW EVALUTION  Dysphagia history: History of dysphagia during and after radiation treatment  Current Diet/Method of Nutritional Intake: thin liquids (level 0), gastrostomy tube (PEG)        CLINICAL SWALLOW EVALUATION  Oral Motor Function: generally intact  Dentition: edentulous, does not have dentures     Level of assist required for feeding: no assistance needed   Textures Trialed:   Clinical Swallow Eval: Thin Liquids  Mode of presentation: cup, self-fed   Volume presented: 6oz  Preparatory Phase: WFL  Oral Phase: WFL  Pharyngeal phase of swallow: intact   Strategies trialed during procedure: DEENA SLP CLINICAL EVAL STRATEGIES: none    Diagnostic statement: No overt signs of aspiration. No throat clearing or  coughing    Clinical Swallow Eval: Louises  Mode of presentation: spoon, self-fed   Volume presented: 4 oz  Preparatory Phase: WFL  Oral Phase: WFL  Pharyngeal phase of swallow: intact   Strategies trialed during procedure: DEENA SLP CLINICAL EVAL STRATEGIES: none    Diagnostic statement: No difficulty with pureed textures. No overt signs of aspiration. Pureed textures appear to go down without difficulty.      ADDITIONAL EVAL COMPLETED TODAY : none    ESOPHAGEAL PHASE OF SWALLOW  no observed or reported concerns related to esophageal function     SWALLOW ASSESSMENT CLINICAL IMPRESSIONS AND RATIONALE  Diet Consistency Recommendations: thin liquids (level 0), soft & bite-sized (level 6)    Recommended Feeding/Eating Techniques: slow rate of intake, alternate food and liquid intake   Medication Administration Recommendations: pills with water  Instrumental Assessment Recommendations:  none      Assessment & Plan   CLINICAL IMPRESSIONS   Medical Diagnosis: Squamous cell carcinoma of the supraglottis    Treatment Diagnosis: moderate oropharyngeal dysphagia   Impression/Assessment: Pt is a 68 year old male with swallowing complaints. The following significant findings have been identified:  mild oropharyngeal dysphagia , characterized by decreased bolus preparation and control related to edentulous. Otherwise, no overt signs of aspiration with trials today and also no indication of esophageal narrowing with trials today. Pt denied sensation of food sticking with pureed trials.  Identified deficits interfere with their ability to consume an oral diet and maintain nutrition as compared to previous level of function. Patient continues to use his PEG tube and is increased the amount of cartons he uses daily from 3-4. Patient reports he is most concerned with his weight loss and he wants to make sure he gains weight before he stops using the PEG tube. Discussed at great length the reason why patient is not eating orally,  ultimately it appears to be related to his teeth. Patient also reports food sticks and does not go down. Trials of applesauce and coffee today do not demonstrate any overt signs of aspiration and also demonstrate that when he swallows nothing is regurgitated or unable to pass through the oropharynx. Reviewed video swallow study. At this time patient was strongly encouraged to put foods in the  and to eat soft textures mainly for purposes of mastication. Patient reports that when he is ready to get the tube out he will move to do that. However at this time he reports he wants to continue to gain weight using the tube and will then transition off. Patient was educated that the longer the tube stays in the harder it will be for him to work to get it out. Patient denied this concern and reports that when he is ready he will stop using the PEG tube. Provided new copy of handout. Trained pt and wife on rationale/importance of continuing home exercise program 2-3x/day for the first two years after XRT d/t risk of radiation induced fibrosis. Pt able to demonstrate exercises. Recommend speech therapy follow up in 1 month. Pt agrees with plan of care.     PLAN OF CARE  Treatment Interventions: Swallowing dysfunction and/or oral function for feeding    Prognosis to achieve stated therapy goals is good   Rehab potential is impacted by: comorbidities, current level of function, family/caregiver support, patient awareness of deficits, patient motivation    Long Term Goals:   SLP Goal 1  Goal Identifier: PO  Goal Description: Pt will tolerate least restricitive diet while adhering to safe swallow precautions and without pulmonary compromise across 6 months time.  Rationale: To maximize safety, ease and/or independence of oral intake  Target Date: 11/05/24  SLP Goal 2  Goal Identifier: Exercise  Goal Description: Pt will improve and/or maintain ROM and strength of BOT, jaw and pharynx by completing 10 repetitions of 5/5  exercises 3 times daily with minimal written or verbal cues.  Rationale: To maximize safety, ease and/or independence of oral intake  Target Date: 11/05/24      Frequency of Treatment: 2-4x/month  Duration of Treatment: 18 months     Recommended Referrals to Other Professionals:  dietition  Education Assessment:   Learner/Method: Patient;Significant Other;No Barriers to Learning    Risks and benefits of evaluation/treatment have been explained.   Patient/Family/caregiver agrees with Plan of Care.     Evaluation Time:    SLP Eval: oral/pharyngeal swallow function, clinical minutes (56705): 40         Signing Clinician: Martine WATTERS [facility]                                                                                   OUTPATIENT SPEECH LANGUAGE PATHOLOGY      PLAN OF TREATMENT FOR OUTPATIENT REHABILITATION   Patient's Last Name, First Name, UDONGJAYCOB  BijanKo YOB: 1955   Provider's Name   [facility]   Medical Record No.  [id number]     Onset Date: 10/10/23 Start of Care Date: 10/10/23     Medical Diagnosis:  Squamous cell carcinoma of the supraglottis      SLP Treatment Diagnosis: moderate oropharyngeal dysphagia  Plan of Treatment  Frequency/Duration: 2-4x/month  / 18 months     Certification date from 08/08/24   To 11/05/24          See note for plan of treatment details and functional goals     Martine Grant                         I CERTIFY THE NEED FOR THESE SERVICES FURNISHED UNDER        THIS PLAN OF TREATMENT AND WHILE UNDER MY CARE     (Physician attestation of this document indicates review and certification of the therapy plan).              Referring Provider:  No ref. provider found    Initial Assessment  See Epic Evaluation- 10/10/23

## 2024-08-21 ENCOUNTER — TELEPHONE (OUTPATIENT)
Dept: ONCOLOGY | Facility: CLINIC | Age: 69
End: 2024-08-21

## 2024-08-21 NOTE — TELEPHONE ENCOUNTER
Oncology Nutrition services:     Sent email and text link for video visit with dietitian. No connection was made so writer called and left vm indicating reason for phone call.  Advised Ace and/or Ondina to return call to return at his convenience.      Meka Lee RD, , LD  [facility]  [phone]

## 2024-08-28 ENCOUNTER — TELEPHONE (OUTPATIENT)
Dept: RADIATION ONCOLOGY | Facility: CLINIC | Age: 69
End: 2024-08-28
Payer: COMMERCIAL

## 2024-08-28 NOTE — TELEPHONE ENCOUNTER
Oncology Nutrition Services:     Received return call back from Ondina today as Ace missed his appointment with RD last week.   Both Ace and Ondina have Covid last week and have been 'completely wiped out'.     She tells me that his appetite remains poor in addition to difficulty with chewing and swallowing.   He tends to take bites of foods and loses interest.   Per his wife, he seems to prefer taking his tube feeding rather than eating.   He forgets to take his 'tamela supplement, CB-1' so having less appetite.   He has very little energy and is barely working due to fatigue. He feels like the residual chemo/rads have been wiping him out.     He has been taking 4 cartons of Osmolite 1.5 tube feeding/day in addition to bites of his meals.   His wife has been offering foods and providing enouragment but he seems to be getting frustrated with this.     She tells me that his home weight is now down to 130 lbs.  Wt Readings from Last 10 Encounters:   08/07/24 63.4 kg (139 lb 12.8 oz)   06/24/24 63.7 kg (140 lb 6.4 oz)   06/12/24 66.5 kg (146 lb 9.6 oz)   05/06/24 68.1 kg (150 lb 1.6 oz)   03/20/24 73.5 kg (162 lb 1.6 oz)   03/19/24 70.3 kg (155 lb)   02/08/24 70.9 kg (156 lb 3.2 oz)   02/06/24 72.6 kg (160 lb)   02/05/24 69.7 kg (153 lb 9.6 oz)   01/30/24 71.7 kg (158 lb)        Interventions:  Due to poor PO intake and lack of appetite, advised to increase EN via PEG tube to 6 cartons/day  Advised to continue striving for increased PO volumes as able.   Encouraged to remind self to take tamela supplement CB-1 as it has worked in the past  Will suggest Rx for appetite stimulant from oncology team  Follow up with writer in 4 weeks      Meka Lee RD, , LD  [facility]  [phone]

## 2024-09-05 ENCOUNTER — ENROLLMENT (OUTPATIENT)
Dept: HOME HEALTH SERVICES | Facility: HOME HEALTH | Age: 69
End: 2024-09-05
Payer: COMMERCIAL

## 2024-09-23 ENCOUNTER — TELEPHONE (OUTPATIENT)
Dept: OTOLARYNGOLOGY | Facility: CLINIC | Age: 69
End: 2024-09-23
Payer: COMMERCIAL

## 2024-09-23 ENCOUNTER — TELEPHONE (OUTPATIENT)
Dept: VASCULAR SURGERY | Facility: CLINIC | Age: 69
End: 2024-09-23
Payer: COMMERCIAL

## 2024-09-23 NOTE — TELEPHONE ENCOUNTER
LMTCB to schedule US/6mo follow up with Dr. Leonard around Dec. [phone]  ________________________________  Olga Monique RN  [facility]  Needs 6 month follow-up with Dr Leonard and carotid us

## 2024-09-30 ENCOUNTER — TELEPHONE (OUTPATIENT)
Dept: ONCOLOGY | Facility: CLINIC | Age: 69
End: 2024-09-30
Payer: COMMERCIAL

## 2024-09-30 NOTE — TELEPHONE ENCOUNTER
Oncology Nutrition Services:     Received phone call today from Ondina, Ace's spouse, with update on Ace.    She tells me that they will be moving to [place] at the end of October as they believe this will help his physical and mental health.  They plan to get in the [facility] in [place] for healthcare.     Ondina tells me that he has been having:  --Low energy, fatigue  --Cold, cold hands  --Poor sleep  --Taking liquid gabapentin to reduce pain and and help sleep       Enteral Nutrition:  He continues to take 100% of his nutrition via FT.   Enteral Nutrition   Formula: Osmolite 1.5 felicia  Volume: 7 cartons/day (1660 mL, 1267 ml free water)  Provisions:  2485kcal (37kcal/kg), 105 g protein (1.6g/kg), 336g CHO, 0g fiber    PO intake:  He also eats regular foods/meals for dinner.   He might have a protein shake during the day.      Weight trends:  Home scale weight remains stable at 139 lb   Wt Readings from Last 10 Encounters:   08/07/24 63.4 kg (139 lb 12.8 oz)   06/24/24 63.7 kg (140 lb 6.4 oz)   06/12/24 66.5 kg (146 lb 9.6 oz)   05/06/24 68.1 kg (150 lb 1.6 oz)   03/20/24 73.5 kg (162 lb 1.6 oz)   03/19/24 70.3 kg (155 lb)   02/08/24 70.9 kg (156 lb 3.2 oz)   02/06/24 72.6 kg (160 lb)   02/05/24 69.7 kg (153 lb 9.6 oz)   01/30/24 71.7 kg (158 lb)       Interventions:  Due to poor PO intake and lack of appetite, advised to continue EN via PEG tube to 6-7 cartons/day  Advised to continue striving for increased PO volumes as able.   Encouraged to remind self to take tamela supplement CB-1 as it has worked in the past  Will help coordinate changing home infusion companies when they move to [place] (ie [place] or [facility])  Follow up with writer in 3 weeks, 10/21 at 11am.       Meka Lee RD, , LD  [facility]  [phone]

## 2024-10-10 NOTE — PROGRESS NOTES
Dear Dr. Vicente:    I had the pleasure of seeing Ko Thakkar in follow-up today at the [facility].     History of Present Illness:   Ko Thakkar is a 68 year old man with a T4N2c SCC of the oropharynx.    He was seen by [facility] on 4/3/2023 at the request of his PCP for dysphagia to solid foods. They discussed EGD vs swallow study.     He had an EGD on 5/16/2023 with GI and found to be H pylori positive, started on medication.     He saw SLP on 7/11/2023 for a video swallow study at [facility]. He was noted to have poor epiglottic inversion and was recommended for further evaluation. He did have penetration and aspiration on the swallow study.    He had a CT neck on 7/28/2023 which showed a supraglottic mass measuring 2.2 x 1.4 x 2.3 cm with concern for paraglottic involvement, no cartilage erosion, enlarged bilateral level IB, IIA, III, and right level IV nodes per radiology. He was also noted to have 70% ICA stenosis with concerns for high risk plaque.     He was seen as a new patient in clinic on 8/9/2023 and was noted on exam  to have extensive tumor involving the oropharynx, supraglottis, hypopharynx, with vocal cord paralysis. He elected for an FNA of the neck with IR which was performed on 8/29/2023. Final pathology was consistent with SCC (not enough cells for p16 status). He had a PET scan on 9/6/2023 which showed FDG avid mass of the right tonsil/lateral pharyngeal wall/glossotonsillar sulcus/tongue base, right AE fold, right epiglottis, right pyriform sinus, right posterolateral pharyngeal wall, posterior pharyngeal wall, no thyroid cartilage erosion, right retropharyngeal node, deep lobe parotid FDG avid mass, bilateral cervical nodes (right level II-IV, left level II-III), no distant disease. His case was reviewed at tumor board with recommendation for chemoradiation. He had PEG placed on 10/13/2023 which fell out 12/25/2023 and  replaced 1/23/2024. He was started on induction chemotherapy with carbo/taxol with 2 cycles due to radiation/medical oncology preference, performed 10/16/2023 to 11/13/2023. He had post-induction PET scan on 11/28/2023 which showed partial response with residual disease present. He had definitive chemoradiation with Dr Louise and Dr Cordova from 12/19/2023 to 2/8/2024. He received 7000 cGy and 6 cycles of weekly carbo/taxol.  He had PET scan in May 2024 which was negative.       Interval history:  He comes in today for follow-up. He was seen in clinic in August 2024. He comes in with labs and scans. He comes in sooner than schedule because he plans to move to [place] again at the end of this month. He is doing a few meals by mouth per day. He is doing between 6-8 cartoons of tube feeds per day. His weight is starting to go up now. He has no pain with swallowing. He has had a few sore throat, located at back of throat when waking up believes secondary to mouth breathing at night. He has occasional sticking of food with swallowing. He can clear with water. He as little aspiration at times. He feels this is not severe. He is trying to do softer foods related to lack of teeth. He does chicken tenders, nuggets, mashed potatoes, eggs, pancakes. He is avoiding bread of because of the dough feeling. He can do biscuits smothered with gravy. He has no ear pain. He has decreased hearing. He has no hemoptysis. His energy is low. He is taking naps - falls asleep if sitting for very long. He does his exercises when he remembers. He is moving at end of the month to [place].     He is accompanied by his significant other who supplements history.      MEDICATIONS:     Current Outpatient Medications   Medication Sig Dispense Refill    albuterol (PROAIR HFA/PROVENTIL HFA/VENTOLIN HFA) 108 (90 Base) MCG/ACT inhaler       amLODIPine (NORVASC) 5 MG tablet Take 5 mg by mouth daily      aspirin (ASA) 81 MG chewable tablet Take 1 tablet (81  mg) by mouth daily 90 tablet 3    atorvastatin (LIPITOR) 80 MG tablet Take 1 tablet (80 mg) by mouth daily 90 tablet 3    diphenhydrAMINE (BENADRYL) 12.5 MG/5ML liquid Taken prior to chemo      empagliflozin (JARDIANCE) 25 MG TABS tablet Take 25 mg by mouth daily      gabapentin (NEURONTIN) 250 MG/5ML solution Take 24 mLs (1,200 mg) by mouth 3 times daily 470 mL 3    gabapentin (NEURONTIN) 300 MG capsule Take 4 capsules (1,200 mg) by mouth 3 times daily 720 capsule 1    hydrochlorothiazide (HYDRODIURIL) 25 MG tablet Take 25 mg by mouth daily      ibuprofen (ADVIL/MOTRIN) 200 MG tablet Take 1,400 mg by mouth every 4 hours as needed for pain      insulin lispro protamine-insulin lispro (HUMALOG MIX 75/25 KWIKPEN) (75-25) 100 UNIT/ML pen       insulin NPH-Regular 70/30 (HUMULIN 70/30;NOVOLIN 70/30) (70-30) 100 UNIT/ML vial 50 units in the morning and 30 units in the evening      levothyroxine (SYNTHROID/LEVOTHROID) 50 MCG tablet Take 1 tablet (50 mcg) by mouth daily 30 tablet 11    liraglutide (VICTOZA) 18 MG/3ML solution Inject Subcutaneous daily      lisinopril (ZESTRIL) 40 MG tablet Take 40 mg by mouth daily      magic mouthwash (ENTER INGREDIENTS IN COMMENTS) suspension Swish, gargle, and spit one to two teaspoonfuls every six hours as needed. May be swallowed if esophageal involvement. 240 mL 3    metFORMIN (GLUCOPHAGE) 500 MG tablet [METFORMIN (GLUCOPHAGE) 500 MG TABLET] Take 500 mg by mouth 2 (two) times a day with meals.      pantoprazole (PROTONIX) 20 MG EC tablet Take 2 tablets (40 mg) by mouth daily 30 tablet 2    potassium chloride (KLOR-CON) 20 MEQ packet Take 20 mEq by mouth 2 times daily 60 each 3    silver sulfADIAZINE (SILVADENE) 1 % external cream Apply topically daily 50 g 3    therapeutic multivitamin (THERAGRAN) tablet Take 1 tablet by mouth daily      TRUEPLUS 5-BEVEL PEN NEEDLES 32G X 4 MM miscellaneous       TRULICITY 1.5 MG/0.5ML pen          ALLERGIES:  No Known Allergies    HABITS/SOCIAL  HISTORY:   History of alcohol sober for 24 years. Former drug abuse. Previously smoked about 23 years ago, 4 ppd previously, 8 years old started. Intermittent chewing tobacco use in the past.   Works as a .   Lives with wife.   Worked as .    Social History     Socioeconomic History    Marital status:      Spouse name: Not on file    Number of children: Not on file    Years of education: Not on file    Highest education level: Not on file   Occupational History    Not on file   Tobacco Use    Smoking status: Former     Current packs/day: 0.00     Types: Cigarettes     Quit date: 10/12/1999     Years since quittin.0     Passive exposure: Past    Smokeless tobacco: Never   Substance and Sexual Activity    Alcohol use: No    Drug use: No    Sexual activity: Not on file   Other Topics Concern    Not on file   Social History Narrative    Not on file     Social Determinants of Health     Financial Resource Strain: Not on File (12/15/2023)    Received from [facility]    Financial Resource Strain     Financial Resource Strain: 0   Food Insecurity: Not on File (2024)    Received from [facility]    Food Insecurity     Food: 0   Transportation Needs: Not on File (12/15/2023)    Received from [facility]    Transportation Needs     Transportation: 0   Physical Activity: Not on File (12/15/2023)    Received from [facility]    Physical Activity     Physical Activity: 0   Stress: Not on File (12/15/2023)    Received from [facility]    Stress     Stress: 0   Social Connections: Not on File (2024)    Received from [facility]    Social Connections     Connectedness: 0   Interpersonal Safety: Not on file   Housing Stability: Not on File (12/15/2023)    Received from [facility]    Housing Stability     Housin       PAST MEDICAL HISTORY:   Past Medical History:   Diagnosis Date    Cancer (H) 2024    throat    Diabetes (H)     Hypertension         PAST SURGICAL HISTORY:   Past Surgical  "History:   Procedure Laterality Date    ESOPHAGOSCOPY, GASTROSCOPY, DUODENOSCOPY (EGD), COMBINED N/A 10/12/2014    Procedure: ESOPHAGOGASTRODUODENOSCOPY;  Surgeon: Jh Ventura MD;  Location: [facility];  Service:     IR FINE NEEDLE ASPIRATION W ULTRASOUND  8/29/2023    IR FINE NEEDLE ASPIRATION W ULTRASOUND  10/13/2023    IR FOLLOW UP VISIT OUTPATIENT  2/8/2024    IR GASTROSTOMY TUBE PERCUTANEOUS PLCMNT  10/13/2023    IR GASTROSTOMY TUBE PERCUTANEOUS PLCMNT  1/23/2024    LAPAROTOMY EXPLORATORY Left     spleen       FAMILY HISTORY:  No family history on file.    REVIEW OF SYSTEMS:  12 point ROS was negative other than the symptoms noted above in the HPI.  Patient Supplied Answers to Review of Systems      10/10/2024     6:29 PM   [facility] ROS   Constitutional Weight gain    Weight loss    Appetite change    Unexplained fatigue    Problems with sleep   Neurology Dizzy spells    Numbness    Unexplained weakness    Headache   Ears, Nose, Throat Nasal congestion or drainage    Sore throat    Trouble swallowing    Hoarseness   Cardiopulmonary Cough    Breathing problems    Wheezing   Gastrointestinal/Genitourinary Diarrhea   Musculoskeletal Sore or stiff joints    Back pain    Neck pain   Endocrine Thirst    Heat or cold intolerance    Frequent urination         PHYSICAL EXAMINATION:   /60 (BP Location: Right arm, Patient Position: Sitting, Cuff Size: Adult Regular)   Pulse 74   Ht 1.575 m (5' 2\")   Wt 66.2 kg (146 lb)   SpO2 99%   BMI 26.70 kg/m     Appearance:   normal; NAD, age-appropriate appearance, well-developed, normal habitus   Communication:   normal; communicates verbally, normal voice quality   Head/Face:   inspection -  Normal; no scars or visible lesions   Salivary glands -  Normal size, no tenderness, swelling, or palpable masses   Facial strength -  Normal and symmetric   Skin:  normal, no rash   Ears:  auricle (AD) -  normal  EAC (AD) -  normal  TM (AD) -  Normal  auricle (AS) -  " normal  EAC (AS) -  normal  TM (AS) -  normal  Normal clinical speech reception   Nose:  Ext. inspection -  Normal  Internal Inspection -  Normal mucosa, septum, and turbinates   Nasopharynx:  Normal mucosa, no masses   Oral Cavity:  lips -  Normal mucosa, oral competence, and stoma size   edentulous, healthy gingival mucosa   Hard palate, buccal, floor of mouth mucosa normal   Tongue - normal movement, no lesions   Oropharynx:  Radiation changes  Thick secretions  No obvious tumor   Hypopharynx:  Radiation changes to mucosa  Unable to visualize pyriform sinuses due to edema from supraglottis  Some thick secretions   Larynx:  Radiation edema to the epiglottis, AE folds, arytenoids  Bilaterally mobile vocal cords  Some secretions present, no aspiration   Neck: No palpable masses  Lymphedema mild  Radiation fibrosis   Lymphatic:  no abnormal nodes   Cardiovascular:  warm, pink, well-perfused extremities without swelling, tenderness, or edema   Respiratory:  Normal respiratory effort, no stridor   Neuro/Psych.:  mood/affect -  normal  mental status -  normal         PROCEDURES:   Flexible fiberoptic laryngoscopy: Scope exam was indicated due to oropharyngeal cancer. Verbal consent was obtained. The nasal cavity was prepped with an aerosolized solution of topical anesthetic and vasoconstrictive agent. The scope was passed through the anterior nasal cavity and advanced. Inspection of the nasopharynx revealed no gross abnormality. There are radiation changes to the mucosa throughout, thick secretions. There is radiation edema of the epiglottis, AE folds, arytenoids. Inspection of the larynx revealed bilaterally mobile vocal cords. No obvious tumor.  Pyriform sinuses unable to be visualized due to edema. Postrcricoid area clear. The airway is patent. Procedure tolerated well with no immediate complications noted.          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 He had induction chemotherapy followed by definitive chemoradiation completed 2/8/2024.    He has no signs of recurrent disease.    He has worked with dietician about his PO intake. He will need to establish care locally. He prefers to use his PEG to supplement PO intake so he can gain weight even though taking things orally.    He was seen by SLP today. He will need to establish care locally.     Lymphedema referral placed previously. He intermittently does therapy.     He had repeat CT scans today. Defer further imaging to local team. Will need follow-up chest imaging for nodules present. Will send results to [facility].    He is on synthroid per oncology. TSH today mildly elevated, T4 normal. Will need to have labs followed locally.     Instructed to establish care locally, that if at [facility] they will have access to our records electronically through Care Everywhere but will likely need to sign a release of information.    Patient not rescheduled with med onc/rad onc. Message sent to Dr Cordova's team.     Follow-up PRN.    Thank you very much for the opportunity to participate in the care of your patient.      Denia Hardin MD  Otolaryngology- Head & Neck Surgery      This note was dictated with voice recognition software and then edited. Please excuse any unintentional errors.         CC:  Wojciech Vicente MD  [facility]   [address] [id number]  [facility] [address]      Alton Mota MD  [facility] [place]note 2]   [address] [facility] [address]      Xochilt Louise MD  Department of Medical Oncology  [facility]      Juwan Cordova MD  Department of Radiation Oncology  [facility]

## 2024-10-11 ENCOUNTER — ANCILLARY PROCEDURE (OUTPATIENT)
Dept: CT IMAGING | Facility: CLINIC | Age: 69
End: 2024-10-11
Attending: OTOLARYNGOLOGY
Payer: COMMERCIAL

## 2024-10-11 ENCOUNTER — THERAPY VISIT (OUTPATIENT)
Dept: SPEECH THERAPY | Facility: CLINIC | Age: 69
End: 2024-10-11
Payer: COMMERCIAL

## 2024-10-11 ENCOUNTER — OFFICE VISIT (OUTPATIENT)
Dept: OTOLARYNGOLOGY | Facility: CLINIC | Age: 69
End: 2024-10-11
Payer: COMMERCIAL

## 2024-10-11 ENCOUNTER — LAB (OUTPATIENT)
Dept: LAB | Facility: CLINIC | Age: 69
End: 2024-10-11
Attending: OTOLARYNGOLOGY
Payer: COMMERCIAL

## 2024-10-11 VITALS
HEIGHT: 62 IN | DIASTOLIC BLOOD PRESSURE: 60 MMHG | OXYGEN SATURATION: 99 % | HEART RATE: 74 BPM | BODY MASS INDEX: 26.87 KG/M2 | SYSTOLIC BLOOD PRESSURE: 118 MMHG | WEIGHT: 146 LBS

## 2024-10-11 DIAGNOSIS — C77.0 SECONDARY MALIGNANCY OF LYMPH NODES OF HEAD, FACE AND NECK (H): ICD-10-CM

## 2024-10-11 DIAGNOSIS — C10.9 SQUAMOUS CELL CARCINOMA OF OROPHARYNX (H): Primary | ICD-10-CM

## 2024-10-11 DIAGNOSIS — C10.9 SQUAMOUS CELL CARCINOMA OF OROPHARYNX (H): ICD-10-CM

## 2024-10-11 DIAGNOSIS — R13.12 DYSPHAGIA, OROPHARYNGEAL PHASE: Primary | ICD-10-CM

## 2024-10-11 DIAGNOSIS — C32.1 SCC (SQUAMOUS CELL CARCINOMA) OF SUPRAGLOTTIS (H): ICD-10-CM

## 2024-10-11 DIAGNOSIS — E03.4 HYPOTHYROIDISM DUE TO ACQUIRED ATROPHY OF THYROID: ICD-10-CM

## 2024-10-11 LAB
CREAT BLD-MCNC: 0.8 MG/DL (ref 0.7–1.3)
EGFRCR SERPLBLD CKD-EPI 2021: >60 ML/MIN/1.73M2
T4 FREE SERPL-MCNC: 0.91 NG/DL (ref 0.9–1.7)
TSH SERPL DL<=0.005 MIU/L-ACNC: 6.8 UIU/ML (ref 0.3–4.2)

## 2024-10-11 PROCEDURE — 36415 COLL VENOUS BLD VENIPUNCTURE: CPT | Performed by: PATHOLOGY

## 2024-10-11 PROCEDURE — 99207 PR NO CHARGE LOS: CPT | Performed by: SPEECH-LANGUAGE PATHOLOGIST

## 2024-10-11 PROCEDURE — 71260 CT THORAX DX C+: CPT | Performed by: RADIOLOGY

## 2024-10-11 PROCEDURE — 99214 OFFICE O/P EST MOD 30 MIN: CPT | Mod: 25 | Performed by: OTOLARYNGOLOGY

## 2024-10-11 PROCEDURE — 84439 ASSAY OF FREE THYROXINE: CPT | Performed by: PATHOLOGY

## 2024-10-11 PROCEDURE — 84443 ASSAY THYROID STIM HORMONE: CPT | Performed by: PATHOLOGY

## 2024-10-11 PROCEDURE — 82565 ASSAY OF CREATININE: CPT | Performed by: PATHOLOGY

## 2024-10-11 PROCEDURE — 70491 CT SOFT TISSUE NECK W/DYE: CPT | Performed by: RADIOLOGY

## 2024-10-11 PROCEDURE — 31575 DIAGNOSTIC LARYNGOSCOPY: CPT | Performed by: OTOLARYNGOLOGY

## 2024-10-11 RX ORDER — IOPAMIDOL 755 MG/ML
68 INJECTION, SOLUTION INTRAVASCULAR ONCE
Status: COMPLETED | OUTPATIENT
Start: 2024-10-11 | End: 2024-10-11

## 2024-10-11 RX ADMIN — IOPAMIDOL 68 ML: 755 INJECTION, SOLUTION INTRAVASCULAR at 13:29

## 2024-10-11 NOTE — DISCHARGE INSTRUCTIONS

## 2024-10-11 NOTE — LETTER
10/11/2024       RE: Ko Thakkar  [address]  [place] [address]     Dear Colleague,    Thank you for referring your patient, Ko Thakkar, to the [facility] EAR [facility]SE [facility]INIC [place] [facility]. Please see a copy of my visit note below.    Dear Dr. Vicente:    I had the pleasure of seeing Ko Thakkar in follow-up today at the [facility].     History of Present Illness:   Ko Thakkar is a 68 year old man with a T4N2c SCC of the oropharynx.    He was seen by [facility] on 4/3/2023 at the request of his PCP for dysphagia to solid foods. They discussed EGD vs swallow study.     He had an EGD on 5/16/2023 with GI and found to be H pylori positive, started on medication.     He saw SLP on 7/11/2023 for a video swallow study at [facility]. He was noted to have poor epiglottic inversion and was recommended for further evaluation. He did have penetration and aspiration on the swallow study.    He had a CT neck on 7/28/2023 which showed a supraglottic mass measuring 2.2 x 1.4 x 2.3 cm with concern for paraglottic involvement, no cartilage erosion, enlarged bilateral level IB, IIA, III, and right level IV nodes per radiology. He was also noted to have 70% ICA stenosis with concerns for high risk plaque.     He was seen as a new patient in clinic on 8/9/2023 and was noted on exam  to have extensive tumor involving the oropharynx, supraglottis, hypopharynx, with vocal cord paralysis. He elected for an FNA of the neck with IR which was performed on 8/29/2023. Final pathology was consistent with SCC (not enough cells for p16 status). He had a PET scan on 9/6/2023 which showed FDG avid mass of the right tonsil/lateral pharyngeal wall/glossotonsillar sulcus/tongue base, right AE fold, right epiglottis, right pyriform sinus, right posterolateral pharyngeal wall, posterior  pharyngeal wall, no thyroid cartilage erosion, right retropharyngeal node, deep lobe parotid FDG avid mass, bilateral cervical nodes (right level II-IV, left level II-III), no distant disease. His case was reviewed at tumor board with recommendation for chemoradiation. He had PEG placed on 10/13/2023 which fell out 12/25/2023 and replaced 1/23/2024. He was started on induction chemotherapy with carbo/taxol with 2 cycles due to radiation/medical oncology preference, performed 10/16/2023 to 11/13/2023. He had post-induction PET scan on 11/28/2023 which showed partial response with residual disease present. He had definitive chemoradiation with Dr Louise and Dr Cordova from 12/19/2023 to 2/8/2024. He received 7000 cGy and 6 cycles of weekly carbo/taxol.  He had PET scan in May 2024 which was negative.       Interval history:  He comes in today for follow-up. He was seen in clinic in August 2024. He comes in with labs and scans. He comes in sooner than schedule because he plans to move to [place] again at the end of this month. He is doing a few meals by mouth per day. He is doing between 6-8 cartoons of tube feeds per day. His weight is starting to go up now. He has no pain with swallowing. He has had a few sore throat, located at back of throat when waking up believes secondary to mouth breathing at night. He has occasional sticking of food with swallowing. He can clear with water. He as little aspiration at times. He feels this is not severe. He is trying to do softer foods related to lack of teeth. He does chicken tenders, nuggets, mashed potatoes, eggs, pancakes. He is avoiding bread of because of the dough feeling. He can do biscuits smothered with gravy. He has no ear pain. He has decreased hearing. He has no hemoptysis. His energy is low. He is taking naps - falls asleep if sitting for very long. He does his exercises when he remembers. He is moving at end of the month to [place].     He is accompanied by his  significant other who supplements history.      MEDICATIONS:     Current Outpatient Medications   Medication Sig Dispense Refill     albuterol (PROAIR HFA/PROVENTIL HFA/VENTOLIN HFA) 108 (90 Base) MCG/ACT inhaler        amLODIPine (NORVASC) 5 MG tablet Take 5 mg by mouth daily       aspirin (ASA) 81 MG chewable tablet Take 1 tablet (81 mg) by mouth daily 90 tablet 3     atorvastatin (LIPITOR) 80 MG tablet Take 1 tablet (80 mg) by mouth daily 90 tablet 3     diphenhydrAMINE (BENADRYL) 12.5 MG/5ML liquid Taken prior to chemo       empagliflozin (JARDIANCE) 25 MG TABS tablet Take 25 mg by mouth daily       gabapentin (NEURONTIN) 250 MG/5ML solution Take 24 mLs (1,200 mg) by mouth 3 times daily 470 mL 3     gabapentin (NEURONTIN) 300 MG capsule Take 4 capsules (1,200 mg) by mouth 3 times daily 720 capsule 1     hydrochlorothiazide (HYDRODIURIL) 25 MG tablet Take 25 mg by mouth daily       ibuprofen (ADVIL/MOTRIN) 200 MG tablet Take 1,400 mg by mouth every 4 hours as needed for pain       insulin lispro protamine-insulin lispro (HUMALOG MIX 75/25 KWIKPEN) (75-25) 100 UNIT/ML pen        insulin NPH-Regular 70/30 (HUMULIN 70/30;NOVOLIN 70/30) (70-30) 100 UNIT/ML vial 50 units in the morning and 30 units in the evening       levothyroxine (SYNTHROID/LEVOTHROID) 50 MCG tablet Take 1 tablet (50 mcg) by mouth daily 30 tablet 11     liraglutide (VICTOZA) 18 MG/3ML solution Inject Subcutaneous daily       lisinopril (ZESTRIL) 40 MG tablet Take 40 mg by mouth daily       magic mouthwash (ENTER INGREDIENTS IN COMMENTS) suspension Swish, gargle, and spit one to two teaspoonfuls every six hours as needed. May be swallowed if esophageal involvement. 240 mL 3     metFORMIN (GLUCOPHAGE) 500 MG tablet [METFORMIN (GLUCOPHAGE) 500 MG TABLET] Take 500 mg by mouth 2 (two) times a day with meals.       pantoprazole (PROTONIX) 20 MG EC tablet Take 2 tablets (40 mg) by mouth daily 30 tablet 2     potassium chloride (KLOR-CON) 20 MEQ packet  Take 20 mEq by mouth 2 times daily 60 each 3     silver sulfADIAZINE (SILVADENE) 1 % external cream Apply topically daily 50 g 3     therapeutic multivitamin (THERAGRAN) tablet Take 1 tablet by mouth daily       TRUEPLUS 5-BEVEL PEN NEEDLES 32G X 4 MM miscellaneous        TRULICITY 1.5 MG/0.5ML pen          ALLERGIES:  No Known Allergies    HABITS/SOCIAL HISTORY:   History of alcohol sober for 24 years. Former drug abuse. Previously smoked about 23 years ago, 4 ppd previously, 8 years old started. Intermittent chewing tobacco use in the past.   Works as a .   Lives with wife.   Worked as .    Social History     Socioeconomic History     Marital status:      Spouse name: Not on file     Number of children: Not on file     Years of education: Not on file     Highest education level: Not on file   Occupational History     Not on file   Tobacco Use     Smoking status: Former     Current packs/day: 0.00     Types: Cigarettes     Quit date: 10/12/1999     Years since quittin.0     Passive exposure: Past     Smokeless tobacco: Never   Substance and Sexual Activity     Alcohol use: No     Drug use: No     Sexual activity: Not on file   Other Topics Concern     Not on file   Social History Narrative     Not on file     Social Determinants of Health     Financial Resource Strain: Not on File (12/15/2023)    Received from [facility]    Financial Resource Strain      Financial Resource Strain: 0   Food Insecurity: Not on File (2024)    Received from [facility]    Food Insecurity      Food: 0   Transportation Needs: Not on File (12/15/2023)    Received from [facility]    Transportation Needs      Transportation: 0   Physical Activity: Not on File (12/15/2023)    Received from [facility]    Physical Activity      Physical Activity: 0   Stress: Not on File (12/15/2023)    Received from [facility]ULL[facility]    Stress      Stress: 0   Social Connections: Not on File (2024)    Received from [facility]     "Social Connections      Connectedness: 0   Interpersonal Safety: Not on file   Housing Stability: Not on File (12/15/2023)    Received from [facility]ULL[facility]    Housing Stability      Housin       PAST MEDICAL HISTORY:   Past Medical History:   Diagnosis Date     Cancer (H) 2024    throat     Diabetes (H)      Hypertension         PAST SURGICAL HISTORY:   Past Surgical History:   Procedure Laterality Date     ESOPHAGOSCOPY, GASTROSCOPY, DUODENOSCOPY (EGD), COMBINED N/A 10/12/2014    Procedure: ESOPHAGOGASTRODUODENOSCOPY;  Surgeon: Jh Ventura MD;  Location: [facility];  Service:      IR FINE NEEDLE ASPIRATION W ULTRASOUND  2023     IR FINE NEEDLE ASPIRATION W ULTRASOUND  10/13/2023     IR FOLLOW UP VISIT OUTPATIENT  2024     IR GASTROSTOMY TUBE PERCUTANEOUS PLCMNT  10/13/2023     IR GASTROSTOMY TUBE PERCUTANEOUS PLCMNT  2024     LAPAROTOMY EXPLORATORY Left     spleen       FAMILY HISTORY:  No family history on file.    REVIEW OF SYSTEMS:  12 point ROS was negative other than the symptoms noted above in the HPI.  Patient Supplied Answers to Review of Systems      10/10/2024     6:29 PM   [facility] ENT ROS   Constitutional Weight gain    Weight loss    Appetite change    Unexplained fatigue    Problems with sleep   Neurology Dizzy spells    Numbness    Unexplained weakness    Headache   Ears, Nose, Throat Nasal congestion or drainage    Sore throat    Trouble swallowing    Hoarseness   Cardiopulmonary Cough    Breathing problems    Wheezing   Gastrointestinal/Genitourinary Diarrhea   Musculoskeletal Sore or stiff joints    Back pain    Neck pain   Endocrine Thirst    Heat or cold intolerance    Frequent urination         PHYSICAL EXAMINATION:   /60 (BP Location: Right arm, Patient Position: Sitting, Cuff Size: Adult Regular)   Pulse 74   Ht 1.575 m (5' 2\")   Wt 66.2 kg (146 lb)   SpO2 99%   BMI 26.70 kg/m     Appearance:   normal; NAD, age-appropriate appearance, well-developed, normal " habitus   Communication:   normal; communicates verbally, normal voice quality   Head/Face:   inspection -  Normal; no scars or visible lesions   Salivary glands -  Normal size, no tenderness, swelling, or palpable masses   Facial strength -  Normal and symmetric   Skin:  normal, no rash   Ears:  auricle (AD) -  normal  EAC (AD) -  normal  TM (AD) -  Normal  auricle (AS) -  normal  EAC (AS) -  normal  TM (AS) -  normal  Normal clinical speech reception   Nose:  Ext. inspection -  Normal  Internal Inspection -  Normal mucosa, septum, and turbinates   Nasopharynx:  Normal mucosa, no masses   Oral Cavity:  lips -  Normal mucosa, oral competence, and stoma size   edentulous, healthy gingival mucosa   Hard palate, buccal, floor of mouth mucosa normal   Tongue - normal movement, no lesions   Oropharynx:  Radiation changes  Thick secretions  No obvious tumor   Hypopharynx:  Radiation changes to mucosa  Unable to visualize pyriform sinuses due to edema from supraglottis  Some thick secretions   Larynx:  Radiation edema to the epiglottis, AE folds, arytenoids  Bilaterally mobile vocal cords  Some secretions present, no aspiration   Neck: No palpable masses  Lymphedema mild  Radiation fibrosis   Lymphatic:  no abnormal nodes   Cardiovascular:  warm, pink, well-perfused extremities without swelling, tenderness, or edema   Respiratory:  Normal respiratory effort, no stridor   Neuro/Psych.:  mood/affect -  normal  mental status -  normal         PROCEDURES:   Flexible fiberoptic laryngoscopy: Scope exam was indicated due to oropharyngeal cancer. Verbal consent was obtained. The nasal cavity was prepped with an aerosolized solution of topical anesthetic and vasoconstrictive agent. The scope was passed through the anterior nasal cavity and advanced. Inspection of the nasopharynx revealed no gross abnormality. There are radiation changes to the mucosa throughout, thick secretions. There is radiation edema of the epiglottis, AE  folds, arytenoids. Inspection of the larynx revealed bilaterally mobile vocal cords. No obvious tumor.  Pyriform sinuses unable to be visualized due to edema. Postrcricoid area clear. The airway is patent. Procedure tolerated well with no immediate complications noted.          RESULTS REVIEWED:   CT neck and chest imaging independently reviewed     CT =chest reports reviewed     TSH elevated/T4 normal    Care discussed with SLP    IMPRESSION AND PLAN:   Ko Thakkar is a 68 year old man with a T4N2c SCC of the oropharynx with sameer metastasis. He had induction chemotherapy followed by definitive chemoradiation completed 2/8/2024.    He has no signs of recurrent disease.    He has worked with dietician about his PO intake. He will need to establish care locally. He prefers to use his PEG to supplement PO intake so he can gain weight even though taking things orally.    He was seen by SLP today. He will need to establish care locally.     Lymphedema referral placed previously. He intermittently does therapy.     He had repeat CT scans today. Defer further imaging to local team. Will need follow-up chest imaging for nodules present. Will send results to [facility].    He is on synthroid per oncology. TSH today mildly elevated, T4 normal. Will need to have labs followed locally.     Instructed to establish care locally, that if at [facility] they will have access to our records electronically through Care Everywhere but will likely need to sign a release of information.    Patient not rescheduled with med onc/rad onc. Message sent to Dr Cordova's team.     Follow-up PRN.    Thank you very much for the opportunity to participate in the care of your patient.      Denia Hardin MD  Otolaryngology- Head & Neck Surgery      This note was dictated with voice recognition software and then edited. Please excuse any unintentional errors.         CC:  Wojciech Vicente MD  [facility]   [address] [address]  [facility]  [address]      Alton Mota MD  [facility]   [address] [facility] [address]      Xochilt Louise MD  Department of Medical Oncology  [facility]      Juwan Cordova MD  Department of Radiation Oncology  [facility]      Again, thank you for allowing me to participate in the care of your patient.      Sincerely,    Denia Hardin MD

## 2024-10-11 NOTE — PROGRESS NOTES
Pt seen for brief allied health visit today per MD request. Pt and spouse report they are moving to [place] at the end of the month. He is using his PEG to work to gain weight before he tries to wean off. Recommend they establish with speech pathology when they move out to [place]. He also reports he is hopeful he can get dentures and then it will be easier to eat. Encouraged him to keep working on PO intake and establish with SLP locally.    FARNAZ Farah (JOSE duarte, CCC-SLP   Speech Language Pathologist  [place] Trained Vocologist   [facility]  Dept. of Otolaryngology  Department of Rehabilitation Services  [address] [place] [id number]  Email: [email or web address]   Phone: [phone]  Pronouns: she/her/hers

## 2024-10-17 ENCOUNTER — HOME INFUSION (OUTPATIENT)
Dept: HOME HEALTH SERVICES | Facility: HOME HEALTH | Age: 69
End: 2024-10-17
Payer: COMMERCIAL

## 2024-10-17 DIAGNOSIS — R63.8 OTHER SYMPTOMS AND SIGNS CONCERNING FOOD AND FLUID INTAKE: ICD-10-CM

## 2024-10-17 DIAGNOSIS — R13.11 DYSPHAGIA, ORAL PHASE: Primary | ICD-10-CM

## 2024-10-17 DIAGNOSIS — C32.1 SCC (SQUAMOUS CELL CARCINOMA) OF SUPRAGLOTTIS (H): ICD-10-CM

## 2024-10-18 RX ORDER — ELECTROLYTES/DEXTROSE
237 SOLUTION, ORAL ORAL
Qty: 49770 ML | Refills: 11 | Status: ACTIVE | OUTPATIENT
Start: 2024-10-23 | End: 2025-10-23

## 2024-10-21 ENCOUNTER — VIRTUAL VISIT (OUTPATIENT)
Dept: ONCOLOGY | Facility: CLINIC | Age: 69
End: 2024-10-21
Attending: DIETITIAN, REGISTERED
Payer: COMMERCIAL

## 2024-10-21 DIAGNOSIS — E11.9 TYPE 2 DIABETES MELLITUS WITHOUT COMPLICATION, UNSPECIFIED WHETHER LONG TERM INSULIN USE (H): ICD-10-CM

## 2024-10-21 DIAGNOSIS — C32.1 SCC (SQUAMOUS CELL CARCINOMA) OF SUPRAGLOTTIS (H): Primary | ICD-10-CM

## 2024-10-21 PROCEDURE — 97803 MED NUTRITION INDIV SUBSEQ: CPT | Mod: TEL,95 | Performed by: DIETITIAN, REGISTERED

## 2024-10-21 NOTE — LETTER
"10/21/2024      Ko Thakkar  [address]  [place] [address]      Dear Colleague,    Thank you for referring your patient, Ko Thakkar, to the [facility]. Please see a copy of my visit note below.    The patient has been notified of the following:      \"We have found that certain health care needs can be provided without the need for a face to face visit.  This service lets us provide the care you need with a phone conversation.       I will have full access to your [facility] medical record during this entire phone call.   I will be taking notes for your medical record.      Since this is like an office visit, we will bill your insurance company for this service.       There are potential benefits and risks of telephone visits (e.g. limits to patient confidentiality) that differ from in-person visits.?  Confidentiality still applies for telephone services, and nobody will record the visit.  It is important to be in a quiet, private space that is free of distractions (including cell phone or other devices) during the visit.??      If during the course of the call I believe a telephone visit is not appropriate, you will not be charged for this service\"     Consent has been obtained for this service by care team member: Yes     CLINICAL NUTRITION SERVICES - REASSESSMENT NOTE   EVALUATION OF PREVIOUS PLAN OF CARE:   Time Spent: 30 minutes  Visit Type: return video/phone call  Pt accompanied by: his wife, Ondina  Referring Physician: Wilfred  C10.9 (ICD-10-CM) - Squamous cell carcinoma of oropharynx (H)   E11.9 (ICD-10-CM) - Type 2 diabetes mellitus without complications (H)      Chemotherapy: Taxol - completed  Radiation: completed  PEG tube: placed 1/23/24    Monitoring from previous assessment:   -Food/Fluid intake - He has been able to eat a variety of foods as his pain decreases.  He still has some barriers to eating due to lack of teeth.    He has been drinking 2 " vegetarian protein shakes daily in addition to his Osmolite formula in his feeding tube.     -EN intake/tolerance- Ace continues to take 7-8 cartons of formula per day via feeding tube with good tolerance.  He plans to continue getting most of his nutrition via PEG tube until his appetite improves and he continues to gain weight. Per PO and EN, he's consuming ~3500 calories per day which is what he was consuming prior to getting head/neck cancer.  He admits that he is very active and has a very high metabolism.     Enteral Nutrition   Formula: Isosource 1.5 felicia  Volume: 7 cartons/day (1750mL, 1330ml free water)  Provisions:  2625kcal, 119g protein, 308g CHO, 26g fiber      -Weight trends - up 7 lb x past 2 months  Wt Readings from Last 10 Encounters:   10/11/24 66.2 kg (146 lb)   08/07/24 63.4 kg (139 lb 12.8 oz)   06/24/24 63.7 kg (140 lb 6.4 oz)   06/12/24 66.5 kg (146 lb 9.6 oz)   05/06/24 68.1 kg (150 lb 1.6 oz)   03/20/24 73.5 kg (162 lb 1.6 oz)   03/19/24 70.3 kg (155 lb)   02/08/24 70.9 kg (156 lb 3.2 oz)   02/06/24 72.6 kg (160 lb)   02/05/24 69.7 kg (153 lb 9.6 oz)     Previous Goals:   1.Aim for a balance of >2500 calories, 100g protein per day for weight repletion.   2. Continue to wean TF towards <2-4 cartons/day  3.Weight gain towards >155 lbs    Evaluation: not met /ongoing  Previous Nutrition Diagnosis:   Inadequate oral intake related to odynophagia and dysphagia as evidenced by pt reliant on EN via PEG tube to meet 100% estimated nutrition needs.   Evaluation: improving  NEW FINDINGS:   Improved pain with eating  CURRENT NUTRITION DIAGNOSIS   Inadequate oral intake related to odynophagia and dysphagia as evidenced by pt reliant on EN via PEG tube to meet 50-75% estimated nutrition needs.   INTERVENTIONS   EN Composition, EN Schedule, and Feeding Tube Flush - will remain on current regimen until PO and appetite improve. Discussed tranferring EN care to [place].  Per [facility], he can remain in their service as  they have a [place] license. They will need an updated address once he moves.  His address in [place] is pending.   Reviewed and encouraged oral intake and getting variety of food textures as tolerated.  Encouraged to aim for higher fat/low carb foods to get more calories without adding sugars.   Goals   Aim for a balance of >2500 calories, 100g protein per day for weight repletion.   2. Continue to wean TF towards <2-4 cartons/day  3. Weight gain towards >155 lbs    Follow up/Monitoring: patient has RD contact information for further questions and concerns.  He will also plan to establish care through [facility].   Enteral Nutrition intake, PO intake and Weight trends    Meka Lee RD, , LD  [facility]  [phone]          Again, thank you for allowing me to participate in the care of your patient.        Sincerely,        Meka Lee RD

## 2024-10-21 NOTE — PROGRESS NOTES
"The patient has been notified of the following:      \"We have found that certain health care needs can be provided without the need for a face to face visit.  This service lets us provide the care you need with a phone conversation.       I will have full access to your [facility] medical record during this entire phone call.   I will be taking notes for your medical record.      Since this is like an office visit, we will bill your insurance company for this service.       There are potential benefits and risks of telephone visits (e.g. limits to patient confidentiality) that differ from in-person visits.?  Confidentiality still applies for telephone services, and nobody will record the visit.  It is important to be in a quiet, private space that is free of distractions (including cell phone or other devices) during the visit.??      If during the course of the call I believe a telephone visit is not appropriate, you will not be charged for this service\"     Consent has been obtained for this service by care team member: Yes     CLINICAL NUTRITION SERVICES - REASSESSMENT NOTE   EVALUATION OF PREVIOUS PLAN OF CARE:   Time Spent: 30 minutes  Visit Type: return video/phone call  Pt accompanied by: his wife, Ondina  Referring Physician: Darriuselaine  C10.9 (ICD-10-CM) - Squamous cell carcinoma of oropharynx (H)   E11.9 (ICD-10-CM) - Type 2 diabetes mellitus without complications (H)      Chemotherapy: Taxol - completed  Radiation: completed  PEG tube: placed 1/23/24    Monitoring from previous assessment:   -Food/Fluid intake - He has been able to eat a variety of foods as his pain decreases.  He still has some barriers to eating due to lack of teeth.    He has been drinking 2 vegetarian protein shakes daily in addition to his Osmolite formula in his feeding tube.     -EN intake/tolerance- Ace continues to take 7-8 cartons of formula per day via feeding tube with good tolerance.  He plans to continue getting most of his " nutrition via PEG tube until his appetite improves and he continues to gain weight. Per PO and EN, he's consuming ~3500 calories per day which is what he was consuming prior to getting head/neck cancer.  He admits that he is very active and has a very high metabolism.     Enteral Nutrition   Formula: Isosource 1.5 felicia  Volume: 7 cartons/day (1750mL, 1330ml free water)  Provisions:  2625kcal, 119g protein, 308g CHO, 26g fiber      -Weight trends - up 7 lb x past 2 months  Wt Readings from Last 10 Encounters:   10/11/24 66.2 kg (146 lb)   08/07/24 63.4 kg (139 lb 12.8 oz)   06/24/24 63.7 kg (140 lb 6.4 oz)   06/12/24 66.5 kg (146 lb 9.6 oz)   05/06/24 68.1 kg (150 lb 1.6 oz)   03/20/24 73.5 kg (162 lb 1.6 oz)   03/19/24 70.3 kg (155 lb)   02/08/24 70.9 kg (156 lb 3.2 oz)   02/06/24 72.6 kg (160 lb)   02/05/24 69.7 kg (153 lb 9.6 oz)     Previous Goals:   1.Aim for a balance of >2500 calories, 100g protein per day for weight repletion.   2. Continue to wean TF towards <2-4 cartons/day  3.Weight gain towards >155 lbs    Evaluation: not met /ongoing  Previous Nutrition Diagnosis:   Inadequate oral intake related to odynophagia and dysphagia as evidenced by pt reliant on EN via PEG tube to meet 100% estimated nutrition needs.   Evaluation: improving  NEW FINDINGS:   Improved pain with eating  CURRENT NUTRITION DIAGNOSIS   Inadequate oral intake related to odynophagia and dysphagia as evidenced by pt reliant on EN via PEG tube to meet 50-75% estimated nutrition needs.   INTERVENTIONS   EN Composition, EN Schedule, and Feeding Tube Flush - will remain on current regimen until PO and appetite improve. Discussed tranferring EN care to [place].  Per I, he can remain in their service as they have a [place] license. They will need an updated address once he moves.  His address in [place] is pending.   Reviewed and encouraged oral intake and getting variety of food textures as tolerated.  Encouraged to aim for higher fat/low carb foods to get  more calories without adding sugars.   Goals   Aim for a balance of >2500 calories, 100g protein per day for weight repletion.   2. Continue to wean TF towards <2-4 cartons/day  3. Weight gain towards >155 lbs    Follow up/Monitoring: patient has RD contact information for further questions and concerns.  He will also plan to establish care through [facility].   Enteral Nutrition intake, PO intake and Weight trends    Meka Lee RD, , LD  [facility]  [phone]

## 2024-10-23 ENCOUNTER — OFFICE VISIT (OUTPATIENT)
Dept: RADIATION ONCOLOGY | Facility: CLINIC | Age: 69
End: 2024-10-23
Payer: COMMERCIAL

## 2024-10-23 VITALS
OXYGEN SATURATION: 95 % | HEART RATE: 82 BPM | RESPIRATION RATE: 18 BRPM | TEMPERATURE: 97.4 F | WEIGHT: 146.7 LBS | SYSTOLIC BLOOD PRESSURE: 125 MMHG | BODY MASS INDEX: 26.83 KG/M2 | DIASTOLIC BLOOD PRESSURE: 64 MMHG

## 2024-10-23 DIAGNOSIS — C10.9 SQUAMOUS CELL CARCINOMA OF OROPHARYNX (H): Primary | ICD-10-CM

## 2024-10-23 PROCEDURE — 99214 OFFICE O/P EST MOD 30 MIN: CPT | Performed by: SURGERY

## 2024-10-23 ASSESSMENT — PAIN SCALES - GENERAL: PAINLEVEL_OUTOF10: NO PAIN (0)

## 2024-10-23 NOTE — PATIENT INSTRUCTIONS
Please contact [facility] RN with questions or concerns following today's appointment.    If you are a patient of Dr. Cordova call: [phone]   If you are a patient of Dr. Collazo call: [phone]    Please feel free to leave a detailed message if your call is not answered.    If your call is not received before 3:00 PM, it may not be returned until the following business day.    If you are receiving radiation treatment and need assistance after 3:00 PM or on the weekends, please call [phone] and ask to speak to the radiation oncologist on-call.    Thank you!    [facility]iation Oncology Nursing

## 2024-10-23 NOTE — NURSING NOTE
"Oncology Rooming Note    October 23, 2024 1:21 PM   Ko Thakkar is a 68 year old male who presents for:    Chief Complaint   Patient presents with    Cancer     Radiation oncology return visit with Dr. Cordova     Initial Vitals: /64 (BP Location: Left arm, Patient Position: Chair, Cuff Size: Adult Regular)   Pulse 82   Temp 97.4  F (36.3  C) (Oral)   Resp 18   Wt 66.5 kg (146 lb 11.2 oz)   SpO2 95%   BMI 26.83 kg/m   Estimated body mass index is 26.83 kg/m  as calculated from the following:    Height as of 10/11/24: 1.575 m (5' 2\").    Weight as of this encounter: 66.5 kg (146 lb 11.2 oz). Body surface area is 1.71 meters squared.  No Pain (0) Comment: Data Unavailable   No LMP for male patient.  Allergies reviewed: Yes  Medications reviewed: Yes    Medications: Medication refills not needed today.  Pharmacy name entered into EPIC:    [facility] - [place], [place] - [address] [facility] - [place], [place] - [address] [id number] - [place], [place] - [address] [place] [address] [facility]ARMACY [id number] - [place], [place] - [address] [place] [address]    Frailty Screening:   Is the patient here for a new oncology consult visit in cancer care? 2. No      Clinical concerns: patient presents to radiation oncology clinic for return visit with Dr. Cordova, presents to clinic with his wife Aretha.  Patient completed 7,000 cGy to oropharynx and bilateral neck on 2/8/2024.  Patient is edentulous.  Patient reports intermittent throat pain, worse in the morning after sleeping as reports he is a mouth breather.  Patient reports he continues to take 5-10 mL of Gabapentin daily as needed for generalized joint and arthritis pains, denies need of Gabapentin for mouth or throat pain s/p radiation therapy.  Dr. Juwan Cordova was notified.    Frances Pino, RN BSN OCN CBCN                "

## 2024-10-23 NOTE — LETTER
10/23/2024      Ko Thakkar  [address]  [place] [address]      Dear Colleague,    Thank you for referring your patient, Ko Thakkar, to the [facility] RADIATION ONCOLOG[facility] [place]. Please see a copy of my visit note below.         Department of Radiation Oncology  [facility]  [facility]  [address] [address] [place] [address]  [phone]       Radiation Oncology Follow-up Visit  Oct 23, 2024      Ko Thakkar  MRN: [id number]   : 1955     DIAGNOSIS / ID:  Mr. Thakkar is a 68 year old male [id number] oropharyngeal SCC, p16 positive. Tumor involved the right tonsil, posterior pharyngeal wall, and right AE fold, hypopharynx, with known right cord paralysis, with bilateral adenopathy including right RP node, right level IV, and questionable right parotid node (which was bx proven SCC). There is no evidence of any distant disease. He has evidence of swallow dysfunction with dysphagia and aspiration, and voice dysfunction with dysphonia and right cord paralysis. Given extent of disease as well as potential delays in starting treatment, induction chemotherapy was administered under care of Dr. Louise.  PET CT scan after 2 cycles of carboplatin paclitaxel demonstrated a good partial response without any distant metastatic disease.     INTENT OF RADIOTHERAPY: Definitive      CONCURRENT SYSTEMIC THERAPY: Yes, carbo/taxol (Dr. Louise)         SITE OF TREATMENT: head and neck, bilateral necks      DATES  OF TREATMENT: 23-24     TOTAL DOSE OF TREATMENT / FRACTIONS: 70Gy/35fx     INTERVAL SINCE COMPLETION OF RADIATION THERAPY: 8-9 months    SUBJECTIVE:     Initial post treatment PET/CT scan was performed on 2024 which did not demonstrate any evidence of local regional or distant recurrence.    He is been following with Dr. Hardin and had neck surgery, most recently seen in 2024.   Scope exam at that time did not demonstrate any evidence of recurrence.    CT of the neck and chest was obtained on 10/11/2024 which demonstrated no evidence of local regional or obvious distant recurrence.  There was a new subsolid nodule in the left lower lobe that was nonspecific, and surveillance was recommended.    Today, he comes in for follow-up given that he will be relocating to [place] at the end of the month.  He has yet to identify a care provider in [place], but will be looking up with either [facility] or [facility], depending on insurance's.  He is accompanied by his wife.  He initially was slow to heal post adjuvant therapy, but is now doing overall quite well.  He still is using his tube feeds 6 cartons per day but also eating orally, softer foods.  Pain is well-controlled.  He does have taste, and denies a significant amount of xerostomia.    PHYSICAL EXAM:  /64 (BP Location: Left arm, Patient Position: Chair, Cuff Size: Adult Regular)   Pulse 82   Temp 97.4  F (36.3  C) (Oral)   Resp 18   Wt 66.5 kg (146 lb 11.2 oz)   SpO2 95%   BMI 26.83 kg/m    Gen: Alert, in NAD  Eyes: PERRL, EOMI, sclera anicteric  HENT     Head: NC/AT     Ears: No external auricular lesions     Nose/sinus: No rhinorrhea or epistaxis     Oral Cavity/Oropharynx: MMM, oral cavity and oropharyngeal mucositis resolved  Neck: Supple, full ROM, no LAD, thickening of bilateral neck, areas of hyper and hypopigmentation bilaterally, skin is intact  Pulm: No wheezing, stridor or respiratory distress  CV: Well-perfused, no cyanosis, no pedal edema  Abdominal: Soft, nontender, nondistended, no hepatomegaly  Back: No step-offs or pain to palpation along the thoracolumbar spine, no CVA tenderness  Rectal/: Deferred  Musculoskeletal: Normal bulk and tone   Skin: Normal color and turgor  Neurologic: A/Ox3, CN II-XII intact  Psychiatric: Appropriate mood and affect    LABS AND IMAGING:  Per HPI, reviewed and in  agreement     I have reviewed Dr. Hardin's notes     IMPRESSION: Initial post treatment PET/CT scan demonstrates complete response, with no evidence of locoregional recurrence. Most recent scan fairly stable. He is doing well post-RT.     PLAN:   I discussed I laci typically recommend follow up in 6 months with radiation oncology, however, he is re-locating to [place] at the end of the month. He will establish care either with [facility] or [facility].     Juwan Cordova M.D.  Department of Radiation Oncology  [facility]     A total of 30 minutes were spent on this visit, including preparation for this visit, face to face with patient, and medical decision making. Medical decision-making included consideration and possible diagnosis, management options, complex record review, review of diagnostic tests, consideration and discussion of significant complications based up medical history/comorbidities, and discussion with providers involved in care of the patient.       Again, thank you for allowing me to participate in the care of your patient.        Sincerely,        Juwan Cordova MD

## 2024-10-25 NOTE — PROGRESS NOTES
Department of Radiation Oncology  [facility] Center  [facility]  [address] [address] [place] [address] [phone]       Radiation Oncology Follow-up Visit  Oct 23, 2024      Ko Thakkar  MRN: [id number]   : 1955     DIAGNOSIS / ID:  Mr. Thakkar is a 68 year old male [id number] oropharyngeal SCC, p16 positive. Tumor involved the right tonsil, posterior pharyngeal wall, and right AE fold, hypopharynx, with known right cord paralysis, with bilateral adenopathy including right RP node, right level IV, and questionable right parotid node (which was bx proven SCC). There is no evidence of any distant disease. He has evidence of swallow dysfunction with dysphagia and aspiration, and voice dysfunction with dysphonia and right cord paralysis. Given extent of disease as well as potential delays in starting treatment, induction chemotherapy was administered under care of Dr. Louise.  PET CT scan after 2 cycles of carboplatin paclitaxel demonstrated a good partial response without any distant metastatic disease.     INTENT OF RADIOTHERAPY: Definitive      CONCURRENT SYSTEMIC THERAPY: Yes, carbo/taxol (Dr. Louise)         SITE OF TREATMENT: head and neck, bilateral necks      DATES  OF TREATMENT: 23-24     TOTAL DOSE OF TREATMENT / FRACTIONS: 70Gy/35fx     INTERVAL SINCE COMPLETION OF RADIATION THERAPY: 8-9 months    SUBJECTIVE:     Initial post treatment PET/CT scan was performed on 2024 which did not demonstrate any evidence of local regional or distant recurrence.    He is been following with Dr. Hardin and had neck surgery, most recently seen in 2024.  Scope exam at that time did not demonstrate any evidence of recurrence.    CT of the neck and chest was obtained on 10/11/2024 which demonstrated no evidence of local regional or obvious distant recurrence.  There was a new subsolid nodule in the left lower lobe  that was nonspecific, and surveillance was recommended.    Today, he comes in for follow-up given that he will be relocating to [place] at the end of the month.  He has yet to identify a care provider in [place], but will be looking up with either [facility] or [facility], depending on insurance's.  He is accompanied by his wife.  He initially was slow to heal post adjuvant therapy, but is now doing overall quite well.  He still is using his tube feeds 6 cartons per day but also eating orally, softer foods.  Pain is well-controlled.  He does have taste, and denies a significant amount of xerostomia.    PHYSICAL EXAM:  /64 (BP Location: Left arm, Patient Position: Chair, Cuff Size: Adult Regular)   Pulse 82   Temp 97.4  F (36.3  C) (Oral)   Resp 18   Wt 66.5 kg (146 lb 11.2 oz)   SpO2 95%   BMI 26.83 kg/m    Gen: Alert, in NAD  Eyes: PERRL, EOMI, sclera anicteric  HENT     Head: NC/AT     Ears: No external auricular lesions     Nose/sinus: No rhinorrhea or epistaxis     Oral Cavity/Oropharynx: MMM, oral cavity and oropharyngeal mucositis resolved  Neck: Supple, full ROM, no LAD, thickening of bilateral neck, areas of hyper and hypopigmentation bilaterally, skin is intact  Pulm: No wheezing, stridor or respiratory distress  CV: Well-perfused, no cyanosis, no pedal edema  Abdominal: Soft, nontender, nondistended, no hepatomegaly  Back: No step-offs or pain to palpation along the thoracolumbar spine, no CVA tenderness  Rectal/: Deferred  Musculoskeletal: Normal bulk and tone   Skin: Normal color and turgor  Neurologic: A/Ox3, CN II-XII intact  Psychiatric: Appropriate mood and affect    LABS AND IMAGING:  Per HPI, reviewed and in agreement     I have reviewed Dr. Hardin's notes     IMPRESSION: Initial post treatment PET/CT scan demonstrates complete response, with no evidence of locoregional recurrence. Most recent scan fairly stable. He is doing well post-RT.     PLAN:   I discussed I  laci typically recommend follow up in 6 months with radiation oncology, however, he is re-locating to [place] at the end of the month. He will establish care either with [facility] or [facility].     Juwan Cordova M.D.  Department of Radiation Oncology  [facility]     A total of 30 minutes were spent on this visit, including preparation for this visit, face to face with patient, and medical decision making. Medical decision-making included consideration and possible diagnosis, management options, complex record review, review of diagnostic tests, consideration and discussion of significant complications based up medical history/comorbidities, and discussion with providers involved in care of the patient.

## 2024-11-18 ENCOUNTER — HOME INFUSION (OUTPATIENT)
Dept: HOME HEALTH SERVICES | Facility: HOME HEALTH | Age: 69
End: 2024-11-18
Payer: COMMERCIAL

## 2024-12-07 ENCOUNTER — HEALTH MAINTENANCE LETTER (OUTPATIENT)
Age: 69
End: 2024-12-07

## 2024-12-12 ENCOUNTER — TELEPHONE (OUTPATIENT)
Dept: ONCOLOGY | Facility: CLINIC | Age: 69
End: 2024-12-12
Payer: COMMERCIAL

## 2024-12-18 ENCOUNTER — HOME INFUSION BILLING (OUTPATIENT)
Dept: HOME HEALTH SERVICES | Facility: HOME HEALTH | Age: 69
End: 2024-12-18
Payer: COMMERCIAL

## 2024-12-18 PROCEDURE — A4450 NON-WATERPROOF TAPE: HCPCS

## 2024-12-18 PROCEDURE — S9341 HIT ENTERAL GRAV DIEM: HCPCS

## 2024-12-18 PROCEDURE — A6402 STERILE GAUZE <= 16 SQ IN: HCPCS

## 2024-12-18 PROCEDURE — B4152 EF CALORIE DENSE>/=1.5KCAL: HCPCS

## 2024-12-19 PROCEDURE — S9341 HIT ENTERAL GRAV DIEM: HCPCS

## 2024-12-20 PROCEDURE — S9341 HIT ENTERAL GRAV DIEM: HCPCS

## 2024-12-21 PROCEDURE — S9341 HIT ENTERAL GRAV DIEM: HCPCS

## 2024-12-22 PROCEDURE — S9341 HIT ENTERAL GRAV DIEM: HCPCS

## 2024-12-23 PROCEDURE — S9341 HIT ENTERAL GRAV DIEM: HCPCS

## 2024-12-24 PROCEDURE — S9341 HIT ENTERAL GRAV DIEM: HCPCS

## 2024-12-25 PROCEDURE — S9341 HIT ENTERAL GRAV DIEM: HCPCS

## 2024-12-26 PROCEDURE — S9341 HIT ENTERAL GRAV DIEM: HCPCS

## 2024-12-27 PROCEDURE — S9341 HIT ENTERAL GRAV DIEM: HCPCS

## 2024-12-28 PROCEDURE — S9341 HIT ENTERAL GRAV DIEM: HCPCS

## 2024-12-29 PROCEDURE — S9341 HIT ENTERAL GRAV DIEM: HCPCS

## 2024-12-30 PROCEDURE — S9341 HIT ENTERAL GRAV DIEM: HCPCS

## 2024-12-31 PROCEDURE — S9341 HIT ENTERAL GRAV DIEM: HCPCS

## 2025-01-01 PROCEDURE — S9341 HIT ENTERAL GRAV DIEM: HCPCS

## 2025-01-02 PROCEDURE — S9341 HIT ENTERAL GRAV DIEM: HCPCS

## 2025-01-03 PROCEDURE — S9341 HIT ENTERAL GRAV DIEM: HCPCS

## 2025-01-04 PROCEDURE — S9341 HIT ENTERAL GRAV DIEM: HCPCS

## 2025-01-05 PROCEDURE — S9341 HIT ENTERAL GRAV DIEM: HCPCS

## 2025-01-06 PROCEDURE — S9341 HIT ENTERAL GRAV DIEM: HCPCS

## 2025-01-07 PROCEDURE — S9341 HIT ENTERAL GRAV DIEM: HCPCS

## 2025-01-08 ENCOUNTER — PATIENT OUTREACH (OUTPATIENT)
Dept: ONCOLOGY | Facility: CLINIC | Age: 70
End: 2025-01-08
Payer: COMMERCIAL

## 2025-01-08 PROCEDURE — S9341 HIT ENTERAL GRAV DIEM: HCPCS

## 2025-01-09 PROCEDURE — S9341 HIT ENTERAL GRAV DIEM: HCPCS

## 2025-01-10 PROCEDURE — S9341 HIT ENTERAL GRAV DIEM: HCPCS

## 2025-01-11 PROCEDURE — S9341 HIT ENTERAL GRAV DIEM: HCPCS

## 2025-01-12 PROCEDURE — S9341 HIT ENTERAL GRAV DIEM: HCPCS

## 2025-01-13 PROCEDURE — S9341 HIT ENTERAL GRAV DIEM: HCPCS

## 2025-01-14 PROCEDURE — S9341 HIT ENTERAL GRAV DIEM: HCPCS

## 2025-01-15 PROCEDURE — S9341 HIT ENTERAL GRAV DIEM: HCPCS

## 2025-01-16 PROCEDURE — S9341 HIT ENTERAL GRAV DIEM: HCPCS

## (undated) RX ORDER — CEFAZOLIN SODIUM 2 G/100ML
INJECTION, SOLUTION INTRAVENOUS
Status: DISPENSED
Start: 2023-10-13

## (undated) RX ORDER — FENTANYL CITRATE 50 UG/ML
INJECTION, SOLUTION INTRAMUSCULAR; INTRAVENOUS
Status: DISPENSED
Start: 2023-08-29

## (undated) RX ORDER — SODIUM CHLORIDE 9 MG/ML
INJECTION, SOLUTION INTRAVENOUS
Status: DISPENSED
Start: 2023-08-29

## (undated) RX ORDER — LIDOCAINE HYDROCHLORIDE 10 MG/ML
INJECTION, SOLUTION EPIDURAL; INFILTRATION; INTRACAUDAL; PERINEURAL
Status: DISPENSED
Start: 2023-08-29

## (undated) RX ORDER — FENTANYL CITRATE 50 UG/ML
INJECTION, SOLUTION INTRAMUSCULAR; INTRAVENOUS
Status: DISPENSED
Start: 2023-10-13

## (undated) RX ORDER — LIDOCAINE HYDROCHLORIDE 10 MG/ML
INJECTION, SOLUTION EPIDURAL; INFILTRATION; INTRACAUDAL; PERINEURAL
Status: DISPENSED
Start: 2023-10-13

## (undated) RX ORDER — IBUPROFEN 200 MG
TABLET ORAL
Status: DISPENSED
Start: 2024-01-23

## (undated) RX ORDER — FENTANYL CITRATE 50 UG/ML
INJECTION, SOLUTION INTRAMUSCULAR; INTRAVENOUS
Status: DISPENSED
Start: 2024-01-23

## (undated) RX ORDER — CEFAZOLIN SODIUM 2 G/100ML
INJECTION, SOLUTION INTRAVENOUS
Status: DISPENSED
Start: 2024-01-23

## (undated) RX ORDER — SODIUM CHLORIDE 9 MG/ML
INJECTION, SOLUTION INTRAVENOUS
Status: DISPENSED
Start: 2024-01-23

## (undated) RX ORDER — SODIUM CHLORIDE 9 MG/ML
INJECTION, SOLUTION INTRAVENOUS
Status: DISPENSED
Start: 2023-10-13

## (undated) RX ORDER — LIDOCAINE HYDROCHLORIDE 20 MG/ML
JELLY TOPICAL
Status: DISPENSED
Start: 2024-01-23

## (undated) RX ORDER — HEPARIN SODIUM (PORCINE) LOCK FLUSH IV SOLN 100 UNIT/ML 100 UNIT/ML
SOLUTION INTRAVENOUS
Status: DISPENSED
Start: 2023-11-28

## (undated) RX ORDER — LIDOCAINE HYDROCHLORIDE 20 MG/ML
JELLY TOPICAL
Status: DISPENSED
Start: 2023-10-13

## (undated) RX ORDER — LIDOCAINE HYDROCHLORIDE 10 MG/ML
INJECTION, SOLUTION EPIDURAL; INFILTRATION; INTRACAUDAL; PERINEURAL
Status: DISPENSED
Start: 2024-01-23